# Patient Record
Sex: FEMALE | NOT HISPANIC OR LATINO | Employment: OTHER | URBAN - METROPOLITAN AREA
[De-identification: names, ages, dates, MRNs, and addresses within clinical notes are randomized per-mention and may not be internally consistent; named-entity substitution may affect disease eponyms.]

---

## 2018-10-01 ENCOUNTER — APPOINTMENT (OUTPATIENT)
Dept: CT IMAGING | Facility: HOSPITAL | Age: 73
End: 2018-10-01

## 2018-10-01 ENCOUNTER — HOSPITAL ENCOUNTER (INPATIENT)
Facility: HOSPITAL | Age: 73
LOS: 18 days | Discharge: NURSING, REHAB OR LONG-TERM CARE | End: 2018-10-19
Attending: EMERGENCY MEDICINE | Admitting: INTERNAL MEDICINE

## 2018-10-01 ENCOUNTER — APPOINTMENT (OUTPATIENT)
Dept: GENERAL RADIOLOGY | Facility: HOSPITAL | Age: 73
End: 2018-10-01

## 2018-10-01 DIAGNOSIS — Z74.09 IMPAIRED FUNCTIONAL MOBILITY, BALANCE, GAIT, AND ENDURANCE: ICD-10-CM

## 2018-10-01 DIAGNOSIS — R13.12 OROPHARYNGEAL DYSPHAGIA: ICD-10-CM

## 2018-10-01 DIAGNOSIS — Z74.09 IMPAIRED MOBILITY AND ADLS: ICD-10-CM

## 2018-10-01 DIAGNOSIS — J96.01 ACUTE RESPIRATORY FAILURE WITH HYPOXEMIA (HCC): Primary | ICD-10-CM

## 2018-10-01 DIAGNOSIS — Z78.9 IMPAIRED MOBILITY AND ADLS: ICD-10-CM

## 2018-10-01 PROBLEM — R63.4 WEIGHT LOSS: Status: ACTIVE | Noted: 2018-10-01

## 2018-10-01 PROBLEM — R41.82 ALTERED MENTAL STATE: Status: ACTIVE | Noted: 2018-10-01

## 2018-10-01 PROBLEM — E87.1 HYPONATREMIA: Status: ACTIVE | Noted: 2018-10-01

## 2018-10-01 PROBLEM — J96.00 ACUTE RESPIRATORY FAILURE (HCC): Status: ACTIVE | Noted: 2018-10-01

## 2018-10-01 PROBLEM — R10.84 GENERALIZED ABDOMINAL PAIN: Status: ACTIVE | Noted: 2018-10-01

## 2018-10-01 PROBLEM — R40.2430 GLASGOW COMA SCALE TOTAL SCORE 3-8 (HCC): Status: ACTIVE | Noted: 2018-10-01

## 2018-10-01 PROBLEM — E16.2 HYPOGLYCEMIA: Status: ACTIVE | Noted: 2018-10-01

## 2018-10-01 PROBLEM — I95.9 HYPOTENSION: Status: ACTIVE | Noted: 2018-10-01

## 2018-10-01 LAB
ALBUMIN SERPL-MCNC: 3.08 G/DL (ref 3.2–4.8)
ALT SERPL W P-5'-P-CCNC: 25 U/L (ref 7–40)
AMPHET+METHAMPHET UR QL: NEGATIVE
AMPHETAMINES UR QL: NEGATIVE
AMYLASE SERPL-CCNC: 99 U/L (ref 30–118)
ANION GAP SERPL CALCULATED.3IONS-SCNC: 10 MMOL/L (ref 3–11)
APTT PPP: 26.6 SECONDS (ref 24–31)
ARTERIAL PATENCY WRIST A: ABNORMAL
AST SERPL-CCNC: 39 U/L (ref 0–33)
ATMOSPHERIC PRESS: ABNORMAL MMHG
BACTERIA UR QL AUTO: ABNORMAL /HPF
BARBITURATES UR QL SCN: NEGATIVE
BASE EXCESS BLDA CALC-SCNC: 0.6 MMOL/L (ref 0–2)
BASOPHILS # BLD AUTO: 0.01 10*3/MM3 (ref 0–0.2)
BASOPHILS NFR BLD AUTO: 0.1 % (ref 0–1)
BDY SITE: ABNORMAL
BENZODIAZ UR QL SCN: NEGATIVE
BILIRUB UR QL STRIP: NEGATIVE
BUN BLD-MCNC: 34 MG/DL (ref 9–23)
BUN/CREAT SERPL: 32.7 (ref 7–25)
BUPRENORPHINE SERPL-MCNC: NEGATIVE NG/ML
CALCIUM SPEC-SCNC: 8.6 MG/DL (ref 8.7–10.4)
CANNABINOIDS SERPL QL: NEGATIVE
CHLORIDE SERPL-SCNC: 86 MMOL/L (ref 99–109)
CK SERPL-CCNC: 212 U/L (ref 26–174)
CLARITY UR: CLEAR
CO2 BLDA-SCNC: 25.2 MMOL/L (ref 22–33)
CO2 SERPL-SCNC: 23 MMOL/L (ref 20–31)
COCAINE UR QL: NEGATIVE
COHGB MFR BLD: 0.5 % (ref 0–2)
COLOR UR: YELLOW
CORTIS SERPL-MCNC: 39.5 MCG/DL
CREAT BLD-MCNC: 1.04 MG/DL (ref 0.6–1.3)
D-LACTATE SERPL-SCNC: 1.5 MMOL/L (ref 0.5–2)
DEPRECATED RDW RBC AUTO: 43.8 FL (ref 37–54)
EOSINOPHIL # BLD AUTO: 0 10*3/MM3 (ref 0–0.3)
EOSINOPHIL NFR BLD AUTO: 0 % (ref 0–3)
ERYTHROCYTE [DISTWIDTH] IN BLOOD BY AUTOMATED COUNT: 14.1 % (ref 11.3–14.5)
ETHANOL BLD-MCNC: <10 MG/DL (ref 0–10)
GFR SERPL CREATININE-BSD FRML MDRD: 52 ML/MIN/1.73
GLUCOSE BLD-MCNC: 178 MG/DL (ref 70–100)
GLUCOSE BLDC GLUCOMTR-MCNC: 198 MG/DL (ref 70–130)
GLUCOSE BLDC GLUCOMTR-MCNC: 81 MG/DL (ref 70–130)
GLUCOSE UR STRIP-MCNC: ABNORMAL MG/DL
HCO3 BLDA-SCNC: 24.2 MMOL/L (ref 20–26)
HCT VFR BLD AUTO: 36.9 % (ref 34.5–44)
HCT VFR BLD CALC: 37.5 %
HGB BLD-MCNC: 12.6 G/DL (ref 11.5–15.5)
HGB BLDA-MCNC: 12.2 G/DL (ref 14–18)
HGB UR QL STRIP.AUTO: NEGATIVE
HOLD SPECIMEN: NORMAL
HOLD SPECIMEN: NORMAL
HOROWITZ INDEX BLD+IHG-RTO: 60 %
HYALINE CASTS UR QL AUTO: ABNORMAL /LPF
IMM GRANULOCYTES # BLD: 0.03 10*3/MM3 (ref 0–0.03)
IMM GRANULOCYTES NFR BLD: 0.3 % (ref 0–0.6)
KETONES UR QL STRIP: NEGATIVE
LEUKOCYTE ESTERASE UR QL STRIP.AUTO: ABNORMAL
LIPASE SERPL-CCNC: 47 U/L (ref 6–51)
LYMPHOCYTES # BLD AUTO: 0.74 10*3/MM3 (ref 0.6–4.8)
LYMPHOCYTES NFR BLD AUTO: 7.1 % (ref 24–44)
MAGNESIUM SERPL-MCNC: 1.6 MG/DL (ref 1.3–2.7)
MCH RBC QN AUTO: 29 PG (ref 27–31)
MCHC RBC AUTO-ENTMCNC: 34.1 G/DL (ref 32–36)
MCV RBC AUTO: 84.8 FL (ref 80–99)
METHADONE UR QL SCN: NEGATIVE
METHGB BLD QL: 1.3 % (ref 0–1.5)
MODALITY: ABNORMAL
MONOCYTES # BLD AUTO: 0.52 10*3/MM3 (ref 0–1)
MONOCYTES NFR BLD AUTO: 5 % (ref 0–12)
NEUTROPHILS # BLD AUTO: 9.2 10*3/MM3 (ref 1.5–8.3)
NEUTROPHILS NFR BLD AUTO: 87.8 % (ref 41–71)
NITRITE UR QL STRIP: NEGATIVE
OPIATES UR QL: NEGATIVE
OXYCODONE UR QL SCN: NEGATIVE
OXYHGB MFR BLDV: 97.7 % (ref 94–99)
PCO2 BLDA: 34.5 MM HG (ref 35–45)
PCP UR QL SCN: NEGATIVE
PH BLDA: 7.45 PH UNITS (ref 7.35–7.45)
PH UR STRIP.AUTO: 7 [PH] (ref 5–8)
PLATELET # BLD AUTO: 317 10*3/MM3 (ref 150–450)
PMV BLD AUTO: 8.9 FL (ref 6–12)
PO2 BLDA: 179 MM HG (ref 83–108)
POTASSIUM BLD-SCNC: 3.5 MMOL/L (ref 3.5–5.5)
PROCALCITONIN SERPL-MCNC: <0.05 NG/ML
PROPOXYPH UR QL: NEGATIVE
PROT UR QL STRIP: ABNORMAL
RBC # BLD AUTO: 4.35 10*6/MM3 (ref 3.89–5.14)
RBC # UR: ABNORMAL /HPF
REF LAB TEST METHOD: ABNORMAL
SODIUM BLD-SCNC: 119 MMOL/L (ref 132–146)
SODIUM BLD-SCNC: 125 MMOL/L (ref 132–146)
SP GR UR STRIP: 1.02 (ref 1–1.03)
SQUAMOUS #/AREA URNS HPF: ABNORMAL /HPF
T4 FREE SERPL-MCNC: 1.36 NG/DL (ref 0.89–1.76)
TRICYCLICS UR QL SCN: NEGATIVE
TROPONIN I SERPL-MCNC: 0.02 NG/ML (ref 0–0.07)
TSH SERPL DL<=0.05 MIU/L-ACNC: 3.11 MIU/ML (ref 0.35–5.35)
UROBILINOGEN UR QL STRIP: ABNORMAL
WBC NRBC COR # BLD: 10.47 10*3/MM3 (ref 3.5–10.8)
WBC UR QL AUTO: ABNORMAL /HPF
WHOLE BLOOD HOLD SPECIMEN: NORMAL
WHOLE BLOOD HOLD SPECIMEN: NORMAL

## 2018-10-01 PROCEDURE — 25010000002 FENTANYL CITRATE (PF) 2500 MCG/50ML SOLUTION: Performed by: NURSE PRACTITIONER

## 2018-10-01 PROCEDURE — 25010000002 PIPERACILLIN SOD-TAZOBACTAM PER 1 G: Performed by: EMERGENCY MEDICINE

## 2018-10-01 PROCEDURE — 84460 ALANINE AMINO (ALT) (SGPT): CPT | Performed by: EMERGENCY MEDICINE

## 2018-10-01 PROCEDURE — 82805 BLOOD GASES W/O2 SATURATION: CPT | Performed by: EMERGENCY MEDICINE

## 2018-10-01 PROCEDURE — 83735 ASSAY OF MAGNESIUM: CPT | Performed by: EMERGENCY MEDICINE

## 2018-10-01 PROCEDURE — 70498 CT ANGIOGRAPHY NECK: CPT

## 2018-10-01 PROCEDURE — 85730 THROMBOPLASTIN TIME PARTIAL: CPT | Performed by: EMERGENCY MEDICINE

## 2018-10-01 PROCEDURE — B543ZZA ULTRASONOGRAPHY OF RIGHT JUGULAR VEINS, GUIDANCE: ICD-10-PCS | Performed by: NURSE PRACTITIONER

## 2018-10-01 PROCEDURE — 82533 TOTAL CORTISOL: CPT | Performed by: INTERNAL MEDICINE

## 2018-10-01 PROCEDURE — 94799 UNLISTED PULMONARY SVC/PX: CPT

## 2018-10-01 PROCEDURE — 85025 COMPLETE CBC W/AUTO DIFF WBC: CPT | Performed by: EMERGENCY MEDICINE

## 2018-10-01 PROCEDURE — 5A1945Z RESPIRATORY VENTILATION, 24-96 CONSECUTIVE HOURS: ICD-10-PCS | Performed by: INTERNAL MEDICINE

## 2018-10-01 PROCEDURE — 25010000002 ALBUMIN HUMAN 5% PER 50 ML: Performed by: NURSE PRACTITIONER

## 2018-10-01 PROCEDURE — 85014 HEMATOCRIT: CPT

## 2018-10-01 PROCEDURE — 36600 WITHDRAWAL OF ARTERIAL BLOOD: CPT | Performed by: EMERGENCY MEDICINE

## 2018-10-01 PROCEDURE — 80048 BASIC METABOLIC PNL TOTAL CA: CPT | Performed by: INTERNAL MEDICINE

## 2018-10-01 PROCEDURE — 82962 GLUCOSE BLOOD TEST: CPT

## 2018-10-01 PROCEDURE — 87040 BLOOD CULTURE FOR BACTERIA: CPT | Performed by: EMERGENCY MEDICINE

## 2018-10-01 PROCEDURE — 80306 DRUG TEST PRSMV INSTRMNT: CPT | Performed by: EMERGENCY MEDICINE

## 2018-10-01 PROCEDURE — 71045 X-RAY EXAM CHEST 1 VIEW: CPT

## 2018-10-01 PROCEDURE — 99291 CRITICAL CARE FIRST HOUR: CPT

## 2018-10-01 PROCEDURE — 80047 BASIC METABLC PNL IONIZED CA: CPT

## 2018-10-01 PROCEDURE — 51702 INSERT TEMP BLADDER CATH: CPT

## 2018-10-01 PROCEDURE — 80307 DRUG TEST PRSMV CHEM ANLYZR: CPT | Performed by: EMERGENCY MEDICINE

## 2018-10-01 PROCEDURE — 82550 ASSAY OF CK (CPK): CPT | Performed by: EMERGENCY MEDICINE

## 2018-10-01 PROCEDURE — 82150 ASSAY OF AMYLASE: CPT | Performed by: INTERNAL MEDICINE

## 2018-10-01 PROCEDURE — 74176 CT ABD & PELVIS W/O CONTRAST: CPT

## 2018-10-01 PROCEDURE — 83605 ASSAY OF LACTIC ACID: CPT | Performed by: EMERGENCY MEDICINE

## 2018-10-01 PROCEDURE — 05HM33Z INSERTION OF INFUSION DEVICE INTO RIGHT INTERNAL JUGULAR VEIN, PERCUTANEOUS APPROACH: ICD-10-PCS | Performed by: NURSE PRACTITIONER

## 2018-10-01 PROCEDURE — 84295 ASSAY OF SERUM SODIUM: CPT | Performed by: INTERNAL MEDICINE

## 2018-10-01 PROCEDURE — 25010000002 VANCOMYCIN PER 500 MG: Performed by: EMERGENCY MEDICINE

## 2018-10-01 PROCEDURE — 84484 ASSAY OF TROPONIN QUANT: CPT

## 2018-10-01 PROCEDURE — 71250 CT THORAX DX C-: CPT

## 2018-10-01 PROCEDURE — 99291 CRITICAL CARE FIRST HOUR: CPT | Performed by: INTERNAL MEDICINE

## 2018-10-01 PROCEDURE — 84450 TRANSFERASE (AST) (SGOT): CPT | Performed by: EMERGENCY MEDICINE

## 2018-10-01 PROCEDURE — P9041 ALBUMIN (HUMAN),5%, 50ML: HCPCS | Performed by: NURSE PRACTITIONER

## 2018-10-01 PROCEDURE — 93005 ELECTROCARDIOGRAM TRACING: CPT | Performed by: EMERGENCY MEDICINE

## 2018-10-01 PROCEDURE — 83690 ASSAY OF LIPASE: CPT | Performed by: INTERNAL MEDICINE

## 2018-10-01 PROCEDURE — 84145 PROCALCITONIN (PCT): CPT | Performed by: EMERGENCY MEDICINE

## 2018-10-01 PROCEDURE — 82040 ASSAY OF SERUM ALBUMIN: CPT | Performed by: NURSE PRACTITIONER

## 2018-10-01 PROCEDURE — 81001 URINALYSIS AUTO W/SCOPE: CPT | Performed by: EMERGENCY MEDICINE

## 2018-10-01 PROCEDURE — 94002 VENT MGMT INPAT INIT DAY: CPT

## 2018-10-01 PROCEDURE — 84443 ASSAY THYROID STIM HORMONE: CPT | Performed by: EMERGENCY MEDICINE

## 2018-10-01 PROCEDURE — 84439 ASSAY OF FREE THYROXINE: CPT | Performed by: NURSE PRACTITIONER

## 2018-10-01 PROCEDURE — 85610 PROTHROMBIN TIME: CPT

## 2018-10-01 PROCEDURE — 0042T HC CT CEREBRAL PERFUSION W/WO CONTRAST: CPT

## 2018-10-01 PROCEDURE — 70450 CT HEAD/BRAIN W/O DYE: CPT

## 2018-10-01 PROCEDURE — 0 IOPAMIDOL PER 1 ML: Performed by: EMERGENCY MEDICINE

## 2018-10-01 PROCEDURE — 36556 INSERT NON-TUNNEL CV CATH: CPT | Performed by: NURSE PRACTITIONER

## 2018-10-01 PROCEDURE — 70496 CT ANGIOGRAPHY HEAD: CPT

## 2018-10-01 RX ORDER — LISINOPRIL 10 MG/1
10 TABLET ORAL 2 TIMES DAILY
Status: ON HOLD | COMMUNITY
End: 2018-10-02

## 2018-10-01 RX ORDER — NOREPINEPHRINE BIT/0.9 % NACL 8 MG/250ML
.02-.3 INFUSION BOTTLE (ML) INTRAVENOUS
Status: DISCONTINUED | OUTPATIENT
Start: 2018-10-01 | End: 2018-10-04

## 2018-10-01 RX ORDER — SODIUM CHLORIDE 0.9 % (FLUSH) 0.9 %
1-10 SYRINGE (ML) INJECTION AS NEEDED
Status: DISCONTINUED | OUTPATIENT
Start: 2018-10-01 | End: 2018-10-19 | Stop reason: HOSPADM

## 2018-10-01 RX ORDER — DOXYCYCLINE HYCLATE 100 MG/1
100 CAPSULE ORAL 2 TIMES DAILY
Status: ON HOLD | COMMUNITY
Start: 2018-09-26 | End: 2018-10-02

## 2018-10-01 RX ORDER — CLOPIDOGREL BISULFATE 75 MG/1
75 TABLET ORAL DAILY
COMMUNITY

## 2018-10-01 RX ORDER — PRAMIPEXOLE DIHYDROCHLORIDE 1 MG/1
1 TABLET ORAL 3 TIMES DAILY
COMMUNITY

## 2018-10-01 RX ORDER — SPIRONOLACTONE AND HYDROCHLOROTHIAZIDE 25; 25 MG/1; MG/1
1 TABLET ORAL DAILY
COMMUNITY
End: 2018-10-19 | Stop reason: HOSPADM

## 2018-10-01 RX ORDER — ALBUMIN, HUMAN INJ 5% 5 %
500 SOLUTION INTRAVENOUS ONCE
Status: COMPLETED | OUTPATIENT
Start: 2018-10-01 | End: 2018-10-01

## 2018-10-01 RX ORDER — SODIUM CHLORIDE 450 MG/100ML
100 INJECTION, SOLUTION INTRAVENOUS CONTINUOUS
Status: DISCONTINUED | OUTPATIENT
Start: 2018-10-01 | End: 2018-10-02

## 2018-10-01 RX ORDER — SODIUM CHLORIDE, SODIUM LACTATE, POTASSIUM CHLORIDE, CALCIUM CHLORIDE 600; 310; 30; 20 MG/100ML; MG/100ML; MG/100ML; MG/100ML
150 INJECTION, SOLUTION INTRAVENOUS CONTINUOUS
Status: DISCONTINUED | OUTPATIENT
Start: 2018-10-01 | End: 2018-10-01

## 2018-10-01 RX ORDER — ESOMEPRAZOLE MAGNESIUM 40 MG/1
40 CAPSULE, DELAYED RELEASE ORAL DAILY
COMMUNITY
End: 2018-10-19 | Stop reason: HOSPADM

## 2018-10-01 RX ORDER — KETOROLAC TROMETHAMINE 5 MG/ML
1 SOLUTION OPHTHALMIC 4 TIMES DAILY PRN
COMMUNITY
End: 2018-10-19 | Stop reason: HOSPADM

## 2018-10-01 RX ORDER — LOSARTAN POTASSIUM 100 MG/1
100 TABLET ORAL DAILY
COMMUNITY
End: 2018-10-19 | Stop reason: HOSPADM

## 2018-10-01 RX ORDER — VANCOMYCIN HYDROCHLORIDE 1 G/200ML
1000 INJECTION, SOLUTION INTRAVENOUS ONCE
Status: COMPLETED | OUTPATIENT
Start: 2018-10-01 | End: 2018-10-01

## 2018-10-01 RX ORDER — SODIUM CHLORIDE 0.9 % (FLUSH) 0.9 %
10 SYRINGE (ML) INJECTION AS NEEDED
Status: DISCONTINUED | OUTPATIENT
Start: 2018-10-01 | End: 2018-10-19 | Stop reason: HOSPADM

## 2018-10-01 RX ADMIN — TAZOBACTAM SODIUM AND PIPERACILLIN SODIUM 3.38 G: 375; 3 INJECTION, SOLUTION INTRAVENOUS at 23:12

## 2018-10-01 RX ADMIN — SODIUM CHLORIDE 1000 ML: 9 INJECTION, SOLUTION INTRAVENOUS at 16:10

## 2018-10-01 RX ADMIN — NOREPINEPHRINE BITARTRATE 0.2 MCG/KG/MIN: 8 SOLUTION at 23:11

## 2018-10-01 RX ADMIN — ALBUMIN HUMAN 500 ML: 0.05 INJECTION, SOLUTION INTRAVENOUS at 20:55

## 2018-10-01 RX ADMIN — IOPAMIDOL 115 ML: 755 INJECTION, SOLUTION INTRAVENOUS at 16:28

## 2018-10-01 RX ADMIN — TAZOBACTAM SODIUM AND PIPERACILLIN SODIUM 3.38 G: 375; 3 INJECTION, SOLUTION INTRAVENOUS at 17:45

## 2018-10-01 RX ADMIN — SODIUM CHLORIDE, POTASSIUM CHLORIDE, SODIUM LACTATE AND CALCIUM CHLORIDE 150 ML/HR: 600; 310; 30; 20 INJECTION, SOLUTION INTRAVENOUS at 20:56

## 2018-10-01 RX ADMIN — VANCOMYCIN HYDROCHLORIDE 1000 MG: 1 INJECTION, SOLUTION INTRAVENOUS at 18:14

## 2018-10-01 RX ADMIN — SODIUM CHLORIDE 100 ML/HR: 4.5 INJECTION, SOLUTION INTRAVENOUS at 23:12

## 2018-10-01 RX ADMIN — FENTANYL CITRATE 50 MCG/HR: 50 INJECTION, SOLUTION INTRAMUSCULAR; INTRAVENOUS at 23:39

## 2018-10-01 RX ADMIN — SODIUM CHLORIDE 1000 ML: 9 INJECTION, SOLUTION INTRAVENOUS at 20:30

## 2018-10-01 RX ADMIN — SODIUM CHLORIDE 1000 ML: 9 INJECTION, SOLUTION INTRAVENOUS at 17:49

## 2018-10-01 NOTE — H&P
"Pulmonary/Critical Care History and Physical Exam     LOS: 0 days   Patient Care Team:  Provider, No Known as PCP - General    Chief Complaint:    Chief Complaint   Patient presents with   • Loss of Consciousness     Source of history: Chart, attempted to call available phone number from chart and message says that number is unavailable.    Subjective     HPI:     73-year-old female who arrived by [facility] to the emergency department here after being found down and unresponsive by family in her home.  She reportedly was found by her son unresponsive and \"not breathing\".  The patient's blood sugar apparently was 105 initially, but 39 mg/dL en route.  Initially, the patient had an oxygen saturation of 47% and was intubated in the field.  The ER was able to contact the patient's son however he was noted to be a poor historian.  He does report that she has had increased weakness over the last few weeks and states that she was evaluated for a seizure 2 weeks previously and had hyponatremia at that point.  He denied that she has diabetes.  She reportedly has a history of CVA in the past.    CT head done emergency department showed no acute abnormality.  CT perfusion study was negative according to the ER attending physician although I do not see an official report is taking this.  CT angiogram is pending.  The patient was started on empiric antibiotics for sepsis.  She was significantly hypotensive in the ER although responds to fluids before drifting back down to 60s to 70s systolic.    On my evaluation, the patient is intubated and drowsy but is arousable.  She follows commands in all 4 extremities.  She does report abdominal tenderness.    I attempted to call the patient's son however I was unable to contact them as the patient's son's phone number message states that the numbers unavailable.    History taken from: patient    Past Medical History:   Diagnosis Date   • Stroke (CMS/[facility])        History reviewed. No " pertinent surgical history.    History reviewed. No pertinent family history.    Social History     Social History   • Marital status: Unknown     Spouse name: N/A   • Number of children: N/A   • Years of education: N/A     Occupational History   • Not on file.     Social History Main Topics   • Smoking status: Unknown If Ever Smoked   • Smokeless tobacco: Not on file   • Alcohol use Defer   • Drug use: Unknown   • Sexual activity: Defer     Other Topics Concern   • Not on file     Social History Narrative   • No narrative on file       Not on File    PMH/FH/SocH were reviewed by me and updates were made.     Review of Systems:    Review of 14 systems was completed with positives and pertinent negatives noted in the subjective section.  All other systems reviewed and are negative.   Exceptions are noted below:    Unable to obtain secondary to altered mental status, intubation.    Objective     Vital Signs  Temp:  [94 °F (34.4 °C)-96.6 °F (35.9 °C)] 96.6 °F (35.9 °C)  Heart Rate:  [67-86] 71  Resp:  [14-16] 16  BP: ()/(36-80) 115/64  FiO2 (%):  [60 %] 60 %  Body mass index is 14.63 kg/m².  FiO2 (%):  [60 %] 60 %  S RR:  [12] 12  PEEP/CPAP (cm H2O):  [5 cm H20] 5 cm H20    Physical Exam:     General Appearance:    Drowsy, on ventilator, cachectic, in no acute distress   Head:    Normocephalic, without obvious abnormality, atraumatic   Eyes:            Lids and lashes normal, conjunctivae and sclerae normal, no   icterus, no pallor, corneas clear, PERRL   ENMT:   Ears appear intact with no abnormalities noted      No oral lesions, edentulous, no thrush, oral mucosa moist, oral endotracheal tube in place.     Neck:   No adenopathy, supple, trachea midline, no thyromegaly, no   carotid bruit, no JVD       Lungs/resp:     On ventilator, symmetric chest rise, no crepitus, clear to      auscultation bilaterally, no chest wall tenderness                  Heart/CV:    Regular rhythm and normal rate, normal S1 and S2, no             murmur   Abdomen/GI:     Normal bowel sounds, no masses, no organomegaly, soft, moderately distended, mild diffuse tenderness to palpation.         G/U:     Deferred   Extremities/MSK:   No clubbing, cyanosis or edema.  Normal tone.  No deformities.    Pulses:   Pulses palpable and equal bilaterally   Skin:   No bleeding, multiple bruises.  Some erythema of bilateral lower extremities.  Wrinkling of skin lower extremities suggesting recent improvement of chronic edema.     Hem/Lymph:   No cervical or supraclavicular adenopathy.    Neurologic:   Moves all extremities with no obvious focal motor deficit.  Cranial nerves 2 - 12 grossly intact            Psychiatric:  Drowsy, nonagitated.     Results Review:     I reviewed the patient's new clinical results.     Results from last 7 days  Lab Units 10/01/18  1640   SODIUM mmol/L 119*   POTASSIUM mmol/L 3.5   CHLORIDE mmol/L 86*   CO2 mmol/L 23.0   BUN mg/dL 34*   CREATININE mg/dL 1.04   CALCIUM mg/dL 8.6*   ALT (SGPT) U/L 25   AST (SGOT) U/L 39*   GLUCOSE mg/dL 178*       Results from last 7 days  Lab Units 10/01/18  1640   WBC 10*3/mm3 10.47   HEMOGLOBIN g/dL 12.6   HEMATOCRIT % 36.9   PLATELETS 10*3/mm3 317       Results from last 7 days  Lab Units 10/01/18  1700   PH, ARTERIAL pH units 7.454*   PO2 ART mm Hg 179.0*   PCO2, ARTERIAL mm Hg 34.5*   HCO3 ART mmol/L 24.2       I reviewed the patient's new imaging including images and reports.    Medication Review:     sodium chloride 1,000 mL Intravenous Once          Assessment/Plan     Hospital Problem List     * (Principal)Acute respiratory failure (CMS/HCC)    Altered mental state    Acute respiratory failure with hypoxemia (CMS/HCC)    Hyponatremia    Hypoglycemia    Generalized abdominal pain    Weight loss    Maria D coma scale total score 3-8 (CMS/HCC) - on arrival    Hypotension        73-year-old female with a history of hypertension, reported history of prior stroke, recent weight loss and episode of  hyponatremia/seizure was brought to the emergency department after being found down by family at home.  She was intubated in the field for hypoxemic respiratory failure.  Blood sugar en route was 39 mg/dL and was replaced.  She has been persistently hypotensive since arrival.  Pro-calcitonin is low.    Chest x-ray was read as no significant abnormality however I am concerned about a possible AP window density/left upper lobe density (although this may be due to rotation).  Additionally, she seems to have some abdominal tenderness and distention on exam.  Her son reports that she was recently treated with doxycycline for a urinary tract infection.  She has poor food intake and has had persistent weight loss over the past year and a half.    I think there is a chance that the patient had a seizure/post ictal state, possibly triggered by hypoglycemia, hyponatremia or a combination.  I suspect she has an underlying issue causing her poor oral intake and weight loss.  Chest x-ray seems abnormal to me and I think she needs a CT scan of the chest, abdomen, and pelvis to rule out possible malignancy and evaluate for the possibility of mesenteric ischemia or intra-abdominal abscess.    Plan:  1.  Admit to the intensive care unit.  2.  Fluid resuscitation.  3.  May require central line, pressors.  4.  Continue antibiotics for now, repeat pro-calcitonin in a.m.  May be able to de-escalate antibiotics soon.  5.  Follow-up cultures.  6.  Check serum cortisol.  7.  Check CT scan of the chest as well as abdomen/pelvis to evaluate abnormal chest x-ray (rule out left upper lobe/AP window pathology) and further evaluate patient's abdominal pain.  8.  Monitor sodium to avoid overly rapid correction.  9.  Hopefully can be weaned from mechanical ventilation tomorrow.      Dhruv Crandall MD  10/01/18  8:01 PM    60 minutes of critical care provided. This time excludes other billable procedures. Time does include preparation of  documents, medical consultations, review of old records, and direct bedside care. Patient was at high risk for life-threatening deterioration due to acute respiratory failure, hypotension.       Addendum: The patient's son, Bryn Huddleston, called back and I was able to get some additional history which I added to the history above.  He is currently only available at the patient's home phone number, as his cell phone has been turned off.  He states that he is going to try to come to the hospital tomorrow, but needs to get his phone turned back on so he can use the map function.    Patient's PCP is Evelyn Francisco [phone].

## 2018-10-01 NOTE — ED PROVIDER NOTES
"Subjective   Ms. Devora Huddleston is a 73-year-old female presenting to the emergency department via helicopter EMS for evaluation s/p being found unconscious by her family in her home. Per EMS personnel, the patient was found by her son unresponsive and barely breathing at her home in [place], [place]. Upon EMS arrival, the patient reportedly was unresponsive with PERRL. When the patient boarded the helicopter, her blood sugar level and was recorded to be 105 mg/dL when checked by EMS, then 39 mg/dL en route taken by helicopter crew, and 200 mg/dL upon arrival at the ED. Oxygen saturation was initially 47%, at which time the patient was intubated and oxygen saturation level went to 100%. The patient's son was called after initial evaluation and provided a slightly more extensive history, but he is a poor historian. He states that the last known well could have been as early as 12:00 today. The patient has a hx of CVA, and the son says \"I could imagine\" when asked if she has any deficits from the previous experiences. He reports that she has been increasingly weak for the past few weeks. He also states that she had a seizure two weeks ago and had low sodium at that point, but does not believe she has DM or currently takes any medication for controlling blood sugar.         History provided by:  EMS personnel and relative  Neurologic Problem   The patient's primary symptoms include an altered mental status, syncope and weakness. This is a recurrent problem. The current episode started today. The neurological problem developed suddenly. The last time the patient was known to be well was 10/1/2018 12:00 PM.  The problem has been gradually improving since onset. Treatments tried: Intubation, ventilation. The treatment provided mild relief. Her past medical history is significant for a CVA and seizures.       Review of Systems   Unable to perform ROS: Acuity of condition   Neurological: Positive for syncope and " weakness.       Past Medical History:   Diagnosis Date   • Stroke (CMS/HCC)        Not on File    History reviewed. No pertinent surgical history.    History reviewed. No pertinent family history.    Social History     Social History   • Marital status: Unknown     Social History Main Topics   • Smoking status: Unknown If Ever Smoked   • Alcohol use Defer   • Drug use: Unknown   • Sexual activity: Defer     Other Topics Concern   • Not on file         Objective   Physical Exam   Constitutional: She appears distressed.   HENT:   Head: Normocephalic and atraumatic.   Nose: Nose normal.   Eyes: Pupils are equal, round, and reactive to light. Conjunctivae are normal. No scleral icterus.   PERRL at 4 mm in diameter.   Neck: Normal range of motion. Neck supple.   Cardiovascular: Normal rate, regular rhythm, normal heart sounds and intact distal pulses.    No murmur heard.  Pulmonary/Chest: She is in respiratory distress.   Patient is intubated and ventilated.   Abdominal: Soft. There is no tenderness.   Musculoskeletal: Normal range of motion.   Neurological:   Patient is lethargic. Moves all 4 extremities but not on command. Spont eye opening occasionally.   Skin: Skin is warm and dry.   Nursing note and vitals reviewed.      Critical Care  Performed by: VALARIE THOMAS  Authorized by: VALARIE THOMAS     Critical care provider statement:     Critical care time (minutes):  45    Critical care time was exclusive of:  Separately billable procedures and treating other patients    Critical care was necessary to treat or prevent imminent or life-threatening deterioration of the following conditions:  Respiratory failure, circulatory failure, metabolic crisis and CNS failure or compromise    Critical care was time spent personally by me on the following activities:  Blood draw for specimens, development of treatment plan with patient or surrogate, discussions with consultants, evaluation of patient's response to treatment,  examination of patient, interpretation of cardiac output measurements, obtaining history from patient or surrogate, ordering and performing treatments and interventions, ordering and review of laboratory studies, ordering and review of radiographic studies, pulse oximetry, re-evaluation of patient's condition, review of old charts and ventilator management                 ED Course  ED Course as of Oct 01 1808   Mon Oct 01, 2018   1617 Met flight crew in CT scan room. Patient is awake, lethargic, moving all 4 extremities weakly and not on command. Pupils are reactive at 4mm. Patient is intubated and ventilated. She is responsive with meaningful movement of extremities to painful stimuli.  [CB]   1621 Obtained note from EMS personnel with the following information:  Patient's last name: Flaquito  : 1945  Cell phone used to make 911 call: [phone]  EMS states that the cell phone number apparently belongs to the patient, but the caller was the patient's son.  [CB]   1626 I have attempted to call the provided number twice with no answer.  [CB]   1640 The patient's son called back and stated that the number provided by EMS is a landline phone number for the patient and her son. The cell number for the son is [phone].  [CB]   1641 [facility] phone number is [phone].  [CB]   1727 I was called back to the patient's room emergently due to the systolic blood pressure dropping to the 60s. I directed further resuscitation and large pore IVs.   [CB]   1755 Paged the intensivist.  [CB]   1759 Chest x-ray by my interpretation and confirmed by the radiologist shows no active disease.  CT perfusion negative.  [MS]   1807 Spoke with Dr. Crandall, the intensivist on call, who will admit the patient.  [CB]      ED Course User Index  [CB] Thony Bains  [MS] Stu Prescott MD     Recent Results (from the past 24 hour(s))   aPTT    Collection Time: 10/01/18  4:40 PM   Result Value Ref Range     PTT 26.6 24.0 - 31.0 seconds   AST    Collection Time: 10/01/18  4:40 PM   Result Value Ref Range    AST (SGOT) 39 (H) 0 - 33 U/L   ALT    Collection Time: 10/01/18  4:40 PM   Result Value Ref Range    ALT (SGPT) 25 7 - 40 U/L   Light Blue Top    Collection Time: 10/01/18  4:40 PM   Result Value Ref Range    Extra Tube hold for add-on    Green Top (Gel)    Collection Time: 10/01/18  4:40 PM   Result Value Ref Range    Extra Tube Hold for add-ons.    Lavender Top    Collection Time: 10/01/18  4:40 PM   Result Value Ref Range    Extra Tube hold for add-on    Gold Top - SST    Collection Time: 10/01/18  4:40 PM   Result Value Ref Range    Extra Tube Hold for add-ons.    CBC Auto Differential    Collection Time: 10/01/18  4:40 PM   Result Value Ref Range    WBC 10.47 3.50 - 10.80 10*3/mm3    RBC 4.35 3.89 - 5.14 10*6/mm3    Hemoglobin 12.6 11.5 - 15.5 g/dL    Hematocrit 36.9 34.5 - 44.0 %    MCV 84.8 80.0 - 99.0 fL    MCH 29.0 27.0 - 31.0 pg    MCHC 34.1 32.0 - 36.0 g/dL    RDW 14.1 11.3 - 14.5 %    RDW-SD 43.8 37.0 - 54.0 fl    MPV 8.9 6.0 - 12.0 fL    Platelets 317 150 - 450 10*3/mm3    Neutrophil % 87.8 (H) 41.0 - 71.0 %    Lymphocyte % 7.1 (L) 24.0 - 44.0 %    Monocyte % 5.0 0.0 - 12.0 %    Eosinophil % 0.0 0.0 - 3.0 %    Basophil % 0.1 0.0 - 1.0 %    Immature Grans % 0.3 0.0 - 0.6 %    Neutrophils, Absolute 9.20 (H) 1.50 - 8.30 10*3/mm3    Lymphocytes, Absolute 0.74 0.60 - 4.80 10*3/mm3    Monocytes, Absolute 0.52 0.00 - 1.00 10*3/mm3    Eosinophils, Absolute 0.00 0.00 - 0.30 10*3/mm3    Basophils, Absolute 0.01 0.00 - 0.20 10*3/mm3    Immature Grans, Absolute 0.03 0.00 - 0.03 10*3/mm3   Ethanol    Collection Time: 10/01/18  4:40 PM   Result Value Ref Range    Ethanol <10 0 - 10 mg/dL   TSH    Collection Time: 10/01/18  4:40 PM   Result Value Ref Range    TSH 3.112 0.350 - 5.350 mIU/mL   Magnesium    Collection Time: 10/01/18  4:40 PM   Result Value Ref Range    Magnesium 1.6 1.3 - 2.7 mg/dL   CK    Collection  Time: 10/01/18  4:40 PM   Result Value Ref Range    Creatine Kinase 212 (H) 26 - 174 U/L   Procalcitonin    Collection Time: 10/01/18  4:45 PM   Result Value Ref Range    Procalcitonin <0.05 <=0.25 ng/mL   POC Troponin, Rapid    Collection Time: 10/01/18  4:48 PM   Result Value Ref Range    Troponin I 0.02 0.00 - 0.07 ng/mL   Blood Gas, Arterial    Collection Time: 10/01/18  5:00 PM   Result Value Ref Range    Site Arterial: right brachial     Stephon's Test N/A     pH, Arterial 7.454 (H) 7.350 - 7.450 pH units    pCO2, Arterial 34.5 (L) 35.0 - 45.0 mm Hg    pO2, Arterial 179.0 (H) 83.0 - 108.0 mm Hg    HCO3, Arterial 24.2 20.0 - 26.0 mmol/L    Base Excess, Arterial 0.6 0.0 - 2.0 mmol/L    Hemoglobin, Blood Gas 12.2 (L) 14 - 18 g/dL    Hematocrit, Blood Gas 37.5 %    Oxyhemoglobin 97.7 94 - 99 %    Methemoglobin 1.30 0.00 - 1.50 %    Carboxyhemoglobin 0.5 0 - 2 %    CO2 Content 25.2 22 - 33 mmol/L    Barometric Pressure for Blood Gas  mmHg    Modality Ventilator     FIO2 60 %   Urinalysis With Microscopic If Indicated (No Culture) - Urine, Catheter    Collection Time: 10/01/18  5:51 PM   Result Value Ref Range    Color, UA Yellow Yellow, Straw    Appearance, UA Clear Clear    pH, UA 7.0 5.0 - 8.0    Specific Gravity, UA 1.016 1.001 - 1.030    Glucose,  mg/dL (1+) (A) Negative    Ketones, UA Negative Negative    Bilirubin, UA Negative Negative    Blood, UA Negative Negative    Protein, UA 30 mg/dL (1+) (A) Negative    Leuk Esterase, UA Trace (A) Negative    Nitrite, UA Negative Negative    Urobilinogen, UA 1.0 E.U./dL 0.2 - 1.0 E.U./dL   Urinalysis, Microscopic Only - Urine, Clean Catch    Collection Time: 10/01/18  5:51 PM   Result Value Ref Range    RBC, UA 3-6 (A) None Seen, 0-2 /HPF    WBC, UA 3-5 (A) None Seen, 0-2 /HPF    Bacteria, UA None Seen None Seen, Trace /HPF    Squamous Epithelial Cells, UA 0-2 None Seen, 0-2 /HPF    Hyaline Casts, UA 0-6 0 - 6 /LPF    Methodology Automated Microscopy      Note: In  "addition to lab results from this visit, the labs listed above may include labs taken at another facility or during a different encounter within the last 24 hours. Please correlate lab times with ED admission and discharge times for further clarification of the services performed during this visit.    CT Head Without Contrast Stroke Protocol   Preliminary Result   No acute intracranial abnormality.       Scan performed on 10/1/2018 at 1607 hours. Scan report given to ER   physician and team in person at scanner by Dr. Marks on 10/1/2018 at 1615   hours.              XR Chest 1 View    (Results Pending)   CT Cerebral Perfusion With & Without Contrast    (Results Pending)   CT Angiogram Head With & Without Contrast    (Results Pending)   CT Angiogram Neck With & Without Contrast    (Results Pending)     Vitals:    10/01/18 1610 10/01/18 1700 10/01/18 1755 10/01/18 1759   BP: 96/64  118/71    BP Location: Left arm      Patient Position: Lying      Pulse: 76      Resp: 14      Temp:  94 °F (34.4 °C)  96.1 °F (35.6 °C)   TempSrc:  Core  Core   SpO2: 99%  100%    Weight:  36.3 kg (80 lb)     Height:  157.5 cm (62\")       Medications   sodium chloride 0.9 % flush 10 mL (not administered)   piperacillin-tazobactam (ZOSYN) 3.375 g in iso-osmotic dextrose 50 ml (premix) (not administered)   vancomycin (VANCOCIN) in iso-osmotic dextrose IVPB 1 g (premix) 200 mL (not administered)   iopamidol (ISOVUE-370) 76 % injection 150 mL (115 mL Intravenous Given 10/1/18 1628)   sodium chloride 0.9 % bolus 1,000 mL (0 mL Intravenous Stopped 10/1/18 1730)   sodium chloride 0.9 % bolus 1,000 mL (1,000 mL Intravenous New Bag 10/1/18 1749)     ECG/EMG Results (last 24 hours)     ** No results found for the last 24 hours. **        ECG 12 Lead   Final Result   Test Reason : Acute Stroke Protocol (onset < 12 hrs)   Blood Pressure : **/** mmHG   Vent. Rate : 072 BPM     Atrial Rate : 072 BPM      P-R Int : 208 ms          QRS Dur : 080 ms       QT " Int : 404 ms       P-R-T Axes : 081 042 241 degrees      QTc Int : 442 ms      Normal sinus rhythm   Low voltage QRS   ST & T wave abnormality, consider inferolateral ischemia   Abnormal ECG   No previous ECGs available   Confirmed by VALARIE PRESCOTT MD ([id number]) on 10/2/2018 2:13:25 AM      Referred By:  MARTHA           Confirmed By:VALARIE PRESCOTT MD                        [facility]    Final diagnoses:   Acute respiratory failure with hypoxemia (CMS/HCC)       Documentation assistance provided by kelli Bains.  Information recorded by the scribe was done at my direction and has been verified and validated by me.     Thony Bains  10/01/18 1730       Thony Bains  10/01/18 1800       Thony Bains  10/01/18 1800       Valarie Prescott MD  10/02/18 1969

## 2018-10-02 ENCOUNTER — APPOINTMENT (OUTPATIENT)
Dept: CARDIOLOGY | Facility: HOSPITAL | Age: 73
End: 2018-10-02

## 2018-10-02 ENCOUNTER — APPOINTMENT (OUTPATIENT)
Dept: GENERAL RADIOLOGY | Facility: HOSPITAL | Age: 73
End: 2018-10-02

## 2018-10-02 PROBLEM — S22.49XA MULTIPLE RIB FRACTURES: Status: ACTIVE | Noted: 2018-10-02

## 2018-10-02 PROBLEM — D64.9 ANEMIA: Status: ACTIVE | Noted: 2018-10-02

## 2018-10-02 LAB
ALBUMIN SERPL-MCNC: 3.45 G/DL (ref 3.2–4.8)
ALBUMIN/GLOB SERPL: 2.2 G/DL (ref 1.5–2.5)
ALP SERPL-CCNC: 81 U/L (ref 25–100)
ALT SERPL W P-5'-P-CCNC: 21 U/L (ref 7–40)
ANION GAP SERPL CALCULATED.3IONS-SCNC: 10 MMOL/L (ref 3–11)
ARTERIAL PATENCY WRIST A: ABNORMAL
AST SERPL-CCNC: 36 U/L (ref 0–33)
ATMOSPHERIC PRESS: ABNORMAL MMHG
BACTERIA UR QL AUTO: ABNORMAL /HPF
BASE EXCESS BLDA CALC-SCNC: 0.1 MMOL/L (ref 0–2)
BASOPHILS # BLD AUTO: 0.01 10*3/MM3 (ref 0–0.2)
BASOPHILS NFR BLD AUTO: 0 % (ref 0–1)
BDY SITE: ABNORMAL
BH CV ECHO MEAS - AO ROOT AREA (BSA CORRECTED): 2.1
BH CV ECHO MEAS - AO ROOT AREA: 5.9 CM^2
BH CV ECHO MEAS - AO ROOT DIAM: 2.7 CM
BH CV ECHO MEAS - BSA(HAYCOCK): 1.2 M^2
BH CV ECHO MEAS - BSA: 1.3 M^2
BH CV ECHO MEAS - BZI_BMI: 14.6 KILOGRAMS/M^2
BH CV ECHO MEAS - BZI_METRIC_HEIGHT: 157.5 CM
BH CV ECHO MEAS - BZI_METRIC_WEIGHT: 36.3 KG
BH CV ECHO MEAS - EDV(CUBED): 57.1 ML
BH CV ECHO MEAS - EDV(MOD-SP2): 39 ML
BH CV ECHO MEAS - EDV(MOD-SP4): 41 ML
BH CV ECHO MEAS - EDV(TEICH): 64 ML
BH CV ECHO MEAS - EF(CUBED): 80.1 %
BH CV ECHO MEAS - EF(MOD-BP): 75 %
BH CV ECHO MEAS - EF(MOD-SP2): 74.4 %
BH CV ECHO MEAS - EF(MOD-SP4): 73.2 %
BH CV ECHO MEAS - EF(TEICH): 73.3 %
BH CV ECHO MEAS - ESV(CUBED): 11.4 ML
BH CV ECHO MEAS - ESV(MOD-SP2): 10 ML
BH CV ECHO MEAS - ESV(MOD-SP4): 11 ML
BH CV ECHO MEAS - ESV(TEICH): 17.1 ML
BH CV ECHO MEAS - FS: 41.6 %
BH CV ECHO MEAS - IVS/LVPW: 1
BH CV ECHO MEAS - IVSD: 0.7 CM
BH CV ECHO MEAS - LAD MAJOR: 4.1 CM
BH CV ECHO MEAS - LAT PEAK E' VEL: 6.6 CM/SEC
BH CV ECHO MEAS - LATERAL E/E' RATIO: 10.6
BH CV ECHO MEAS - LV DIASTOLIC VOL/BSA (35-75): 31.7 ML/M^2
BH CV ECHO MEAS - LV MASS(C)D: 73.1 GRAMS
BH CV ECHO MEAS - LV MASS(C)DI: 56.4 GRAMS/M^2
BH CV ECHO MEAS - LV SYSTOLIC VOL/BSA (12-30): 8.5 ML/M^2
BH CV ECHO MEAS - LVIDD: 3.9 CM
BH CV ECHO MEAS - LVIDS: 2.2 CM
BH CV ECHO MEAS - LVLD AP2: 5.9 CM
BH CV ECHO MEAS - LVLD AP4: 5.9 CM
BH CV ECHO MEAS - LVLS AP2: 4.8 CM
BH CV ECHO MEAS - LVLS AP4: 4.9 CM
BH CV ECHO MEAS - LVPWD: 0.69 CM
BH CV ECHO MEAS - MED PEAK E' VEL: 6.9 CM/SEC
BH CV ECHO MEAS - MEDIAL E/E' RATIO: 10.1
BH CV ECHO MEAS - MV A MAX VEL: 58.2 CM/SEC
BH CV ECHO MEAS - MV E MAX VEL: 71.1 CM/SEC
BH CV ECHO MEAS - MV E/A: 1.2
BH CV ECHO MEAS - RAP SYSTOLE: 15 MMHG
BH CV ECHO MEAS - RVSP: 38 MMHG
BH CV ECHO MEAS - SI(CUBED): 35.4 ML/M^2
BH CV ECHO MEAS - SI(MOD-SP2): 22.4 ML/M^2
BH CV ECHO MEAS - SI(MOD-SP4): 23.2 ML/M^2
BH CV ECHO MEAS - SI(TEICH): 36.2 ML/M^2
BH CV ECHO MEAS - SV(CUBED): 45.8 ML
BH CV ECHO MEAS - SV(MOD-SP2): 29 ML
BH CV ECHO MEAS - SV(MOD-SP4): 30 ML
BH CV ECHO MEAS - SV(TEICH): 46.9 ML
BH CV ECHO MEAS - TAPSE (>1.6): 1.2 CM2
BH CV ECHO MEAS - TR MAX PG: 23 MMHG
BH CV ECHO MEAS - TR MAX VEL: 238.2 CM/SEC
BH CV ECHO MEASUREMENTS AVERAGE E/E' RATIO: 10.53
BH CV VAS BP RIGHT ARM: NORMAL MMHG
BH CV XLRA - RV BASE: 4.2 CM
BH CV XLRA - RV LENGTH: 6 CM
BH CV XLRA - RV MID: 3.2 CM
BH CV XLRA - TDI S': 12.7 CM/SEC
BILIRUB SERPL-MCNC: 0.8 MG/DL (ref 0.3–1.2)
BILIRUB UR QL STRIP: NEGATIVE
BUN BLD-MCNC: 27 MG/DL (ref 9–23)
BUN BLDA-MCNC: 34 MG/DL (ref 8–26)
BUN/CREAT SERPL: 37 (ref 7–25)
CA-I BLDA-SCNC: 1.19 MMOL/L (ref 1.2–1.32)
CA-I SERPL ISE-MCNC: 1.22 MMOL/L (ref 1.12–1.32)
CALCIUM SPEC-SCNC: 8.2 MG/DL (ref 8.7–10.4)
CHLORIDE BLDA-SCNC: 84 MMOL/L (ref 98–109)
CHLORIDE SERPL-SCNC: 93 MMOL/L (ref 99–109)
CLARITY UR: CLEAR
CO2 BLDA-SCNC: 24.8 MMOL/L (ref 22–33)
CO2 BLDA-SCNC: 26 MMOL/L (ref 24–29)
CO2 SERPL-SCNC: 23 MMOL/L (ref 20–31)
COHGB MFR BLD: 1.2 % (ref 0–2)
COLOR UR: YELLOW
CREAT BLD-MCNC: 0.73 MG/DL (ref 0.6–1.3)
CREAT BLDA-MCNC: 1.1 MG/DL (ref 0.6–1.3)
DEPRECATED RDW RBC AUTO: 44.4 FL (ref 37–54)
EOSINOPHIL # BLD AUTO: 0 10*3/MM3 (ref 0–0.3)
EOSINOPHIL NFR BLD AUTO: 0 % (ref 0–3)
ERYTHROCYTE [DISTWIDTH] IN BLOOD BY AUTOMATED COUNT: 14.2 % (ref 11.3–14.5)
GFR SERPL CREATININE-BSD FRML MDRD: 79 ML/MIN/1.73
GLOBULIN UR ELPH-MCNC: 1.6 GM/DL
GLUCOSE BLD-MCNC: 104 MG/DL (ref 70–100)
GLUCOSE BLDC GLUCOMTR-MCNC: 100 MG/DL (ref 70–130)
GLUCOSE BLDC GLUCOMTR-MCNC: 121 MG/DL (ref 70–130)
GLUCOSE BLDC GLUCOMTR-MCNC: 129 MG/DL (ref 70–130)
GLUCOSE BLDC GLUCOMTR-MCNC: 141 MG/DL (ref 70–130)
GLUCOSE BLDC GLUCOMTR-MCNC: 197 MG/DL (ref 70–130)
GLUCOSE BLDC GLUCOMTR-MCNC: 204 MG/DL (ref 70–130)
GLUCOSE BLDC GLUCOMTR-MCNC: 249 MG/DL (ref 70–130)
GLUCOSE BLDC GLUCOMTR-MCNC: 66 MG/DL (ref 70–130)
GLUCOSE BLDC GLUCOMTR-MCNC: 78 MG/DL (ref 70–130)
GLUCOSE BLDC GLUCOMTR-MCNC: 78 MG/DL (ref 70–130)
GLUCOSE UR STRIP-MCNC: ABNORMAL MG/DL
HCO3 BLDA-SCNC: 23.8 MMOL/L (ref 20–26)
HCT VFR BLD AUTO: 29.5 % (ref 34.5–44)
HCT VFR BLD AUTO: 33.1 % (ref 34.5–44)
HCT VFR BLD CALC: 32.4 %
HCT VFR BLDA CALC: 41 % (ref 38–51)
HGB BLD-MCNC: 10.2 G/DL (ref 11.5–15.5)
HGB BLD-MCNC: 11.2 G/DL (ref 11.5–15.5)
HGB BLDA-MCNC: 10.6 G/DL (ref 14–18)
HGB BLDA-MCNC: 13.9 G/DL (ref 12–17)
HGB UR QL STRIP.AUTO: ABNORMAL
HOROWITZ INDEX BLD+IHG-RTO: 40 %
HYALINE CASTS UR QL AUTO: ABNORMAL /LPF
IMM GRANULOCYTES # BLD: 0.08 10*3/MM3 (ref 0–0.03)
IMM GRANULOCYTES NFR BLD: 0.4 % (ref 0–0.6)
INR PPP: 1 (ref 0.8–1.2)
KETONES UR QL STRIP: NEGATIVE
LEFT ATRIUM VOLUME INDEX: 27.8 ML/M^2
LEFT ATRIUM VOLUME: 36 CM3
LEUKOCYTE ESTERASE UR QL STRIP.AUTO: NEGATIVE
LYMPHOCYTES # BLD AUTO: 0.79 10*3/MM3 (ref 0.6–4.8)
LYMPHOCYTES NFR BLD AUTO: 3.6 % (ref 24–44)
MAGNESIUM SERPL-MCNC: 1.4 MG/DL (ref 1.3–2.7)
MCH RBC QN AUTO: 29.4 PG (ref 27–31)
MCHC RBC AUTO-ENTMCNC: 34.6 G/DL (ref 32–36)
MCV RBC AUTO: 85 FL (ref 80–99)
METHGB BLD QL: 1 % (ref 0–1.5)
MODALITY: ABNORMAL
MONOCYTES # BLD AUTO: 0.4 10*3/MM3 (ref 0–1)
MONOCYTES NFR BLD AUTO: 1.8 % (ref 0–12)
NEUTROPHILS # BLD AUTO: 20.78 10*3/MM3 (ref 1.5–8.3)
NEUTROPHILS NFR BLD AUTO: 94.6 % (ref 41–71)
NITRITE UR QL STRIP: NEGATIVE
OSMOLALITY SERPL: 267 MOSM/KG (ref 275–295)
OSMOLALITY UR: 454 MOSM/KG (ref 300–1100)
OXYHGB MFR BLDV: 96.9 % (ref 94–99)
PCO2 BLDA: 34.2 MM HG (ref 35–45)
PH BLDA: 7.45 PH UNITS (ref 7.35–7.45)
PH UR STRIP.AUTO: 6 [PH] (ref 5–8)
PHOSPHATE SERPL-MCNC: 2.4 MG/DL (ref 2.4–5.1)
PLATELET # BLD AUTO: 293 10*3/MM3 (ref 150–450)
PMV BLD AUTO: 9 FL (ref 6–12)
PO2 BLDA: 102 MM HG (ref 83–108)
POTASSIUM BLD-SCNC: 2.3 MMOL/L (ref 3.5–5.5)
POTASSIUM BLD-SCNC: 3 MMOL/L (ref 3.5–5.5)
POTASSIUM BLDA-SCNC: 3.3 MMOL/L (ref 3.5–4.9)
PROT SERPL-MCNC: 5 G/DL (ref 5.7–8.2)
PROT UR QL STRIP: ABNORMAL
PROTHROMBIN TIME: 11.7 SECONDS (ref 12.8–15.2)
RBC # BLD AUTO: 3.47 10*6/MM3 (ref 3.89–5.14)
RBC # UR: ABNORMAL /HPF
REF LAB TEST METHOD: ABNORMAL
SODIUM BLD-SCNC: 123 MMOL/L (ref 132–146)
SODIUM BLD-SCNC: 124 MMOL/L (ref 132–146)
SODIUM BLD-SCNC: 124 MMOL/L (ref 132–146)
SODIUM BLD-SCNC: 125 MMOL/L (ref 132–146)
SODIUM BLD-SCNC: 126 MMOL/L (ref 132–146)
SODIUM BLDA-SCNC: 122 MMOL/L (ref 138–146)
SP GR UR STRIP: >=1.03 (ref 1–1.03)
SQUAMOUS #/AREA URNS HPF: ABNORMAL /HPF
UROBILINOGEN UR QL STRIP: ABNORMAL
WBC NRBC COR # BLD: 21.98 10*3/MM3 (ref 3.5–10.8)
WBC UR QL AUTO: ABNORMAL /HPF

## 2018-10-02 PROCEDURE — 84165 PROTEIN E-PHORESIS SERUM: CPT | Performed by: INTERNAL MEDICINE

## 2018-10-02 PROCEDURE — 82962 GLUCOSE BLOOD TEST: CPT

## 2018-10-02 PROCEDURE — 81001 URINALYSIS AUTO W/SCOPE: CPT | Performed by: INTERNAL MEDICINE

## 2018-10-02 PROCEDURE — 25010000003 POTASSIUM CHLORIDE PER 2 MEQ: Performed by: NURSE PRACTITIONER

## 2018-10-02 PROCEDURE — 99291 CRITICAL CARE FIRST HOUR: CPT | Performed by: INTERNAL MEDICINE

## 2018-10-02 PROCEDURE — 25010000002 VANCOMYCIN: Performed by: INTERNAL MEDICINE

## 2018-10-02 PROCEDURE — 93306 TTE W/DOPPLER COMPLETE: CPT | Performed by: INTERNAL MEDICINE

## 2018-10-02 PROCEDURE — 25010000002 PIPERACILLIN SOD-TAZOBACTAM PER 1 G: Performed by: INTERNAL MEDICINE

## 2018-10-02 PROCEDURE — 94003 VENT MGMT INPAT SUBQ DAY: CPT

## 2018-10-02 PROCEDURE — 36600 WITHDRAWAL OF ARTERIAL BLOOD: CPT | Performed by: INTERNAL MEDICINE

## 2018-10-02 PROCEDURE — 82330 ASSAY OF CALCIUM: CPT | Performed by: NURSE PRACTITIONER

## 2018-10-02 PROCEDURE — 84132 ASSAY OF SERUM POTASSIUM: CPT | Performed by: NURSE PRACTITIONER

## 2018-10-02 PROCEDURE — 25010000002 HEPARIN (PORCINE) PER 1000 UNITS: Performed by: NURSE PRACTITIONER

## 2018-10-02 PROCEDURE — 83930 ASSAY OF BLOOD OSMOLALITY: CPT | Performed by: INTERNAL MEDICINE

## 2018-10-02 PROCEDURE — 94799 UNLISTED PULMONARY SVC/PX: CPT

## 2018-10-02 PROCEDURE — 82805 BLOOD GASES W/O2 SATURATION: CPT | Performed by: INTERNAL MEDICINE

## 2018-10-02 PROCEDURE — 84295 ASSAY OF SERUM SODIUM: CPT | Performed by: INTERNAL MEDICINE

## 2018-10-02 PROCEDURE — 74018 RADEX ABDOMEN 1 VIEW: CPT

## 2018-10-02 PROCEDURE — 83935 ASSAY OF URINE OSMOLALITY: CPT | Performed by: INTERNAL MEDICINE

## 2018-10-02 PROCEDURE — 85018 HEMOGLOBIN: CPT | Performed by: INTERNAL MEDICINE

## 2018-10-02 PROCEDURE — 85014 HEMATOCRIT: CPT | Performed by: INTERNAL MEDICINE

## 2018-10-02 PROCEDURE — 25010000002 MAGNESIUM SULFATE 2 GM/50ML SOLUTION: Performed by: NURSE PRACTITIONER

## 2018-10-02 PROCEDURE — 80053 COMPREHEN METABOLIC PANEL: CPT | Performed by: NURSE PRACTITIONER

## 2018-10-02 PROCEDURE — 83735 ASSAY OF MAGNESIUM: CPT | Performed by: NURSE PRACTITIONER

## 2018-10-02 PROCEDURE — 84100 ASSAY OF PHOSPHORUS: CPT | Performed by: NURSE PRACTITIONER

## 2018-10-02 PROCEDURE — 93306 TTE W/DOPPLER COMPLETE: CPT

## 2018-10-02 PROCEDURE — 85025 COMPLETE CBC W/AUTO DIFF WBC: CPT | Performed by: NURSE PRACTITIONER

## 2018-10-02 RX ORDER — DOXYCYCLINE HYCLATE 100 MG/1
100 CAPSULE ORAL 2 TIMES DAILY
COMMUNITY
Start: 2018-09-26 | End: 2018-10-19 | Stop reason: HOSPADM

## 2018-10-02 RX ORDER — CLONAZEPAM 0.5 MG/1
0.5 TABLET ORAL 3 TIMES DAILY PRN
Status: ON HOLD | COMMUNITY
End: 2018-10-19

## 2018-10-02 RX ORDER — DEXTROSE AND SODIUM CHLORIDE 5; .45 G/100ML; G/100ML
75 INJECTION, SOLUTION INTRAVENOUS CONTINUOUS
Status: DISCONTINUED | OUTPATIENT
Start: 2018-10-02 | End: 2018-10-02

## 2018-10-02 RX ORDER — CHLORHEXIDINE GLUCONATE 0.12 MG/ML
15 RINSE ORAL EVERY 12 HOURS SCHEDULED
Status: DISCONTINUED | OUTPATIENT
Start: 2018-10-02 | End: 2018-10-04

## 2018-10-02 RX ORDER — MAGNESIUM SULFATE HEPTAHYDRATE 40 MG/ML
2 INJECTION, SOLUTION INTRAVENOUS AS NEEDED
Status: DISCONTINUED | OUTPATIENT
Start: 2018-10-02 | End: 2018-10-19 | Stop reason: HOSPADM

## 2018-10-02 RX ORDER — FAMOTIDINE 10 MG/ML
20 INJECTION, SOLUTION INTRAVENOUS EVERY 12 HOURS SCHEDULED
Status: DISCONTINUED | OUTPATIENT
Start: 2018-10-02 | End: 2018-10-03

## 2018-10-02 RX ORDER — FAMOTIDINE 10 MG/ML
20 INJECTION, SOLUTION INTRAVENOUS
Status: DISCONTINUED | OUTPATIENT
Start: 2018-10-02 | End: 2018-10-02

## 2018-10-02 RX ORDER — POTASSIUM CHLORIDE 29.8 MG/ML
20 INJECTION INTRAVENOUS
Status: DISCONTINUED | OUTPATIENT
Start: 2018-10-02 | End: 2018-10-19 | Stop reason: HOSPADM

## 2018-10-02 RX ORDER — DEXTROSE AND SODIUM CHLORIDE 5; .45 G/100ML; G/100ML
100 INJECTION, SOLUTION INTRAVENOUS CONTINUOUS
Status: ACTIVE | OUTPATIENT
Start: 2018-10-02 | End: 2018-10-02

## 2018-10-02 RX ORDER — MAGNESIUM SULFATE HEPTAHYDRATE 40 MG/ML
4 INJECTION, SOLUTION INTRAVENOUS AS NEEDED
Status: DISCONTINUED | OUTPATIENT
Start: 2018-10-02 | End: 2018-10-19 | Stop reason: HOSPADM

## 2018-10-02 RX ORDER — DEXTROSE AND SODIUM CHLORIDE 5; .9 G/100ML; G/100ML
75 INJECTION, SOLUTION INTRAVENOUS CONTINUOUS
Status: DISCONTINUED | OUTPATIENT
Start: 2018-10-02 | End: 2018-10-05

## 2018-10-02 RX ORDER — HEPARIN SODIUM 5000 [USP'U]/ML
2500 INJECTION, SOLUTION INTRAVENOUS; SUBCUTANEOUS EVERY 8 HOURS SCHEDULED
Status: DISCONTINUED | OUTPATIENT
Start: 2018-10-02 | End: 2018-10-03

## 2018-10-02 RX ADMIN — POTASSIUM CHLORIDE 20 MEQ: 29.8 INJECTION, SOLUTION INTRAVENOUS at 22:36

## 2018-10-02 RX ADMIN — TAZOBACTAM SODIUM AND PIPERACILLIN SODIUM 3.38 G: 375; 3 INJECTION, SOLUTION INTRAVENOUS at 16:21

## 2018-10-02 RX ADMIN — CHLORHEXIDINE GLUCONATE 15 ML: 1.2 RINSE ORAL at 08:57

## 2018-10-02 RX ADMIN — VANCOMYCIN HYDROCHLORIDE 500 MG: 500 INJECTION, POWDER, LYOPHILIZED, FOR SOLUTION INTRAVENOUS at 16:21

## 2018-10-02 RX ADMIN — MAGNESIUM SULFATE HEPTAHYDRATE 2 G: 40 INJECTION, SOLUTION INTRAVENOUS at 06:09

## 2018-10-02 RX ADMIN — FAMOTIDINE 20 MG: 10 INJECTION, SOLUTION INTRAVENOUS at 08:52

## 2018-10-02 RX ADMIN — POTASSIUM CHLORIDE 20 MEQ: 29.8 INJECTION, SOLUTION INTRAVENOUS at 07:30

## 2018-10-02 RX ADMIN — HEPARIN SODIUM 2500 UNITS: 5000 INJECTION, SOLUTION INTRAVENOUS; SUBCUTANEOUS at 22:36

## 2018-10-02 RX ADMIN — HEPARIN SODIUM 2500 UNITS: 5000 INJECTION, SOLUTION INTRAVENOUS; SUBCUTANEOUS at 06:08

## 2018-10-02 RX ADMIN — NOREPINEPHRINE BITARTRATE 0.3 MCG/KG/MIN: 8 SOLUTION at 12:28

## 2018-10-02 RX ADMIN — POTASSIUM CHLORIDE 20 MEQ: 29.8 INJECTION, SOLUTION INTRAVENOUS at 08:51

## 2018-10-02 RX ADMIN — FAMOTIDINE 20 MG: 10 INJECTION, SOLUTION INTRAVENOUS at 22:36

## 2018-10-02 RX ADMIN — MAGNESIUM SULFATE HEPTAHYDRATE 2 G: 40 INJECTION, SOLUTION INTRAVENOUS at 07:30

## 2018-10-02 RX ADMIN — DEXTROSE AND SODIUM CHLORIDE 100 ML/HR: 5; 450 INJECTION, SOLUTION INTRAVENOUS at 00:42

## 2018-10-02 RX ADMIN — HEPARIN SODIUM 2500 UNITS: 5000 INJECTION, SOLUTION INTRAVENOUS; SUBCUTANEOUS at 14:00

## 2018-10-02 RX ADMIN — CHLORHEXIDINE GLUCONATE 15 ML: 1.2 RINSE ORAL at 04:01

## 2018-10-02 RX ADMIN — MAGNESIUM SULFATE HEPTAHYDRATE 2 G: 40 INJECTION, SOLUTION INTRAVENOUS at 08:51

## 2018-10-02 RX ADMIN — TAZOBACTAM SODIUM AND PIPERACILLIN SODIUM 3.38 G: 375; 3 INJECTION, SOLUTION INTRAVENOUS at 08:52

## 2018-10-02 RX ADMIN — POTASSIUM CHLORIDE 20 MEQ: 29.8 INJECTION, SOLUTION INTRAVENOUS at 18:50

## 2018-10-02 RX ADMIN — POTASSIUM CHLORIDE 20 MEQ: 29.8 INJECTION, SOLUTION INTRAVENOUS at 06:09

## 2018-10-02 RX ADMIN — CHLORHEXIDINE GLUCONATE 15 ML: 1.2 RINSE ORAL at 20:30

## 2018-10-02 NOTE — PROGRESS NOTES
"Discharge Planning Assessment  [facility]     Patient Name: Devora Huddleston  MRN: [id number]  Today's Date: 10/2/2018    Admit Date: 10/1/2018          Discharge Needs Assessment     Row Name 10/02/18 1522       Living Environment    Lives With child(nitish), adult    Current Living Arrangements home/apartment/condo   Lives in a ranch style home c basement in [place], [place] c her adult son.  She can stay on main floor.    Primary Care Provided by child(nitish)    Provides Primary Care For no one, unable/limited ability to care for self    Family Caregiver if Needed child(nitish), adult    Quality of Family Relationships unable to assess    Able to Return to Prior Arrangements yes       Resource/Environmental Concerns    Transportation Concerns rides, unreliable from others       Transition Planning    Patient/Family Anticipates Transition to home with help/services    Transportation Anticipated family or friend will provide       Discharge Needs Assessment    Readmission Within the Last 30 Days no previous admission in last 30 days    Concerns to be Addressed adjustment to diagnosis/illness;basic needs;discharge planning    Equipment Currently Used at Home walker, rolling            Discharge Plan     Row Name 10/02/18 1524       Plan    Plan TBD    Patient/Family in Agreement with Plan yes    Plan Comments Ms. Huddleston's son came to visit her today.  She lives c her son in [place], [place] in ranch style home c basement.  She stays on main floor.  She is dependent c ADL's.  She uses a RW.  Her son assists her c bath, cooking meals. She asked for board and wrote me a message \"I need help\".  I asked him if she needs to go to facility for care, and he states she is not going to a nursing home.  She is a current pt. c [facility].  Son's name is Bryn.  His new cell # [phone]. Her PCP is Dr. Evelyn Francisco.  She uses [facility] for her Rx.  Son's goal is for her to return to their home c " [facility].  I will talk with Ms. Huddleston again when she is extubated to decide d/c plan.      Row Name 10/02/18 1420       Plan    Plan Comments Sedated and intubated on [facility] vent.  Her son is on his way to hospital.  Waiting to discuss d/c plans with her son.  He does not answer his phone.          Destination     No service coordination in this encounter.      Durable Medical Equipment     No service coordination in this encounter.      Dialysis/Infusion     No service coordination in this encounter.      Home Medical Care     No service coordination in this encounter.      Social Care     No service coordination in this encounter.        Expected Discharge Date and Time     Expected Discharge Date Expected Discharge Time    Oct 6, 2018               Demographic Summary    No documentation.           Functional Status     Row Name 10/02/18 1520       Functional Status    Usual Activity Tolerance fair    Current Activity Tolerance poor       Functional Status, IADL    Medications assistive person    Meal Preparation assistive person    Housekeeping assistive person    Laundry assistive person    Shopping assistive person       Mental Status Summary    Recent Changes in Mental Status/Cognitive Functioning unable to assess       Employment/    Employment Status retired    Row Name 10/02/18 1420       Functional Status    Current Activity Tolerance fair       Employment/    Employment Status retired            Psychosocial    No documentation.           Abuse/Neglect    No documentation.           Legal    No documentation.           Substance Abuse    No documentation.           Patient Forms    No documentation.         Yary Zapata RN

## 2018-10-02 NOTE — PROGRESS NOTES
Discharge Planning Assessment  [facility]     Patient Name: Devora Huddleston  MRN: [id number]  Today's Date: 10/2/2018    Admit Date: 10/1/2018          Discharge Needs Assessment    No documentation.             Discharge Plan     Row Name 10/02/18 1420       Plan    Plan [facility]  Her son is on his way to hospital.  Waiting to discuss d/c plans with her son.  He does not answer his phone.          Destination     No service coordination in this encounter.      Durable Medical Equipment     No service coordination in this encounter.      Dialysis/Infusion     No service coordination in this encounter.      Home Medical Care     No service coordination in this encounter.      Social Care     No service coordination in this encounter.        Expected Discharge Date and Time     Expected Discharge Date Expected Discharge Time    Oct 6, 2018               Demographic Summary    No documentation.           Functional Status     Row Name 10/02/18 1420       Functional Status    Current Activity Tolerance fair       Employment/    Employment Status retired            Psychosocial    No documentation.           Abuse/Neglect    No documentation.           Legal    No documentation.           Substance Abuse    No documentation.           Patient Forms    No documentation.         Yary Zapata RN

## 2018-10-02 NOTE — PLAN OF CARE
Problem: Skin Injury Risk (Adult)  Goal: Identify Related Risk Factors and Signs and Symptoms  Outcome: Ongoing (interventions implemented as appropriate)    Goal: Skin Health and Integrity  Outcome: Ongoing (interventions implemented as appropriate)      Problem: Ventilation, Mechanical Invasive (Adult)  Goal: Signs and Symptoms of Listed Potential Problems Will be Absent, Minimized or Managed (Ventilation, Mechanical Invasive)  Outcome: Ongoing (interventions implemented as appropriate)      Problem: Airway, Artificial (Adult)  Goal: Signs and Symptoms of Listed Potential Problems Will be Absent, Minimized or Managed (Airway, Artificial)  Outcome: Ongoing (interventions implemented as appropriate)      Problem: Patient Care Overview  Goal: Plan of Care Review  Outcome: Ongoing (interventions implemented as appropriate)    Goal: Individualization and Mutuality  Outcome: Ongoing (interventions implemented as appropriate)    Goal: Discharge Needs Assessment  Outcome: Ongoing (interventions implemented as appropriate)    Goal: Interprofessional Rounds/Family Conf  Outcome: Ongoing (interventions implemented as appropriate)      Problem: Infection, Risk/Actual (Adult)  Goal: Identify Related Risk Factors and Signs and Symptoms  Outcome: Ongoing (interventions implemented as appropriate)    Goal: Infection Prevention/Resolution  Outcome: Ongoing (interventions implemented as appropriate)

## 2018-10-02 NOTE — PAYOR COMM NOTE
"Francy Pete RN    Phone [phone]  Fax [phone]    Devora Huddleston  (73 y.o. Female)     Date of Birth Social Security Number Address Home Phone MRN    1945  [place]  [facility] [address] [phone] [id number]    [employer] Marital Status          None Unknown       Admission Date Admission Type Admitting Provider Attending Provider Department, Room/Bed    10/1/18 Emergency Dhruv Crandall MD Mueller, Joseph C, MD [facility] 2B ICU, N235/1    Discharge Date Discharge Disposition Discharge Destination                       Attending Provider:  Dhruv Crandall MD    Allergies:  Not on File    Isolation:  None   Infection:  None   Code Status:  CPR    Ht:  157.5 cm (62\")   Wt:  36.3 kg (80 lb)    Admission Cmt:  None   Principal Problem:  Acute respiratory failure (CMS/[facility]) [J96.00]                 Active Insurance as of 10/1/2018     Primary Coverage     Payor Plan Insurance Group Employer/Plan Group    MEDICARE MEDICARE A & B      Payor Plan Address Payor Plan Phone Number Effective From Effective To    [address] [phone] 4/1/1986     [facility] [id number]       Subscriber Name Subscriber Birth Date Member ID       DEVORA HUDDLESTON 1945 [id number]           Secondary Coverage     Payor Plan Insurance Group Employer/Plan Group    [employer] [place]ICAID      Payor Plan Address Payor Plan Phone Number Effective From Effective To    [address] [address] [phone] 10/1/2018     [facility] [id number]       Subscriber Name Subscriber Birth Date Member ID       DEVORA HUDDLESTON 1945 [id number]                 Emergency Contacts      (Rel.) Home Phone Work Phone Mobile Phone    Flaquito Julien (Wily) [phone] -- --               History & Physical      Dhruv Crandall MD at 10/1/2018  7:13 PM          Pulmonary/Critical Care History and Physical Exam     LOS: 0 days   Patient Care Team:  Provider, No Known as PCP - " "General    Chief Complaint:    Chief Complaint   Patient presents with   • Loss of Consciousness     Source of history: Chart, attempted to call available phone number from chart and message says that number is unavailable.    Subjective     HPI:     73-year-old female who arrived by [facility] to the emergency department here after being found down and unresponsive by family in her home.  She reportedly was found by her son unresponsive and \"not breathing\".  The patient's blood sugar apparently was 105 initially, but 39 mg/dL en route.  Initially, the patient had an oxygen saturation of 47% and was intubated in the field.  The ER was able to contact the patient's son however he was noted to be a poor historian.  He does report that she has had increased weakness over the last few weeks and states that she was evaluated for a seizure 2 weeks previously and had hyponatremia at that point.  He denied that she has diabetes.  She reportedly has a history of CVA in the past.    CT head done emergency department showed no acute abnormality.  CT perfusion study was negative according to the ER attending physician although I do not see an official report is taking this.  CT angiogram is pending.  The patient was started on empiric antibiotics for sepsis.  She was significantly hypotensive in the ER although responds to fluids before drifting back down to 60s to 70s systolic.    On my evaluation, the patient is intubated and drowsy but is arousable.  She follows commands in all 4 extremities.  She does report abdominal tenderness.    I attempted to call the patient's son however I was unable to contact them as the patient's son's phone number message states that the numbers unavailable.    History taken from: patient    Past Medical History:   Diagnosis Date   • Stroke (CMS/HCC)        History reviewed. No pertinent surgical history.    History reviewed. No pertinent family history.    Social History     Social History   • " Marital status: Unknown     Spouse name: N/A   • Number of children: N/A   • Years of education: N/A     Occupational History   • Not on file.     Social History Main Topics   • Smoking status: Unknown If Ever Smoked   • Smokeless tobacco: Not on file   • Alcohol use Defer   • Drug use: Unknown   • Sexual activity: Defer     Other Topics Concern   • Not on file     Social History Narrative   • No narrative on file       Not on File    PMH/FH/SocH were reviewed by me and updates were made.     Review of Systems:    Review of 14 systems was completed with positives and pertinent negatives noted in the subjective section.  All other systems reviewed and are negative.   Exceptions are noted below:    Unable to obtain secondary to altered mental status, intubation.    Objective     Vital Signs  Temp:  [94 °F (34.4 °C)-96.6 °F (35.9 °C)] 96.6 °F (35.9 °C)  Heart Rate:  [67-86] 71  Resp:  [14-16] 16  BP: ()/(36-80) 115/64  FiO2 (%):  [60 %] 60 %  Body mass index is 14.63 kg/m².  FiO2 (%):  [60 %] 60 %  S RR:  [12] 12  PEEP/CPAP (cm H2O):  [5 cm H20] 5 cm H20    Physical Exam:     General Appearance:    Drowsy, on ventilator, cachectic, in no acute distress   Head:    Normocephalic, without obvious abnormality, atraumatic   Eyes:            Lids and lashes normal, conjunctivae and sclerae normal, no   icterus, no pallor, corneas clear, PERRL   ENMT:   Ears appear intact with no abnormalities noted      No oral lesions, edentulous, no thrush, oral mucosa moist, oral endotracheal tube in place.     Neck:   No adenopathy, supple, trachea midline, no thyromegaly, no   carotid bruit, no JVD       Lungs/resp:     On ventilator, symmetric chest rise, no crepitus, clear to      auscultation bilaterally, no chest wall tenderness                  Heart/CV:    Regular rhythm and normal rate, normal S1 and S2, no            murmur   Abdomen/GI:     Normal bowel sounds, no masses, no organomegaly, soft, moderately distended, mild  diffuse tenderness to palpation.         G/U:     Deferred   Extremities/MSK:   No clubbing, cyanosis or edema.  Normal tone.  No deformities.    Pulses:   Pulses palpable and equal bilaterally   Skin:   No bleeding, multiple bruises.  Some erythema of bilateral lower extremities.  Wrinkling of skin lower extremities suggesting recent improvement of chronic edema.     Hem/Lymph:   No cervical or supraclavicular adenopathy.    Neurologic:   Moves all extremities with no obvious focal motor deficit.  Cranial nerves 2 - 12 grossly intact            Psychiatric:  Drowsy, nonagitated.     Results Review:     I reviewed the patient's new clinical results.     Results from last 7 days  Lab Units 10/01/18  1640   SODIUM mmol/L 119*   POTASSIUM mmol/L 3.5   CHLORIDE mmol/L 86*   CO2 mmol/L 23.0   BUN mg/dL 34*   CREATININE mg/dL 1.04   CALCIUM mg/dL 8.6*   ALT (SGPT) U/L 25   AST (SGOT) U/L 39*   GLUCOSE mg/dL 178*       Results from last 7 days  Lab Units 10/01/18  1640   WBC 10*3/mm3 10.47   HEMOGLOBIN g/dL 12.6   HEMATOCRIT % 36.9   PLATELETS 10*3/mm3 317       Results from last 7 days  Lab Units 10/01/18  1700   PH, ARTERIAL pH units 7.454*   PO2 ART mm Hg 179.0*   PCO2, ARTERIAL mm Hg 34.5*   HCO3 ART mmol/L 24.2       I reviewed the patient's new imaging including images and reports.    Medication Review:     sodium chloride 1,000 mL Intravenous Once          Assessment/Plan     Hospital Problem List     * (Principal)Acute respiratory failure (CMS/HCC)    Altered mental state    Acute respiratory failure with hypoxemia (CMS/HCC)    Hyponatremia    Hypoglycemia    Generalized abdominal pain    Weight loss    Maria D coma scale total score 3-8 (CMS/HCC) - on arrival    Hypotension        73-year-old female with a history of hypertension, reported history of prior stroke, recent weight loss and episode of hyponatremia/seizure was brought to the emergency department after being found down by family at home.  She was  intubated in the field for hypoxemic respiratory failure.  Blood sugar en route was 39 mg/dL and was replaced.  She has been persistently hypotensive since arrival.  Pro-calcitonin is low.    Chest x-ray was read as no significant abnormality however I am concerned about a possible AP window density/left upper lobe density (although this may be due to rotation).  Additionally, she seems to have some abdominal tenderness and distention on exam.  Her son reports that she was recently treated with doxycycline for a urinary tract infection.  She has poor food intake and has had persistent weight loss over the past year and a half.    I think there is a chance that the patient had a seizure/post ictal state, possibly triggered by hypoglycemia, hyponatremia or a combination.  I suspect she has an underlying issue causing her poor oral intake and weight loss.  Chest x-ray seems abnormal to me and I think she needs a CT scan of the chest, abdomen, and pelvis to rule out possible malignancy and evaluate for the possibility of mesenteric ischemia or intra-abdominal abscess.    Plan:  1.  Admit to the intensive care unit.  2.  Fluid resuscitation.  3.  May require central line, pressors.  4.  Continue antibiotics for now, repeat pro-calcitonin in a.m.  May be able to de-escalate antibiotics soon.  5.  Follow-up cultures.  6.  Check serum cortisol.  7.  Check CT scan of the chest as well as abdomen/pelvis to evaluate abnormal chest x-ray (rule out left upper lobe/AP window pathology) and further evaluate patient's abdominal pain.  8.  Monitor sodium to avoid overly rapid correction.  9.  Hopefully can be weaned from mechanical ventilation tomorrow.      Dhruv Crandall MD  10/01/18  8:01 PM    60 minutes of critical care provided. This time excludes other billable procedures. Time does include preparation of documents, medical consultations, review of old records, and direct bedside care. Patient was at high risk for  life-threatening deterioration due to acute respiratory failure, hypotension.       Addendum: The patient's son, Bryn Huddleston, called back and I was able to get some additional history which I added to the history above.  He is currently only available at the patient's home phone number, as his cell phone has been turned off.  He states that he is going to try to come to the hospital tomorrow, but needs to get his phone turned back on so he can use the map function.    Patient's PCP is Evelyn Francisco [phone].       Electronically signed by Dhruv Crandall MD at 10/1/2018  8:23 PM             ICU Vital Signs     Row Name 10/02/18 0826 10/02/18 0630 10/02/18 0615 10/02/18 0600 10/02/18 0545       Vitals    Pulse  -- 64 61 66 65    Heart Rate Source  --  --  -- Monitor  --    Resp  --  --  -- 14  --    Resp Rate Source  --  --  -- Ventilator  --    Resp Rate (Observed) Vent 13 11 11 14 13    BP  -- 97/55 125/66 99/60 94/56    Noninvasive MAP (mmHg)  -- 70 87 77 71    BP Location  --  --  -- Left arm  --    BP Method  --  --  -- Automatic  --    Patient Position  --  --  -- Lying  --       Oxygen Therapy    SpO2  -- 95 % 97 % 95 % 95 %    Pulse Oximetry Type  --  --  -- Continuous  --    Device (Oxygen Therapy)  --  --  -- ventilator  --    Oxygen Concentration (%)  --  --  -- 40  --       Invasive Hemodynamic Monitoring    CVP (mmHg)  -- 6 mmHg 9 mmHg 6 mmHg 5 mmHg    Row Name 10/02/18 0530 10/02/18 0515 10/02/18 0500 10/02/18 0445 10/02/18 0430       Vitals    Pulse 68 65 65 66 67    Resp Rate (Observed) Vent 11 14 14 15 14    BP 95/57 94/56 98/55 96/53 97/57    Noninvasive MAP (mmHg) 71 70 71 71 73       Oxygen Therapy    SpO2 95 % 96 % 96 % 96 % 95 %       Invasive Hemodynamic Monitoring    CVP (mmHg) 6 mmHg 6 mmHg 6 mmHg 6 mmHg 6 mmHg    Row Name 10/02/18 0420 10/02/18 0415 10/02/18 0400 10/02/18 0345 10/02/18 0330       Vitals    Pulse  -- 70 83 66 66    Heart Rate Source  --  -- Monitor  --  --    Resp   --  -- 14  --  --    Resp Rate Source  --  -- Ventilator  --  --    Resp Rate (Observed) Vent 13 12 30 15 15    BP  -- (!)  88/54 103/62 93/52 93/53    Noninvasive MAP (mmHg)  -- 66 80 67 69    BP Location  --  -- Left arm  --  --    BP Method  --  -- Automatic  --  --    Patient Position  --  -- Lying  --  --       Oxygen Therapy    SpO2  -- 94 % 97 % 96 % 96 %    Pulse Oximetry Type  --  -- Continuous  --  --    Device (Oxygen Therapy)  --  -- ventilator  --  --    Oxygen Concentration (%)  --  -- 40  --  --       Invasive Hemodynamic Monitoring    CVP (mmHg)  -- 7 mmHg 5 mmHg 6 mmHg 6 mmHg    Row Name 10/02/18 0315 10/02/18 0300 10/02/18 0245 10/02/18 0230 10/02/18 0215       Vitals    Pulse 67 68 67 66 67    Resp Rate (Observed) Vent 13 12 14 12 14    BP 98/56 100/60 95/58 93/54 95/59    Noninvasive MAP (mmHg) 72 76 72 69 72       Oxygen Therapy    SpO2 96 % 96 % 96 % 96 % 97 %       Invasive Hemodynamic Monitoring    CVP (mmHg) 7 mmHg 7 mmHg 7 mmHg 6 mmHg 6 mmHg    Row Name 10/02/18 0200 10/02/18 0145 10/02/18 0130 10/02/18 0120 10/02/18 0115       Vitals    Pulse 70 68 67  -- 66    Heart Rate Source Monitor  --  --  --  --    Resp 14  --  --  --  --    Resp Rate Source Ventilator  --  --  --  --    Resp Rate (Observed) Vent 14 14 14 14 16    /59 99/60 100/58  -- 98/61    Noninvasive MAP (mmHg) 75 79 75  -- 76    BP Location Left arm  --  --  --  --    BP Method Automatic  --  --  --  --    Patient Position Lying  --  --  --  --       Oxygen Therapy    SpO2 97 % 97 % 96 %  -- 96 %    Pulse Oximetry Type Continuous  --  --  --  --    Device (Oxygen Therapy) ventilator  --  --  --  --    Oxygen Concentration (%) 40  --  --  --  --       Invasive Hemodynamic Monitoring    CVP (mmHg) 7 mmHg 8 mmHg 7 mmHg  -- 7 mmHg    Row Name 10/02/18 0100 10/02/18 0045 10/02/18 0030 10/02/18 0015 10/02/18 0000       Vitals    Temp  --  --  --  -- 98.2 °F (36.8 °C)    Temp src  --  --  --  -- Core    Pulse 67 68 70 75 81     Heart Rate Source  --  --  --  -- Monitor    Resp  --  --  --  -- 15    Resp Rate Source  --  --  --  -- Ventilator    Resp Rate (Observed) Vent 16 23 14 15 13    BP (!)  84/55 95/63 91/60 92/61 98/60    Noninvasive MAP (mmHg) 65 74 69 72 72    BP Location  --  --  --  -- Left arm    BP Method  --  --  --  -- Automatic    Patient Position  --  --  --  -- Lying       Oxygen Therapy    SpO2 96 % 96 % 96 % 95 % 96 %    Pulse Oximetry Type  --  --  --  -- Continuous    Device (Oxygen Therapy)  --  --  --  -- ventilator    Oxygen Concentration (%)  --  --  --  -- 40       Invasive Hemodynamic Monitoring    CVP (mmHg) 6 mmHg 6 mmHg 7 mmHg 6 mmHg 5 mmHg    Row Name 10/01/18 2345 10/01/18 2340 10/01/18 2330 10/01/18 2315 10/01/18 2300       Vitals    Pulse 75  -- 78 64 67    Resp Rate (Observed) Vent 23 21 22 18 14    /80  -- 113/79 94/56 (!)  80/47    Noninvasive MAP (mmHg) 97  -- 89 69 60       Oxygen Therapy    SpO2 100 %  -- 97 % 97 % 96 %       Invasive Hemodynamic Monitoring    CVP (mmHg) 11 mmHg  -- 10 mmHg 9 mmHg 8 mmHg    Row Name 10/01/18 2245 10/01/18 2230 10/01/18 2215 10/01/18 2200 10/01/18 2145       Vitals    Pulse 67 66 70 71 73    Heart Rate Source  --  --  -- Monitor  --    Resp  --  --  -- 13  --    Resp Rate Source  --  --  -- Ventilator  --    Resp Rate (Observed) Vent 15 14 12 13 13    BP (!)  72/46 (!)  75/44 (!)  77/48 (!)  85/55 92/52    Noninvasive MAP (mmHg) 54 55 60 65 66    BP Location  --  --  -- Left arm  --    BP Method  --  --  -- Automatic  --    Patient Position  --  --  -- Lying  --       Oxygen Therapy    SpO2 94 % 93 % 98 % 98 % 99 %    Pulse Oximetry Type  --  --  -- Continuous  --    Device (Oxygen Therapy)  --  --  -- ventilator  --    Oxygen Concentration (%)  --  --  -- 40  --    Row Name 10/01/18 2130 10/01/18 2115 10/01/18 2110 10/01/18 2100 10/01/18 2045       Vitals    Pulse 71 71  -- 64 66    Resp Rate (Observed) Vent 30 18 15 14 13    /64 100/64  -- (!)  68/46 (!)   73/46    Noninvasive MAP (mmHg) 82 76  -- 55 56       Oxygen Therapy    SpO2 100 % 99 %  -- 98 % 96 %    Row Name 10/01/18 2030 10/01/18 2000 10/01/18 1955 10/01/18 1945 10/01/18 1930       Vitals    Temp  -- 97.3 °F (36.3 °C)  --  --  --    Temp src  -- Core  --  --  --    Pulse 68 71  -- 71 73    Heart Rate Source  -- Monitor  --  --  --    Resp  -- 13  --  --  --    Resp Rate Source  -- Ventilator  --  --  --    Resp Rate (Observed) Vent 14  -- 14  --  --    BP (!)  70/47 107/64  -- 115/64 99/63    Noninvasive MAP (mmHg) 57 77  -- 83 79    BP Location  -- Left arm  --  --  --    BP Method  -- Automatic  --  --  --    Patient Position  -- Lying  --  --  --       Oxygen Therapy    SpO2 95 % 100 %  -- 99 % 100 %    Pulse Oximetry Type  -- Continuous  --  --  --    Device (Oxygen Therapy)  -- ventilator  --  --  --    Oxygen Concentration (%)  -- 40  --  --  --    Row Name 10/01/18 1915 10/01/18 1900 10/01/18 1845 10/01/18 1805 10/01/18 1800       Vitals    Temp  --  -- 96.6 °F (35.9 °C)  --  --    Temp src  --  -- Core  --  --    Pulse 67 68 86  --  --    Heart Rate Source  --  -- Monitor  --  --    Resp  --  -- 16  --  --    Resp Rate Source  --  -- Ventilator  --  --    BP (!)  77/52 (!)  64/43 120/69 133/80 128/77    Noninvasive MAP (mmHg) 61 50 89 99 100    BP Location  --  -- Left arm  --  --    BP Method  --  -- Automatic  --  --    Patient Position  --  -- Lying  --  --       Oxygen Therapy    SpO2 97 % 99 % 100 % 100 % 100 %    Pulse Oximetry Type  --  -- Continuous  --  --    Device (Oxygen Therapy)  --  -- ventilator  --  --    Row Name 10/01/18 1759 10/01/18 1755 10/01/18 1750 10/01/18 1745 10/01/18 1740       Vitals    Temp 96.1 °F (35.6 °C)  --  --  --  --    Temp src Core   temp sensing catheter  --  --  --  --    Pulse  --  -- 67  -- 68    BP  -- 118/71 93/56 (!)  77/49 (!)  89/53    Noninvasive MAP (mmHg)  -- 91 68 58 68       Oxygen Therapy    SpO2  -- 100 % 100 % 100 % 100 %    Row Name 10/01/18  "1735 10/01/18 1730 10/01/18 1720 10/01/18 1715 10/01/18 1712       Vitals    Pulse 72 75 75 70 73    BP 95/60 95/59 94/76 (!)  61/45 (!)  56/36    Noninvasive MAP (mmHg) 69 70 81 53 45       Oxygen Therapy    SpO2 100 % 100 % 100 %  --  --    Row Name 10/01/18 1710 10/01/18 1705 10/01/18 1700 10/01/18 1655 10/01/18 1610       Height and Weight    Height  --  -- 157.5 cm (62\")  --  --    Height Method  --  -- Estimated  --  --    Weight  --  -- 36.3 kg (80 lb)  --  --    Weight Method  --  -- Estimated  --  --    Ideal Body Weight (IBW) (kg)  --  -- 50.43  --  --    BSA (Calculated - sq m)  --  -- 1.29 sq meters  --  --    BMI (Calculated)  --  -- 14.6  --  --    Weight in (lb) to have BMI = 25  --  -- 136.4  --  --       Vitals    Temp  --  -- 94 °F (34.4 °C)  --  --    Temp src  --  -- Core  --  --    Pulse 75 81  --  -- 76    Heart Rate Source  --  --  --  -- Monitor    Resp  --  --  --  -- 14    Resp Rate Source  --  --  --  -- Ventilator    Resp Rate (Observed) Vent  --  -- 20  --  --    BP  -- (!)  80/64 119/66 108/67 96/64    Noninvasive MAP (mmHg) 21 73 103 87  --    BP Location  --  --  --  -- Left arm    BP Method  --  --  --  -- Automatic    Patient Position  --  --  --  -- Lying       Oxygen Therapy    SpO2  --  --  --  -- 99 %    Pulse Oximetry Type  --  --  --  -- Continuous    Device (Oxygen Therapy)  --  --  --  -- ventilator        Hospital Medications (all)       Dose Frequency Start End    ! vancomycin trough 10/3 @1530. Hold 1600 dose until pharmacist reviews.  Continuous PRN 10/3/2018     Sig - Route: Continuous As Needed for Consult. - Does not apply    albumin human 5 % bottle 500 mL 500 mL Once 10/1/2018 10/1/2018    Sig - Route: Infuse 500 mL into a venous catheter 1 (One) Time. - Intravenous    chlorhexidine (PERIDEX) 0.12 % solution 15 mL 15 mL Every 12 Hours Scheduled 10/2/2018     Sig - Route: Apply 15 mL to the mouth or throat Every 12 (Twelve) Hours. - Mouth/Throat    dextrose 5 % and " "sodium chloride 0.45 % infusion 100 mL/hr Continuous 10/2/2018 10/2/2018    Sig - Route: Infuse 100 mL/hr into a venous catheter Continuous. - Intravenous    famotidine (PEPCID) injection 20 mg 20 mg Every 24 Hours Scheduled 10/2/2018     Sig - Route: Infuse 2 mL into a venous catheter Daily. - Intravenous    fentaNYL 2500 mcg/250 mL NS infusion  mcg/hr Titrated 10/1/2018 10/11/2018    Sig - Route: Infuse  mcg/hr into a venous catheter Dose Adjusted By Provider As Needed. - Intravenous    heparin (porcine) 5000 UNIT/ML injection 2,500 Units 2,500 Units Every 8 Hours Scheduled 10/2/2018     Sig - Route: Inject 0.5 mL under the skin into the appropriate area as directed Every 8 (Eight) Hours. - Subcutaneous    iopamidol (ISOVUE-370) 76 % injection 150 mL 150 mL Once in Imaging 10/1/2018 10/1/2018    Sig - Route: Infuse 150 mL into a venous catheter Once. - Intravenous    Magnesium Sulfate 2 gram / 50mL Infusion (GIVE X 3 BAGS TO EQUAL 6GM TOTAL DOSE) - Mg 1.1 - 1.5 mg/dl 2 g As Needed 10/2/2018     Sig - Route: Infuse 50 mL into a venous catheter As Needed (See Administration Instructions). - Intravenous    Linked Group 1:  \"Or\" Linked Group Details        Magnesium Sulfate 2 gram Bolus, followed by 8 gram infusion (total Mg dose 10 grams)- Mg less than or equal to 1mg/dL 2 g As Needed 10/2/2018     Sig - Route: Infuse 50 mL into a venous catheter As Needed (See Administration Instructions). - Intravenous    Linked Group 1:  \"Or\" Linked Group Details        Magnesium Sulfate 4 gram infusion- Mg 1.6-1.9 mg/dL 4 g As Needed 10/2/2018     Sig - Route: Infuse 100 mL into a venous catheter As Needed (See Administration Instructions). - Intravenous    Linked Group 1:  \"Or\" Linked Group Details        norepinephrine (LEVOPHED) 8 mg/250 mL (32 mcg/mL) in sodium chloride 0.9% infusion (premix) 0.02-0.3 mcg/kg/min × 36.3 kg Titrated 10/1/2018     Sig - Route: Infuse 0.726-10.89 mcg/min into a venous catheter Dose " Adjusted By Provider As Needed. - Intravenous    Pharmacy to dose vancomycin  Continuous PRN 10/1/2018 10/8/2018    Sig - Route: Continuous As Needed for Consult. - Does not apply    piperacillin-tazobactam (ZOSYN) 3.375 g in iso-osmotic dextrose 50 ml (premix) 3.375 g Once 10/1/2018 10/1/2018    Sig - Route: Infuse 50 mL into a venous catheter 1 (One) Time. - Intravenous    piperacillin-tazobactam (ZOSYN) 3.375 g in iso-osmotic dextrose 50 ml (premix) 3.375 g Every 8 Hours 10/2/2018 10/11/2018    Sig - Route: Infuse 50 mL into a venous catheter Every 8 (Eight) Hours. - Intravenous    potassium chloride 20 mEq in 50 mL IVPB 20 mEq Every 1 Hour PRN 10/2/2018     Sig - Route: Infuse 50 mL into a venous catheter Every 1 (One) Hour As Needed (See admin Instructions.). - Intravenous    sodium chloride 0.9 % bolus 1,000 mL 1,000 mL Once 10/1/2018 10/1/2018    Sig - Route: Infuse 1,000 mL into a venous catheter 1 (One) Time. - Intravenous    sodium chloride 0.9 % bolus 1,000 mL 1,000 mL Once 10/1/2018 10/1/2018    Sig - Route: Infuse 1,000 mL into a venous catheter 1 (One) Time. - Intravenous    sodium chloride 0.9 % bolus 1,000 mL 1,000 mL Once 10/1/2018 10/1/2018    Sig - Route: Infuse 1,000 mL into a venous catheter 1 (One) Time. - Intravenous    sodium chloride 0.9 % flush 1-10 mL 1-10 mL As Needed 10/1/2018     Sig - Route: Infuse 1-10 mL into a venous catheter As Needed for Line Care. - Intravenous    sodium chloride 0.9 % flush 10 mL 10 mL As Needed 10/1/2018     Sig - Route: Infuse 10 mL into a venous catheter As Needed for Line Care. - Intravenous    Cosign for Ordering: Accepted by Stu Prescott MD on 10/2/2018  3:34 AM    vancomycin (VANCOCIN) in iso-osmotic dextrose IVPB 1 g (premix) 200 mL 1,000 mg Once 10/1/2018 10/1/2018    Sig - Route: Infuse 200 mL into a venous catheter 1 (One) Time. - Intravenous    vancomycin 500 mg/100 mL 0.9% NS IVPB (mbp) 500 mg Every 24 Hours 10/2/2018 10/12/2018    Sig - Route:  Infuse 100 mL into a venous catheter Daily. - Intravenous    enoxaparin (LOVENOX) syringe 30 mg (Discontinued) 30 mg Every 24 Hours 10/2/2018 10/2/2018    Sig - Route: Inject 0.3 mL under the skin into the appropriate area as directed Daily. - Subcutaneous    Reason for Discontinue: Alternate therapy    lactated ringers infusion (Discontinued) 150 mL/hr Continuous 10/1/2018 10/1/2018    Sig - Route: Infuse 150 mL/hr into a venous catheter Continuous. - Intravenous    Pharmacy Meds to Bed Consult (Discontinued)  Daily 10/2/2018 10/2/2018    Sig - Route: Daily. - Does not apply    Cosign for Ordering: Accepted by Dhruv Crandall MD on 10/1/2018 10:26 PM    piperacillin-tazobactam (ZOSYN) 3.375 g in iso-osmotic dextrose 50 ml (premix) (Discontinued) 3.375 g Once 10/1/2018 10/1/2018    Sig - Route: Infuse 50 mL into a venous catheter 1 (One) Time. - Intravenous    Reason for Discontinue: Duplicate order    piperacillin-tazobactam (ZOSYN) 3.375 g in iso-osmotic dextrose 50 ml (premix) (Discontinued) 3.375 g Every 8 Hours 10/2/2018 10/1/2018    Sig - Route: Infuse 50 mL into a venous catheter Every 8 (Eight) Hours. - Intravenous    Reason for Discontinue: Duplicate order    piperacillin-tazobactam (ZOSYN) 3.375 g in iso-osmotic dextrose 50 ml (premix) (Discontinued) 3.375 g Once 10/2/2018 10/1/2018    Sig - Route: Infuse 50 mL into a venous catheter 1 (One) Time. - Intravenous    Reason for Discontinue: Reorder    sodium chloride 0.45 % infusion (Discontinued) 100 mL/hr Continuous 10/1/2018 10/2/2018    Sig - Route: Infuse 100 mL/hr into a venous catheter Continuous. - Intravenous            Lab Results (last 24 hours)     Procedure Component Value Units Date/Time    Sodium [[id number]]  (Abnormal) Collected:  10/02/18 0834    Specimen:  Blood Updated:  10/02/18 0935     Sodium 124 (L) mmol/L     Protein Elec + Interp, Serum [[id number]] Collected:  10/02/18 0834    Specimen:  Blood Updated:  10/02/18 0916    Osmolality,  Serum [[id number]] Collected:  10/02/18 0834    Specimen:  Blood Updated:  10/02/18 0916    POC CHEM 8 [[id number]]  (Abnormal) Collected:  10/01/18 1619    Specimen:  Blood Updated:  10/02/18 0830     Glucose 197 (H) mg/dL      BUN 34 (H) mg/dL      Creatinine 1.10 mg/dL      Sodium 122 (L) mmol/L      Potassium 3.3 (L) mmol/L      Chloride 84 (L) mmol/L      Total CO2 26 mmol/L      Hemoglobin 13.9 g/dL      Comment: Serial Number: [id number]:  [id number]        Hematocrit 41 %      Ionized Calcium 1.19 (L) mmol/L     POC Protime / INR [[id number]]  (Abnormal) Collected:  10/01/18 1618    Specimen:  Blood Updated:  10/02/18 0830     Protime 11.7 (L) seconds      INR 1.0     Comment: Serial Number: [id number]:  [id number]       POC Glucose Once [[id number]]  (Normal) Collected:  10/02/18 0814    Specimen:  Blood Updated:  10/02/18 0815     Glucose 129 mg/dL     Narrative:       Meter: [id number] : [id number] David Gallardo    POC Glucose Once [[id number]]  (Abnormal) Collected:  10/01/18 1617    Specimen:  Blood Updated:  10/02/18 0811     Glucose 204 (H) mg/dL     Narrative:       Meter: [id number] : [id number] Amari Rojas    Blood Culture - Blood, [[id number]]  (Normal) Collected:  10/01/18 1745    Specimen:  Blood from Wrist, Right Updated:  10/02/18 0631     Blood Culture No growth at less than 24 hours    Blood Culture - Blood, [[id number]]  (Normal) Collected:  10/01/18 1715    Specimen:  Blood from Arm, Right Updated:  10/02/18 0631     Blood Culture No growth at less than 24 hours    Blood Gas, Arterial [[id number]]  (Abnormal) Collected:  10/02/18 0545    Specimen:  Arterial Blood Updated:  10/02/18 0550     Site Arterial: right radial     Stephon's Test N/A     pH, Arterial 7.450 pH units      pCO2, Arterial 34.2 (L) mm Hg      pO2, Arterial 102.0 mm Hg      HCO3, Arterial 23.8 mmol/L      Base Excess, Arterial 0.1 mmol/L      Hemoglobin, Blood Gas 10.6 (L) g/dL      Hematocrit, Blood Gas 32.4 %       Oxyhemoglobin 96.9 %      Methemoglobin 1.00 %      Carboxyhemoglobin 1.2 %      CO2 Content 24.8 mmol/L      Barometric Pressure for Blood Gas -- mmHg      Comment: N/A        Modality Ventilator     FIO2 40 %     Narrative:       While On Ventilator    Comprehensive Metabolic Panel [[id number]]  (Abnormal) Collected:  10/02/18 0357    Specimen:  Blood Updated:  10/02/18 0519     Glucose 104 (H) mg/dL      BUN 27 (H) mg/dL      Creatinine 0.73 mg/dL      Sodium 126 (L) mmol/L      Potassium 2.3 (C) mmol/L      Chloride 93 (L) mmol/L      CO2 23.0 mmol/L      Calcium 8.2 (L) mg/dL      Total Protein 5.0 (L) g/dL      Albumin 3.45 g/dL      ALT (SGPT) 21 U/L      AST (SGOT) 36 (H) U/L      Alkaline Phosphatase 81 U/L      Total Bilirubin 0.8 mg/dL      eGFR Non African Amer 79 mL/min/1.73      Globulin 1.6 gm/dL      A/G Ratio 2.2 g/dL      BUN/Creatinine Ratio 37.0 (H)     Anion Gap 10.0 mmol/L     Narrative:       [employer] Guidelines    Stage     Description        GFR  1         Normal or High     90+  2         Mild decrease      60-89  3         Moderate decrease  30-59  4         Severe decrease    15-29  5         Kidney failure     <15    The MDRD GFR formula is only valid for adults with stable renal function between ages 18 and 70.    Calcium, Ionized [[id number]]  (Normal) Collected:  10/02/18 0357    Specimen:  Blood Updated:  10/02/18 0514     Ionized Calcium 1.22 mmol/L     Magnesium [[id number]]  (Normal) Collected:  10/02/18 0357    Specimen:  Blood Updated:  10/02/18 0511     Magnesium 1.4 mg/dL     Phosphorus [[id number]]  (Normal) Collected:  10/02/18 0357    Specimen:  Blood Updated:  10/02/18 0511     Phosphorus 2.4 mg/dL     POC Glucose Once [[id number]]  (Normal) Collected:  10/02/18 0505    Specimen:  Blood Updated:  10/02/18 0509     Glucose 121 mg/dL     Narrative:       Meter: [id number] : [id number] Nettro Lindsay    CBC Auto Differential [[id number]]  (Abnormal) Collected:   10/02/18 0357    Specimen:  Blood Updated:  10/02/18 0437     WBC 21.98 (H) 10*3/mm3      RBC 3.47 (L) 10*6/mm3      Hemoglobin 10.2 (L) g/dL      Hematocrit 29.5 (L) %      MCV 85.0 fL      MCH 29.4 pg      MCHC 34.6 g/dL      RDW 14.2 %      RDW-SD 44.4 fl      MPV 9.0 fL      Platelets 293 10*3/mm3      Neutrophil % 94.6 (H) %      Lymphocyte % 3.6 (L) %      Monocyte % 1.8 %      Eosinophil % 0.0 %      Basophil % 0.0 %      Immature Grans % 0.4 %      Neutrophils, Absolute 20.78 (H) 10*3/mm3      Lymphocytes, Absolute 0.79 10*3/mm3      Monocytes, Absolute 0.40 10*3/mm3      Eosinophils, Absolute 0.00 10*3/mm3      Basophils, Absolute 0.01 10*3/mm3      Immature Grans, Absolute 0.08 (H) 10*3/mm3     Sodium [[id number]]  (Abnormal) Collected:  10/02/18 0042    Specimen:  Blood Updated:  10/02/18 0152     Sodium 125 (L) mmol/L     POC Glucose Once [[id number]]  (Abnormal) Collected:  10/02/18 0034    Specimen:  Blood Updated:  10/02/18 0040     Glucose 66 (L) mg/dL     Narrative:       Meter: [id number] : [id number] Nettro Lindsay    POC Glucose Once [[id number]]  (Normal) Collected:  10/02/18 0026    Specimen:  Blood Updated:  10/02/18 0040     Glucose 78 mg/dL     Narrative:       Meter: [id number] : [id number] Nettro Lindsay    POC Glucose Once [[id number]]  (Abnormal) Collected:  10/02/18 0025    Specimen:  Blood Updated:  10/02/18 0040     Glucose 249 (H) mg/dL     Narrative:       Meter: [id number] : [id number] Nettro Lindsay    Albumin [[id number]]  (Abnormal) Collected:  10/01/18 2048    Specimen:  Blood Updated:  10/01/18 2350     Albumin 3.08 (L) g/dL     POC Glucose Once [[id number]]  (Normal) Collected:  10/01/18 2308    Specimen:  Blood Updated:  10/01/18 2311     Glucose 81 mg/dL     Narrative:       Meter: [id number] : [id number] Nettro Lindsay    Sodium [[id number]]  (Abnormal) Collected:  10/01/18 2048    Specimen:  Blood Updated:  10/01/18 2121     Sodium 125 (L) mmol/L     Lipase  [[id number]]  (Normal) Collected:  10/01/18 1640    Specimen:  Blood Updated:  10/01/18 2003     Lipase 47 U/L     Amylase [[id number]]  (Normal) Collected:  10/01/18 1640    Specimen:  Blood Updated:  10/01/18 2003     Amylase 99 U/L     POC Glucose Once [[id number]]  (Abnormal) Collected:  10/01/18 1938    Specimen:  Blood Updated:  10/01/18 1953     Glucose 198 (H) mg/dL     Narrative:       Meter: [id number] : [id number] Jude Montoya    Cortisol [[id number]] Collected:  10/01/18 1640    Specimen:  Blood Updated:  10/01/18 1952     Cortisol 39.50 mcg/dL     Basic Metabolic Panel [[id number]]  (Abnormal) Collected:  10/01/18 1640    Specimen:  Blood Updated:  10/01/18 1900     Glucose 178 (H) mg/dL      BUN 34 (H) mg/dL      Creatinine 1.04 mg/dL      Sodium 119 (C) mmol/L      Potassium 3.5 mmol/L      Chloride 86 (L) mmol/L      CO2 23.0 mmol/L      Calcium 8.6 (L) mg/dL      eGFR Non African Amer 52 (L) mL/min/1.73      BUN/Creatinine Ratio 32.7 (H)     Anion Gap 10.0 mmol/L     Narrative:       National Kid[employer] Guidelines    Stage     Description        GFR  1         Normal or High     90+  2         Mild decrease      60-89  3         Moderate decrease  30-59  4         Severe decrease    15-29  5         Kidney failure     <15    The MDRD GFR formula is only valid for adults with stable renal function between ages 18 and 70.    T4, Free [[id number]]  (Normal) Collected:  10/01/18 1640    Specimen:  Blood Updated:  10/01/18 1856     Free T4 1.36 ng/dL     Lactic Acid, Plasma [[id number]]  (Normal) Collected:  10/01/18 1747    Specimen:  Blood Updated:  10/01/18 1813     Lactate 1.5 mmol/L      Comment: Falsely depressed results may occur on samples drawn from patients receiving N-Acetylcysteine (NAC) or Metamizole.       Urine Drug Screen - Urine, Clean Catch [[id number]]  (Normal) Collected:  10/01/18 1752    Specimen:  Urine from Urine, Clean Catch Updated:  10/01/18 1809     THC, Screen, Urine  Negative     Phencyclidine (PCP), Urine Negative     Cocaine Screen, Urine Negative     Methamphetamine, Urine Negative     Opiate Screen Negative     Amphetamine Screen, Urine Negative     Benzodiazepine Screen, Urine Negative     Tricyclic Antidepressants Screen Negative     Methadone Screen, Urine Negative     Barbiturates Screen, Urine Negative     Oxycodone Screen, Urine Negative     Propoxyphene Screen Negative     Buprenorphine, Screen, Urine Negative    Narrative:       Cutoff For Drugs Screened:    Amphetamines               500 ng/ml  Barbiturates               200 ng/ml  Benzodiazepines            150 ng/ml  Cocaine                    150 ng/ml  Methadone                  200 ng/ml  Opiates                    100 ng/ml  Phencyclidine               25 ng/ml  THC                            50 ng/ml  Methamphetamine            500 ng/ml  Tricyclic Antidepressants  300 ng/ml  Oxycodone                  100 ng/ml  Propoxyphene               300 ng/ml  Buprenorphine               10 ng/ml    The normal value for all drugs tested is negative. This report includes unconfirmed screening results, with the cutoff values listed, to be used for medical treatment purposes only.  Unconfirmed results must not be used for non-medical purposes such as employment or legal testing.  Clinical consideration should be applied to any drug of abuse test, particularly when unconfirmed results are used.      Urinalysis With Microscopic If Indicated (No Culture) - Urine, Catheter [[id number]]  (Abnormal) Collected:  10/01/18 1751    Specimen:  Urine from Urine, Catheter Updated:  10/01/18 1803     Color, UA Yellow     Appearance, UA Clear     pH, UA 7.0     Specific Gravity, UA 1.016     Glucose,  mg/dL (1+) (A)     Ketones, UA Negative     Bilirubin, UA Negative     Blood, UA Negative     Protein, UA 30 mg/dL (1+) (A)     Leuk Esterase, UA Trace (A)     Nitrite, UA Negative     Urobilinogen, UA 1.0 E.U./dL    Urinalysis,  Microscopic Only - Urine, Clean Catch [[id number]]  (Abnormal) Collected:  10/01/18 1751    Specimen:  Urine from Urine, Catheter Updated:  10/01/18 1803     RBC, UA 3-6 (A) /HPF      WBC, UA 3-5 (A) /HPF      Bacteria, UA None Seen /HPF      Squamous Epithelial Cells, UA 0-2 /HPF      Hyaline Casts, UA 0-6 /LPF      Methodology Automated Microscopy    [place] Draw [[id number]] Collected:  10/01/18 1640    Specimen:  Blood Updated:  10/01/18 1752    Narrative:       The following orders were created for panel order [place] Draw.  Procedure                               Abnormality         Status                     ---------                               -----------         ------                     Light Blue Top[[id number]]                                   Final result               Green Top (Gel)[[id number]]                                  Final result               Lavender Top[[id number]]                                     Final result               Gold Top - SST[[id number]]                                   Final result               Green Top (No Gel)[[id number]]                                                            Please view results for these tests on the individual orders.    Light Blue Top [[id number]] Collected:  10/01/18 1640    Specimen:  Blood Updated:  10/01/18 1746     Extra Tube hold for add-on     Comment: Auto resulted       Green Top (Gel) [[id number]] Collected:  10/01/18 1640    Specimen:  Blood Updated:  10/01/18 1746     Extra Tube Hold for add-ons.     Comment: Auto resulted.       Lavender Top [[id number]] Collected:  10/01/18 1640    Specimen:  Blood Updated:  10/01/18 1746     Extra Tube hold for add-on     Comment: Auto resulted       Gold Top - SST [[id number]] Collected:  10/01/18 1640    Specimen:  Blood Updated:  10/01/18 1746     Extra Tube Hold for add-ons.     Comment: Auto resulted.       Procalcitonin [[id number]]  (Normal) Collected:  10/01/18 1645    Specimen:  Blood Updated:   10/01/18 1734     Procalcitonin <0.05 ng/mL     Narrative:       As a Marker for Sepsis (Non-Neonates):   1. <0.5 ng/mL represents a low risk of severe sepsis and/or septic shock.  2. >2 ng/mL represents a high risk of severe sepsis and/or septic shock.    As a Marker for Lower Respiratory Tract Infections that require antibiotic therapy:    PCT on Admission     Antibiotic Therapy       6-12 Hrs later  > 0.5                Strongly Recommended             >0.25 - <0.5         Recommended  0.1 - 0.25           Discouraged              Remeasure/reassess PCT  <0.1                 Strongly Discouraged     Remeasure/reassess PCT                     PCT values of < 0.5 ng/mL do not exclude an infection, because localized infections (without systemic signs) may be associated with such low concentrations, or a systemic infection in its initial stages (< 6 hours). Furthermore, increased PCT can occur without infection. PCT concentrations between 0.5 and 2.0 ng/mL should be interpreted taking into account the patient's history. It is recommended to retest PCT within 6-24 hours if any concentrations < 2 ng/mL are obtained.    CK [[id number]]  (Abnormal) Collected:  10/01/18 1640    Specimen:  Blood Updated:  10/01/18 1727     Creatine Kinase 212 (H) U/L     AST [[id number]]  (Abnormal) Collected:  10/01/18 1640    Specimen:  Blood Updated:  10/01/18 1727     AST (SGOT) 39 (H) U/L     ALT [[id number]]  (Normal) Collected:  10/01/18 1640    Specimen:  Blood Updated:  10/01/18 1727     ALT (SGPT) 25 U/L     TSH [[id number]]  (Normal) Collected:  10/01/18 1640    Specimen:  Blood Updated:  10/01/18 1727     TSH 3.112 mIU/mL     Magnesium [[id number]]  (Normal) Collected:  10/01/18 1640    Specimen:  Blood Updated:  10/01/18 1727     Magnesium 1.6 mg/dL     Ethanol [[id number]]  (Normal) Collected:  10/01/18 1640    Specimen:  Blood Updated:  10/01/18 1725     Ethanol <10 mg/dL     aPTT [[id number]]  (Normal) Collected:  10/01/18 1640     Specimen:  Blood Updated:  10/01/18 1722     PTT 26.6 seconds     Narrative:       PTT = The equivalent PTT values for the therapeutic range of heparin levels at 0.3 to 0.5 U/ml are 55 to 70 seconds.    Blood Gas, Arterial [[id number]]  (Abnormal) Collected:  10/01/18 1700    Specimen:  Arterial Blood Updated:  10/01/18 1719     Site Arterial: right brachial     Stephon's Test N/A     pH, Arterial 7.454 (H) pH units      pCO2, Arterial 34.5 (L) mm Hg      pO2, Arterial 179.0 (H) mm Hg      HCO3, Arterial 24.2 mmol/L      Base Excess, Arterial 0.6 mmol/L      Hemoglobin, Blood Gas 12.2 (L) g/dL      Hematocrit, Blood Gas 37.5 %      Oxyhemoglobin 97.7 %      Methemoglobin 1.30 %      Carboxyhemoglobin 0.5 %      CO2 Content 25.2 mmol/L      Barometric Pressure for Blood Gas -- mmHg      Comment: N/A        Modality Ventilator     FIO2 60 %     POC Troponin, Rapid [[id number]]  (Normal) Collected:  10/01/18 1648    Specimen:  Blood Updated:  10/01/18 1713     Troponin I 0.02 ng/mL      Comment: Serial Number: [id number]:  [id number]       CBC & Differential [[id number]] Collected:  10/01/18 1640    Specimen:  Blood Updated:  10/01/18 1707    Narrative:       The following orders were created for panel order CBC & Differential.  Procedure                               Abnormality         Status                     ---------                               -----------         ------                     CBC Auto Differential[[id number]]        Abnormal            Final result                 Please view results for these tests on the individual orders.    CBC Auto Differential [[id number]]  (Abnormal) Collected:  10/01/18 1640    Specimen:  Blood Updated:  10/01/18 1707     WBC 10.47 10*3/mm3      RBC 4.35 10*6/mm3      Hemoglobin 12.6 g/dL      Hematocrit 36.9 %      MCV 84.8 fL      MCH 29.0 pg      MCHC 34.1 g/dL      RDW 14.1 %      RDW-SD 43.8 fl      MPV 8.9 fL      Platelets 317 10*3/mm3      Neutrophil % 87.8 (H) %       Lymphocyte % 7.1 (L) %      Monocyte % 5.0 %      Eosinophil % 0.0 %      Basophil % 0.1 %      Immature Grans % 0.3 %      Neutrophils, Absolute 9.20 (H) 10*3/mm3      Lymphocytes, Absolute 0.74 10*3/mm3      Monocytes, Absolute 0.52 10*3/mm3      Eosinophils, Absolute 0.00 10*3/mm3      Basophils, Absolute 0.01 10*3/mm3      Immature Grans, Absolute 0.03 10*3/mm3         Imaging Results (last 24 hours)     Procedure Component Value Units Date/Time    XR Abdomen KUB [[id number]] Updated:  10/02/18 0922    XR Chest 1 View [[id number]] Collected:  10/02/18 0912     Updated:  10/02/18 0912    Narrative:       EXAMINATION: XR CHEST 1 VW- 10/01/2018     INDICATION: Acute Stroke Protocol (onset < 12 hrs)      COMPARISON: NONE     FINDINGS: Portable chest reveals endotracheal tube in satisfactory  position above the daily. Lung fields are clear. No focal parenchymal  opacification present.  No pleural effusion or pneumothorax.  Emphysematous changes seen throughout the lung fields.           Impression:       Chronic and emphysematous changes seen throughout the lung  fields. No acute parenchymal disease.     D:  10/01/2018  E:  10/02/2018          CT Angiogram Head With & Without Contrast [[id number]] Collected:  10/02/18 0908     Updated:  10/02/18 0908    Narrative:       EXAMINATION: CT ANGIOGRAM HEAD W WO CONTRAST-, CT ANGIOGRAM NECK W WO  CONTRAST- 10/01/2018     INDICATION: Unresponsive; prior history of stroke     TECHNIQUE: Multiple axial CT imaging was obtained of the head and neck  following the administration of intravenous contrast according to the CT  angio protocol. Three-D reformatted images were submitted to further  facilitate diagnostic accuracy and treatment planning.     Stenosis measurement was performed by the NASCET or similar method.     The radiation dose reduction device was turned on for each scan per the  ALARA (As Low as Reasonably Achievable) protocol.     COMPARISON: None.     FINDINGS:   Severe emphysematous changes identified at the lung apices  with endotracheal tube above the level of the daily. The thyroid is  homogeneous. There is distention identified of the esophagus filled with  air. Thoracic aortic arch reveals atherosclerotic disease. There is  atherosclerotic disease seen at the origin of the left subclavian  artery. Both common carotid arteries are patent bilaterally with  extensive atherosclerotic disease seen in both carotid bifurcations.  There is no significant stenosis seen of the right internal carotid  artery. The left internal carotid artery reveals moderate stenosis of  the proximal portion. There is symmetry seen of the vertebral arteries  bilaterally. No significant stenosis or tortuosity present of the  vertebral arteries. Degenerative changes seen within the spine.     The internal carotid arteries are revealing extensive atherosclerotic  disease seen in the cavernous portions and supraclinoid portions. There  is no significant stenosis seen of the middle cerebral arteries or the  anterior cerebral arteries. No aneurysmal dilatation is identified. The  posterior circulation reveals small caliber basilar artery. There is  fetal origin identified of the right posterior cerebral artery. Left  posterior cerebral artery is unremarkable in appearance. Posterior  circulation is intact.       Impression:       Atherosclerotic disease of both carotid bifurcations with  no significant stenosis seen of the common carotid arteries and only  moderate stenosis of the left proximal internal carotid artery. No  significant stenosis of the right internal carotid artery. The CTA of  the head is unremarkable with atherosclerotic disease of the  intracranial portions of the internal carotid arteries.     D:  10/01/2018  E:  10/02/2018       CT Angiogram Neck With & Without Contrast [[id number]] Collected:  10/02/18 0908     Updated:  10/02/18 0908    Narrative:       EXAMINATION: CT ANGIOGRAM  HEAD W WO CONTRAST-, CT ANGIOGRAM NECK W WO  CONTRAST- 10/01/2018     INDICATION: Unresponsive; prior history of stroke     TECHNIQUE: Multiple axial CT imaging was obtained of the head and neck  following the administration of intravenous contrast according to the CT  angio protocol. Three-D reformatted images were submitted to further  facilitate diagnostic accuracy and treatment planning.     Stenosis measurement was performed by the NASCET or similar method.     The radiation dose reduction device was turned on for each scan per the  ALARA (As Low as Reasonably Achievable) protocol.     COMPARISON: None.     FINDINGS:  Severe emphysematous changes identified at the lung apices  with endotracheal tube above the level of the daily. The thyroid is  homogeneous. There is distention identified of the esophagus filled with  air. Thoracic aortic arch reveals atherosclerotic disease. There is  atherosclerotic disease seen at the origin of the left subclavian  artery. Both common carotid arteries are patent bilaterally with  extensive atherosclerotic disease seen in both carotid bifurcations.  There is no significant stenosis seen of the right internal carotid  artery. The left internal carotid artery reveals moderate stenosis of  the proximal portion. There is symmetry seen of the vertebral arteries  bilaterally. No significant stenosis or tortuosity present of the  vertebral arteries. Degenerative changes seen within the spine.     The internal carotid arteries are revealing extensive atherosclerotic  disease seen in the cavernous portions and supraclinoid portions. There  is no significant stenosis seen of the middle cerebral arteries or the  anterior cerebral arteries. No aneurysmal dilatation is identified. The  posterior circulation reveals small caliber basilar artery. There is  fetal origin identified of the right posterior cerebral artery. Left  posterior cerebral artery is unremarkable in appearance.  Posterior  circulation is intact.       Impression:       Atherosclerotic disease of both carotid bifurcations with  no significant stenosis seen of the common carotid arteries and only  moderate stenosis of the left proximal internal carotid artery. No  significant stenosis of the right internal carotid artery. The CTA of  the head is unremarkable with atherosclerotic disease of the  intracranial portions of the internal carotid arteries.     D:  10/01/2018  E:  10/02/2018       CT Cerebral Perfusion With & Without Contrast [[id number]] Collected:  10/02/18 0902     Updated:  10/02/18 0902    Narrative:       EXAMINATION: CT CEREBRAL PERFUSION W WO CONTRAST- 10/01/2018     INDICATION: Unresponsive; prior history of stroke     TECHNIQUE: Cerebral perfusion analysis was performed using computed  tomography with contrast administration, including post processing of  parametric maps with determination of cerebral blood flow, cerebral  blood volume, and mean transit time.     The radiation dose reduction device was turned on for each scan per the  ALARA (As Low as Reasonably Achievable) protocol.     COMPARISON: NONE     FINDINGS: There is extensive low-density seen throughout the  periventricular and subcortical white matter. There is no evidence of  perfusion abnormality to suggest evidence of reversible ischemia. There  is no evidence of increased mean transit time to suggest evidence of an  occlusion or stenosis.       Impression:       No evidence of reversible ischemia.     D:  10/01/2018  E:  10/02/2018                   CT Head Without Contrast Stroke Protocol [[id number]] Collected:  10/01/18 1643     Updated:  10/02/18 0902    Narrative:       EXAMINATION: CT HEAD WO CONTRAST STROKE PROTOCOL-      INDICATION: Unresponsive.      TECHNIQUE: Axial CT of the head without intravenous contrast  administration.     The radiation dose reduction device was turned on for each scan per the  ALARA (As Low as Reasonably  Achievable) protocol.     COMPARISON: None.     FINDINGS: Structures are symmetric without evidence of mass, mass effect  or midline shift. Ventricles and sulci within normal limits. No  intraaxial hemorrhage or extraaxial fluid collection. Severe attenuation  changes in the periventricular and deep white matter indicating chronic  small vessel ischemic disease with likely prior insult left  frontotemporal region as there is mild encephalomalacia present. Globes  and orbits unremarkable. Visualized paranasal sinuses and mastoid air  cells are grossly clear and well pneumatized.       Impression:       No acute intracranial abnormality.     NOTE: Scan performed on 10/01/2018 at 1607 hours. Scan report given to  ER physician and team in person at scanner by Dr. Marks on 10/01/2018 at  1615 hours.     D:  10/01/2018  E:  10/02/2018       XR Chest 1 View [[id number]] Collected:  10/01/18 2152     Updated:  10/01/18 2227    Narrative:       EXAM:    XR Chest, 1 View    CLINICAL HISTORY:    73 years old, female; Device placement; Other: Cvc placement    TECHNIQUE:    Frontal view of the chest.    COMPARISON:    CR XR CHEST 1 VW 10/1/2018 5:22 PM    FINDINGS:    Lungs:  COPD.  Mild residual right basilar atelectasis/infiltrate, some of   which is superimposed upon dome of diaphragm.  Mild biapical scarring.  Mild   accentuated interstitial markings at right base.  No acute infiltrates of left   lung.    Pleural space:  No significant pleural effusion visualized.  No obvious   pneumothorax.    Heart:  Normal heart size.    Mediastinum:  No significant pneumomediastinum visualized, but please refer   to recent CT exam.    Bones/joints:  Healing fracture of lateral right ninth rib.  Patient has   known acute right lower rib fractures, but these are better visualized on the   recent CT scan.    Vasculature:  Atherosclerotic placques scattered along some vessels.    Tubes, lines and devices:  Right jugular central venous catheter  tip at SVC   level.  ETT tip is approximately 3.4 cm above daily.    Upper abdomen:  Some gaseous distention of stomach.      Impression:       1.  Right jugular central venous catheter tip at SVC level.  2.  COPD.  3.  ETT tip is approximately 3.4 cm above daily.  4.  Mild residual right basilar atelectasis/infiltrate.    THIS DOCUMENT HAS BEEN ELECTRONICALLY SIGNED BY JOSIE PEREZ MD    CT Abdomen Pelvis Without Contrast [[id number]] Collected:  10/01/18 1827     Updated:  10/01/18 2219    Narrative:       EXAM:    CT Abdomen and Pelvis Without Intravenous Contrast    CLINICAL HISTORY:    73 years old, female; Signs and symptoms; Abdominal tenderness; Additional   info: Abdominal pain, unspecified    TECHNIQUE:    Axial computed tomography images of the abdomen and pelvis without   intravenous contrast.  All CT scans at this facility use At least one of these   dose optimization techniques: automated exposure control; mA and/or kV   adjustment per patient size (includes targeted exams where dose is matched to   clinical indication); or iterative reconstruction.    Coronal and sagittal reformatted images were created and reviewed.    COMPARISON:    No relevant prior studies available.    FINDINGS:    Artifacts:  Artifacts from patient's arm(s) held down diminish resolution on   some scan slices.    Lung bases:  Old granulomatous disease of lung(s).  COPD, scattered   subsegmental atelectasis, and pulmonary scarring.  Mild RLL   infiltrate/atelectasis.    Pleural space:  Anterior left basilar subpleural blebs versus trace   pneumothorax.  Erect inspiratory/expiratory CXR could discern this, if desired.    The finding has been previously reported to physician on CT Chest scan.    Possible minimal right pleural effusion.    Heart:  Normal heart size.  Cannot exclude CAD or mitral annulus   calcifications.     ABDOMEN:    Liver:  Unremarkable as visualized.  Limited.    Gallbladder and bile ducts:  Distended  gallbladder.  No calcified stones.  No   ductal dilation.    Pancreas:  Unremarkable as visualized, but limited resolution    Spleen:  Unremarkable as visualized.  Limited.    Adrenals:  Unremarkable as visualized.  Limited.    Kidneys and ureters:  Scattered subcentimeter or bilateral renal nodules,   which US can characterize.  Limited parenchymal resolution and evaluation at   posterior cortex left kidney related to beam hardening artifacts from adjacent   left arm held down by patient's side.  Bilateral renal function.  No   hydronephrosis.    Stomach and bowel:  Gaseously distended stomach.  Nonspecific bowel gas   pattern.  The contracted segments of bowel restrict wall evaluation to rule out   any abnormal thickening.  No gaseously dilated small bowel.     PELVIS:    Appendix:  Appendix obscured, incompletely evaluated.    Bladder:  The urinary bladder relatively is decompressed with a Matthews   catheter in place.      Reproductive:  Unremarkable as visualized.     ABDOMEN and PELVIS:    Intraperitoneal space:  Possible mild ascites, which US can clarify.  No   definite pneumoperitoneum.    Bones/joints:  Acute mildly displaced fractures of right posterior ninth,   10th, and 11th ribs.  Healing or healed fracture of lateral right ninth rib.    No dislocation.    Soft tissues:  Calcified injection granulomas are noted in the subcutaneous   tissues of right buttocks. Skin wrinkles or skin irregularity at right buttock.    Clinically correlate.     Vasculature:  Mild unruptured proximal AAA 3.2 cm.  Pelvic phleboliths.    Atherosclerotic placques scattered along some vessels.  No abdominal aortic   aneurysm.    Lymph nodes:  Unremarkable.  No enlarged lymph nodes.    Other findings:  Cachexia.      Impression:       1.  Small bilateral renal nodules.  2.  Mild unruptured proximal AAA 3.2 cm.  3.  Gaseously distended stomach.  4.  Nonspecific bowel gas pattern.  5.  Possible mild ascites, which US can clarify.  6.   Acute mildly displaced fractures of right posterior ninth, 10th, and 11th   ribs.  7.  Abnormal chest findings, as above.    Note: Findings were discussed with Dr. Delvalle, associate of Dhruv Cortez at 10/1/2018 10:05 PM EDT, in conjunction with reportedly CT Chest   report. The report was understood and acknowledged by the healthcare giver, who   stated that gastric gaseous distention may be secondary to recent emergency   assisted ventilation.    THIS DOCUMENT HAS BEEN ELECTRONICALLY SIGNED BY JOSIE PEREZ MD    CT Chest Without Contrast [[id number]] Collected:  10/01/18 1825     Updated:  10/01/18 2211    Addenda:        Note: Critical Findings were discussed with Dr. Delvalle , associate of Dhruv Cortez at 10/1/2018 10:05 PM EDT. The report was understood and   acknowledged by the healthcare giver, who stated patient had hypoxic   episode   and was ventilated prior to arrival.    THIS DOCUMENT HAS BEEN ELECTRONICALLY SIGNED BY JOSIE PEREZ MD  Signed:  10/01/18 2210 by Josie Perez MD    Narrative:       EXAM:    CT Chest Without Intravenous Contrast    CLINICAL HISTORY:    73 years old, female; Abnormal findings; Abnormal radiologic exam of lung or   chest; Additional info: Abn findings on diagnostic imaging of lung. Cxr report   not dictated at this time    TECHNIQUE:    Axial computed tomography images of the chest without intravenous contrast.    All CT scans at this facility use at least one of these dose optimization   techniques: automated exposure control; mA and/or kV adjustment per patient   size (includes targeted exams where dose is matched to clinical indication); or   iterative reconstruction.    Coronal and sagittal reformatted images were created and reviewed.    COMPARISON:    CR XR CHEST 1 VW 10/1/2018 5:22 PM    FINDINGS:    Artifacts:  Artifacts from patient's arm(s) held down diminish resolution on   some scan slices.    Lungs:  COPD.  Mild scattered  subsegmental atelectasis.  Mild RLL   atelectasis/infiltrate.  Old granulomatous disease of lung(s).  Mild pulmonary   scarring, predominantly at lung apices.  Some retained secretions in central   right bronchial tree.  Suspect small streak of scarring or subsegmental   atelectasis laterally at MIRANDA mid lung field bordering the major fissure, rather   than a true tiny nodule.    Pleural space:  Trace anterior left basilar pneumothorax versus subpleural   blebs.  Followup erect inspiratory/expiratory CXR could discern this.  Possible   trace right pleural effusion.  No right pneumothorax.    Heart:  Normal heart size.  Some coronary artery calcification cannot be   excluded.  No significant pericardial effusion.    Mediastinum:  Mild pneumomediastinum bordering left tracheobronchial tree,   perhaps,  related to barotrauma.    Bones/joints:  Acute mildly displaced fractures of posterior right ninth,   10th, 11th ribs.  Mild thoracolumbar scoliosis.  Focal sclerosis in left side   of the L1 vertebral body.  Degenerative changes of the spine.  Healing or   healed fracture of lateral right ninth rib.  No dislocation.    Soft tissues: Cachexia.    Vasculature:  Atherosclerotic placques scattered along some vessels.  No   aortic aneurysm.    Lymph nodes:  No obvious lymphadenopathy, but limited resolution related to   cachexia and noncontrast exam.    Kidneys and ureters:  A few subcentimeter hypodense nodules of left kidney.    US can characterize, if desired.    Stomach and bowel:  Gaseous distention of stomach.    Intraperitoneal space: Limited resolution, cannot exclude mild ascites,   restricted resolution.  US could clarify, if desired.    Tubes, lines and devices:  ETT tip in satisfactory position, 2 cm above   daily.      Impression:       1.  Mild pneumomediastinum bordering left tracheobronchial tree.  2.  Trace anterior left basilar pneumothorax versus subpleural blebs.  Followup   erect inspiratory/expiratory  CXR could discern this.  3.  Acute mildly displaced fractures of posterior right ninth, 10th, 11th ribs.  4.  Possible trace right pleural effusion.  5.  Satisfactory ETT position.  6.  COPD.  7.  Mild scattered subsegmental atelectasis and pulmonary scarring.  8.  Mild RLL atelectasis/infiltrate.  9.  Small left renal nodules.  10.  Additional findings, as above.    THIS DOCUMENT HAS BEEN ELECTRONICALLY SIGNED BY JOSIE PEREZ MD

## 2018-10-02 NOTE — PROGRESS NOTES
"Pharmacy Consult-Vancomycin Dosing  Devora Huddleston is a  73 y.o. female receiving vancomycin therapy.     Indication: sepsis  Consulting Provider:  ID Consult:     Goal Trough:15-20    Current Antimicrobial Therapy  Anti-Infectives       Ordered     Dose/Rate Route Frequency Start Stop    10/01/18 2025  piperacillin-tazobactam (ZOSYN) 3.375 g in iso-osmotic dextrose 50 ml (premix)     Ordering Provider:  Dhruv Crandall MD    3.375 g  over 4 Hours Intravenous Every 8 Hours 10/02/18 0300 10/09/18 0259    10/01/18 2025  piperacillin-tazobactam (ZOSYN) 3.375 g in iso-osmotic dextrose 50 ml (premix)     Ordering Provider:  Dhruv Crandall MD    3.375 g  over 30 Minutes Intravenous Once 10/01/18 2115      10/01/18 2025  Pharmacy to dose vancomycin     Ordering Provider:  Dhruv Crandall MD     Does not apply Continuous PRN 10/01/18 2025 10/08/18 2024    10/01/18 1703  piperacillin-tazobactam (ZOSYN) 3.375 g in iso-osmotic dextrose 50 ml (premix)     Ordering Provider:  Stu Prescott MD    3.375 g  over 30 Minutes Intravenous Once 10/01/18 1705 10/01/18 1815    10/01/18 1703  vancomycin (VANCOCIN) in iso-osmotic dextrose IVPB 1 g (premix) 200 mL     Ordering Provider:  Stu Prescott MD    1,000 mg  over 1 Hours Intravenous Once 10/01/18 1705 10/01/18 1914            Allergies  Allergies as of 10/01/2018   • (Not on File)       Labs      Results from last 7 days     Lab Units 10/01/18  1640   BUN mg/dL 34*   CREATININE mg/dL 1.04         Results from last 7 days     Lab Units 10/01/18  1640   WBC 10*3/mm3 10.47       Evaluation of Dosing     Last Dose Received in the ED/Outside Facility:     Ht - 157.5 cm (62\")  Wt - 36.3 kg (80 lb)    Estimated Creatinine Clearance: 28.4 mL/min (by C-G formula based on SCr of 1.04 mg/dL).    Intake & Output (last 3 days)         09/29 0701 - 09/30 0700 09/30 0701 - 10/01 0700 10/01 0701 - 10/02 0700    IV Piggyback   1050    Total Intake(mL/kg)   1050 (28.9)    Urine " (mL/kg/hr)   500    Total Output     500    Net     +550                   Microbiology and Radiology  Microbiology Results (last 10 days)       ** No results found for the last 240 hours. **            Evaluation of Level        Assessment/Plan: Loading dose 27.5 mg/kg ( on estimated weight). Per current guideline 15 mg/kg q24h to follow.

## 2018-10-02 NOTE — PROGRESS NOTES
Intensivist Note     10/2/2018  Hospital Day: 1  * No surgery found *      Ms. Devora Huddleston, 73 y.o. female is followed for:  Principal Problem:    Acute respiratory failure (CMS/HCC)  Active Problems:    Altered mental state    Acute respiratory failure with hypoxemia (CMS/HCC)    Hyponatremia    Hypoglycemia    Generalized abdominal pain    Weight loss    Gill coma scale total score 3-8 (CMS/HCC) - on arrival    Hypotension       SUBJECTIVE     73-year-old white female found down and unresponsive by her son 10/1/18. Was intubated in the field and transported. Initial blood sugar was apparently 105 but dropped to 39 and in route. Patient had apparently been complaining of increasing weakness for several weeks, had been losing weight, and 2 weeks PTA was evaluated for hyponatremia and seizures. No data was available and no family was available upon arrival here. X-rays and CT scans revealed that she had 3 right posterior rib fractures so presumably fell at home. CTs of the head however revealed no evidence of injury or stroke. The only pertinent history from the transferring records indicated that she had apparently had a CVA in the past but we had no specifics. Patient was hypotensive in the ER but did respond initially to fluids. When her blood pressure drifted down again she was placed on norepinephrine and remains on that at this time. Although requiring ventilatory support, gas exchange has not been a problem, and she is well oxygenated on only 40%.    The patient is alert and cooperative at this time and does not appear in any distress. She has had no fever overnight, but her white count is increased from 10,000-22,000. She is at present on empiric vancomycin and Zosyn for possible sepsis. Urine however does not reveal any evidence of infection, and chest x-ray on admission yesterday was normal without any evidence of pulmonary infiltrates suggesting pneumonia.    The patient's relevant past medical,  "surgical and social history were reviewed and updated in Epic as appropriate.    OBJECTIVE     /63   Pulse (!) 46   Temp 97.5 °F (36.4 °C) (Core)   Resp 13   Ht 157.5 cm (62.01\")   Wt 36.3 kg (80 lb)   SpO2 100%   BMI 14.63 kg/m²   Oxygen Concentration (%): 40       Flowsheet Rows      First Filed Value   Admission Height  157.5 cm (62\") Documented at 10/01/2018 1700   Admission Weight  36.3 kg (80 lb) Documented at 10/01/2018 1700        Intake & Output (last day)       10/01 0701 - 10/02 0700 10/02 0701 - 10/03 0700    I.V. (mL/kg) 1393.7 (38.4) 836.2 (23)    IV Piggyback 1118.5 196.8    Total Intake(mL/kg) 2512.2 (69.2) 1033 (28.5)    Urine (mL/kg/hr) 1980 850 (2)    Total Output 1980 850    Net +532.2 +183                Exam:  General Exam:  Chronically ill cachectic-looking white female intubated and on ventilatory support  HEENT: Pupils equal and reactive. ETT and NG tube in place.  Neck:                          Supple, no JVD, thyromegaly, or adenopathy. Right IJ line in place.  Lungs: Clear to auscultation and percussion anteriorly and posteriorly.  Cardiovascular: RRR without murmurs or gallops. HR 56 and sinus on monitor  Abdomen: No hepatosplenomegaly. Somewhat protuberant and tender diffusely but primarily over the right lateral abdomen where she has rib fractures.   and rectal: Matthews catheter in place  Extremities: No cyanosis clubbing edema.  Neurologic:                 Symmetric strength but diffusely weak. No focal deficits.    Chest X-Ray 10/1/18: Clear lung fields    INFUSIONS  fentanyl 10 mcg/mL  mcg/hr Last Rate: 75 mcg/hr (10/02/18 0628)   norepinephrine 0.02-0.3 mcg/kg/min Last Rate: 0.3 mcg/kg/min (10/02/18 1228)         Results from last 7 days  Lab Units 10/02/18  0357 10/01/18  1640 10/01/18  1619   WBC 10*3/mm3 21.98* 10.47  --    HEMOGLOBIN g/dL 10.2* 12.6  --    HEMOGLOBIN, POC g/dL  --   --  13.9   HEMATOCRIT % 29.5* 36.9  --    HEMATOCRIT POC %  --   --  41 "   PLATELETS 10*3/mm3 293 317  --        Results from last 7 days  Lab Units 10/02/18  1535 10/02/18  1210  10/02/18  0357  10/01/18  1640   SODIUM mmol/L 123* 124*  < > 126*  < > 119*   POTASSIUM mmol/L 3.0*  --   --  2.3*  --  3.5   CHLORIDE mmol/L  --   --   --  93*  --  86*   CO2 mmol/L  --   --   --  23.0  --  23.0   BUN mg/dL  --   --   --  27*  --  34*   CREATININE mg/dL  --   --   --  0.73  --  1.04   GLUCOSE mg/dL  --   --   --  104*  --  178*   CALCIUM mg/dL  --   --   --  8.2*  --  8.6*   < > = values in this interval not displayed.    Results from last 7 days  Lab Units 10/02/18  0357 10/01/18  1640   MAGNESIUM mg/dL 1.4 1.6   PHOSPHORUS mg/dL 2.4  --        Results from last 7 days  Lab Units 10/02/18  0357 10/01/18  1640   ALK PHOS U/L 81  --    BILIRUBIN mg/dL 0.8  --    ALT (SGPT) U/L 21 25   AST (SGOT) U/L 36* 39*       No results found for: SEDRATE  No results found for: BNP  Lab Results   Component Value Date    CKTOTAL 212 (H) 10/01/2018     Lab Results   Component Value Date    TSH 3.112 10/01/2018     Lab Results   Component Value Date    LACTATE 1.5 10/01/2018     Lab Results   Component Value Date    CORTISOL 39.50 10/01/2018         Results from last 7 days  Lab Units 10/02/18  0545 10/01/18  1700   PH, ARTERIAL pH units 7.450 7.454*   PCO2, ARTERIAL mm Hg 34.2* 34.5*   PO2 ART mm Hg 102.0 179.0*   HCO3 ART mmol/L 23.8 24.2   FIO2 % 40 60       Vent Settings:  Assist control  Vt (Set, L): 0.35 L  Resp Rate (Set): 10  FiO2 (%): 40 %  PEEP/CPAP (cm H2O): 5 cm H20    Minute Ventilation (L/min) (Obs): 6.37 L/min  Resp Rate (Observed) Vent: 10  I:E Ratio (Obs): 1:4.70  PIP Observed (cm H2O): 18 cm H2O      I reviewed the patient's results, images and medication.    Assessment/Plan   ASSESSMENT      Principal Problem:    Acute respiratory failure (CMS/HCC)  Active Problems:    Altered mental state    Acute respiratory failure with hypoxemia (CMS/HCC)    Hyponatremia    Hypoglycemia    Generalized  abdominal pain    Weight loss    Maria D coma scale total score 3-8 (CMS/HCC) - on arrival    Hypotension      DISCUSSION: Not sure if I can explain the entire presentation. She appears profoundly debilitated, has been losing weight, and is hyponatremic suggesting an underlying malignancy but I do not see nothing obvious on CT of the chest or abdomen. She then had an event at home in which she fell and fractured her ribs. She was not initially hypoglycemic but did become so shortly after being found. There has been no evidence of seizure activity and CT of the head was apparently unremarkable. For now I would like to keep her on ventilatory support, maintain her with fluids, follow-up her hematocrit to see if she is losing blood anywhere, and reassess in the morning. She is on broad-spectrum antimicrobial coverage with vancomycin and Zosyn although I see no focus for infection and we did this just because she had an elevated white count (for all I know this was due to the stress of her fall, rib fractures, or even an occult seizure related to hypoglycemia).    PLAN     1. Maintain on ventilatory support  2. If hemodynamically stable in a.m. and off pressors (and has adequate mechanics) will wean and extubate  3. Check urine and serum osmolality to rule out SIADH   4. Continue antimicrobial therapy for now  5. Repeat hemoglobin and hematocrit tonight and in a.m.  6. Change fluids to D5 normal saline and follow-up serum sodium      Plan of care and goals reviewed with mulitdisciplinary team at daily rounds.    I discussed the patient's findings and my recommendations with patient and nursing staff    Time spent Critical care 40 min (It does not include procedure time).    Joe Christine MD  Intensive Care Medicine  10/02/18 6:58 PM

## 2018-10-03 ENCOUNTER — APPOINTMENT (OUTPATIENT)
Dept: GENERAL RADIOLOGY | Facility: HOSPITAL | Age: 73
End: 2018-10-03

## 2018-10-03 PROBLEM — J44.9 COPD (CHRONIC OBSTRUCTIVE PULMONARY DISEASE) (HCC): Status: ACTIVE | Noted: 2018-10-03

## 2018-10-03 LAB
ALBUMIN SERPL-MCNC: 3.3 G/DL (ref 2.9–4.4)
ALBUMIN SERPL-MCNC: 3.34 G/DL (ref 3.2–4.8)
ALBUMIN/GLOB SERPL: 1.7 {RATIO} (ref 0.7–1.7)
ALBUMIN/GLOB SERPL: 1.8 G/DL (ref 1.5–2.5)
ALP SERPL-CCNC: 85 U/L (ref 25–100)
ALPHA1 GLOB FLD ELPH-MCNC: 0.3 G/DL (ref 0–0.4)
ALPHA2 GLOB SERPL ELPH-MCNC: 0.5 G/DL (ref 0.4–1)
ALT SERPL W P-5'-P-CCNC: 23 U/L (ref 7–40)
ANION GAP SERPL CALCULATED.3IONS-SCNC: 6 MMOL/L (ref 3–11)
APTT PPP: 30.2 SECONDS (ref 55–70)
ARTERIAL PATENCY WRIST A: ABNORMAL
ARTERIAL PATENCY WRIST A: ABNORMAL
AST SERPL-CCNC: 46 U/L (ref 0–33)
ATMOSPHERIC PRESS: ABNORMAL MMHG
ATMOSPHERIC PRESS: ABNORMAL MMHG
B-GLOBULIN SERPL ELPH-MCNC: 0.6 G/DL (ref 0.7–1.3)
BASE EXCESS BLDA CALC-SCNC: -0.7 MMOL/L (ref 0–2)
BASE EXCESS BLDA CALC-SCNC: -1.1 MMOL/L (ref 0–2)
BASOPHILS # BLD AUTO: 0.02 10*3/MM3 (ref 0–0.2)
BASOPHILS NFR BLD AUTO: 0.2 % (ref 0–1)
BDY SITE: ABNORMAL
BDY SITE: ABNORMAL
BILIRUB SERPL-MCNC: 0.6 MG/DL (ref 0.3–1.2)
BUN BLD-MCNC: 14 MG/DL (ref 9–23)
BUN/CREAT SERPL: 24.1 (ref 7–25)
CALCIUM SPEC-SCNC: 8.2 MG/DL (ref 8.7–10.4)
CHLORIDE SERPL-SCNC: 98 MMOL/L (ref 99–109)
CO2 BLDA-SCNC: 23.6 MMOL/L (ref 22–33)
CO2 BLDA-SCNC: 24.6 MMOL/L (ref 22–33)
CO2 SERPL-SCNC: 23 MMOL/L (ref 20–31)
COHGB MFR BLD: 1 % (ref 0–2)
COHGB MFR BLD: 1.1 % (ref 0–2)
CREAT BLD-MCNC: 0.58 MG/DL (ref 0.6–1.3)
DEPRECATED RDW RBC AUTO: 46.8 FL (ref 37–54)
EOSINOPHIL # BLD AUTO: 0.01 10*3/MM3 (ref 0–0.3)
EOSINOPHIL NFR BLD AUTO: 0.1 % (ref 0–3)
ERYTHROCYTE [DISTWIDTH] IN BLOOD BY AUTOMATED COUNT: 14.7 % (ref 11.3–14.5)
ERYTHROCYTE [SEDIMENTATION RATE] IN BLOOD: 7 MM/HR (ref 0–30)
GAMMA GLOB SERPL ELPH-MCNC: 0.5 G/DL (ref 0.4–1.8)
GFR SERPL CREATININE-BSD FRML MDRD: 102 ML/MIN/1.73
GLOBULIN SER CALC-MCNC: 2 G/DL (ref 2.2–3.9)
GLOBULIN UR ELPH-MCNC: 1.9 GM/DL
GLUCOSE BLD-MCNC: 112 MG/DL (ref 70–100)
GLUCOSE BLDC GLUCOMTR-MCNC: 101 MG/DL (ref 70–130)
GLUCOSE BLDC GLUCOMTR-MCNC: 123 MG/DL (ref 70–130)
GLUCOSE BLDC GLUCOMTR-MCNC: 68 MG/DL (ref 70–130)
GLUCOSE BLDC GLUCOMTR-MCNC: 89 MG/DL (ref 70–130)
HCO3 BLDA-SCNC: 22.6 MMOL/L (ref 20–26)
HCO3 BLDA-SCNC: 23.4 MMOL/L (ref 20–26)
HCT VFR BLD AUTO: 31.8 % (ref 34.5–44)
HCT VFR BLD CALC: 31.3 %
HCT VFR BLD CALC: 33.2 %
HGB BLD-MCNC: 10.6 G/DL (ref 11.5–15.5)
HGB BLDA-MCNC: 10.2 G/DL (ref 14–18)
HGB BLDA-MCNC: 10.8 G/DL (ref 14–18)
HOROWITZ INDEX BLD+IHG-RTO: 35 %
HOROWITZ INDEX BLD+IHG-RTO: 40 %
IMM GRANULOCYTES # BLD: 0.02 10*3/MM3 (ref 0–0.03)
IMM GRANULOCYTES NFR BLD: 0.2 % (ref 0–0.6)
INR PPP: 1.04 (ref 0.91–1.09)
LYMPHOCYTES # BLD AUTO: 0.98 10*3/MM3 (ref 0.6–4.8)
LYMPHOCYTES NFR BLD AUTO: 9.9 % (ref 24–44)
Lab: ABNORMAL
M-SPIKE: ABNORMAL G/DL
MAGNESIUM SERPL-MCNC: 2 MG/DL (ref 1.3–2.7)
MCH RBC QN AUTO: 29.2 PG (ref 27–31)
MCHC RBC AUTO-ENTMCNC: 33.3 G/DL (ref 32–36)
MCV RBC AUTO: 87.6 FL (ref 80–99)
METHGB BLD QL: 0.8 % (ref 0–1.5)
METHGB BLD QL: 0.9 % (ref 0–1.5)
MODALITY: ABNORMAL
MODALITY: ABNORMAL
MONOCYTES # BLD AUTO: 0.42 10*3/MM3 (ref 0–1)
MONOCYTES NFR BLD AUTO: 4.2 % (ref 0–12)
NEUTROPHILS # BLD AUTO: 8.46 10*3/MM3 (ref 1.5–8.3)
NEUTROPHILS NFR BLD AUTO: 85.6 % (ref 41–71)
OXYHGB MFR BLDV: 97.8 % (ref 94–99)
OXYHGB MFR BLDV: 98 % (ref 94–99)
PCO2 BLDA: 31.9 MM HG (ref 35–45)
PCO2 BLDA: 37.5 MM HG (ref 35–45)
PH BLDA: 7.4 PH UNITS (ref 7.35–7.45)
PH BLDA: 7.46 PH UNITS (ref 7.35–7.45)
PHOSPHATE SERPL-MCNC: 1.6 MG/DL (ref 2.4–5.1)
PLATELET # BLD AUTO: 257 10*3/MM3 (ref 150–450)
PMV BLD AUTO: 9.3 FL (ref 6–12)
PO2 BLDA: 136 MM HG (ref 83–108)
PO2 BLDA: 148 MM HG (ref 83–108)
POTASSIUM BLD-SCNC: 3.5 MMOL/L (ref 3.5–5.5)
PROT PATTERN SERPL ELPH-IMP: ABNORMAL
PROT SERPL-MCNC: 5.2 G/DL (ref 5.7–8.2)
PROT SERPL-MCNC: 5.3 G/DL (ref 6–8.5)
PROTHROMBIN TIME: 10.9 SECONDS (ref 9.6–11.5)
RBC # BLD AUTO: 3.63 10*6/MM3 (ref 3.89–5.14)
SAO2 % BLDCOA: 98 %
SODIUM BLD-SCNC: 127 MMOL/L (ref 132–146)
VANCOMYCIN TROUGH SERPL-MCNC: 5.1 MCG/ML (ref 10–20)
WBC NRBC COR # BLD: 9.89 10*3/MM3 (ref 3.5–10.8)

## 2018-10-03 PROCEDURE — 94799 UNLISTED PULMONARY SVC/PX: CPT

## 2018-10-03 PROCEDURE — 82962 GLUCOSE BLOOD TEST: CPT

## 2018-10-03 PROCEDURE — 80053 COMPREHEN METABOLIC PANEL: CPT | Performed by: INTERNAL MEDICINE

## 2018-10-03 PROCEDURE — 71045 X-RAY EXAM CHEST 1 VIEW: CPT

## 2018-10-03 PROCEDURE — 83735 ASSAY OF MAGNESIUM: CPT | Performed by: NURSE PRACTITIONER

## 2018-10-03 PROCEDURE — 82805 BLOOD GASES W/O2 SATURATION: CPT | Performed by: INTERNAL MEDICINE

## 2018-10-03 PROCEDURE — 85025 COMPLETE CBC W/AUTO DIFF WBC: CPT | Performed by: INTERNAL MEDICINE

## 2018-10-03 PROCEDURE — 80202 ASSAY OF VANCOMYCIN: CPT | Performed by: INTERNAL MEDICINE

## 2018-10-03 PROCEDURE — 25010000003 POTASSIUM CHLORIDE PER 2 MEQ: Performed by: NURSE PRACTITIONER

## 2018-10-03 PROCEDURE — 25010000002 PIPERACILLIN SOD-TAZOBACTAM PER 1 G: Performed by: INTERNAL MEDICINE

## 2018-10-03 PROCEDURE — 25010000002 VANCOMYCIN PER 500 MG

## 2018-10-03 PROCEDURE — 36600 WITHDRAWAL OF ARTERIAL BLOOD: CPT | Performed by: INTERNAL MEDICINE

## 2018-10-03 PROCEDURE — 85610 PROTHROMBIN TIME: CPT | Performed by: INTERNAL MEDICINE

## 2018-10-03 PROCEDURE — 25010000002 FENTANYL CITRATE (PF) 2500 MCG/50ML SOLUTION: Performed by: NURSE PRACTITIONER

## 2018-10-03 PROCEDURE — 85652 RBC SED RATE AUTOMATED: CPT | Performed by: INTERNAL MEDICINE

## 2018-10-03 PROCEDURE — 99291 CRITICAL CARE FIRST HOUR: CPT | Performed by: INTERNAL MEDICINE

## 2018-10-03 PROCEDURE — 25010000002 ENOXAPARIN PER 10 MG: Performed by: INTERNAL MEDICINE

## 2018-10-03 PROCEDURE — 84100 ASSAY OF PHOSPHORUS: CPT | Performed by: INTERNAL MEDICINE

## 2018-10-03 PROCEDURE — 85730 THROMBOPLASTIN TIME PARTIAL: CPT | Performed by: INTERNAL MEDICINE

## 2018-10-03 PROCEDURE — 25010000002 HEPARIN (PORCINE) PER 1000 UNITS: Performed by: NURSE PRACTITIONER

## 2018-10-03 PROCEDURE — 94003 VENT MGMT INPAT SUBQ DAY: CPT

## 2018-10-03 PROCEDURE — 92610 EVALUATE SWALLOWING FUNCTION: CPT

## 2018-10-03 RX ORDER — MIDODRINE HYDROCHLORIDE 5 MG/1
5 TABLET ORAL
Status: DISCONTINUED | OUTPATIENT
Start: 2018-10-03 | End: 2018-10-05

## 2018-10-03 RX ORDER — DIPHENOXYLATE HYDROCHLORIDE AND ATROPINE SULFATE 2.5; .025 MG/1; MG/1
1 TABLET ORAL DAILY
Status: DISCONTINUED | OUTPATIENT
Start: 2018-10-04 | End: 2018-10-19 | Stop reason: HOSPADM

## 2018-10-03 RX ORDER — DOCUSATE SODIUM 100 MG/1
100 CAPSULE, LIQUID FILLED ORAL 2 TIMES DAILY
Status: DISCONTINUED | OUTPATIENT
Start: 2018-10-03 | End: 2018-10-06

## 2018-10-03 RX ORDER — CLOPIDOGREL BISULFATE 75 MG/1
75 TABLET ORAL DAILY
Status: DISCONTINUED | OUTPATIENT
Start: 2018-10-03 | End: 2018-10-19 | Stop reason: HOSPADM

## 2018-10-03 RX ORDER — VANCOMYCIN HYDROCHLORIDE 1 G/200ML
1000 INJECTION, SOLUTION INTRAVENOUS EVERY 24 HOURS
Status: DISCONTINUED | OUTPATIENT
Start: 2018-10-03 | End: 2018-10-03

## 2018-10-03 RX ORDER — SENNA AND DOCUSATE SODIUM 50; 8.6 MG/1; MG/1
2 TABLET, FILM COATED ORAL NIGHTLY
Status: DISCONTINUED | OUTPATIENT
Start: 2018-10-03 | End: 2018-10-19 | Stop reason: HOSPADM

## 2018-10-03 RX ORDER — FAMOTIDINE 20 MG/1
20 TABLET, FILM COATED ORAL 2 TIMES DAILY
Status: DISCONTINUED | OUTPATIENT
Start: 2018-10-03 | End: 2018-10-19 | Stop reason: HOSPADM

## 2018-10-03 RX ADMIN — FAMOTIDINE 20 MG: 10 INJECTION, SOLUTION INTRAVENOUS at 08:26

## 2018-10-03 RX ADMIN — MIDODRINE HYDROCHLORIDE 5 MG: 5 TABLET ORAL at 17:17

## 2018-10-03 RX ADMIN — TAZOBACTAM SODIUM AND PIPERACILLIN SODIUM 3.38 G: 375; 3 INJECTION, SOLUTION INTRAVENOUS at 17:08

## 2018-10-03 RX ADMIN — TAZOBACTAM SODIUM AND PIPERACILLIN SODIUM 3.38 G: 375; 3 INJECTION, SOLUTION INTRAVENOUS at 08:26

## 2018-10-03 RX ADMIN — NOREPINEPHRINE BITARTRATE 0.2 MCG/KG/MIN: 8 SOLUTION at 03:17

## 2018-10-03 RX ADMIN — FAMOTIDINE 20 MG: 20 TABLET ORAL at 20:04

## 2018-10-03 RX ADMIN — ENOXAPARIN SODIUM 30 MG: 30 INJECTION SUBCUTANEOUS at 20:04

## 2018-10-03 RX ADMIN — HEPARIN SODIUM 2500 UNITS: 5000 INJECTION, SOLUTION INTRAVENOUS; SUBCUTANEOUS at 06:48

## 2018-10-03 RX ADMIN — POTASSIUM CHLORIDE 20 MEQ: 29.8 INJECTION, SOLUTION INTRAVENOUS at 08:30

## 2018-10-03 RX ADMIN — TAZOBACTAM SODIUM AND PIPERACILLIN SODIUM 3.38 G: 375; 3 INJECTION, SOLUTION INTRAVENOUS at 01:11

## 2018-10-03 RX ADMIN — CLOPIDOGREL BISULFATE 75 MG: 75 TABLET ORAL at 20:04

## 2018-10-03 RX ADMIN — POTASSIUM PHOSPHATE, MONOBASIC AND POTASSIUM PHOSPHATE, DIBASIC 30 MMOL: 224; 236 INJECTION, SOLUTION INTRAVENOUS at 10:49

## 2018-10-03 RX ADMIN — CHLORHEXIDINE GLUCONATE 15 ML: 1.2 RINSE ORAL at 08:36

## 2018-10-03 RX ADMIN — DOCUSATE SODIUM 100 MG: 100 CAPSULE, LIQUID FILLED ORAL at 21:04

## 2018-10-03 RX ADMIN — VANCOMYCIN HYDROCHLORIDE 1000 MG: 1 INJECTION, SOLUTION INTRAVENOUS at 17:09

## 2018-10-03 RX ADMIN — CHLORHEXIDINE GLUCONATE 15 ML: 1.2 RINSE ORAL at 20:05

## 2018-10-03 RX ADMIN — SENNOSIDES AND DOCUSATE SODIUM 2 TABLET: 8.6; 5 TABLET ORAL at 21:04

## 2018-10-03 RX ADMIN — HEPARIN SODIUM 2500 UNITS: 5000 INJECTION, SOLUTION INTRAVENOUS; SUBCUTANEOUS at 17:08

## 2018-10-03 RX ADMIN — FENTANYL CITRATE 75 MCG/HR: 50 INJECTION, SOLUTION INTRAMUSCULAR; INTRAVENOUS at 07:23

## 2018-10-03 NOTE — PROGRESS NOTES
Adult Nutrition  Assessment/PES    Patient Name:  Devora Huddleston  YOB: 1945  MRN: [id number]  Admit Date:  10/1/2018    Assessment Date:  10/3/2018    Comments:  Malnutrition severity assessment in progress; actual weight needed to complete. Pt reports she eats a small meal each day not much food in the house; pt son reports she drinks better than eats. Pt drinks an Ensure sometimes. Pt w/ possible  physical signs of vit B2,3,6 and/ or Fe deficiency. Consider multivitamin and mineral supplement. RD will complete assessment and monitor intake closely.          Reason for Assessment     Row Name 10/03/18 1733          Reason for Assessment    Reason For Assessment per organizational policy;follow-up protocol   MDR; p/o- dysphagia f/u; malnutrition assessment initiated 30 mins     Diagnosis --   AMS unresponsive in ED ARF (extubated );rib fractures; Abd pain, ? seizure, present: Hypoglycemia, Hyponatremia, Hypotension Hx HTN, CVA, recent UTI     Identified At Risk by Screening Criteria MST SCORE 2+;BMI;difficulty chewing/swallowing             Nutrition/Diet History     Row Name 10/03/18 1739          Nutrition/Diet History    Typical Food/Fluid Intake I small meal per day     Food Preferences prefers vanilla, pleased w/ mechanical soft texture; edentulous, dentures are at home son will bring them on Friday     Supplemental Drinks/Foods/Additives drinks vanilla ensure at home     Vitamin/Mineral/Herbal Supplements cannot afford vitamin     Functional Status not able to prepare meals;not able to purchase food   limited financial resources; son and great grandson live w/ pt     Factors Affecting Nutritional Intake socio-economic   RN reports pt eating only a small amount               Labs/Tests/Procedures/Meds     Row Name 10/03/18 3959          Labs/Procedures/Meds    Lab Results Reviewed reviewed, pertinent     Lab Results Comments low K, Phos, Mg++ consistent w/ malnutrition        Diagnostic  Tests/Procedures    Diagnostic Test/Procedure Reviewed reviewed, pertinent     Diagnostic Test/Procedures Comments SLP swallow eval noted        Medications    Pertinent Medications Reviewed reviewed, pertinent             Physical Findings     Row Name 10/03/18 173          Physical Findings    Overall Physical Appearance generalized wasting;loss of muscle mass;loss of subcutaneous fat     Oral/Mouth Cavity other (see comments)   r/o dermatits, angular stomatitis, cheilosis     Skin poor skin integrity/turgor               Nutrition Prescription Ordered     Row Name 10/03/18 1743          Nutrition Prescription PO    Current PO Diet Soft Texture     Texture Ground     Fluid Consistency Thin     Common Modifiers Cardiac               Problem/Interventions:        Problem 1     Row Name 10/03/18 1742          Nutrition Diagnoses Problem 1    Problem 1 Inadequate Intake/Infusion     Macronutrient Kcal;Protein;Fat     Micronutrient Vitamin;Mineral     Etiology (related to) Factors Affecting Nutrition     Functional Diagnosis Chewing deficit;Other (comment)   edentulous , dentures at home     Food Habit/Preferences Socioeconomic Issues     Signs/Symptoms (evidenced by) Report of Mnimal PO Intake;Unintended Weight Change     Unintended Weight Change Loss     Number of Pounds Lost unknown                      Intervention Goal     Row Name 10/03/18 1747          Intervention Goal    General Nutrition support treatment;Meet nutritional needs for age/condition     PO Initiate feeding;Increase intake;Modify texture/consistency             Nutrition Intervention     Row Name 10/03/18 7978          Nutrition Intervention    RD/Tech Action Follow Tx progress;Care plan reviewd;Interview for preference;Encourage intake;Recommend/ordered     Recommended/Ordered Supplement             Nutrition Prescription     Row Name 10/03/18 9110          Nutrition Prescription PO    PO Prescription Begin/change supplement     Supplement Boost  Plus     Supplement Frequency 3 times a day     New PO Prescription Ordered? Yes             Education/Evaluation     Row Name 10/03/18 1747          Monitor/Evaluation    Monitor Per protocol;PO intake;Supplement intake;Pertinent labs;Weight;Skin status;Symptoms         Electronically signed by:  Darline Guerra MS,RD,LD  10/03/18 5:47 PM

## 2018-10-03 NOTE — PROGRESS NOTES
Continued Stay Note  [facility]     Patient Name: Devora Huddleston  MRN: [id number]  Today's Date: 10/3/2018    Admit Date: 10/1/2018          Discharge Plan     Row Name 10/03/18 0920       Plan    Plan Comments Will consult with MSW to give her and son resources for food at home.  Son states he does not have enough money for food for family.  She does smoke/drink per son.                Discharge Codes    No documentation.       Expected Discharge Date and Time     Expected Discharge Date Expected Discharge Time    Oct 6, 2018             Yary Zapata RN

## 2018-10-03 NOTE — THERAPY TREATMENT NOTE
Acute Care - Speech Language Pathology   Swallow Initial Evaluation [facility]     Patient Name: Devora Huddleston  : 1945  MRN: [id number]  Today's Date: 10/3/2018               Admit Date: 10/1/2018    Visit Dx:     ICD-10-CM ICD-9-CM   1. Acute respiratory failure with hypoxemia (CMS/HCC) J96.01 518.81     Patient Active Problem List   Diagnosis   • Acute respiratory failure (CMS/HCC)   • Altered mental state associated with hypoglycemia. Resolved   • Acute respiratory failure with hypoxemia (CMS/HCC)   • Hyponatremia   • Hypoglycemia   • Generalized abdominal pain   • Weight loss   • Maria D coma scale total score 3-8 (CMS/HCC) - on arrival   • Hypotension   • Multiple right rib fractures (ninth, 10th, 11th)   • Anemia with hematocrit dropping from 36 to 29 over 16 hours     Past Medical History:   Diagnosis Date   • Hypertension    • Stroke (CMS/HCC)      History reviewed. No pertinent surgical history.       SWALLOW EVALUATION (last 72 hours)      SLP Adult Swallow Evaluation     Row Name 10/03/18 1300                   Rehab Evaluation    Document Type evaluation  -AV (r) MF (t) AV (c)        Subjective Information no complaints  -AV (r) MF (t) AV (c)        Patient Observations alert;cooperative  -AV (r) MF (t) AV (c)        Patient/Family Observations no family present  -AV (r) MF (t) AV (c)        Patient Effort good  -AV (r) MF (t) AV (c)           General Information    Patient Profile Reviewed yes  -AV (r) MF (t) AV (c)        Pertinent History Of Current Problem resp. failure  -AV (r) MF (t) AV (c)        Current Method of Nutrition NPO  -AV (r) MF (t) AV (c)        Precautions/Limitations, Vision WFL  -AV (r) MF (t) AV (c)        Precautions/Limitations, Hearing WFL  -AV (r) MF (t) AV (c)        Prior Level of Function-Communication WFL  -AV (r) MF (t) AV (c)        Prior Level of Function-Swallowing no diet consistency restrictions  -AV (r) MF (t) AV (c)        Patient's Goals for Discharge  patient did not state  -AV (r) MF (t) AV (c)           Pain Assessment    Additional Documentation Pain Scale: FACES Pre/Post-Treatment (Group)  -AV (r) MF (t) AV (c)           Pain Scale: FACES Pre/Post-Treatment    Pain: FACES Scale, Pretreatment 0-->no hurt  -AV (r) MF (t) AV (c)        Pain: FACES Scale, Post-Treatment 0-->no hurt  -AV (r) MF (t) AV (c)           Oral Motor and Function    Dentition Assessment edentulous, does not have dentures  -AV (r) MF (t) AV (c)        Secretion Management WNL/WFL  -AV (r) MF (t) AV (c)        Mucosal Quality moist, healthy  -AV (r) MF (t) AV (c)           Oral Musculature and Cranial Nerve Assessment    Oral Motor General Assessment WFL  -AV (r) MF (t) AV (c)           General Eating/Swallowing Observations    Respiratory Support Currently in Use nasal cannula  -AV (r) MF (t) AV (c)        Eating/Swallowing Skills fed by SLP  -AV (r) MF (t) AV (c)        Positioning During Eating upright in bed  -AV (r) MF (t) AV (c)        Utensils Used spoon;cup;straw  -AV (r) MF (t) AV (c)        Consistencies Trialed regular textures;pureed;thin liquids  -AV (r) MF (t) AV (c)           Respiratory    Respiratory Status WFL  -AV (r) MF (t) AV (c)           Clinical Swallow Eval    Oral Prep Phase (P)  impaired   prolonged mastication. pt edentulous  -MF        Oral Transit WFL  -AV (r) MF (t) AV (c)        Oral Residue WFL  -AV (r) MF (t) AV (c)        Pharyngeal Phase no overt signs/symptoms of pharyngeal impairment  -AV (r) MF (t) AV (c)        Esophageal Phase unremarkable  -AV (r) MF (t) AV (c)        Clinical Swallow Evaluation Summary CSE completed this date. Pt did not show s/s of aspiration at the bedside on trials of thin liquids, puree, or solids. Observed prolonged mastication 2' edentulousness. Rec: regular diet and thin liquids.   -AV (r) MF (t) AV (c)           Oral Prep Concerns    Oral Prep Concerns (P)  prolonged mastication  -MF        Prolonged Mastication (P)   regular consistencies  -           Clinical Impression    SLP Swallowing Diagnosis (P)  functional pharyngeal phase;mild;oral dysfunction  -        Functional Impact (P)  risk of aspiration/pneumonia  -        Rehab Potential/Prognosis, Swallowing good, to achieve stated therapy goals  -AV (r) MF (t) AV (c)        Swallow Criteria for Skilled Therapeutic Interventions Met demonstrates skilled criteria  -AV (r) MF (t) AV (c)           Recommendations    Therapy Frequency (Swallow) (P)  5 days per week  -        Predicted Duration Therapy Intervention (Days) (P)  until discharge  -        SLP Diet Recommendation (P)  soft textures;thin liquids  -        SLP Rec. for Method of Medication Administration meds whole;with thin liquids  -AV (r) MF (t) AV (c)        Monitor for Signs of Aspiration notify SLP if any concerns  -AV (r) MF (t) AV (c)        Anticipated Dischage Disposition (P)  unknown  -          User Key  (r) = Recorded By, (t) = Taken By, (c) = Cosigned By    Initials Name Effective Dates     Alejandra Xiong, MS CCC-SLP 04/03/18 -     Emani Flynn, Speech Therapy Student 08/06/18 -         EDUCATION  The patient has been educated in the following areas:   Dysphagia (Swallowing Impairment).    SLP Recommendation and Plan  SLP Swallowing Diagnosis: (P) functional pharyngeal phase, mild, oral dysfunction  SLP Diet Recommendation: (P) soft textures, thin liquids        Monitor for Signs of Aspiration: notify SLP if any concerns     Swallow Criteria for Skilled Therapeutic Interventions Met: demonstrates skilled criteria  Anticipated Dischage Disposition: (P) unknown  Rehab Potential/Prognosis, Swallowing: good, to achieve stated therapy goals  Therapy Frequency (Swallow): (P) 5 days per week  Predicted Duration Therapy Intervention (Days): (P) until discharge       Plan of Care Reviewed With: (P) patient  Plan of Care Review  Plan of Care Reviewed With: (P) patient  Progress: (P)   (Evaluation)          SLP GOALS     Row Name 10/03/18 1300             Oral Nutrition/Hydration Goal 1 (SLP)    Oral Nutrition/Hydration Goal 1, SLP (P)  LTG: Pt will tolerate mechanical soft diet and thin liquids with no s/s of aspiration in the hospital settig with 100% accuracy.  -      Time Frame (Oral Nutrition/Hydration Goal 1, SLP) (P)  by discharge  -         Oral Nutrition/Hydration Goal 2 (SLP)    Oral Nutrition/Hydration Goal 2, SLP (P)  Pt will tolerate trials of mechanical soft textures and thin liquids with no s/s of aspiration with 100% accuracy.  -      Time Frame (Oral Nutrition/Hydration Goal 2, SLP) (P)  short term goal (STG);by discharge  -        User Key  (r) = Recorded By, (t) = Taken By, (c) = Cosigned By    Initials Name Provider Type    Emani Flynn, Speech Therapy Student Speech Therapy Student               Time Calculation:         Time Calculation- SLP     Row Name 10/03/18 1513             Time Calculation- SLP    SLP Start Time (P)  1300  -      SLP Received On (P)  10/03/18  -        User Key  (r) = Recorded By, (t) = Taken By, (c) = Cosigned By    Initials Name Provider Type    Emani Flynn, Speech Therapy Student Speech Therapy Student          Therapy Charges for Today     Code Description Service Date Service Provider Modifiers Qty    [id number] HC ST EVAL ORAL PHARYNG SWALLOW 4 10/3/2018 Emain Gamble, Speech Therapy Student GN 1               Emani Gamble Speech Therapy Student  10/3/2018

## 2018-10-03 NOTE — PROGRESS NOTES
Intensivist Note     10/3/2018  Hospital Day: 2  * No surgery found *      Ms. Devora Huddleston, 73 y.o. female is followed for:  Principal Problem:    Acute hypoxemic respiratory failure requiring intubation and ventilatory support (CMS/[facility])  Active Problems:    Hypoglycemia etiology unclear. Resolved    COPD and active cigarette abuse (CMS/[facility])    Altered mental state associated with hypoglycemia. Resolved    Maria D coma scale on arrival 3-8 (CMS/[facility])     Multiple right rib fractures (ninth, 10th, 11th)    Hypotension improving    Anemia with hematocrit dropping from 36 to 29 over 16 hours    Hyponatremia. Suspect SIADH    Generalized abdominal pain    Weight loss       SUBJECTIVE     73-year-old white female found down and unresponsive by her son 10/1/18. Was intubated in the field and transported. Initial blood sugar was apparently 105 but dropped to 39 in route. Patient had apparently been complaining of increasing weakness for several weeks, had been losing weight, and 2 weeks PTA was evaluated for hyponatremia and seizures. No data was available, patient was intubated, and no family available. X-rays and CT scans revealed that she had 3 right posterior rib fractures so presumably fell at home. CTs of the head however revealed no evidence of injury or stroke. The only pertinent history from the transferring records indicated that she had apparently had a CVA in the past but we had no specifics. Patient was hypotensive in the ER but did respond initially to fluids. When her blood pressure drifted down again she was placed on norepinephrine and remains on that at this time. Although requiring ventilatory support, gas exchange has not been a problem, and she is well oxygenated on only 40%.     Today the patient is awake, alert and cooperative and although intubated is not in distress. She had developed a significant leukocytosis on 10/2/18, but that has now returned to normal and she has had no fever. She  "remains on empiric vancomycin and Zosyn for possible sepsis, but there were no pulmonary infiltrates, urinalysis was negative without evidence of infection, and blood cultures remain normal.    The patient's relevant past medical, surgical and social history were reviewed and updated in Epic as appropriate.    OBJECTIVE     /85   Pulse 61   Temp 97.5 °F (36.4 °C) (Core)   Resp 12   Ht 157.5 cm (62.01\")   Wt 36.3 kg (80 lb)   SpO2 94%   BMI 14.63 kg/m²       Flowsheet Rows      First Filed Value   Admission Height  157.5 cm (62\") Documented at 10/01/2018 1700   Admission Weight  36.3 kg (80 lb) Documented at 10/01/2018 1700        Intake & Output (last day)       10/03 0701 - 10/04 0700    I.V. (mL/kg) 276.1 (7.6)    Total Intake(mL/kg) 276.1 (7.6)    Urine (mL/kg/hr) 300 (0.7)    Total Output 300    Net -23.9               Exam:  General Exam:  Elderly cachectic appearing white female intubated and on ventilatory support  HEENT: Pupils equal and reactive. ETT in place. OG tube was in place but she pulled it out  Neck:                          Supple, no JVD, thyromegaly, or adenopathy. Right IJ line in place.  Lungs: Clear to auscultation and percussion anteriorly and posteriorly.  Cardiovascular: RRR without murmurs or gallops. HR 50 (sinus)  Abdomen: Soft nontender without organomegaly or masses.   and rectal: Matthews catheter placed  Extremities: No cyanosis clubbing edema.  Neurologic:                 Symmetric strength. No focal deficits. Follows all commands.    Chest X-Ray 10/3/18: Normal heart size, lung fields clear, ET tube in position, right IJ line in position    INFUSIONS    dextrose 5 % and sodium chloride 0.9 % 75 mL/hr Last Rate: 75 mL/hr (10/02/18 2030)   fentanyl 10 mcg/mL  mcg/hr Last Rate: 75 mcg/hr (10/03/18 0723)   norepinephrine 0.02-0.3 mcg/kg/min Last Rate: 0.02 mcg/kg/min (10/03/18 1712)         Results from last 7 days  Lab Units 10/03/18  0629 10/02/18  2008 " 10/02/18  0357 10/01/18  1640   WBC 10*3/mm3 9.89  --  21.98* 10.47   HEMOGLOBIN g/dL 10.6* 11.2* 10.2* 12.6   HEMATOCRIT % 31.8* 33.1* 29.5* 36.9   PLATELETS 10*3/mm3 257  --  293 317       Results from last 7 days  Lab Units 10/03/18  0629 10/02/18  1535  10/02/18  0357   SODIUM mmol/L 127* 123*  < > 126*   POTASSIUM mmol/L 3.5 3.0*  --  2.3*   CHLORIDE mmol/L 98*  --   --  93*   CO2 mmol/L 23.0  --   --  23.0   BUN mg/dL 14  --   --  27*   CREATININE mg/dL 0.58*  --   --  0.73   GLUCOSE mg/dL 112*  --   --  104*   CALCIUM mg/dL 8.2*  --   --  8.2*   < > = values in this interval not displayed.    Results from last 7 days  Lab Units 10/03/18  0629 10/02/18  0357 10/01/18  1640   MAGNESIUM mg/dL 2.0 1.4 1.6   PHOSPHORUS mg/dL 1.6* 2.4  --        Results from last 7 days  Lab Units 10/03/18  0629 10/02/18  0357 10/01/18  1640   ALK PHOS U/L 85 81  --    BILIRUBIN mg/dL 0.6 0.8  --    ALT (SGPT) U/L 23 21 25   AST (SGOT) U/L 46* 36* 39*       Lab Results   Component Value Date    SEDRATE 7 10/03/2018     No results found for: BNP  Lab Results   Component Value Date    CKTOTAL 212 (H) 10/01/2018     Lab Results   Component Value Date    TSH 3.112 10/01/2018     Lab Results   Component Value Date    LACTATE 1.5 10/01/2018     Lab Results   Component Value Date    CORTISOL 39.50 10/01/2018         Results from last 7 days  Lab Units 10/03/18  0945 10/03/18  0415 10/02/18  0545 10/01/18  1700   PH, ARTERIAL pH units 7.459* 7.403 7.450 7.454*   PCO2, ARTERIAL mm Hg 31.9* 37.5 34.2* 34.5*   PO2 ART mm Hg 136.0* 148.0* 102.0 179.0*   HCO3 ART mmol/L 22.6 23.4 23.8 24.2   FIO2 % 35 40 40 60       Vent Settings:  IMV and pressure support  Vt (Set, L): 0.35 L  Resp Rate (Set): 10  Pressure Support (cm H2O): 10 cm H20  FiO2 (%): 35 %  PEEP/CPAP (cm H2O): 6 cm H20    Minute Ventilation (L/min) (Obs): 4.64 L/min  Resp Rate (Observed) Vent: 22  I:E Ratio (Obs): 1:5.40  PIP Observed (cm H2O): 16 cm H2O      I reviewed the patient's  "results, images and medication.    Assessment/Plan   ASSESSMENT      Principal Problem:    Acute hypoxemic respiratory failure requiring intubation and ventilatory support (CMS/[facility])  Active Problems:    Hypoglycemia etiology unclear. Resolved    COPD and active cigarette abuse (CMS/[facility])    Altered mental state associated with hypoglycemia. Resolved    [place] coma scale on arrival 3-8 (CMS/[facility])     Multiple right rib fractures (ninth, 10th, 11th)    Hypotension improving    Anemia with hematocrit dropping from 36 to 29 over 16 hours    Hyponatremia. Suspect SIADH    Generalized abdominal pain    Weight loss      DISCUSSION: ABGs excellent and respiratory mechanics good. She is awake and alert and should be able to handle her secretions. We will proceed with weaning and extubation today. Serum sodium remains low at 127 and a note that serum osmolality is 267, while urine osmolality is 454. She seems to be inappropriately concentrating her urine suggesting SIADH. I normally would suspect an underlying malignancy (especially with her weight loss) but I see nothing to suggest a malignancy on CT scans of the chest and abdomen. It remains unclear to me why she is hypotensive as she is normally on antihypertensives at home. There is no evidence of sepsis, and her cortisol was normal.     Apparently case management has discussed the patient's situation with her son. She is living with her son and her great-grandchild and apparently \"they do not have enough money for food\".    PLAN     1. Wean and extubate  2. Mobilize  3. Start midodrine if unable to take off of norepinephrine  4. After extubation will test swallowing and if she fails will start tube feedings  5. Have asked case management/social work to look into her home situation  6. Discontinue vancomycin but continue Zosyn  7. Change IV heparin to subcutaneous Lovenox  8. Continue normal saline fluids, until we see if she will be able to tolerate oral intake  9. " Follow-up sodium closely  10. Resume patient's home Plavix  11. Spirometry in a.m.    Plan of care and goals reviewed with mulitdisciplinary team at daily rounds.    I discussed the patient's findings and my recommendations with patient and nursing staff    Time spent Critical care 35 min (It does not include procedure time).    Joe Christine MD  Intensive Care Medicine  10/03/18 7:19 PM

## 2018-10-03 NOTE — PROGRESS NOTES
"Pharmacy Consult-Vancomycin Dosing  Devora Huddleston is a  73 y.o. female receiving vancomycin therapy.     Indication: Sepsis  Consulting Provider: Intensivist  ID Consult: No    Goal Trough:15-20    Current Antimicrobial Therapy  Vancomycin - Day 3  Zosyn - Day 3      Allergies  Allergies as of 10/01/2018   • (Not on File)       Labs    Results from last 7 days   Lab Units 10/03/18  0629 10/02/18  0357 10/01/18  1640   BUN mg/dL 14 27* 34*   CREATININE mg/dL 0.58* 0.73 1.04       Results from last 7 days   Lab Units 10/03/18  0629 10/02/18  0357 10/01/18  1640   WBC 10*3/mm3 9.89 21.98* 10.47       Evaluation of Dosing    Ht - 157.5 cm (62.01\")  Wt - 36.3 kg (80 lb)    Estimated Creatinine Clearance: 37 mL/min (A) (by C-G formula based on SCr of 0.58 mg/dL (L)).    Intake & Output (last 3 days)         09/30 0701 - 10/01 0700 10/01 0701 - 10/02 0700 10/02 0701 - 10/03 0700 10/03 0701 - 10/04 0700    I.V. (mL/kg)  1393.7 (38.4) 1825 (50.3) 276.1 (7.6)    IV Piggyback  1118.5 232.5     Total Intake(mL/kg)  2512.2 (69.2) 2057.5 (56.7) 276.1 (7.6)    Urine (mL/kg/hr)  1980 1470 (1.7) 200 (0.6)    Total Output   1980 1470 200    Net   +532.2 +587.5 +76.1                    Microbiology and Radiology  Microbiology Results (last 10 days)       Procedure Component Value - Date/Time    Blood Culture - Blood, [[id number]]  (Normal) Collected:  10/01/18 1745    Lab Status:  Preliminary result Specimen:  Blood from Wrist, Right Updated:  10/02/18 1830     Blood Culture No growth at 24 hours    Blood Culture - Blood, [[id number]]  (Normal) Collected:  10/01/18 1715    Lab Status:  Preliminary result Specimen:  Blood from Arm, Right Updated:  10/02/18 1830     Blood Culture No growth at 24 hours            Evaluation of Level    Lab Results   Component Value Date    VANCSTEVIE 5.10 (L) 10/03/2018   ---Drawn appropriately prior to third total dose of Vancomycin 500mg IV q24h    Assessment/Plan:   PTD Vancomycin for potential " sepsis, goal trough 15-20mcg/mL. Pt was initiated on a Vancomycin 1000mg (27mg/kg) load followed by Vancomycin 500mg IV q24h (14mg/kg). Trough was drawn appropriately prior to the 3rd total dose and is SUBtherapeutic at 5.1mcg/mL. Will increase to Vancomycin 1000mg IV q24h given patient's improvement in Scr and given trough level. Pharmacy will follow.    Ben Correa PharmD  10/3/2018  4:11 PM

## 2018-10-03 NOTE — PROGRESS NOTES
"Adult Nutrition  Assessment/PES    Patient Name:  Devora Huddleston  YOB: 1945  MRN: [id number]  Admit Date:  10/1/2018    Assessment Date:  10/2/2018    Comments: Determine svc needs pending eval of wt/intake hx and course of tx           Reason for Assessment     Row Name 10/02/18 2028          Reason for Assessment    Reason For Assessment per organizational policy   MDR 25 min     Diagnosis --   AMS unresponsive in ED Acute Resp F now mechanical vent; Abd pain, ? seizure, present: Hypoglycemia, Hyponatremia, Hypotension Hx HTN, CVA, recent UTI     Identified At Risk by Screening Criteria MST SCORE 2+               Anthropometrics     Row Name 10/02/18 2034 10/02/18 1808       Anthropometrics    Height  -- 157.5 cm (62.01\")    Weight --   80 lb estimated; Ht 62.01 in 36.3 kg (80 lb)       Ideal Body Weight (IBW)    Ideal Body Weight (IBW) (kg)  -- 50.45    % Ideal Body Weight  -- 71.93       Body Mass Index (BMI)    BMI (kg/m2)  -- 14.66            Labs/Tests/Procedures/Meds     Row Name 10/02/18 2034          Labs/Procedures/Meds    Lab Results Reviewed reviewed            Nutrition Prescription Ordered     Row Name 10/02/18 2034          Nutrition Prescription PO    Current PO Diet NPO         Problem/Interventions:        Problem 1     Row Name 10/02/18 2034          Nutrition Diagnoses Problem 1    Problem 1 Inadequate Intake/Infusion     Etiology (related to) --   vent support     Signs/Symptoms (evidenced by) NPO                     Intervention Goal     Row Name 10/02/18 2035          Intervention Goal    General Nutrition support treatment             Nutrition Intervention     Row Name 10/02/18 2035          Nutrition Intervention    RD/Tech Action Follow Tx progress;Care plan reviewd               Education/Evaluation     Row Name 10/02/18 2035          Monitor/Evaluation    Monitor Per protocol;Pertinent labs;Symptoms   Determine svc needs pending eval of wt/intake hx and course of tx  "        Electronically signed by:  Malgorzata Dukes RD  10/02/18 8:36 PM

## 2018-10-03 NOTE — PLAN OF CARE
Problem: Skin Injury Risk (Adult)  Intervention: Promote/Optimize Nutrition   10/03/18 1624   Nutrition Interventions   Oral Nutrition Promotion medicated;safe use of adaptive equipment encouraged     Intervention: Prevent/Manage Excess Moisture   10/03/18 0800 10/03/18 1200 10/03/18 1624   Skin Interventions   Skin Protection --  --  adhesive use limited;incontinence pads utilized;transparent dressing maintained;skin sealant/moisture barrier applied;skin-to-device areas padded   Hygiene Care   Perineal Care --  absorbent pad changed --    Bathing/Skin Care incontinence care --  --      Intervention: Maintain Head of Bed Elevation Less Than 30 Degrees as Tolerated   10/03/18 1600   Positioning   Head of Bed (HOB) HOB at 60 degrees     Intervention: Prevent/Minimize Shear/Friction Injuries   10/03/18 1600   Skin Interventions   Pressure Reduction Devices elbow protectors utilized;positioning supports utilized;heel offloading device utilized   Positioning   Positioning/Transfer Devices pillows;in use     Intervention: Prevent or Minimize Pressure   10/03/18 1600   Skin Interventions   Pressure Reduction Techniques frequent weight shift encouraged;pressure points protected   Positioning   Body Position neutral body alignment;neutral head position;upper extremity elevated, right;upper extremity elevated, left;weight shift assistance provided       Goal: Identify Related Risk Factors and Signs and Symptoms  Outcome: Ongoing (interventions implemented as appropriate)   10/03/18 1624   Skin Injury Risk (Adult)   Related Risk Factors (Skin Injury Risk) advanced age;critical care admission;edema;hospitalization prolonged;medication;mobility impaired;nutritional deficiencies     Goal: Skin Health and Integrity  Outcome: Ongoing (interventions implemented as appropriate)      Problem: Ventilation, Mechanical Invasive (Adult)  Intervention: Prevent Airway Displacement/Mechanical Dysfunction   10/03/18 0800   Prevent Airway  Displacement/Mechanical Dysfunction   Airway Safety Measures manual resuscitator/mask/valve in room;suction at bedside     Intervention: Prevent Airway-Related Skin/Tissue Breakdown   10/03/18 1600   Skin Interventions   Device Skin Pressure Protection absorbent pad utilized/changed;pressure points protected;positioning supports utilized;skin-to-device areas padded       Goal: Signs and Symptoms of Listed Potential Problems Will be Absent, Minimized or Managed (Ventilation, Mechanical Invasive)  Outcome: Outcome(s) achieved Date Met: 10/03/18      Problem: Patient Care Overview  Goal: Interprofessional Rounds/Family Conf  Outcome: Ongoing (interventions implemented as appropriate)   10/03/18 1624   Interdisciplinary Rounds/Family Conf   Summary Pt is appears to be awake and sedation turned off this A.M. to test mechanics. ABG drawn and extubation was done. pt failed bedside dysphagia-SLP was consulted and pt. passed swallow evaluation then. pt is now sitting up and is alert and appears awake and eating.    Interdisciplinary Rounds/Family Conf   Participants nursing;physician;respiratory therapy;social work       Problem: Infection, Risk/Actual (Adult)  Intervention: Manage Suspected/Actual Infection   10/03/18 1624   Promote Perineal Hygiene/Elimination Safety   Isolation Precautions protective environment maintained   Safety Interventions   Infection Management aseptic technique maintained     Intervention: Prevent Infection/Maximize Resistance   10/03/18 0800 10/03/18 1000 10/03/18 1200   Nutrition Interventions   Glycemic Management --  --  blood glucose monitoring   Oral Nutrition Promotion --  --  --    Respiratory Interventions   Airway/Ventilation Management --  airway patency maintained;calming measures promoted;humidification applied;pulmonary hygiene promoted --    Pain/Comfort/Sleep Interventions   Sleep/Rest Enhancement --  --  --    Hygiene Care   Bathing/Skin Care incontinence care --  --     10/03/18  1600 10/03/18 162   Nutrition Interventions   Glycemic Management --  --    Oral Nutrition Promotion --  medicated;safe use of adaptive equipment encouraged   Respiratory Interventions   Airway/Ventilation Management --  --    Pain/Comfort/Sleep Interventions   Sleep/Rest Enhancement consistent schedule promoted;noise level reduced;relaxation techniques promoted --    Hygiene Care   Bathing/Skin Care --  --        Goal: Identify Related Risk Factors and Signs and Symptoms  Outcome: Ongoing (interventions implemented as appropriate)   10/03/18 1624   Infection, Risk/Actual (Adult)   Related Risk Factors (Infection, Risk/Actual) age extremes;chronic illness/condition;malnutrition;skin integrity impairment   Signs and Symptoms (Infection, Risk/Actual) blood glucose changes;edema;feeding/eating intolerance;weakness     Goal: Infection Prevention/Resolution  Outcome: Ongoing (interventions implemented as appropriate)      Problem: Fall Risk (Adult)  Intervention: Monitor/Assist with Self Care   10/03/18 1600 10/03/18 1624   Daily Care Interventions   Self-Care Promotion --  independence encouraged;safe use of adaptive equipment encouraged   Activity   Activity Assistance Provided assistance, 2 people --      Intervention: Reduce Risk/Promote Restraint Free Environment   10/03/18 1600   Safety Management   Environmental Safety Modification assistive device/personal items within reach;clutter free environment maintained;room organization consistent;room near unit station   Safety Promotion/Fall Prevention safety round/check completed;fall prevention program maintained;activity supervised   Prevent  Drop/Fall   Safety/Security Measures bed alarm set     Intervention: Review Medications/Identify Contributors to Fall Risk   10/03/18 1600   Safety Management   Medication Review/Management medications reviewed;high risk medications identified       Goal: Identify Related Risk Factors and Signs and Symptoms  Outcome:  Ongoing (interventions implemented as appropriate)   10/03/18 1624   Fall Risk (Adult)   Related Risk Factors (Fall Risk) age-related changes;history of falls;gait/mobility problems;fear of falling;environment unfamiliar   Signs and Symptoms (Fall Risk) presence of risk factors     Goal: Absence of Fall  Outcome: Ongoing (interventions implemented as appropriate)   10/03/18 1624   Fall Risk (Adult)   Absence of Fall making progress toward outcome

## 2018-10-03 NOTE — PLAN OF CARE
Problem: Patient Care Overview  Goal: Plan of Care Review  Outcome: Ongoing (interventions implemented as appropriate)   10/03/18 0761   Coping/Psychosocial   Plan of Care Reviewed With patient   Plan of Care Review   Progress (Evaluation)   SLP evaluation completed. Will continue to address dysphagia. Pt did not show s/s of aspiration at the bedside, recommend mechanical soft diet and thin liquids. SLP will f/u for diet tolerance. Please see note for further details and recommendations.

## 2018-10-04 ENCOUNTER — APPOINTMENT (OUTPATIENT)
Dept: PULMONOLOGY | Facility: HOSPITAL | Age: 73
End: 2018-10-04
Attending: INTERNAL MEDICINE

## 2018-10-04 ENCOUNTER — APPOINTMENT (OUTPATIENT)
Dept: GENERAL RADIOLOGY | Facility: HOSPITAL | Age: 73
End: 2018-10-04

## 2018-10-04 ENCOUNTER — ANCILLARY PROCEDURE (OUTPATIENT)
Dept: SPEECH THERAPY | Facility: HOSPITAL | Age: 73
End: 2018-10-04
Attending: INTERNAL MEDICINE

## 2018-10-04 LAB
ALBUMIN SERPL-MCNC: 2.89 G/DL (ref 3.2–4.8)
ANION GAP SERPL CALCULATED.3IONS-SCNC: 6 MMOL/L (ref 3–11)
BASOPHILS # BLD AUTO: 0.01 10*3/MM3 (ref 0–0.2)
BASOPHILS NFR BLD AUTO: 0.1 % (ref 0–1)
BUN BLD-MCNC: 14 MG/DL (ref 9–23)
BUN/CREAT SERPL: 28.6 (ref 7–25)
CALCIUM SPEC-SCNC: 7.8 MG/DL (ref 8.7–10.4)
CHLORIDE SERPL-SCNC: 95 MMOL/L (ref 99–109)
CO2 SERPL-SCNC: 25 MMOL/L (ref 20–31)
CREAT BLD-MCNC: 0.49 MG/DL (ref 0.6–1.3)
DEPRECATED RDW RBC AUTO: 46.4 FL (ref 37–54)
EOSINOPHIL # BLD AUTO: 0.03 10*3/MM3 (ref 0–0.3)
EOSINOPHIL NFR BLD AUTO: 0.3 % (ref 0–3)
ERYTHROCYTE [DISTWIDTH] IN BLOOD BY AUTOMATED COUNT: 14.8 % (ref 11.3–14.5)
GFR SERPL CREATININE-BSD FRML MDRD: 124 ML/MIN/1.73
GLUCOSE BLD-MCNC: 80 MG/DL (ref 70–100)
GLUCOSE BLDC GLUCOMTR-MCNC: 115 MG/DL (ref 70–130)
GLUCOSE BLDC GLUCOMTR-MCNC: 159 MG/DL (ref 70–130)
GLUCOSE BLDC GLUCOMTR-MCNC: 194 MG/DL (ref 70–130)
GLUCOSE BLDC GLUCOMTR-MCNC: 95 MG/DL (ref 70–130)
HCT VFR BLD AUTO: 26.7 % (ref 34.5–44)
HGB BLD-MCNC: 9.1 G/DL (ref 11.5–15.5)
IMM GRANULOCYTES # BLD: 0.02 10*3/MM3 (ref 0–0.03)
IMM GRANULOCYTES NFR BLD: 0.2 % (ref 0–0.6)
LYMPHOCYTES # BLD AUTO: 0.74 10*3/MM3 (ref 0.6–4.8)
LYMPHOCYTES NFR BLD AUTO: 8 % (ref 24–44)
MAGNESIUM SERPL-MCNC: 1.5 MG/DL (ref 1.3–2.7)
MCH RBC QN AUTO: 29.6 PG (ref 27–31)
MCHC RBC AUTO-ENTMCNC: 34.1 G/DL (ref 32–36)
MCV RBC AUTO: 87 FL (ref 80–99)
MONOCYTES # BLD AUTO: 0.3 10*3/MM3 (ref 0–1)
MONOCYTES NFR BLD AUTO: 3.2 % (ref 0–12)
NEUTROPHILS # BLD AUTO: 8.17 10*3/MM3 (ref 1.5–8.3)
NEUTROPHILS NFR BLD AUTO: 88.4 % (ref 41–71)
PHOSPHATE SERPL-MCNC: 1.9 MG/DL (ref 2.4–5.1)
PLATELET # BLD AUTO: 225 10*3/MM3 (ref 150–450)
PMV BLD AUTO: 9.3 FL (ref 6–12)
POTASSIUM BLD-SCNC: 3.7 MMOL/L (ref 3.5–5.5)
RBC # BLD AUTO: 3.07 10*6/MM3 (ref 3.89–5.14)
SODIUM BLD-SCNC: 126 MMOL/L (ref 132–146)
WBC NRBC COR # BLD: 9.25 10*3/MM3 (ref 3.5–10.8)

## 2018-10-04 PROCEDURE — 97163 PT EVAL HIGH COMPLEX 45 MIN: CPT

## 2018-10-04 PROCEDURE — 92526 ORAL FUNCTION THERAPY: CPT

## 2018-10-04 PROCEDURE — 74018 RADEX ABDOMEN 1 VIEW: CPT

## 2018-10-04 PROCEDURE — 82962 GLUCOSE BLOOD TEST: CPT

## 2018-10-04 PROCEDURE — 25010000002 MAGNESIUM SULFATE 2 GM/50ML SOLUTION: Performed by: NURSE PRACTITIONER

## 2018-10-04 PROCEDURE — 92612 ENDOSCOPY SWALLOW (FEES) VID: CPT

## 2018-10-04 PROCEDURE — 25010000002 PIPERACILLIN SOD-TAZOBACTAM PER 1 G: Performed by: INTERNAL MEDICINE

## 2018-10-04 PROCEDURE — 94010 BREATHING CAPACITY TEST: CPT | Performed by: INTERNAL MEDICINE

## 2018-10-04 PROCEDURE — 94010 BREATHING CAPACITY TEST: CPT

## 2018-10-04 PROCEDURE — 80069 RENAL FUNCTION PANEL: CPT | Performed by: INTERNAL MEDICINE

## 2018-10-04 PROCEDURE — 85025 COMPLETE CBC W/AUTO DIFF WBC: CPT | Performed by: INTERNAL MEDICINE

## 2018-10-04 PROCEDURE — 83735 ASSAY OF MAGNESIUM: CPT | Performed by: INTERNAL MEDICINE

## 2018-10-04 PROCEDURE — 25810000003 DEXTROSE-NACL PER 500 ML: Performed by: INTERNAL MEDICINE

## 2018-10-04 PROCEDURE — 99291 CRITICAL CARE FIRST HOUR: CPT | Performed by: INTERNAL MEDICINE

## 2018-10-04 PROCEDURE — 25010000002 ENOXAPARIN PER 10 MG: Performed by: INTERNAL MEDICINE

## 2018-10-04 PROCEDURE — 71045 X-RAY EXAM CHEST 1 VIEW: CPT

## 2018-10-04 RX ORDER — SODIUM PHOSPHATE, DIBASIC AND SODIUM PHOSPHATE, MONOBASIC 7; 19 G/133ML; G/133ML
1 ENEMA RECTAL ONCE AS NEEDED
Status: DISCONTINUED | OUTPATIENT
Start: 2018-10-04 | End: 2018-10-06

## 2018-10-04 RX ORDER — BISACODYL 5 MG/1
10 TABLET, DELAYED RELEASE ORAL ONCE
Status: COMPLETED | OUTPATIENT
Start: 2018-10-04 | End: 2018-10-04

## 2018-10-04 RX ORDER — BISACODYL 10 MG
10 SUPPOSITORY, RECTAL RECTAL ONCE AS NEEDED
Status: DISCONTINUED | OUTPATIENT
Start: 2018-10-04 | End: 2018-10-19 | Stop reason: HOSPADM

## 2018-10-04 RX ADMIN — TAZOBACTAM SODIUM AND PIPERACILLIN SODIUM 3.38 G: 375; 3 INJECTION, SOLUTION INTRAVENOUS at 16:00

## 2018-10-04 RX ADMIN — MAGNESIUM SULFATE HEPTAHYDRATE 2 G: 40 INJECTION, SOLUTION INTRAVENOUS at 08:20

## 2018-10-04 RX ADMIN — DOCUSATE SODIUM 100 MG: 100 CAPSULE, LIQUID FILLED ORAL at 22:39

## 2018-10-04 RX ADMIN — Medication 1 TABLET: at 08:40

## 2018-10-04 RX ADMIN — FAMOTIDINE 20 MG: 20 TABLET ORAL at 22:39

## 2018-10-04 RX ADMIN — TAZOBACTAM SODIUM AND PIPERACILLIN SODIUM 3.38 G: 375; 3 INJECTION, SOLUTION INTRAVENOUS at 00:24

## 2018-10-04 RX ADMIN — ENOXAPARIN SODIUM 30 MG: 30 INJECTION SUBCUTANEOUS at 20:55

## 2018-10-04 RX ADMIN — MAGNESIUM SULFATE HEPTAHYDRATE 2 G: 40 INJECTION, SOLUTION INTRAVENOUS at 13:00

## 2018-10-04 RX ADMIN — DOCUSATE SODIUM 100 MG: 100 CAPSULE, LIQUID FILLED ORAL at 08:20

## 2018-10-04 RX ADMIN — CLOPIDOGREL BISULFATE 75 MG: 75 TABLET ORAL at 08:20

## 2018-10-04 RX ADMIN — MIDODRINE HYDROCHLORIDE 5 MG: 5 TABLET ORAL at 13:00

## 2018-10-04 RX ADMIN — TAZOBACTAM SODIUM AND PIPERACILLIN SODIUM 3.38 G: 375; 3 INJECTION, SOLUTION INTRAVENOUS at 23:28

## 2018-10-04 RX ADMIN — MIDODRINE HYDROCHLORIDE 5 MG: 5 TABLET ORAL at 08:20

## 2018-10-04 RX ADMIN — SENNOSIDES AND DOCUSATE SODIUM 2 TABLET: 8.6; 5 TABLET ORAL at 22:39

## 2018-10-04 RX ADMIN — POTASSIUM & SODIUM PHOSPHATES POWDER PACK 280-160-250 MG 2 PACKET: 280-160-250 PACK at 08:40

## 2018-10-04 RX ADMIN — MAGNESIUM SULFATE HEPTAHYDRATE 2 G: 40 INJECTION, SOLUTION INTRAVENOUS at 09:50

## 2018-10-04 RX ADMIN — TAZOBACTAM SODIUM AND PIPERACILLIN SODIUM 3.38 G: 375; 3 INJECTION, SOLUTION INTRAVENOUS at 08:21

## 2018-10-04 RX ADMIN — POLYETHYLENE GLYCOL 3350 17 G: 17 POWDER, FOR SOLUTION ORAL at 08:20

## 2018-10-04 RX ADMIN — FAMOTIDINE 20 MG: 20 TABLET ORAL at 08:21

## 2018-10-04 RX ADMIN — BISACODYL 10 MG: 5 TABLET, COATED ORAL at 10:18

## 2018-10-04 RX ADMIN — DEXTROSE AND SODIUM CHLORIDE 75 ML/HR: 5; 900 INJECTION, SOLUTION INTRAVENOUS at 20:55

## 2018-10-04 RX ADMIN — DEXTROSE AND SODIUM CHLORIDE 75 ML/HR: 5; 900 INJECTION, SOLUTION INTRAVENOUS at 02:35

## 2018-10-04 NOTE — NURSING NOTE
Attempted to call son to update on plan of care changes (restraints and feeding tube placement). Unable to reach at this time.

## 2018-10-04 NOTE — PLAN OF CARE
Problem: Patient Care Overview  Goal: Plan of Care Review   10/04/18 1827   Coping/Psychosocial   Plan of Care Reviewed With patient   Coping/Psychosocial   Patient Agreement with Plan of Care unable to participate   OTHER   Outcome Summary Pt failed speech evaulation today and is now NPO; keofeed placed and awaiting xray confirmation; worked with PT today; bowel regimen started, attempted to disempact some; pt restrained r/t attempting to pull out feeding tube; vital signs stable; will continue to monitor.

## 2018-10-04 NOTE — CONSULTS
Continued Stay Note  [facility]     Patient Name: Devora Huddleston  MRN: [id number]  Today's Date: 10/4/2018    Admit Date: 10/1/2018          Discharge Plan     Row Name 10/04/18 1049       Plan    Plan MSW available    Plan Comments Spoke with pt's son Molina Hendrickson and provided local resources for food and other needs- [facility] [phone] and [facility] [phone]    Row Name 10/04/18 4617       Plan    Plan Awaiting return call.     Plan Comments Called and left message for sonMolina [phone] re: food resources.               Discharge Codes    No documentation.       Expected Discharge Date and Time     Expected Discharge Date Expected Discharge Time    Oct 6, 2018             ANNE Gunn

## 2018-10-04 NOTE — CONSULTS
Adult Nutrition  Assessment/PES    Patient Name:  Devora Huddleston  YOB: 1945  MRN: [id number]  Admit Date:  10/1/2018    Assessment Date:  10/4/2018    Comments:  Pt initiated on EN support; malnutrition severity assessment and EN assessment completed. Goal rate of Peptamen af rec at 55 m l/he w/ daily goal of volume of 1100 ml. Pt at high risk of refeeding syndrome; need to  Closely monitor - K+, phos, Mg++ levels. RD will monitor adequacy of delivery and tolerance to EN support. Please see assessment details in note.          Reason for Assessment     Row Name 10/04/18 175          Reason for Assessment    Reason For Assessment per organizational policy;follow-up protocol;TF/PN;physician consult   MDR; malnutrition severity assessment; nutrition assessment for EN support - 60 mins     Diagnosis neurologic conditions;pulmonary disease   AMS unresponsive in ED ARF (extubated );rib fractures; Abd pain, ? seizure, COPD : Hypoglycemia, Hyponatremia, Hypotension Hx HTN, CVA, recent UTI, active cigarette abuse     Identified At Risk by Screening Criteria MST SCORE 2+;BMI;difficulty chewing/swallowing             Nutrition/Diet History     Row Name 10/04/18 9333          Nutrition/Diet History    Factors Affecting Nutritional Intake difficulty/impaired swallowing;chewing difficulties/inability to chew food;socio-economic;abdominal pain;abdominal distension               Labs/Tests/Procedures/Meds     Row Name 10/04/18 8703          Labs/Procedures/Meds    Lab Results Reviewed reviewed, pertinent     Lab Results Comments low, phos, Mg - replaced per RN report        Diagnostic Tests/Procedures    Diagnostic Test/Procedure Reviewed reviewed, pertinent     Diagnostic Test/Procedures Comments SLP re-evval completed; all consistencies w/ aspiration - NPO recs noted; EN support ordered        Medications    Pertinent Medications Reviewed reviewed, pertinent     Pertinent Medications Comments multivitamin and  bowel regimen ordered             Physical Findings     Row Name 10/04/18 1758          Physical Findings    Overall Physical Appearance generalized wasting;loss of subcutaneous fat;loss of muscle mass     Gastrointestinal constipation     Oral/Mouth Cavity --   edentulous , dentures at home     Skin --   bruising , dermatitis , angular stomatitis              Estimated/Assessed Needs     Row Name 10/04/18 1800          Calculation Measurements    Weight Used For Calculations 42.6 kg (93 lb 14.7 oz)        Estimated/Assessed Needs    Additional Documentation Calorie Requirements (Group);[place] (Group);Fluid Requirements (Group);Protein Requirements (Group)        Calorie Requirements    Weight Used For Calorie Calculations 42.6 kg (93 lb 14.7 oz)     Estimated Calorie Requirement (kcal/day) 1300     Estimated Calorie Need Method [place];kcal/kg     Estimated Calorie Requirement Comment 1065- 1523 kcal/d range for gentle to aggressive repletion per predictive equations        [place]    Activity Factors Other (comment)     Repletion Factors + 250     Estimated Kcal ([place])  x 1.25 = 1273 plus 250 =1523 kcal        KCAL/KG    25 Kcal/Kg (kcal) 1065     30 Kcal/Kg (kcal) 1278     35 Kcal/Kg (kcal) 1491        [place]-St. Jeor Equation    RMR ([place]-St. Jeor Equation) 894.38        Protein Requirements    Weight Used For Protein Calculations 42.6 kg (93 lb 14.7 oz)     Est Protein Requirement Amount (gms/kg) 1.5 gm protein     Estimated Protein Requirements (gms/day) 63.9        Fluid Requirements    Estimated Fluid Requirements (mL/day) 1300     Estimated Fluid Requirement Method RDA Method     RDA Method (mL) 1300                   Nutrition Prescription Ordered     Row Name 10/04/18 1804          Nutrition Prescription PO    Current PO Diet Soft Texture     Texture Ground     Fluid Consistency Thin     Supplement Boost Plus (Ensure Enlive, Ensure Plus)     Supplement Frequency 3  times a day     Common Modifiers Cardiac             Evaluation of Received Nutrient/Fluid Intake     Row Name 10/04/18 1804 10/04/18 1800       Calculation Measurements    Weight Used For Calculations  -- 42.6 kg (93 lb 14.7 oz)       PO Evaluation    Number of Days PO Intake Evaluated 2 days  --    Number of Meals 3  --    % PO Intake 25%; 2 meals undocumented observed by RD approx 5 % - few bites and sips of Boost Plus  --            Evaluation of Prescribed Nutrient/Fluid Intake     Row Name 10/04/18 1806          Weight Used For Calculations  --          Nutrition Prescribed Calorie Evaluation;Protein Evaluation;Fluid Evaluation;RDI;Fiber Evaluation          Enteral Calories (kcal) 1728    Total Calories (kcal) 1728          Enteral Protein (gm) 109    Total Protein (gm) 109          Enteral  Fluid (mL) 1166    Total Fluid Intake (mL) 1166          Fiber 8 gm          RDI Met            Malnutrition Severity Assessment     Row Name 10/04/18 1810          Malnutrition Severity Assessment    Malnutrition Type Social/Environmental Circumstance Malnutrition        Physical Signs of Malnutrition (Social/Environmental)    Muscle Wasting Severe   per NFPE severe orbital and temporal wasting, clavicle and acromion regions -severe, hand- dorsal severe, patella/ thigh- severe, calf severe     Fat Loss Severe   orbital,temporal, buccal - severe,  thoraic/ribs severe, triceps severe wasting     Fluid Accumulation None     Secondary Physical Signs Present (comment)   angular stomatitis; dermatits        Weight Status (Social/Environmental)    %UBW Mod (75-85%)     Weight Loss --    lbs pt reports she is thin at this; period of wt loss unknown        Energy Intake Status (Social/Environmental)    Energy Intake Severe (< or equal to 50% / > or equal to 1 mo)        Criteria Met (Must meet criteria for severity in at least 2 of these categories: M Wasting, Fat Loss, Fluid, Secondary Signs, Wt. Status, Intake)    Patient  meets criteria for  Severe malnutrition         Problem/Interventions:        Problem 1     Row Name 10/04/18 1824          Nutrition Diagnoses Problem 1    Problem 1 Inadequate Intake/Infusion     Macronutrient Kcal;Protein;Fat     Micronutrient Vitamin;Mineral     Etiology (related to) Factors Affecting Nutrition     Functional Diagnosis Chewing deficit;Other (comment)   edentulous , dentures at home     Food Habit/Preferences Socioeconomic Issues     Signs/Symptoms (evidenced by) Report of Mnimal PO Intake;Unintended Weight Change;PO Intake     Percent (%) intake recorded 20 %     Over number of meals 5     Unintended Weight Change Loss     Number of Pounds Lost unknown              Problem 2     Row Name 10/04/18 1825          Nutrition Diagnoses Problem 2    Problem 2 Malnutrition     Etiology (related to) Factors Affecting Nutrition;Functional Diagnosis     Functional Diagnosis Dysphagia     Oral Chewing Difficulty;Swallowing Difficulty;Without Dentures     Other food insecurity r/t limited financial resources     Signs/Symptoms (evidenced by) Unintended Weight Change;BMI;Report of Minimal PO Intake     BMI 17 - 17.9     Unintended Weight Change Loss     Number of Pounds Lost 17     Weight loss time period uncertain per pt report                   Intervention Goal     Row Name 10/04/18 1828          Intervention Goal    General Nutrition support treatment;Meet nutritional needs for age/condition     TF/PN Inititiate TF/PN;Establish TF tolerance;Adjust TF/PN             Nutrition Intervention     Row Name 10/04/18 1828          Nutrition Intervention    RD/Tech Action Follow Tx progress;Care plan reviewd;Recommend/ordered     Recommended/Ordered EN             Nutrition Prescription     Row Name 10/04/18 1828          Nutrition Prescription PO    PO Prescription Begin/change diet     Begin/Change Diet to NPO        Nutrition Prescription EN    Enteral Prescription Enteral begin/change;Enteral to supply      Enteral Route ND     Product Peptamen AF     TF Delivery Method Continuous     Continuous TF Goal Rate (mL/hr) 55 mL/hr     Continuous TF Goal Volume (mL) 1100 mL     Water flush (mL)  20 mL     Water Flush Frequency Per hour     New EN Prescription Ordered? No, recommended        EN to Supply    Kcal/Day 1320 Kcal/Day     Protein (gm/day) 83 gm/day     TF Free H2O (mL) 891 mL     Total Free H2O (mL/day) 1371 mL/day     Fiber Per Day (gm/day) 7 gm/day        Other Orders    Supplement Vitamin mineral supplement             Education/Evaluation     Row Name 10/04/18 1830          Monitor/Evaluation    Monitor Per protocol;I&O;Pertinent labs;TF delivery/tolerance;Weight;Symptoms;Skin status         Electronically signed by:  Darline Guerra MS,RD,LD  10/04/18 6:31 PM

## 2018-10-04 NOTE — DISCHARGE INSTR - DIET
MBS/VFSS/FEES    Reason for Referral  Patient was referred for a FEES to assess the efficiency of his/her swallow function, rule out aspiration and make recommendations regarding safe dietary consistencies, effective compensatory strategies, and safe eating environment.        Referring Physician: MD Emmett          Recommendations/Treatment  Oral Prep Phase: impaired oral phase of swallowing  Oral Transit Phase: impaired  Oral Residue: WFL              SLP Swallowing Diagnosis: mild, oral dysfunction, functional pharyngeal phase  Swallow Criteria for Skilled Therapeutic Interventions Met: no problems identified which require skilled intervention    Therapy Frequency (Swallow): evaluation only  SLP Diet Recommendation: soft textures, chopped, thin liquids, other (see comments) (nursing can advance/adjust as needed r/t cog status)  Recommended Precautions and Strategies: upright posture during/after eating  SLP Rec. for Method of Medication Administration: meds whole, with pudding or applesauce (advance to via thins as desires as cog status improves)      Oral Preparation/ Oral Phase  Oral Prep Phase: impaired oral phase of swallowing  Prolonged Manipulation: pudding/puree, regular textures, secondary to impaired cognitive status         Pharyngeal Phase  Pharyngeal Phase: functional pharyngeal phase of swallowing             Cervical Esophageal Phase   N/A        Prognosis

## 2018-10-04 NOTE — THERAPY TREATMENT NOTE
Acute Care - Speech Language Pathology   Swallow Treatment Note [facility]     Patient Name: Devora Huddleston  : 1945  MRN: [id number]  Today's Date: 10/4/2018               Admit Date: 10/1/2018    Visit Dx:      ICD-10-CM ICD-9-CM   1. Acute respiratory failure with hypoxemia (CMS/[facility]) J96.01 518.81     Patient Active Problem List   Diagnosis   • Acute respiratory failure (CMS/[facility])   • Altered mental state associated with hypoglycemia. Resolved   • Acute hypoxemic respiratory failure requiring intubation and ventilatory support (CMS/[facility])   • Hyponatremia. Suspect SIADH   • Hypoglycemia etiology unclear. Resolved   • Generalized abdominal pain   • Weight loss   • Maria D coma scale on arrival 3-8 (CMS/[facility])    • Hypotension improving   • Multiple right rib fractures (ninth, 10th, 11th)   • Anemia with hematocrit dropping from 36 to 29 over 16 hours   • COPD and active cigarette abuse (CMS/[facility])       Therapy Treatment        Rehabilitation Treatment Summary     Row Name 10/04/18 1315             Treatment Time/Intention    Discipline speech language pathologist  -MP      Document Type therapy note (daily note)  -MP      Subjective Information no complaints  -MP      Care Plan Review care plan/treatment goals reviewed;patient/other agree to care plan  -MP      Therapy Frequency (Swallow) 5 days per week  -MP      Patient Effort good  -MP      Recorded by [MP] Terrence Padgett MS, JUDAH-SLP 10/04/18 1421      Row Name 10/04/18 1315             Pain Assessment    Additional Documentation Pain Scale: FACES Pre/Post-Treatment (Group)  -MP      Recorded by [MP] Terrence Padgett MS, JUDAH-SLP 10/04/18 1421      Row Name 10/04/18 1315             Pain Scale: FACES Pre/Post-Treatment    Pain: FACES Scale, Pretreatment 2-->hurts little bit  -MP      Pain: FACES Scale, Post-Treatment 2-->hurts little bit  -MP      Recorded by [MP] Terrence Padgett MS, JUDAH-SLP 10/04/18 1421      Row Name                Wound 10/01/18  1845 Bilateral medial coccyx    Wound - Properties Group Date first assessed: 10/01/18 [SW] Time first assessed: 1845 [SW] Present On Admission : yes [SW] Side: Bilateral [SW] Orientation: medial [SW] Location: coccyx [SW] Recorded by:  [SW] Fatmata Villalobos RN 10/01/18 1905    Row Name 10/04/18 1315             Outcome Summary/Treatment Plan (SLP)    Daily Summary of Progress (SLP) progress towards functional goals is fair  -MP      Barriers to Overall Progress (SLP) ? cognitive status  -MP      Plan for Continued Treatment (SLP) SLP will f/u for FEES this PM  -MP      Anticipated Dischage Disposition unknown  -MP      Recorded by [MP] Terrence Padgett MS, CFY-SLP 10/04/18 1421        User Key  (r) = Recorded By, (t) = Taken By, (c) = Cosigned By    Initials Name Effective Dates Discipline     Fatmata Villalobos RN 06/16/16 -  Nurse    MP Terrence Padgett MS, CFY-SLP 08/15/18 -  SLP          Outcome Summary  Outcome Summary/Treatment Plan (SLP)  Daily Summary of Progress (SLP): progress towards functional goals is fair (10/04/18 1315 : Terrence Padgett MS, CFY-SLP)  Barriers to Overall Progress (SLP): ? cognitive status (10/04/18 1315 : Terrence Padgett MS, CFY-SLP)  Plan for Continued Treatment (SLP): SLP will f/u for FEES this PM (10/04/18 1315 : Terrence Padgett MS, CFY-SLP)  Anticipated Dischage Disposition: unknown (10/04/18 1315 : Terrence Padgett MS, CFY-SLP)            SLP GOALS     Row Name 10/04/18 1315 10/03/18 1300          Oral Nutrition/Hydration Goal 1 (SLP)    Oral Nutrition/Hydration Goal 1, SLP LTG: Pt will tolerate mechanical soft diet and thin liquids with no s/s of aspiration in the hospital settig with 100% accuracy.  -MP LTG: Pt will tolerate mechanical soft diet and thin liquids with no s/s of aspiration in the hospital settig with 100% accuracy.  -AV (r) MF (t) AV (c)     Time Frame (Oral Nutrition/Hydration Goal 1, SLP) by discharge  -MP by discharge  -AV (r) MF (t) AV (c)      Progress/Outcomes (Oral Nutrition/Hydration Goal 1, SLP) continuing progress toward goal  -MP  --        Oral Nutrition/Hydration Goal 2 (SLP)    Oral Nutrition/Hydration Goal 2, SLP Pt will tolerate trials of mechanical soft textures and thin liquids with no s/s of aspiration with 100% accuracy.  -MP Pt will tolerate trials of mechanical soft textures and thin liquids with no s/s of aspiration with 100% accuracy.  -AV (r) MF (t) AV (c)     Time Frame (Oral Nutrition/Hydration Goal 2, SLP) short term goal (STG);by discharge  -MP short term goal (STG);by discharge  -AV (r) MF (t) AV (c)     Barriers (Oral Nutrition/Hydration Goal 2, SLP) Pt w/ cough requiring suctioning following 2 bites of mashed potatoes and small sip of boost.  -MP  --     Progress/Outcomes (Oral Nutrition/Hydration Goal 2, SLP) continuing progress toward goal  -MP  --       User Key  (r) = Recorded By, (t) = Taken By, (c) = Cosigned By    Initials Name Provider Type    Alejandra Solorzano, MS CCC-SLP Speech and Language Pathologist    Emani Flynn, Speech Therapy Student Speech Therapy Student    Terrence Saavedra, MS, CFY-SLP Speech and Language Pathologist          EDUCATION  The patient has been educated in the following areas:   Dysphagia (Swallowing Impairment) Modified Diet Instruction.    SLP Recommendation and Plan                       Anticipated Dischage Disposition: unknown     Therapy Frequency (Swallow): 5 days per week          Plan of Care Reviewed With: patient  Plan of Care Review  Plan of Care Reviewed With: patient  Daily Summary of Progress (SLP): progress towards functional goals is fair  Plan for Continued Treatment (SLP): SLP will f/u for FEES this PM           Time Calculation:         Time Calculation- SLP     Row Name 10/04/18 1425             Time Calculation- SLP    SLP Start Time 1315  -MP      SLP Received On 10/04/18  -MP        User Key  (r) = Recorded By, (t) = Taken By, (c) = Cosigned By    Initials  Name Provider Type    Terrence Saavedra MS, CFY-SLP Speech and Language Pathologist          Therapy Charges for Today     Code Description Service Date Service Provider Modifiers Qty    [id number] HC ST TREATMENT SWALLOW 3 10/4/2018 Terrence Padgett MS, CFY-SLP GN 1                 Terrence Padgett MS, JUDAH-SLP  10/4/2018

## 2018-10-04 NOTE — PLAN OF CARE
Problem: Patient Care Overview  Goal: Plan of Care Review   10/04/18 1430   Coping/Psychosocial   Plan of Care Reviewed With patient   SLP FEES evaluation completed as pt coughing w/ lunch tray. Dietician reports concern for severe malnutrition. Will address Moderate oral and severe pharyngeal dysphagia. Pt aspirated thins, nectar, and pudding consistencies. Mostly silent aspiration, but inconsistently sensed w/ coughing. Able to orally suction some of aspirated material, but could not clear all of subglottic residue. RECS: Safest NPO, meds alt route, Oral care BID/PRN, initiate dys tx. Please see note for further details and recommendations.

## 2018-10-04 NOTE — PROGRESS NOTES
Malnutrition Severity Assessment    Patient Name:  Devora Huddleston  YOB: 1945  MRN: [id number]  Admit Date:  10/1/2018    Patient meets criteria for : Severe malnutrition    Comments:Multiple criteria met for severe malnutrition r/t social/ environmental circumstances ae evidenced by po intake of < 50% for> 1 month r/t food insecurity, wt loss of 15 % and as pt allowed for nutrition focus physical exam w/ evidence of severe  muscle and fat wasting.    Malnutrition Type: Social/Environmental Circumstance Malnutrition     Malnutrition Type (last 8 hours)      Malnutrition Severity Assessment     Row Name 10/04/18 1810       Malnutrition Severity Assessment    Malnutrition Type Social/Environmental Circumstance Malnutrition    Row Name 10/04/18 1810       Physical Signs of Malnutrition (Social/Environmental)    Muscle Wasting Severe   per NFPE severe orbital and temporal wasting, clavicle and acromion regions -severe, hand- dorsal severe, patella/ thigh- severe, calf severe    Fat Loss Severe   orbital,temporal, buccal - severe,  thoraic/ribs severe, triceps severe wasting    Fluid Accumulation None    Secondary Physical Signs Present (comment)   angular stomatitis; dermatits    Row Name 10/04/18 1810       Weight Status (Social/Environmental)    %UBW Mod (75-85%)    Weight Loss --    lbs pt reports she is thin at this; period of wt loss unknown    Row Name 10/04/18 1810       Energy Intake Status (Social/Environmental)    Energy Intake Severe (< or equal to 50% / > or equal to 1 mo)    Row Name 10/04/18 1810       Criteria Met (Must meet criteria for severity in at least 2 of these categories: M Wasting, Fat Loss, Fluid, Secondary Signs, Wt. Status, Intake)    Patient meets criteria for  Severe malnutrition          Electronically signed by:  Darline Guerra, MS,RD,LD  10/04/18 6:36 PM

## 2018-10-04 NOTE — PROGRESS NOTES
Continued Stay Note  [facility]     Patient Name: Devora Huddleston  MRN: [id number]  Today's Date: 10/4/2018    Admit Date: 10/1/2018          Discharge Plan     Row Name 10/04/18 0852       Plan    Plan Awaiting return call.     Plan Comments Called and left message for sonMolina [phone] re: food resources.               Discharge Codes    No documentation.       Expected Discharge Date and Time     Expected Discharge Date Expected Discharge Time    Oct 6, 2018             ANNE Gunn

## 2018-10-04 NOTE — PLAN OF CARE
Problem: Skin Injury Risk (Adult)  Goal: Identify Related Risk Factors and Signs and Symptoms  Outcome: Outcome(s) achieved Date Met: 10/04/18    Goal: Skin Health and Integrity  Outcome: Ongoing (interventions implemented as appropriate)      Problem: Infection, Risk/Actual (Adult)  Goal: Identify Related Risk Factors and Signs and Symptoms  Outcome: Outcome(s) achieved Date Met: 10/04/18      Problem: Fall Risk (Adult)  Goal: Identify Related Risk Factors and Signs and Symptoms  Outcome: Outcome(s) achieved Date Met: 10/04/18    Goal: Absence of Fall  Outcome: Ongoing (interventions implemented as appropriate)      Problem: Patient Care Overview  Goal: Plan of Care Review  Outcome: Ongoing (interventions implemented as appropriate)   10/04/18 0553   Coping/Psychosocial   Plan of Care Reviewed With patient   Plan of Care Review   Progress improving   OTHER   Outcome Summary Patient walked to the bathroom last night with walker and assistance x2. Had BM - c/o of constipation so bowel regimen started. Weaned down on oxygen from 5L to RA, sats >95%. Levophed off, hemodynamically stable. Will continue to monitor.

## 2018-10-04 NOTE — PLAN OF CARE
Problem: Patient Care Overview  Goal: Plan of Care Review  Outcome: Ongoing (interventions implemented as appropriate)   10/04/18 3815   Coping/Psychosocial   Plan of Care Reviewed With patient   SLP treatment completed. Pt seen when eating lunch. Pt w/ prolonged oral manipulation of soft solid and multiple swallows per bite. Cough following PO requiring suctioning. SLP will f/u for FEES this PM. Please see note for further details and recommendations.

## 2018-10-04 NOTE — THERAPY EVALUATION
Acute Care - Physical Therapy Initial Evaluation  [facility]     Patient Name: Devora Huddleston  : 1945  MRN: [id number]  Today's Date: 10/4/2018   Onset of Illness/Injury or Date of Surgery: 10/01/18  Date of Referral to PT: 10/04/18  Referring Physician: MD Crandall      Admit Date: 10/1/2018    Visit Dx:     ICD-10-CM ICD-9-CM   1. Acute respiratory failure with hypoxemia (CMS/HCC) J96.01 518.81   2. Impaired functional mobility, balance, gait, and endurance Z74.09 V49.89     Patient Active Problem List   Diagnosis   • Acute respiratory failure (CMS/HCC)   • Altered mental state associated with hypoglycemia. 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 Resolved   • Generalized abdominal pain   • Weight loss   • [place] coma scale on arrival 3-8 (CMS/HCC)    • Hypotension improving   • Multiple right rib fractures (ninth, 10th, 11th)   • Anemia with hematocrit dropping from 36 to 29 over 16 hours   • COPD and active cigarette abuse (CMS/HCC)     Past Medical History:   Diagnosis Date   • Hypertension    • Stroke (CMS/HCC)      History reviewed. No pertinent surgical history.     PT ASSESSMENT (last 12 hours)      Physical Therapy Evaluation     Row Name 10/04/18 1500          PT Evaluation Time/Intention    Subjective Information complains of;weakness  -SIXTO     Document Type evaluation  -SIXTO     Mode of Treatment physical therapy  -SIXTO     Patient Effort good  -SIXTO     Symptoms Noted During/After Treatment fatigue  -SIXTO     Row Name 10/04/18 1500          General Information    Patient Profile Reviewed? yes  -SIXTO     Onset of Illness/Injury or Date of Surgery 10/01/18  -SIXTO     Referring Physician MD Crandall  -SIXTO     Patient Observations alert;cooperative;agree to therapy  -SIXTO     Patient/Family Observations no family  -SIXTO     Prior Level of Function independent:;gait;transfer;bed  mobility;ADL's  -SIXTO     Equipment Currently Used at Home none  -SIXTO     Pertinent History of Current Functional Problem patient was found down at home not breathing by family she was intubated in the field and admitted to [facility] with acute hypoxia  -SIXTO     Existing Precautions/Restrictions fall  -SIXTO     Limitations/Impairments safety/cognitive  -SIXTO     Risks Reviewed patient:;LOB;increased discomfort  -SIXTO     Benefits Reviewed patient:;increase strength;increase balance;decrease risk of DVT  -SIXTO     Barriers to Rehab medically complex;previous functional deficit;family issues  -SIXTO     Row Name 10/04/18 1500          Relationship/Environment    Lives With child(nitish), adult  -SIXTO     Row Name 10/04/18 1500          Resource/Environmental Concerns    Current Living Arrangements home/apartment/condo  -SIXTO     Resource/Environmental Concerns none  -SIXTO     Row Name 10/04/18 1500          Cognitive Assessment/Intervention- PT/OT    Orientation Status (Cognition) oriented to;person  -SIXTO     Follows Commands (Cognition) follows one step commands;75-90% accuracy  -SIXTO     Safety Deficit (Cognitive) judgment;safety precautions awareness;safety precautions follow-through/compliance;awareness of need for assistance  -SIXTO     Row Name 10/04/18 1500          Safety Issues, Functional Mobility    Safety Issues Affecting Function (Mobility) insight into deficits/self awareness;judgment;safety precautions follow-through/compliance;safety precaution awareness  -SIXTO     Impairments Affecting Function (Mobility) balance;cognition;endurance/activity tolerance;shortness of breath;strength  -SIXTO     Row Name 10/04/18 1500          Bed Mobility Assessment/Treatment    Bed Mobility Assessment/Treatment rolling left;rolling right;scooting/bridging;supine-sit  -SIXTO     Rolling Left [place] (Bed Mobility) minimum assist (75% patient effort)  -SIXTO     Rolling Right [place] (Bed Mobility) minimum assist (75% patient effort)  -SIXTO      Scooting/Bridging [place] (Bed Mobility) moderate assist (50% patient effort)  -SIXTO     Supine-Sit [place] (Bed Mobility) moderate assist (50% patient effort);2 person assist  -SIXTO     Bed Mobility, Safety Issues decreased use of arms for pushing/pulling;decreased use of legs for bridging/pushing  -SIXTO     Row Name 10/04/18 1500          Transfer Assessment/Treatment    Transfer Assessment/Treatment sit-stand transfer;stand-sit transfer;toilet transfer;bed-chair transfer  -SIXTO     Bed-Chair [place] (Transfers) minimum assist (75% patient effort);2 person assist  -SIXTO     Assistive Device (Bed-Chair Transfers) walker, front-wheeled  -SIXTO     Sit-Stand [place] (Transfers) minimum assist (75% patient effort);2 person assist  -SIXTO     Stand-Sit [place] (Transfers) minimum assist (75% patient effort);2 person assist  -SIXTO     [place] Level (Toilet Transfer) minimum assist (75% patient effort);2 person assist  -SIXTO     Assistive Device (Toilet Transfer) walker, front-wheeled  -SIXTO     Row Name 10/04/18 1500          Sit-Stand Transfer    Assistive Device (Sit-Stand Transfers) walker, front-wheeled  -SIXTO     Row Name 10/04/18 1500          Stand-Sit Transfer    Assistive Device (Stand-Sit Transfers) walker, front-wheeled  -SIXOT     Row Name 10/04/18 1500          Toilet Transfer    Type (Toilet Transfer) sit-stand;stand-sit  -SIXTO     Row Name 10/04/18 1500          Gait/Stairs Assessment/Training    Gait/Stairs Assessment/Training gait/ambulation independence;gait/ambulation assistive device  -SIXTO     [place] Level (Gait) moderate assist (50% patient effort);2 person assist  -SIXTO     Assistive Device (Gait) walker, front-wheeled  -SIXTO     Distance in Feet (Gait) 25  -SIXTO     Pattern (Gait) step-to  -SIXTO     Deviations/Abnormal Patterns (Gait) ataxic;base of support, narrow;scissoring  -SIXTO     Bilateral Gait Deviations forward flexed posture  -SIXTO     Comment (Gait/Stairs) patient fatigue easily and has  increased forward flexed posture with fatigue.   -     Row Name 10/04/18 1500          General ROM    GENERAL ROM COMMENTS limited ROM all extremities   -     Row Name 10/04/18 1500          MMT (Manual Muscle Testing)    General MMT Comments generalized weakness 4-/5  -     Row Name 10/04/18 1500          Motor Assessment/Intervention    Additional Documentation Balance (Group);Therapeutic Exercise (Group);Therapeutic Exercise Interventions (Group)  -     Row Name 10/04/18 1500          Therapeutic Exercise    Therapeutic Exercise seated, lower extremities  -     Additional Documentation Therapeutic Exercise (Row)  -     Row Name 10/04/18 1500          Lower Extremity Seated Therapeutic Exercise    Performed, Seated Lower Extremity (Therapeutic Exercise) hip flexion/extension;knee flexion/extension;ankle dorsiflexion/plantarflexion  -     Exercise Type, Seated Lower Extremity (Therapeutic Exercise) AAROM (active assistive range of motion)  -     Sets/Reps Detail, Seated Lower Extremity (Therapeutic Exercise) 1/10  -     Row Name 10/04/18 1500          Balance    Balance static sitting balance;static standing balance  -[facility] Name 10/04/18 1500          Static Sitting Balance    Level of [place] (Unsupported Sitting, Static Balance) contact guard assist  -SIXTO     Sitting Position (Unsupported Sitting, Static Balance) sitting on edge of bed  -SIXTO     Time Able to Maintain Position (Unsupported Sitting, Static Balance) more than 5 minutes  -     Row Name 10/04/18 1500          Static Standing Balance    Level of [place] (Supported Standing, Static Balance) moderate assist, 50 to 74% patient effort  -SIXTO     Time Able to Maintain Position (Supported Standing, Static Balance) 1 to 2 minutes  -     Assistive Device Utilized (Supported Standing, Static Balance) rolling walker  -     Row Name 10/04/18 1500          Pain Scale: FACES Pre/Post-Treatment    Pain: FACES Scale, Pretreatment  2-->hurts little bit  -SIXTO     Pain: FACES Scale, Post-Treatment 2-->hurts little bit  -SIXTO     Row Name             Wound 10/01/18 1845 Bilateral medial coccyx    Wound - Properties Group Date first assessed: 10/01/18  -SW Time first assessed: 1845  -SW Present On Admission : yes  -SW Side: Bilateral  -SW Orientation: medial  -SW Location: coccyx  -SW    Row Name 10/04/18 1500          Coping    Observed Emotional State calm;cooperative  -SIXTO     Verbalized Emotional State acceptance  -SIXTO     Row Name 10/04/18 1500          Plan of Care Review    Plan of Care Reviewed With patient  -SIXTO     Row Name 10/04/18 1500          Physical Therapy Clinical Impression    Date of Referral to PT 10/04/18  -SIXTO     PT Diagnosis (PT Clinical Impression) impaired bed mobility transfer and gait decreased strength and balance  -SIXTO     Patient/Family Goals Statement (PT Clinical Impression) unable to assess  -SIXTO     Criteria for Skilled Interventions Met (PT Clinical Impression) yes;treatment indicated  -SIXTO     Rehab Potential (PT Clinical Summary) fair, will monitor progress closely  -SIXTO     Care Plan Review (PT) care plan/treatment goals reviewed;risks/benefits reviewed;patient/other agree to care plan;evaluation/treatment results reviewed  -SIXTO     Row Name 10/04/18 1500          Vital Signs    Pre Systolic BP Rehab 112  -SIXTO     Pre Treatment Diastolic BP 60  -SIXTO     Post Systolic BP Rehab 118  -SIXTO     Post Treatment Diastolic BP 64  -SIXTO     Pretreatment Heart Rate (beats/min) 60  -SIXTO     Intratreatment Heart Rate (beats/min) 70  -SIXTO     Pre SpO2 (%) 97  -SIXTO     O2 Delivery Pre Treatment nasal cannula  -SIXTO     Post SpO2 (%) 96  -SIXTO     O2 Delivery Post Treatment supplemental O2  -SIXTO     Pre Patient Position Supine  -SIXTO     Intra Patient Position Standing  -SIXTO     Post Patient Position Sitting  -SIXTO     Row Name 10/04/18 1500          Physical Therapy Goals    Bed Mobility Goal Selection (PT) bed mobility, PT goal 1  -SIXTO     Transfer  Goal Selection (PT) transfer, PT goal 1  -SIXTO     Gait Training Goal Selection (PT) gait training, PT goal 1  -SIXTO     Row Name 10/04/18 1500          Bed Mobility Goal 1 (PT)    Activity/Assistive Device (Bed Mobility Goal 1, PT) sit to supine/supine to sit  -SIXTO     [place] Level/Cues Needed (Bed Mobility Goal 1, PT) independent  -SIXTO     Time Frame (Bed Mobility Goal 1, PT) long term goal (LTG);10 days  -SIXTO     Progress/Outcomes (Bed Mobility Goal 1, PT) goal ongoing  -SIXTO     Row Name 10/04/18 1500          Transfer Goal 1 (PT)    Activity/Assistive Device (Transfer Goal 1, PT) sit-to-stand/stand-to-sit  -SIXTO     [place] Level/Cues Needed (Transfer Goal 1, PT) independent  -SIXTO     Time Frame (Transfer Goal 1, PT) long term goal (LTG);10 days  -SIXTO     Progress/Outcome (Transfer Goal 1, PT) goal ongoing  -SIXTO     Row Name 10/04/18 1500          Gait Training Goal 1 (PT)    Activity/Assistive Device (Gait Training Goal 1, PT) gait (walking locomotion);assistive device use;walker, rolling  -SIXTO     [place] Level (Gait Training Goal 1, PT) minimum assist (75% or more patient effort)  -SIXTO     Distance (Gait Goal 1, PT) 100  -SIXTO     Time Frame (Gait Training Goal 1, PT) long term goal (LTG);10 days  -SIXTO     Progress/Outcome (Gait Training Goal 1, PT) goal ongoing  -SIXTO     Row Name 10/04/18 1500          Patient Education Goal (PT)    Activity (Patient Education Goal, PT) HEP  -SIXTO     [place]/Cues/Accuracy (Memory Goal 2, PT) verbalizes understanding  -SIXTO     Time Frame (Patient Education Goal, PT) long term goal (LTG);10 days  -SIXTO     Progress/Outcome (Patient Education Goal, PT) goal ongoing  -SIXTO     Row Name 10/04/18 1500          Positioning and Restraints    Pre-Treatment Position in bed  -SIXTO     Post Treatment Position chair  -SIXTO     In Chair notified nsg;reclined;sitting;call light within reach;exit alarm on;with SLP;waffle cushion  -SIXTO       User Key  (r) = Recorded By, (t) = Taken By, (c) = Cosigned  By    Initials Name Provider Type    Althea Baum, PT Physical Therapist    Fatmata Chaves RN Registered Nurse          Physical Therapy Education     Title: PT OT SLP Therapies (Active)     Topic: Physical Therapy (Active)     Point: Mobility training (Active)    Learning Progress Summary     Learner Status Readiness Method Response Comment Documented by    Patient Active Acceptance E NR   10/04/18 1500          Point: Home exercise program (Active)    Learning Progress Summary     Learner Status Readiness Method Response Comment Documented by    Patient Active Acceptance E NR   10/04/18 1500          Point: Body mechanics (Active)    Learning Progress Summary     Learner Status Readiness Method Response Comment Documented by    Patient Active Acceptance E NR   10/04/18 1500          Point: Precautions (Active)    Learning Progress Summary     Learner Status Readiness Method Response Comment Documented by    Patient Active Acceptance E NR   10/04/18 1500                      User Key     Initials Effective Dates Name Provider Type Discipline    SIXTO 06/19/15 -  Althea Ruth PT Physical Therapist PT                PT Recommendation and Plan  Anticipated Discharge Disposition (PT): skilled nursing facility  Planned Therapy Interventions (PT Eval): balance training, bed mobility training, gait training, home exercise program, strengthening, transfer training  Therapy Frequency (PT Clinical Impression): daily  Outcome Summary/Treatment Plan (PT)  Anticipated Discharge Disposition (PT): skilled nursing facility  Plan of Care Reviewed With: patient  Outcome Summary: PT eval completed patient is able to ambulate 25 ft with mod assist using rolling walker . anticipate patient will need SNF placement at D/C          Outcome Measures     Row Name 10/04/18 1500             How much help from another person do you currently need...    Turning from your back to your side while in flat bed without  using bedrails? 3  -SIXTO      Moving from lying on back to sitting on the side of a flat bed without bedrails? 2  -SIXTO      Moving to and from a bed to a chair (including a wheelchair)? 2  -SIXTO      Standing up from a chair using your arms (e.g., wheelchair, bedside chair)? 2  -SIXTO      Climbing 3-5 steps with a railing? 1  -SIXTO      To walk in hospital room? 2  -SIXTO      AM-PAC 6 Clicks Score 12  -SIXTO         Functional Assessment    Outcome Measure Options AM-PAC 6 Clicks Basic Mobility (PT)  -SIXTO        User Key  (r) = Recorded By, (t) = Taken By, (c) = Cosigned By    Initials Name Provider Type    Althea Baum PT Physical Therapist           Time Calculation:         PT Charges     Row Name 10/04/18 1500             Time Calculation    Start Time 1500  -SIXTO      PT Received On 10/04/18  -SIXTO      PT Goal Re-Cert Due Date 10/14/18  -SIXTO        User Key  (r) = Recorded By, (t) = Taken By, (c) = Cosigned By    Initials Name Provider Type    Althea Baum PT Physical Therapist        Therapy Suggested Charges     Code   Minutes Charges    None           Therapy Charges for Today     Code Description Service Date Service Provider Modifiers Qty    [id number] HC PT EVAL HIGH COMPLEXITY 4 10/4/2018 Althea Ruth, PT GP 1    [id number]  PT THER SUPP EA 15 MIN 10/4/2018 Althea Ruth PT GP 2          PT G-Codes  Outcome Measure Options: AM-PAC 6 Clicks Basic Mobility (PT)  AM-PAC 6 Clicks Score: 12      Althea Ruth PT  10/4/2018

## 2018-10-04 NOTE — MBS/VFSS/FEES
Acute Care - Speech Language Pathology   Swallow Initial Evaluation Psychiatric   Fiberoptic Endoscopic Evaluation of Swallowing (FEES)     Patient Name: Devora Huddleston  : 1945  MRN: [id number]  Today's Date: 10/4/2018  Onset of Illness/Injury or Date of Surgery: 10/01/18     Referring Physician: MD Crandall      Admit Date: 10/1/2018    Visit Dx:     ICD-10-CM ICD-9-CM   1. Acute respiratory failure with hypoxemia (CMS/HCC) J96.01 518.81   2. Impaired functional mobility, balance, gait, and endurance Z74.09 V49.89   3. Oropharyngeal dysphagia R13.12 787.22     Patient Active Problem List   Diagnosis   • Acute respiratory failure (CMS/HCC)   • Altered mental state associated with hypoglycemia. Resolved   • Acute hypoxemic respiratory failure requiring intubation and ventilatory support (CMS/[facility])   • Hyponatremia. Suspect SIADH   • Hypoglycemia etiology unclear. Resolved   • Generalized abdominal pain   • Weight loss   • Maria D coma scale on arrival 3-8 (CMS/[facility])    • Hypotension improving   • Multiple right rib fractures (ninth, 10th, 11th)   • Anemia with hematocrit dropping from 36 to 29 over 16 hours   • COPD and active cigarette abuse (CMS/HCC)     Past Medical History:   Diagnosis Date   • Hypertension    • Stroke (CMS/[facility])      History reviewed. No pertinent surgical history.       SWALLOW EVALUATION (last 72 hours)      [facility] Adult Swallow Evaluation     Row Name 10/04/18 1430       Rehab Evaluation    Document Type  eval only    Subjective Information  no complaints    Patient Observations  lethargic, cooperative, agrees to therapy    Patient/Family Observations  No family present    Patient Effort  adequate       General Information    Patient Profile Reviewed  yes    Pertinent History Of Current Problem  Acute resp failure. Intubated 10/1-10/3/18. Severe malnutrition per dietician. AMS, unintentional weight loss, AMS. Coughing w/ lunch tray today.    Current Method of Nutrition [facility] soft-chopped,  thins  -RD    Precautions/Limitations, Vision      Precautions/Limitations, Hearing  WFL for purposes of eval    Prior Level of Function-Communication other (see comments)   baseline unknown  -RD    Prior Level of Function-Swallowing no diet consistency restrictions  -RD    Patient's Goals for Discharge patient did not state  -RD       Pain Assessment    Additional Documentation Pain Scale: FACES Pre/Post-Treatment (Group)  -RD       Pain Scale: FACES Pre/Post-Treatment    Pain: FACES Scale, Pretreatment 0-->no hurt  -RD    Pain: FACES Scale, Post-Treatment 0-->no hurt  -RD       Fiberoptic Endoscopic Evaluation of Swallowing (FEES)    Risks/Benefits Reviewed risks/benefits explained;patient;agreed to eval  -RD    Nasal Entry right:  -RD    Special Considerations scope #: [id number]  -RD       Anatomy and Physiology    Anatomic Considerations vocal folds;false vocal folds;anatomic deviation observed (see comments);other (see comments)  -RD    Comment Whitish-red lesions on the posterior portion of the true vocal folds bilaterally, consistent w/ intubation. Pt noted w/ deep pockets in the false vocal folds bilaterally, appears to be an anatomical difference not impairment.   -RD    Velopharyngeal WFL  -RD    Base of Tongue symmetrical;range reduced  -RD    Epiglottis WFL  -RD    Laryngeal Function Breathing symmetrical  -RD    Laryngeal Function Phonation symmetrical  -RD    Laryngeal Function to Breath Hold TVT/FVF/Arytenoid;can't sustain closure  -RD    Secretion Rating Scale (Dl et al. 1996) 0- normal, no visible secretions  -RD    Secretion Description thin;clear  -RD    Ice Chips DNA  -RD    Spontaneous Swallow frequency reduced  -RD    Sensory sensed scope  -RD    Utensils Used Spoon;Cup;Straw  -RD    Consistencies Trialed thin liquids;nectar-thick liquids;pudding/puree  -RD       FEES Interpretation    Oral Phase prolonged manipulation;increased A-P transit time;prespill of liquids into pharynx  -RD    Oral  Phase, Comment Moderate oral dysphagia. Prolonged manipulation and oral transit time w/ pudding. DNT solid. Inconsistent pre-spill in small amounts w/ thins and nectar.   -RD       Initiation of Pharyngeal Swallow    Initiation of Pharyngeal Swallow bolus in pyriform sinuses  -RD    Pharyngeal Phase impaired pharyngeal phase of swallowing  -RD    Penetration During the Swallow thin liquids;nectar-thick liquids;pudding/puree;secondary to delayed swallow initiation or mistiming;secondary to reduced laryngeal elevation;secondary to reduced vestibular closure  -RD    Aspiration During the Swallow thin liquids;nectar-thick liquids;pudding/puree;secondary to delayed swallow initiation or mistiming;secondary to reduced laryngeal elevation;secondary to reduced vestibular closure  -RD    Aspiration After the Swallow thin liquids;nectar-thick liquids;pudding/puree;secondary to residue;in laryngeal vestibule  -RD    Response to Aspiration no response, silent aspiration;inconsistent response;weak cough/throat clear;could not clear subglottic material;cleared subglottic material  -RD    Rosenbek's Scale thin:;nectar:;pudding/puree:;8-->Level 8  -RD    Residue thin liquids;nectar-thick liquids;pudding/puree;laryngeal vestibule;secondary to reduced posterior pharyngeal wall stripping;secondary to reduced laryngeal elevation  -RD    Response to Residue unable to clear residue;with spontaneous subsequent swallow;with cued swallow;other (see comments)   w/ cued cough  -RD    Attempted Compensatory Maneuvers bolus size;bolus presentation style;throat clear after swallow  -RD    Response to Attempted Compensatory Maneuvers did not prevent aspiration  -RD    Pharyngeal Phase, Comment Severe pharyngeal dysphagia. Pt lethargic during eval. Inconsistent delay to the pyriforms. Penetration/aspiration occurred during the swallow w/ thins, nectar, and pudding 2' decr'd elevation, reduced pharyngeal squeeze, and reduced vestibular closure. Pt  unable to consistently clear vestibular residue and continued to aspirate during consecutive swallows w/ all consistencies tested. Pt fatigued t/o eval and aspirated all consistencies in larger amounts. Most of aspiration was silent, however pt inconsistently able to cough and suction some of aspirated material. Vestibular residue present w/ all consistencies, otherwise no significant pharyngeal residue observed.   -RD       Clinical Impression    SLP Swallowing Diagnosis severe;pharyngeal dysfunction;moderate;oral dysfunction  -RD    Functional Impact risk of aspiration/pneumonia;risk of malnutrition;risk of dehydration  -RD    Rehab Potential/Prognosis, Swallowing good, to achieve stated therapy goals  -RD    Swallow Criteria for Skilled Therapeutic Interventions Met  demonstrates skilled criteria       Recommendations    Therapy Frequency (Swallow) 5 days per week  -RD    Predicted Duration Therapy Intervention (Days) until discharge  -RD    SLP Diet Recommendation NPO;temporary alternate methods of nutrition/hydration  -RD    Recommended Precautions and Strategies other (see comments)   Oral care BID/PRN  -RD    SLP Rec. for Method of Medication Administration meds via alternate route  -RD    Monitor for Signs of Aspiration  --    Anticipated Dischage Disposition unknown;anticipate therapy at next level of care  -RD      User Key  (r) = Recorded By, (t) = Taken By, (c) = Cosigned By    Initials Name Effective Dates    Alejandra Solorzano, MS CCC-SLP 04/03/18 -     Crystal Donald, MS CCC-SLP 04/03/18 -     Emani Flynn, Speech Therapy Student 08/06/18 -         EDUCATION  The patient has been educated in the following areas:   Dysphagia (Swallowing Impairment) Oral Care/Hydration NPO rationale.    SLP Recommendation and Plan  SLP Swallowing Diagnosis: severe, pharyngeal dysfunction, moderate, oral dysfunction  SLP Diet Recommendation: NPO, temporary alternate methods of  nutrition/hydration  Recommended Precautions and Strategies: other (see comments) (Oral care BID/PRN)           Swallow Criteria for Skilled Therapeutic Interventions Met: demonstrates skilled criteria  Anticipated Dischage Disposition: unknown, anticipate therapy at next level of care  Rehab Potential/Prognosis, Swallowing: good, to achieve stated therapy goals  Therapy Frequency (Swallow): 5 days per week  Predicted Duration Therapy Intervention (Days): until discharge       Plan of Care Reviewed With: patient  Plan of Care Review  Plan of Care Reviewed With: patient          SLP GOALS     Row Name 10/04/18 1430 10/04/18 1315 10/03/18 1300       Oral Nutrition/Hydration Goal 1 (SLP)    Oral Nutrition/Hydration Goal 1, SLP LTG: Pt will improve swallowing to begin to take some PO safely w/ 100% accuracy w/o cues.   -RD LTG: Pt will tolerate mechanical soft diet and thin liquids with no s/s of aspiration in the hospital settig with 100% accuracy.  -MP LTG: Pt will tolerate mechanical soft diet and thin liquids with no s/s of aspiration in the hospital settig with 100% accuracy.  -AV (r) MF (t) AV (c)    Time Frame (Oral Nutrition/Hydration Goal 1, SLP) by discharge  -RD by discharge  -MP by discharge  -AV (r) MF (t) AV (c)    Progress/Outcomes (Oral Nutrition/Hydration Goal 1, SLP) goal revised this date  -RD continuing progress toward goal  -MP  --       Oral Nutrition/Hydration Goal 2 (SLP)    Oral Nutrition/Hydration Goal 2, SLP Pt will tolerate H20 trials post oral care w/ no overt s/s of aspiration w/ 70% accuracy w/o cues.   -RD Pt will tolerate trials of mechanical soft textures and thin liquids with no s/s of aspiration with 100% accuracy.  -MP Pt will tolerate trials of mechanical soft textures and thin liquids with no s/s of aspiration with 100% accuracy.  -AV (r) MF (t) AV (c)    Time Frame (Oral Nutrition/Hydration Goal 2, SLP) short term goal (STG);by discharge  -RD short term goal (STG);by discharge   -MP short term goal (STG);by discharge  -AV (r) MF (t) AV (c)    Barriers (Oral Nutrition/Hydration Goal 2, SLP)  -- Pt w/ cough requiring suctioning following 2 bites of mashed potatoes and small sip of boost.  -MP  --    Progress/Outcomes (Oral Nutrition/Hydration Goal 2, SLP) goal revised this date  -RD continuing progress toward goal  -MP  --       Pharyngeal Strengthening Exercise Goal 1 (SLP)    Activity (Pharyngeal Strengthening Goal 1, SLP) increase timing;increase superior movement of the hyolaryngeal complex;increase closure at entrance to airway/closure of airway at glottis;increase squeeze/positive pressure generation  -RD  --  --    Increase Timing prepping - 3 second prep or suck swallow or 3-step swallow  -RD  --  --    Increase Superior Movement of the Hyolaryngeal Complex supraglottic swallow  -RD  --  --    Increase Closure at Entrance to Airway/Closure of Airway at Glottis super-supraglottic swallow  -RD  --  --    Increase Squeeze/Positive Pressure Generation effortful pitch glide (falsetto + pharyngeal squeeze);hard effortful swallow  -RD  --  --    [place]/Accuracy (Pharyngeal Strengthening Goal 1, SLP) with minimal cues (75-90% accuracy)  -RD  --  --    Time Frame (Pharyngeal Strengthening Goal 1, SLP) short term goal (STG);by discharge  -RD  --  --      User Key  (r) = Recorded By, (t) = Taken By, (c) = Cosigned By    Initials Name Provider Type    AV Alejandra Xiong, MS CCC-SLP Speech and Language Pathologist    Crystal Donald, MS CCC-SLP Speech and Language Pathologist    MF Emani Gamble, Speech Therapy Student Speech Therapy Student    MP Terrence Padegtt, MS, CFY-SLP Speech and Language Pathologist               Time Calculation:         Time Calculation- SLP     Row Name 10/04/18 1706 10/04/18 1426          Time Calculation- SLP    SLP Start Time 1430  -RD 1315  -MP     SLP Received On 10/04/18  -RD 10/04/18  -MP       User Key  (r) = Recorded By, (t) = Taken By, (c)  = Cosigned By    Initials Name Provider Type    RD Crystal Gilmore, MS CCC-SLP Speech and Language Pathologist    Terrence Saavedra MS, CFY-SLP Speech and Language Pathologist          Therapy Charges for Today     Code Description Service Date Service Provider Modifiers Qty    [id number] HC ST FIBEROPTIC ENDO EVAL SWALL 7 10/4/2018 Crystal Gilmore MS CCC-SLP GN 1               Crystal Gilmore MS CCC-SLP  10/4/2018

## 2018-10-04 NOTE — PLAN OF CARE
Problem: Patient Care Overview  Goal: Plan of Care Review  Outcome: Ongoing (interventions implemented as appropriate)   10/04/18 1500   Coping/Psychosocial   Plan of Care Reviewed With patient   OTHER   Outcome Summary PT eval completed patient is able to ambulate 25 ft with mod assist using rolling walker . anticipate patient will need SNF placement at D/C

## 2018-10-05 ENCOUNTER — APPOINTMENT (OUTPATIENT)
Dept: GENERAL RADIOLOGY | Facility: HOSPITAL | Age: 73
End: 2018-10-05

## 2018-10-05 LAB
ANION GAP SERPL CALCULATED.3IONS-SCNC: 6 MMOL/L (ref 3–11)
BASOPHILS # BLD AUTO: 0.01 10*3/MM3 (ref 0–0.2)
BASOPHILS NFR BLD AUTO: 0.1 % (ref 0–1)
BUN BLD-MCNC: 9 MG/DL (ref 9–23)
BUN/CREAT SERPL: 23.7 (ref 7–25)
CALCIUM SPEC-SCNC: 7.5 MG/DL (ref 8.7–10.4)
CHLORIDE SERPL-SCNC: 98 MMOL/L (ref 99–109)
CO2 SERPL-SCNC: 25 MMOL/L (ref 20–31)
CREAT BLD-MCNC: 0.38 MG/DL (ref 0.6–1.3)
DEPRECATED RDW RBC AUTO: 46.4 FL (ref 37–54)
EOSINOPHIL # BLD AUTO: 0.02 10*3/MM3 (ref 0–0.3)
EOSINOPHIL NFR BLD AUTO: 0.2 % (ref 0–3)
ERYTHROCYTE [DISTWIDTH] IN BLOOD BY AUTOMATED COUNT: 14.8 % (ref 11.3–14.5)
GFR SERPL CREATININE-BSD FRML MDRD: >150 ML/MIN/1.73
GLUCOSE BLD-MCNC: 92 MG/DL (ref 70–100)
GLUCOSE BLDC GLUCOMTR-MCNC: 114 MG/DL (ref 70–130)
GLUCOSE BLDC GLUCOMTR-MCNC: 74 MG/DL (ref 70–130)
GLUCOSE BLDC GLUCOMTR-MCNC: 95 MG/DL (ref 70–130)
HCT VFR BLD AUTO: 29.5 % (ref 34.5–44)
HGB BLD-MCNC: 10 G/DL (ref 11.5–15.5)
IMM GRANULOCYTES # BLD: 0.02 10*3/MM3 (ref 0–0.03)
IMM GRANULOCYTES NFR BLD: 0.2 % (ref 0–0.6)
LYMPHOCYTES # BLD AUTO: 0.92 10*3/MM3 (ref 0.6–4.8)
LYMPHOCYTES NFR BLD AUTO: 9.6 % (ref 24–44)
MAGNESIUM SERPL-MCNC: 1.8 MG/DL (ref 1.3–2.7)
MCH RBC QN AUTO: 29.2 PG (ref 27–31)
MCHC RBC AUTO-ENTMCNC: 33.9 G/DL (ref 32–36)
MCV RBC AUTO: 86 FL (ref 80–99)
MONOCYTES # BLD AUTO: 0.3 10*3/MM3 (ref 0–1)
MONOCYTES NFR BLD AUTO: 3.1 % (ref 0–12)
NEUTROPHILS # BLD AUTO: 8.31 10*3/MM3 (ref 1.5–8.3)
NEUTROPHILS NFR BLD AUTO: 87 % (ref 41–71)
PHOSPHATE SERPL-MCNC: 2 MG/DL (ref 2.4–5.1)
PLATELET # BLD AUTO: 256 10*3/MM3 (ref 150–450)
PMV BLD AUTO: 9.3 FL (ref 6–12)
POTASSIUM BLD-SCNC: 2.9 MMOL/L (ref 3.5–5.5)
POTASSIUM BLD-SCNC: 3.7 MMOL/L (ref 3.5–5.5)
RBC # BLD AUTO: 3.43 10*6/MM3 (ref 3.89–5.14)
SODIUM BLD-SCNC: 129 MMOL/L (ref 132–146)
WBC NRBC COR # BLD: 9.56 10*3/MM3 (ref 3.5–10.8)

## 2018-10-05 PROCEDURE — 25010000003 POTASSIUM CHLORIDE PER 2 MEQ: Performed by: NURSE PRACTITIONER

## 2018-10-05 PROCEDURE — 99291 CRITICAL CARE FIRST HOUR: CPT | Performed by: INTERNAL MEDICINE

## 2018-10-05 PROCEDURE — 74018 RADEX ABDOMEN 1 VIEW: CPT

## 2018-10-05 PROCEDURE — 25010000002 ENOXAPARIN PER 10 MG: Performed by: INTERNAL MEDICINE

## 2018-10-05 PROCEDURE — 80048 BASIC METABOLIC PNL TOTAL CA: CPT | Performed by: INTERNAL MEDICINE

## 2018-10-05 PROCEDURE — 84132 ASSAY OF SERUM POTASSIUM: CPT | Performed by: INTERNAL MEDICINE

## 2018-10-05 PROCEDURE — 97165 OT EVAL LOW COMPLEX 30 MIN: CPT

## 2018-10-05 PROCEDURE — 82962 GLUCOSE BLOOD TEST: CPT

## 2018-10-05 PROCEDURE — 85025 COMPLETE CBC W/AUTO DIFF WBC: CPT | Performed by: INTERNAL MEDICINE

## 2018-10-05 PROCEDURE — 83735 ASSAY OF MAGNESIUM: CPT | Performed by: NURSE PRACTITIONER

## 2018-10-05 PROCEDURE — 25010000002 PIPERACILLIN SOD-TAZOBACTAM PER 1 G: Performed by: INTERNAL MEDICINE

## 2018-10-05 PROCEDURE — 84100 ASSAY OF PHOSPHORUS: CPT | Performed by: NURSE PRACTITIONER

## 2018-10-05 RX ORDER — DEXTROSE, SODIUM CHLORIDE, AND POTASSIUM CHLORIDE 5; .9; .15 G/100ML; G/100ML; G/100ML
75 INJECTION INTRAVENOUS CONTINUOUS
Status: DISCONTINUED | OUTPATIENT
Start: 2018-10-05 | End: 2018-10-05

## 2018-10-05 RX ORDER — SODIUM CHLORIDE AND POTASSIUM CHLORIDE 300; 900 MG/100ML; MG/100ML
50 INJECTION, SOLUTION INTRAVENOUS CONTINUOUS
Status: DISCONTINUED | OUTPATIENT
Start: 2018-10-06 | End: 2018-10-08

## 2018-10-05 RX ADMIN — DEXTROSE MONOHYDRATE, SODIUM CHLORIDE, AND POTASSIUM CHLORIDE 75 ML/HR: 50; 9; 1.49 INJECTION, SOLUTION INTRAVENOUS at 11:04

## 2018-10-05 RX ADMIN — MIDODRINE HYDROCHLORIDE 5 MG: 5 TABLET ORAL at 09:00

## 2018-10-05 RX ADMIN — POTASSIUM & SODIUM PHOSPHATES POWDER PACK 280-160-250 MG 2 PACKET: 280-160-250 PACK at 09:00

## 2018-10-05 RX ADMIN — Medication 1 TABLET: at 09:00

## 2018-10-05 RX ADMIN — SENNOSIDES AND DOCUSATE SODIUM 2 TABLET: 8.6; 5 TABLET ORAL at 20:29

## 2018-10-05 RX ADMIN — POLYETHYLENE GLYCOL 3350 17 G: 17 POWDER, FOR SOLUTION ORAL at 09:00

## 2018-10-05 RX ADMIN — POTASSIUM CHLORIDE 20 MEQ: 29.8 INJECTION, SOLUTION INTRAVENOUS at 09:00

## 2018-10-05 RX ADMIN — TAZOBACTAM SODIUM AND PIPERACILLIN SODIUM 3.38 G: 375; 3 INJECTION, SOLUTION INTRAVENOUS at 08:22

## 2018-10-05 RX ADMIN — TAZOBACTAM SODIUM AND PIPERACILLIN SODIUM 3.38 G: 375; 3 INJECTION, SOLUTION INTRAVENOUS at 17:35

## 2018-10-05 RX ADMIN — DOCUSATE SODIUM 100 MG: 100 CAPSULE, LIQUID FILLED ORAL at 20:29

## 2018-10-05 RX ADMIN — MAGNESIUM SULFATE HEPTAHYDRATE 4 G: 40 INJECTION, SOLUTION INTRAVENOUS at 09:00

## 2018-10-05 RX ADMIN — POTASSIUM CHLORIDE 20 MEQ: 29.8 INJECTION, SOLUTION INTRAVENOUS at 11:02

## 2018-10-05 RX ADMIN — ENOXAPARIN SODIUM 30 MG: 30 INJECTION SUBCUTANEOUS at 20:28

## 2018-10-05 RX ADMIN — POTASSIUM CHLORIDE 20 MEQ: 29.8 INJECTION, SOLUTION INTRAVENOUS at 13:38

## 2018-10-05 RX ADMIN — FAMOTIDINE 20 MG: 20 TABLET ORAL at 09:00

## 2018-10-05 RX ADMIN — CLOPIDOGREL BISULFATE 75 MG: 75 TABLET ORAL at 09:00

## 2018-10-05 RX ADMIN — FAMOTIDINE 20 MG: 20 TABLET ORAL at 20:29

## 2018-10-05 NOTE — PLAN OF CARE
Problem: Skin Injury Risk (Adult)  Goal: Skin Health and Integrity  Outcome: Ongoing (interventions implemented as appropriate)   10/05/18 1457   Skin Injury Risk (Adult)   Skin Health and Integrity making progress toward outcome       Problem: Patient Care Overview  Goal: Plan of Care Review  Outcome: Ongoing (interventions implemented as appropriate)   10/05/18 1457   Coping/Psychosocial   Plan of Care Reviewed With patient;son   Coping/Psychosocial   Patient Agreement with Plan of Care agrees   Plan of Care Review   Progress improving   OTHER   Outcome Summary Pt worked with OT again today. Remains on room air. Pt disempacted again today and had a soap suds enema administered. Pt has had some successful bowel movements following. Matthews placed due to patient's urinary retention. Restraints D/C'd. Vitals remain stable. Ordered to telemetry. Will continue to monitor.        Problem: Infection, Risk/Actual (Adult)  Goal: Infection Prevention/Resolution  Outcome: Ongoing (interventions implemented as appropriate)   10/05/18 1457   Infection, Risk/Actual (Adult)   Infection Prevention/Resolution making progress toward outcome       Problem: Fall Risk (Adult)  Goal: Absence of Fall  Outcome: Outcome(s) achieved Date Met: 10/05/18   10/05/18 1457   Fall Risk (Adult)   Absence of Fall achieves outcome

## 2018-10-05 NOTE — PLAN OF CARE
Problem: Patient Care Overview  Goal: Plan of Care Review  Outcome: Ongoing (interventions implemented as appropriate)   10/05/18 1010   Coping/Psychosocial   Plan of Care Reviewed With patient   OTHER   Outcome Summary OT completed a brief chart review relating to presenting physical deficits. Pt Mod Ax2 for t/fs, Dep for ADL performance. Pt limited d/t significant generalized weakness and confusion. Recommend cont skilled IPOT POC. Recommend pt DC to SNF.

## 2018-10-05 NOTE — PROGRESS NOTES
Clinical Nutrition Note      Patient Name: Devora Huddleston  MRN: [id number]  Admission date: 10/1/2018      Reason for Assessment:  Multidisciplinary Rounds    Additional information obtained during MDR: RN reports difficulty w/ ND placement once placed pt removed; replaced w/ corpak but NG. Pt remains significantly constipated, plan for enema today and disimpaction. Pt requiring replacement of K+ , Phos and Mg++ over the past few days and today as well.    Current diet: NPO Diet    Diet, Tube Feeding Tube Feeding Formula: Peptamen AF (Peptide Based, Critical Care, ALI)    Pertinent medical data reviewed  76 ml Peptamen AF delivered in past 24 hrs per nsg shift period    Intervention:  Plan of care and goals reviewed    Monitor:  RD to follow per protocol      Darline Guerra MS,RD,LD  10/05/18 4:40 PM  Time: 20min

## 2018-10-05 NOTE — THERAPY EVALUATION
Acute Care - Occupational Therapy Initial Evaluation  [facility]     Patient Name: Devora Huddleston  : 1945  MRN: [id number]  Today's Date: 10/5/2018  Onset of Illness/Injury or Date of Surgery: 10/01/18  Date of Referral to OT: 10/04/18  Referring Physician: MD Emmett    Admit Date: 10/1/2018       ICD-10-CM ICD-9-CM   1. Acute respiratory failure with hypoxemia (CMS/HCC) J96.01 518.81   2. Impaired functional mobility, balance, gait, and endurance Z74.09 V49.89   3. Oropharyngeal dysphagia R13.12 787.22   4. Impaired mobility and ADLs Z74.09 799.89     Patient Active Problem List   Diagnosis   • Acute respiratory failure (CMS/HCC)   • Altered mental state associated with hypoglycemia. Resolved   • Acute hypoxemic respiratory failure requiring intubation and ventilatory support (CMS/[facility])   • Hyponatremia. Suspect SIADH   • Hypoglycemia etiology unclear. Resolved   • Generalized abdominal pain   • Weight loss   • Maria D coma scale on arrival 3-8 (CMS/[facility])    • Hypotension improving   • Multiple right rib fractures (ninth, 10th, 11th)   • Anemia with hematocrit dropping from 36 to 29 over 16 hours   • COPD and active cigarette abuse (CMS/HCC)     Past Medical History:   Diagnosis Date   • Hypertension    • Stroke (CMS/[facility])      History reviewed. No pertinent surgical history.       OT ASSESSMENT FLOWSHEET (last 72 hours)      Occupational Therapy Evaluation     Row Name 10/05/18 0840                   OT Evaluation Time/Intention    Subjective Information complains of;fatigue;pain  -CL        Document Type evaluation  -CL        Mode of Treatment occupational therapy  -CL        Patient Effort good  -CL        Symptoms Noted During/After Treatment fatigue  -CL           General Information    Patient Profile Reviewed? yes  -CL        Onset of Illness/Injury or Date of Surgery 10/01/18  -CL        Referring Physician MD Emmett  -CL        Prior Level of Function independent:;all household  mobility;transfer;ADL's;dressing;bathing   Pt questionnable historian, no family present  -CL        Equipment Currently Used at Home none   Pt questionnable historian, no family present  -CL        Pertinent History of Current Functional Problem Pt presented to the ED ater being found unresponsive. Pt required intubation 2/2 to hypoxic resp failure. CT (+) 3 R posterior rib fx d/t fall. Pt extubated on 10/03. PMH: CVA  -CL        Existing Precautions/Restrictions fall;other (see comments)   R sided posterior rib fxs  -CL        Limitations/Impairments safety/cognitive  -CL        Risks Reviewed patient:;LOB;nausea/vomiting;change in vital signs;increased discomfort;dizziness;lines disloged  -CL        Benefits Reviewed patient:;improve function;increase independence;increase balance;increase strength;decrease pain;increase knowledge  -CL        Barriers to Rehab medically complex;cognitive status  -CL           Relationship/Environment    Lives With child(nitish), adult;child(nitish), dependent   Son and 2 y.o. great grandchild  -CL           Resource/Environmental Concerns    Current Living Arrangements home/apartment/condo   Pt questionnable historian, no family present  -CL           Cognitive Assessment/Intervention- PT/OT    Affect/Mental Status (Cognitive) confused  -CL        Orientation Status (Cognition) oriented to;person;disoriented to;place  -CL        Follows Commands (Cognition) follows one step commands;75-90% accuracy;repetition of directions required;verbal cues/prompting required;increased processing time needed  -CL        Cognitive Function (Cognitive) safety deficit  -CL        Safety Deficit (Cognitive) moderate deficit;awareness of need for assistance;insight into deficits/self awareness;safety precautions awareness  -CL        Personal Safety Interventions fall prevention program maintained;gait belt;nonskid shoes/slippers when out of bed  -CL           Bed Mobility Assessment/Treatment    Bed  Mobility Assessment/Treatment supine-sit;sit-supine  -CL        Supine-Sit Morgan (Bed Mobility) moderate assist (50% patient effort);2 person assist;verbal cues  -CL        Sit-Supine [place] (Bed Mobility) moderate assist (50% patient effort);2 person assist;verbal cues  -CL        Assistive Device (Bed Mobility) draw sheet;head of bed elevated  -CL           Transfer Assessment/Treatment    Transfer Assessment/Treatment sit-stand transfer;stand-sit transfer;toilet transfer  -CL        Comment (Transfers) Pt stood from the EOB and performed SPT to/from the BSC.   -CL           Sit-Stand Transfer    Sit-Stand Morgan (Transfers) moderate assist (50% patient effort);2 person assist;verbal cues  -CL           Stand-Sit Transfer    Stand-Sit Morgan (Transfers) moderate assist (50% patient effort);2 person assist;verbal cues  -CL           Toilet Transfer    Type (Toilet Transfer) stand pivot/stand step  -CL        Morgan Level (Toilet Transfer) moderate assist (50% patient effort);2 person assist;verbal cues  -CL        Assistive Device (Toilet Transfer) commode, bedside without drop arms  -CL           ADL Assessment/Intervention    BADL Assessment/Intervention toileting  -CL           Toileting Assessment/Training    [place] Level (Toileting) change pad/brief;perform perineal hygiene;adjust/manage clothing;dependent (less than 25% patient effort);two person assist required;verbal cues  -CL        Assistive Devices (Toileting) commode, bedside without drop arms  -CL        Toileting Position supported sitting;supported standing  -CL           General ROM    GENERAL ROM COMMENTS Formal assessment limited d/t cognitive status.   -CL           MMT (Manual Muscle Testing)    General MMT Comments Formal assessment limited d/t cognitive status. Observed grossly 3/5.   -CL           Motor Assessment/Interventions    Additional Documentation Balance (Group)  -CL           Static Sitting  Balance    Level of [place] (Unsupported Sitting, Static Balance) contact guard assist  -CL        Sitting Position (Unsupported Sitting, Static Balance) sitting on edge of bed  -CL           Static Standing Balance    Level of [place] (Supported Standing, Static Balance) moderate assist, 50 to 74% patient effort  -CL        Assistive Device Utilized (Supported Standing, Static Balance) --   BUE support  -CL           Sensory Assessment/Intervention    Sensory General Assessment no sensation deficits identified  -CL           Positioning and Restraints    Pre-Treatment Position in bed  -CL        Post Treatment Position bed  -CL        In Bed notified nsg;fowlers;side lying right;call light within reach;encouraged to call for assist;exit alarm on;side rails up x3;RUE elevated;LUE elevated;legs elevated;heels elevated;pillow between legs  -CL           Pain Assessment    Additional Documentation Pain Scale 2: FACES Pre/Post-Treatment (Group)  -CL           Pain Scale 2: FACES Pre/Post-Treatment    Pain 2: FACES Scale, Pretreatment 2-->hurts little bit  -CL        Pain 2: FACES Scale, Post-Treatment 2-->hurts little bit  -CL        Pain Location 2 - Orientation generalized  -CL        Pre/Post Treatment Pain 2 Comment Tolerated.   -CL        Pain Intervention(s) 2 Repositioned;Ambulation/increased activity  -CL           Wound 10/01/18 1845 Bilateral medial coccyx    Wound - Properties Group Date first assessed: 10/01/18  -SW Time first assessed: 1845  -SW Present On Admission : yes  -SW Side: Bilateral  -SW Orientation: medial  -SW Location: coccyx  -SW       Clinical Impression (OT)    Date of Referral to OT 10/04/18  -CL        OT Diagnosis Decreased independence in ADLs.   -CL        Patient/Family Goals Statement (OT Eval) Return to PLOF.   -CL        Criteria for Skilled Therapeutic Interventions Met (OT Eval) yes;treatment indicated  -CL        Rehab Potential (OT Eval) good, to achieve stated therapy  goals  -CL        Therapy Frequency (OT Eval) daily  -CL        Anticipated Equipment Needs at Discharge (OT) --   TBA further  -CL        Anticipated Discharge Disposition (OT) skilled nursing facility  -CL           Vital Signs    Pre Systolic BP Rehab 132  -CL        Pre Treatment Diastolic BP 69  -CL        Post Systolic BP Rehab 132  -CL        Post Treatment Diastolic BP 75  -CL        Pretreatment Heart Rate (beats/min) 57  -CL        Posttreatment Heart Rate (beats/min) 57  -CL        Pre SpO2 (%) 95  -CL        O2 Delivery Pre Treatment room air  -CL        Post SpO2 (%) 94  -CL        O2 Delivery Post Treatment room air  -CL        Pre Patient Position Supine  -CL        Intra Patient Position Standing  -CL        Post Patient Position Supine  -CL           OT Goals    Transfer Goal Selection (OT) transfer, OT goal 1  -CL        Dressing Goal Selection (OT) dressing, OT goal 1  -CL        Toileting Goal Selection (OT) toileting, OT goal 1  -CL        Strength Goal Selection (OT) strength, OT goal 1  -CL        Additional Documentation Strength Goal Selection (OT) (Row)  -CL           Transfer Goal 1 (OT)    Activity/Assistive Device (Transfer Goal 1, OT) sit-to-stand/stand-to-sit;toilet  -CL        [place] Level/Cues Needed (Transfer Goal 1, OT) minimum assist (75% or more patient effort);verbal cues required  -CL        Time Frame (Transfer Goal 1, OT) by discharge;long term goal (LTG)  -CL        Progress/Outcome (Transfer Goal 1, OT) goal ongoing  -CL           Dressing Goal 1 (OT)    Activity/Assistive Device (Dressing Goal 1, OT) lower body dressing   Don/doff socks  -CL        [place]/Cues Needed (Dressing Goal 1, OT) verbal cues required;minimum assist (75% or more patient effort)  -CL        Time Frame (Dressing Goal 1, OT) by discharge;long term goal (LTG)  -CL        Progress/Outcome (Dressing Goal 1, OT) goal ongoing  -CL           Toileting Goal 1 (OT)    Activity/Device (Toileting  Goal 1, OT) adjust/manage clothing;perform perineal hygiene  -CL        [place] Level/Cues Needed (Toileting Goal 1, OT) minimum assist (75% or more patient effort);verbal cues required  -CL        Time Frame (Toileting Goal 1, OT) by discharge;long term goal (LTG)  -CL        Progress/Outcome (Toileting Goal 1, OT) goal ongoing  -CL           Strength Goal 1 (OT)    Strength Goal 1 (OT) Pt will tolerate BUE AROM HEP x10 reps to promote ADL performance.   -CL        Time Frame (Strength Goal 1, OT) by discharge;long term goal (LTG)  -CL        Progress/Outcome (Strength Goal 1, OT) goal ongoing  -CL          User Key  (r) = Recorded By, (t) = Taken By, (c) = Cosigned By    Initials Name Effective Dates    Fatmata Chaves RN 06/16/16 -     CL Helena Thomas OT 04/03/18 -            Occupational Therapy Education     Title: PT OT SLP Therapies (Active)     Topic: Occupational Therapy (Active)     Point: ADL training (Active)     Description: Instruct learner(s) on proper safety adaptation and remediation techniques during self care or transfers.   Instruct in proper use of assistive devices.   Learning Progress Summary     Learner Status Readiness Method Response Comment Documented by    Patient Active Acceptance E NR Pt educated on appropriate safety precautions, t/f techniques, role of OT, positioning, and benefits of therapy. CL 10/05/18 1009          Point: Precautions (Active)     Description: Instruct learner(s) on prescribed precautions during self-care and functional transfers.   Learning Progress Summary     Learner Status Readiness Method Response Comment Documented by    Patient Active Acceptance E NR Pt educated on appropriate safety precautions, t/f techniques, role of OT, positioning, and benefits of therapy. CL 10/05/18 1009          Point: Body mechanics (Active)     Description: Instruct learner(s) on proper positioning and spine alignment during self-care, functional mobility activities  and/or exercises.   Learning Progress Summary     Learner Status Readiness Method Response Comment Documented by    Patient Active Acceptance E NR Pt educated on appropriate safety precautions, t/f techniques, role of OT, positioning, and benefits of therapy.  10/05/18 1009                      User Key     Initials Effective Dates Name Provider Type Discipline    CL 04/03/18 -  Helena Thomas, AYANA Occupational Therapist OT                  OT Recommendation and Plan  Outcome Summary/Treatment Plan (OT)  Anticipated Equipment Needs at Discharge (OT):  (TBA further)  Anticipated Discharge Disposition (OT): skilled nursing facility  Therapy Frequency (OT Eval): daily  Plan of Care Review  Plan of Care Reviewed With: patient  Plan of Care Reviewed With: patient  Outcome Summary: OT completed a brief chart review relating to presenting physical deficits. Pt Mod Ax2 for t/fs, Dep for ADL performance. Pt limited d/t significant generalized weakness and confusion. Recommend cont skilled IPOT POC. Recommend pt DC to SNF.           Outcome Measures     Row Name 10/05/18 0840 10/04/18 1500          How much help from another person do you currently need...    Turning from your back to your side while in flat bed without using bedrails?  -- 3  -SIXTO     Moving from lying on back to sitting on the side of a flat bed without bedrails?  -- 2  -SIXTO     Moving to and from a bed to a chair (including a wheelchair)?  -- 2  -SIXTO     Standing up from a chair using your arms (e.g., wheelchair, bedside chair)?  -- 2  -SIXTO     Climbing 3-5 steps with a railing?  -- 1  -SIXTO     To walk in hospital room?  -- 2  -SIXTO     AM-PAC 6 Clicks Score  -- 12  -SIXTO        How much help from another is currently needed...    Putting on and taking off regular lower body clothing? 1  -CL  --     Bathing (including washing, rinsing, and drying) 1  -CL  --     Toileting (which includes using toilet bed pan or urinal) 1  -CL  --     Putting on and taking off regular  upper body clothing 2  -CL  --     Taking care of personal grooming (such as brushing teeth) 3  -CL  --     Eating meals 1  -CL  --     Score 9  -CL  --        Functional Assessment    Outcome Measure Options AM-PAC 6 Clicks Daily Activity (OT)  -CL AM-PAC 6 Clicks Basic Mobility (PT)  -SIXTO       User Key  (r) = Recorded By, (t) = Taken By, (c) = Cosigned By    Initials Name Provider Type    Althea Baum, PT Physical Therapist    CL Helena Thomas OT Occupational Therapist          Time Calculation:         Time Calculation- OT     Row Name 10/05/18 0840             Time Calculation- OT    OT Start Time 0840  -CL      OT Received On 10/05/18  -CL      OT Goal Re-Cert Due Date 10/15/18  -CL        User Key  (r) = Recorded By, (t) = Taken By, (c) = Cosigned By    Initials Name Provider Type    CL Helena Thomas OT Occupational Therapist        Therapy Suggested Charges     Code   Minutes Charges    None           Therapy Charges for Today     Code Description Service Date Service Provider Modifiers Qty    [id number]  OT EVAL LOW COMPLEXITY 4 10/5/2018 Helena Thomas OT GO 1    [id number]  OT THER SUPP EA 15 MIN 10/5/2018 Helena Thomas OT GO 2               Helena Thomas OT  10/5/2018

## 2018-10-05 NOTE — PROGRESS NOTES
Continued Stay Note  [facility]     Patient Name: Devora Huddleston  MRN: [id number]  Today's Date: 10/5/2018    Admit Date: 10/1/2018          Discharge Plan     Row Name 10/05/18 1413       Plan    Plan SNF    Patient/Family in Agreement with Plan yes    Plan Comments Talked to Ms. Huddleston's son, Bryn.  We discussed placement and he is hesitant, but agrees she needs more nursing care than he can give her.  He would like me to try and get her into SNF in [place] as 1st choice and in [place] or [place] as 2nd choices.  Will fax clinical packet to these facilities.  He has full custody of his 2 year old grandson and his 7 yr. old son.  Bryn's ph # is [phone].    Final Discharge Disposition Code 03 - skilled nursing facility (SNF)              Discharge Codes    No documentation.       Expected Discharge Date and Time     Expected Discharge Date Expected Discharge Time    Oct 6, 2018             Yary Zapata RN

## 2018-10-05 NOTE — PROGRESS NOTES
Continued Stay Note  [facility]     Patient Name: Devora Huddleston  MRN: [id number]  Today's Date: 10/5/2018    Admit Date: 10/1/2018          Discharge Plan     Row Name 10/05/18 0909       Plan    Plan Rehab    Plan Comments Will likely need SNF to help her.  Son works and has a 2 year old grandchild he cares for.  Will discuss with son when he comes in today.               Discharge Codes    No documentation.       Expected Discharge Date and Time     Expected Discharge Date Expected Discharge Time    Oct 6, 2018             Yary Zapata RN

## 2018-10-05 NOTE — PLAN OF CARE
Problem: Skin Injury Risk (Adult)  Goal: Skin Health and Integrity  Outcome: Ongoing (interventions implemented as appropriate)      Problem: Infection, Risk/Actual (Adult)  Goal: Infection Prevention/Resolution  Outcome: Ongoing (interventions implemented as appropriate)      Problem: Fall Risk (Adult)  Goal: Absence of Fall  Outcome: Ongoing (interventions implemented as appropriate)      Problem: Patient Care Overview  Goal: Plan of Care Review  Outcome: Ongoing (interventions implemented as appropriate)   10/05/18 0540   Coping/Psychosocial   Plan of Care Reviewed With patient   Plan of Care Review   Progress no change   OTHER   Outcome Summary Patient had NG placed to begin feeding - did well up until 5 AM when patient pulled tube out. JEFF Madsen had conversation about goals of care with patient at bedside. Patient agreed to have tube placed back in and to be placed in mitt restraints. VSS. Will continue to monitor.       Problem: Restraint, Nonbehavioral (Nonviolent)  Goal: Rationale and Justification  Outcome: Ongoing (interventions implemented as appropriate)    Goal: Nonbehavioral (Nonviolent) Restraint: Absence of Injury/Harm  Outcome: Ongoing (interventions implemented as appropriate)    Goal: Nonbehavioral (Nonviolent) Restraint: Achievement of Discontinuation Criteria  Outcome: Ongoing (interventions implemented as appropriate)    Goal: Nonbehavioral (Nonviolent) Restraint: Preservation of Dignity and Wellbeing  Outcome: Ongoing (interventions implemented as appropriate)

## 2018-10-05 NOTE — DISCHARGE PLACEMENT REQUEST
"Devora Huddleston  (73 y.o. Female)     Chiquita Zapata, RAE/Tanesha Paulson RN  Case Management  [facility]  [phone]        Date of Birth Social Security Number Address Home Phone MRN    1945  [address] [place] [facility] [address] [phone] [id number]    Tao[place] Marital Status          None Unknown       Admission Date Admission Type Admitting Provider Attending Provider Department, Room/Bed    10/1/18 Emergency Joe Christine MD Harrison, John M, MD [facility] 2B ICU, N235/1    Discharge Date Discharge Disposition Discharge Destination                       Attending Provider:  Joe Christine MD    Allergies:  Not on File    Isolation:  None   Infection:  None   Code Status:  CPR    Ht:  157.5 cm (62.01\")   Wt:  42.6 kg (93 lb 14.7 oz)    Admission Cmt:  None   Principal Problem:  Acute hypoxemic respiratory failure requiring intubation and ventilatory support (CMS/[facility]) [J96.01]                 Active Insurance as of 10/1/2018     Primary Coverage     Payor Plan Insurance Group Employer/Plan Group    MEDICARE MEDICARE A & B      Payor Plan Address Payor Plan Phone Number Effective From Effective To    [address] [phone] 4/1/1986     [facility] [id number]       Subscriber Name Subscriber Birth Date Member ID       DEVORA HUDDLESTON 1945 [id number]           Secondary Coverage     Payor Plan Insurance Group Employer/Plan Group    [employer] [place] WELL[employer]ARE MEDICAID      Payor Plan Address Payor Plan Phone Number Effective From Effective To    [address] [address] [phone] 10/1/2018     [facility] [id number]       Subscriber Name Subscriber Birth Date Member ID       DEVORA HUDDLESTON 1945 [id number]                 Emergency Contacts      (Rel.) Home Phone Work Phone Mobile Phone    Molina Huddleston (Son) [phone] -- --               History & Physical      Dhruv Crandall MD at 10/1/2018  7:13 PM          Pulmonary/Critical Care History " "and Physical Exam     LOS: 0 days   Patient Care Team:  Provider, No Known as PCP - General    Chief Complaint:    Chief Complaint   Patient presents with   • Loss of Consciousness     Source of history: Chart, attempted to call available phone number from chart and message says that number is unavailable.    Subjective     HPI:     73-year-old female who arrived by [facility] to the emergency department here after being found down and unresponsive by family in her home.  She reportedly was found by her son unresponsive and \"not breathing\".  The patient's blood sugar apparently was 105 initially, but 39 mg/dL en route.  Initially, the patient had an oxygen saturation of 47% and was intubated in the field.  The ER was able to contact the patient's son however he was noted to be a poor historian.  He does report that she has had increased weakness over the last few weeks and states that she was evaluated for a seizure 2 weeks previously and had hyponatremia at that point.  He denied that she has diabetes.  She reportedly has a history of CVA in the past.    CT head done emergency department showed no acute abnormality.  CT perfusion study was negative according to the ER attending physician although I do not see an official report is taking this.  CT angiogram is pending.  The patient was started on empiric antibiotics for sepsis.  She was significantly hypotensive in the ER although responds to fluids before drifting back down to 60s to 70s systolic.    On my evaluation, the patient is intubated and drowsy but is arousable.  She follows commands in all 4 extremities.  She does report abdominal tenderness.    I attempted to call the patient's son however I was unable to contact them as the patient's son's phone number message states that the numbers unavailable.    History taken from: patient    Past Medical History:   Diagnosis Date   • Stroke (CMS/[facility])        History reviewed. No pertinent surgical " history.    History reviewed. No pertinent family history.    Social History     Social History   • Marital status: Unknown     Spouse name: N/A   • Number of children: N/A   • Years of education: N/A     Occupational History   • Not on file.     Social History Main Topics   • Smoking status: Unknown If Ever Smoked   • Smokeless tobacco: Not on file   • Alcohol use Defer   • Drug use: Unknown   • Sexual activity: Defer     Other Topics Concern   • Not on file     Social History Narrative   • No narrative on file       Not on File    PMH/FH/SocH were reviewed by me and updates were made.     Review of Systems:    Review of 14 systems was completed with positives and pertinent negatives noted in the subjective section.  All other systems reviewed and are negative.   Exceptions are noted below:    Unable to obtain secondary to altered mental status, intubation.    Objective     Vital Signs  Temp:  [94 °F (34.4 °C)-96.6 °F (35.9 °C)] 96.6 °F (35.9 °C)  Heart Rate:  [67-86] 71  Resp:  [14-16] 16  BP: ()/(36-80) 115/64  FiO2 (%):  [60 %] 60 %  Body mass index is 14.63 kg/m².  FiO2 (%):  [60 %] 60 %  S RR:  [12] 12  PEEP/CPAP (cm H2O):  [5 cm H20] 5 cm H20    Physical Exam:     General Appearance:    Drowsy, on ventilator, cachectic, in no acute distress   Head:    Normocephalic, without obvious abnormality, atraumatic   Eyes:            Lids and lashes normal, conjunctivae and sclerae normal, no   icterus, no pallor, corneas clear, PERRL   ENMT:   Ears appear intact with no abnormalities noted      No oral lesions, edentulous, no thrush, oral mucosa moist, oral endotracheal tube in place.     Neck:   No adenopathy, supple, trachea midline, no thyromegaly, no   carotid bruit, no JVD       Lungs/resp:     On ventilator, symmetric chest rise, no crepitus, clear to      auscultation bilaterally, no chest wall tenderness                  Heart/CV:    Regular rhythm and normal rate, normal S1 and S2, no            murmur    Abdomen/GI:     Normal bowel sounds, no masses, no organomegaly, soft, moderately distended, mild diffuse tenderness to palpation.         G/U:     Deferred   Extremities/MSK:   No clubbing, cyanosis or edema.  Normal tone.  No deformities.    Pulses:   Pulses palpable and equal bilaterally   Skin:   No bleeding, multiple bruises.  Some erythema of bilateral lower extremities.  Wrinkling of skin lower extremities suggesting recent improvement of chronic edema.     Hem/Lymph:   No cervical or supraclavicular adenopathy.    Neurologic:   Moves all extremities with no obvious focal motor deficit.  Cranial nerves 2 - 12 grossly intact            Psychiatric:  Drowsy, nonagitated.     Results Review:     I reviewed the patient's new clinical results.     Results from last 7 days  Lab Units 10/01/18  1640   SODIUM mmol/L 119*   POTASSIUM mmol/L 3.5   CHLORIDE mmol/L 86*   CO2 mmol/L 23.0   BUN mg/dL 34*   CREATININE mg/dL 1.04   CALCIUM mg/dL 8.6*   ALT (SGPT) U/L 25   AST (SGOT) U/L 39*   GLUCOSE mg/dL 178*       Results from last 7 days  Lab Units 10/01/18  1640   WBC 10*3/mm3 10.47   HEMOGLOBIN g/dL 12.6   HEMATOCRIT % 36.9   PLATELETS 10*3/mm3 317       Results from last 7 days  Lab Units 10/01/18  1700   PH, ARTERIAL pH units 7.454*   PO2 ART mm Hg 179.0*   PCO2, ARTERIAL mm Hg 34.5*   HCO3 ART mmol/L 24.2       I reviewed the patient's new imaging including images and reports.    Medication Review:     sodium chloride 1,000 mL Intravenous Once          Assessment/Plan     Hospital Problem List     * (Principal)Acute respiratory failure (CMS/HCC)    Altered mental state    Acute respiratory failure with hypoxemia (CMS/HCC)    Hyponatremia    Hypoglycemia    Generalized abdominal pain    Weight loss    [place] coma scale total score 3-8 (CMS/HCC) - on arrival    Hypotension        73-year-old female with a history of hypertension, reported history of prior stroke, recent weight loss and episode of  hyponatremia/seizure was brought to the emergency department after being found down by family at home.  She was intubated in the field for hypoxemic respiratory failure.  Blood sugar en route was 39 mg/dL and was replaced.  She has been persistently hypotensive since arrival.  Pro-calcitonin is low.    Chest x-ray was read as no significant abnormality however I am concerned about a possible AP window density/left upper lobe density (although this may be due to rotation).  Additionally, she seems to have some abdominal tenderness and distention on exam.  Her son reports that she was recently treated with doxycycline for a urinary tract infection.  She has poor food intake and has had persistent weight loss over the past year and a half.    I think there is a chance that the patient had a seizure/post ictal state, possibly triggered by hypoglycemia, hyponatremia or a combination.  I suspect she has an underlying issue causing her poor oral intake and weight loss.  Chest x-ray seems abnormal to me and I think she needs a CT scan of the chest, abdomen, and pelvis to rule out possible malignancy and evaluate for the possibility of mesenteric ischemia or intra-abdominal abscess.    Plan:  1.  Admit to the intensive care unit.  2.  Fluid resuscitation.  3.  May require central line, pressors.  4.  Continue antibiotics for now, repeat pro-calcitonin in a.m.  May be able to de-escalate antibiotics soon.  5.  Follow-up cultures.  6.  Check serum cortisol.  7.  Check CT scan of the chest as well as abdomen/pelvis to evaluate abnormal chest x-ray (rule out left upper lobe/AP window pathology) and further evaluate patient's abdominal pain.  8.  Monitor sodium to avoid overly rapid correction.  9.  Hopefully can be weaned from mechanical ventilation tomorrow.      Dhruv Crandall MD  10/01/18  8:01 PM    60 minutes of critical care provided. This time excludes other billable procedures. Time does include preparation of  documents, medical consultations, review of old records, and direct bedside care. Patient was at high risk for life-threatening deterioration due to acute respiratory failure, hypotension.       Addendum: The patient's son, Bryn Huddleston, called back and I was able to get some additional history which I added to the history above.  He is currently only available at the patient's home phone number, as his cell phone has been turned off.  He states that he is going to try to come to the hospital tomorrow, but needs to get his phone turned back on so he can use the map function.    Patient's PCP is Evelyn Francisco [phone].       Electronically signed by Dhruv Crandall MD at 10/1/2018  8:23 PM       Hospital Medications (active)       Dose Frequency Start End    bisacodyl (DULCOLAX) suppository 10 mg 10 mg Once As Needed 10/4/2018     Sig - Route: Insert 1 suppository into the rectum 1 (One) Time As Needed for Constipation (if oral bisacodyl is ineffective). - Rectal    clopidogrel (PLAVIX) tablet 75 mg 75 mg Daily 10/3/2018     Sig - Route: Take 1 tablet by mouth Daily. - Oral    dextrose 5 % and sodium chloride 0.9 % with KCl 20 mEq/L infusion 75 mL/hr Continuous 10/5/2018     Sig - Route: Infuse 75 mL/hr into a venous catheter Continuous. - Intravenous    docusate sodium (COLACE) capsule 100 mg 100 mg 2 Times Daily 10/3/2018     Sig - Route: Take 1 capsule by mouth 2 (Two) Times a Day. - Oral    enoxaparin (LOVENOX) syringe 30 mg 30 mg Nightly 10/3/2018     Sig - Route: Inject 0.3 mL under the skin into the appropriate area as directed Every Night. - Subcutaneous    famotidine (PEPCID) tablet 20 mg 20 mg 2 Times Daily 10/3/2018     Sig - Route: Take 1 tablet by mouth 2 (Two) Times a Day. - Oral    fleet enema 1 enema 1 enema Once As Needed 10/4/2018     Sig - Route: Insert 1 enema into the rectum 1 (One) Time As Needed (constipation). - Rectal    Magnesium Sulfate 2 gram / 50mL Infusion (GIVE X 3 BAGS TO  "EQUAL 6GM TOTAL DOSE) - Mg 1.1 - 1.5 mg/dl 2 g As Needed 10/2/2018     Sig - Route: Infuse 50 mL into a venous catheter As Needed (See Administration Instructions). - Intravenous    Linked Group 1:  \"Or\" Linked Group Details        Magnesium Sulfate 2 gram Bolus, followed by 8 gram infusion (total Mg dose 10 grams)- Mg less than or equal to 1mg/dL 2 g As Needed 10/2/2018     Sig - Route: Infuse 50 mL into a venous catheter As Needed (See Administration Instructions). - Intravenous    Linked Group 1:  \"Or\" Linked Group Details        Magnesium Sulfate 4 gram infusion- Mg 1.6-1.9 mg/dL 4 g As Needed 10/2/2018     Sig - Route: Infuse 100 mL into a venous catheter As Needed (See Administration Instructions). - Intravenous    Linked Group 1:  \"Or\" Linked Group Details        multivitamin (THERAGRAN) tablet 1 tablet 1 tablet Daily 10/4/2018     Sig - Route: Take 1 tablet by mouth Daily. - Oral    piperacillin-tazobactam (ZOSYN) 3.375 g in iso-osmotic dextrose 50 ml (premix) 3.375 g Every 8 Hours 10/2/2018 10/11/2018    Sig - Route: Infuse 50 mL into a venous catheter Every 8 (Eight) Hours. - Intravenous    polyethylene glycol 3350 powder (packet) 17 g Daily 10/4/2018     Sig - Route: Take 17 g by mouth Daily. - Oral    potassium & sodium phosphates (PHOS-NAK) 280-160-250 MG packet - for Phosphorus 1.25 - 2.5 mg/dL 2 packet Once As Needed 10/4/2018     Sig - Route: Take 2 packets by mouth 1 (One) Time As Needed (Phosphorus 1.25 - 2.5 mg/dL). - Oral    Linked Group 2:  \"Or\" Linked Group Details        potassium & sodium phosphates (PHOS-NAK) 280-160-250 MG packet - for Phosphorus less than 1.25 mg/dL 2 packet Every 6 Hours PRN 10/4/2018     Sig - Route: Take 2 packets by mouth Every 6 (Six) Hours As Needed (Phosphorus less than 1.25 mg/dL). - Oral    Linked Group 2:  \"Or\" Linked Group Details        potassium chloride 20 mEq in 50 mL IVPB 20 mEq Every 1 Hour PRN 10/2/2018     Sig - Route: Infuse 50 mL into a venous catheter " "Every 1 (One) Hour As Needed (See admin Instructions.). - Intravenous    potassium phosphate 15 mmol in sodium chloride 0.9 % 100 mL infusion 15 mmol As Needed 10/3/2018     Sig - Route: Infuse 15 mmol into a venous catheter As Needed (Central Line - Phosphorus 1.8 - 2.5 mg/dL.). - Intravenous    Linked Group 3:  \"Or\" Linked Group Details        potassium phosphate 30 mmol in sodium chloride 0.9 % 100 mL infusion 30 mmol As Needed 10/3/2018     Sig - Route: Infuse 30 mmol into a venous catheter As Needed (Central Line - Phosphorus 1.3 - 1.7 mg/dL.). - Intravenous    Linked Group 3:  \"Or\" Linked Group Details        potassium phosphate 45 mmol in sodium chloride 0.9 % 250 mL infusion 45 mmol As Needed 10/3/2018     Sig - Route: Infuse 45 mmol into a venous catheter As Needed (Central Line - Phosphorus Less Than 1.3 mg/dL.). - Intravenous    Linked Group 3:  \"Or\" Linked Group Details        sennosides-docusate sodium (SENOKOT-S) 8.6-50 MG tablet 2 tablet 2 tablet Nightly 10/3/2018     Sig - Route: Take 2 tablets by mouth Every Night. - Oral    sodium chloride 0.9 % flush 1-10 mL 1-10 mL As Needed 10/1/2018     Sig - Route: Infuse 1-10 mL into a venous catheter As Needed for Line Care. - Intravenous    sodium chloride 0.9 % flush 10 mL 10 mL As Needed 10/1/2018     Sig - Route: Infuse 10 mL into a venous catheter As Needed for Line Care. - Intravenous    Cosign for Ordering: Accepted by Stu Prescott MD on 10/2/2018  3:34 AM    dextrose 5 % and sodium chloride 0.9 % infusion (Discontinued) 75 mL/hr Continuous 10/2/2018 10/5/2018    Sig - Route: Infuse 75 mL/hr into a venous catheter Continuous. - Intravenous    midodrine (PROAMATINE) tablet 5 mg (Discontinued) 5 mg 3 Times Daily Before Meals 10/3/2018 10/5/2018    Sig - Route: Take 1 tablet by mouth 3 (Three) Times a Day Before Meals. - Oral    norepinephrine (LEVOPHED) 8 mg/250 mL (32 mcg/mL) in sodium chloride 0.9% infusion (premix) (Discontinued) 0.02-0.3 " mcg/kg/min × 36.3 kg Titrated 10/1/2018 10/4/2018    Sig - Route: Infuse 0.726-10.89 mcg/min into a venous catheter Dose Adjusted By Provider As Needed. - Intravenous             Physician Progress Notes (last 24 hours) (Notes from 10/4/2018  2:20 PM through 10/5/2018  2:20 PM)      Joe Christine MD at 10/4/2018  8:45 PM          Intensivist Note     10/4/2018  Hospital Day: 3  * No surgery found *      Ms. Devora Huddleston, 73 y.o. female is followed for:  Principal Problem:    Acute hypoxemic respiratory failure requiring intubation and ventilatory support (CMS/HCC)  Active Problems:    Hypoglycemia etiology unclear. Resolved    COPD and active cigarette abuse (CMS/HCC)    Altered mental state associated with hypoglycemia. Resolved    [place] coma scale on arrival 3-8 (CMS/HCC)     Multiple right rib fractures (ninth, 10th, 11th)    Hypotension improving    Anemia with hematocrit dropping from 36 to 29 over 16 hours    Hyponatremia. Suspect SIADH    Generalized abdominal pain    Weight loss       SUBJECTIVE     73-year-old white female found down and unresponsive by her son 10/1/18. Was intubated in the field and transported. Initial blood sugar was apparently 105 but dropped to 39 in route. Patient had apparently been complaining of increasing weakness for several weeks, had been losing weight, and 2 weeks PTA was evaluated for hyponatremia and seizures. No data was available, patient was intubated, and no family available. X-rays and CT scans revealed that she had 3 right posterior rib fractures so presumably fell at home. CTs of the head however revealed no evidence of injury or stroke. The only pertinent history from the transferring records indicated that she had apparently had a CVA in the past but we had no specifics. Patient was hypotensive in the ER but did respond initially to fluids. When her blood pressure drifted down again she was placed on norepinephrine but that has since been weaned off. In  "addition on 10/3/18 she continue to improve, was alert, and was able to be extubated without difficulty.     Today the patient is awake, alert and cooperative. She has been complaining bitterly of constipation and in fact continually tries to disimpact herself digitally. She remains afebrile and has a normal white count. Patient initially had a significant leukocytosis on admission but has returned to normal and she remains afebrile. She was initially treated with vancomycin and Zosyn but the vancomycin was discontinued 10/3/18 and all cultures remain no growth. She remains thin, profoundly debilitated, and malnourished. I see no way that she should return to her home environment    The patient's relevant past medical, surgical and social history were reviewed and updated in Epic as appropriate.    OBJECTIVE     /82 (BP Location: Right arm, Patient Position: Lying)   Pulse 52   Temp 98.1 °F (36.7 °C) (Oral)   Resp 12   Ht 157.5 cm (62.01\")   Wt 42.6 kg (93 lb 14.7 oz)   SpO2 96%   BMI 17.17 kg/m²        Flowsheet Rows      First Filed Value   Admission Height  157.5 cm (62\") Documented at 10/01/2018 1700   Admission Weight  36.3 kg (80 lb) Documented at 10/01/2018 1700        Intake & Output (last day)       10/04 0701 - 10/05 0700    P.O. 175    I.V. (mL/kg) 848 (19.9)    IV Piggyback 50    Total Intake(mL/kg) 1073 (25.2)    Net +1073         Unmeasured Urine Occurrence 2 x    Unmeasured Stool Occurrence 1 x          Exam:  General Exam:  Elderly cachectic appearing white female awake and alert  HEENT: Pupils equal and reactive. Nose and throat clear  Neck:                          Supple, no JVD, thyromegaly, or adenopathy. Right IJ line in place.  Lungs: Clear but diminished breath sounds  Cardiovascular: RRR without murmurs or gallops. HR 60 (sinus)  Abdomen: Soft nontender without organomegaly or masses. Scaphoid   and rectal: Matthews catheter to be removed today  Extremities: No cyanosis clubbing " edema.  Neurologic:                 Symmetric strength but diffusely weak. No focal deficits. Follows all commands.    Chest X-Ray 10/4/18: Normal heart size, hyperinflated lungs, no pulmonary parenchymal infiltrates or masses.        Results from last 7 days  Lab Units 10/04/18  0452 10/03/18  0629 10/02/18  2008 10/02/18  0357   WBC 10*3/mm3 9.25 9.89  --  21.98*   HEMOGLOBIN g/dL 9.1* 10.6* 11.2* 10.2*   HEMATOCRIT % 26.7* 31.8* 33.1* 29.5*   PLATELETS 10*3/mm3 225 257  --  293       Results from last 7 days  Lab Units 10/04/18  0452 10/03/18  0629   SODIUM mmol/L 126* 127*   POTASSIUM mmol/L 3.7 3.5   CHLORIDE mmol/L 95* 98*   CO2 mmol/L 25.0 23.0   BUN mg/dL 14 14   CREATININE mg/dL 0.49* 0.58*   GLUCOSE mg/dL 80 112*   CALCIUM mg/dL 7.8* 8.2*       Results from last 7 days  Lab Units 10/04/18  0452 10/03/18  0629 10/02/18  0357   MAGNESIUM mg/dL 1.5 2.0 1.4   PHOSPHORUS mg/dL 1.9* 1.6* 2.4       Results from last 7 days  Lab Units 10/03/18  0629 10/02/18  0357 10/01/18  1640   ALK PHOS U/L 85 81  --    BILIRUBIN mg/dL 0.6 0.8  --    ALT (SGPT) U/L 23 21 25   AST (SGOT) U/L 46* 36* 39*       Lab Results   Component Value Date    SEDRATE 7 10/03/2018     No results found for: BNP  Lab Results   Component Value Date    CKTOTAL 212 (H) 10/01/2018     Lab Results   Component Value Date    TSH 3.112 10/01/2018     Lab Results   Component Value Date    LACTATE 1.5 10/01/2018     Lab Results   Component Value Date    CORTISOL 39.50 10/01/2018         Results from last 7 days  Lab Units 10/03/18  0945 10/03/18  0415 10/02/18  0545 10/01/18  1700   PH, ARTERIAL pH units 7.459* 7.403 7.450 7.454*   PCO2, ARTERIAL mm Hg 31.9* 37.5 34.2* 34.5*   PO2 ART mm Hg 136.0* 148.0* 102.0 179.0*   HCO3 ART mmol/L 22.6 23.4 23.8 24.2   FIO2 % 35 40 40 60       I reviewed the patient's results, images and medication.    Assessment/Plan   ASSESSMENT      Principal Problem:    Acute hypoxemic respiratory failure requiring intubation and  "ventilatory support (CMS/[facility])  Active Problems:    Hypoglycemia etiology unclear. Resolved    COPD and active cigarette abuse (CMS/[facility])    Altered mental state associated with hypoglycemia. Resolved    [place] coma scale on arrival 3-8 (CMS/[facility])     Multiple right rib fractures (ninth, 10th, 11th)    Hypotension improving    Anemia with hematocrit dropping from 36 to 29 over 16 hours    Hyponatremia. Suspect SIADH    Generalized abdominal pain    Weight loss      DISCUSSION: Patient has done well off of the ventilator. I was told initially that she could swallow and could tolerate a diet. Speech however reassessed her situation today and she definitely aspirates one to take in just about any substance. Because of this will need enteral feeds until she is strong enough. 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 Her sodium however is stable at 126 and we will just continue to watch for now. I still suspect she may have an underlying malignancy but have been unsuccessful in finding any obvious malignant process on her scans. She was living at home with a son and a 2-year-old great grandson. It is obvious that she should not return to that living situation and social service is looking into the situation.    Her blood pressure now appears much improved since we started midodrine and she is no longer requiring pressors.    Apparently case management has discussed the patient's situation with her son. She is living with her son and her great-grandchild and apparently \"they do not have enough money for food\".    PLAN     1. Bowel regimen to help with her constipation  2. Discontinue Matthews  3. We need to push physical therapy and OT  4. Social service to help us find a nursing facility to help her recover  5. NG tube and begin enteral feeds but continue IV fluids until she is at goal   6. Continue Zosyn for now  7. Follow-up results of spirometry   8. If maintains blood pressure may see how she does off of " midodrine  9. Follow-up hematocrit  10. Transfer to the floor and asked the hospitalist to assume attending role    Plan of care and goals reviewed with mulitdisciplinary team at daily rounds.    I discussed the patient's findings and my recommendations with patient and nursing staff    Time spent Critical care 35 min (It does not include procedure time).    Joe Christine MD  Intensive Care Medicine  10/04/18 8:45 PM       Electronically signed by Joe Christine MD at 10/4/2018  9:01 PM       Consult Notes (last 24 hours) (Notes from 10/4/2018  2:20 PM through 10/5/2018  2:20 PM)     No notes of this type exist for this encounter.           Nutrition Notes (last 24 hours) (Notes from 10/4/2018  2:20 PM through 10/5/2018  2:20 PM)      Darline Guerra, MS,RD,LD at 10/4/2018  6:36 PM     Attestation signed by Joe Christine MD at 10/4/2018  8:42 PM    I have reviewed the documentation above and agree.  Joe Christine MD  Pulmonary and Critical Care Medicine  10/04/18 8:42 PM                      Malnutrition Severity Assessment    Patient Name:  Devora Huddleston  YOB: 1945  MRN: [id number]  Admit Date:  10/1/2018    Patient meets criteria for : Severe malnutrition    Comments:Multiple criteria met for severe malnutrition r/t social/ environmental circumstances ae evidenced by po intake of < 50% for> 1 month r/t food insecurity, wt loss of 15 % and as pt allowed for nutrition focus physical exam w/ evidence of severe  muscle and fat wasting.    Malnutrition Type: Social/Environmental Circumstance Malnutrition     Malnutrition Type (last 8 hours)      Malnutrition Severity Assessment     Row Name 10/04/18 1810       Malnutrition Severity Assessment    Malnutrition Type Social/Environmental Circumstance Malnutrition    Row Name 10/04/18 1810       Physical Signs of Malnutrition (Social/Environmental)    Muscle Wasting Severe   per NFPE severe orbital and temporal wasting, clavicle and  acromion regions -severe, hand- dorsal severe, patella/ thigh- severe, calf severe    Fat Loss Severe   orbital,temporal, buccal - severe,  thoraic/ribs severe, triceps severe wasting    Fluid Accumulation None    Secondary Physical Signs Present (comment)   angular stomatitis; dermatits    Row Name 10/04/18 1810       Weight Status (Social/Environmental)    %UBW Mod (75-85%)    Weight Loss --    lbs pt reports she is thin at this; period of wt loss unknown    Row Name 10/04/18 1810       Energy Intake Status (Social/Environmental)    Energy Intake Severe (< or equal to 50% / > or equal to 1 mo)    Row Name 10/04/18 1810       Criteria Met (Must meet criteria for severity in at least 2 of these categories: M Wasting, Fat Loss, Fluid, Secondary Signs, Wt. Status, Intake)    Patient meets criteria for  Severe malnutrition          Electronically signed by:  Darline Guerra MS,RD,LD  10/04/18 6:36 PM        Electronically signed by Joe Christine MD at 10/4/2018  8:42 PM     Darline Guerra MS,RD,LD at 10/4/2018  6:31 PM      Consult Orders:    1. Inpatient Consult to Nutrition Services [[id number]] ordered by Joe Christine MD at 10/04/18 1602                Adult Nutrition  Assessment/PES    Patient Name:  Devora Huddleston  YOB: 1945  MRN: [id number]  Admit Date:  10/1/2018    Assessment Date:  10/4/2018    Comments:  Pt initiated on EN support; malnutrition severity assessment and EN assessment completed. Goal rate of Peptamen af rec at 55 m l/he w/ daily goal of volume of 1100 ml. Pt at high risk of refeeding syndrome; need to  Closely monitor - K+, phos, Mg++ levels. RD will monitor adequacy of delivery and tolerance to EN support. Please see assessment details in note.          Reason for Assessment     Row Name 10/04/18 9378          Reason for Assessment    Reason For Assessment per organizational policy;follow-up protocol;TF/PN;physician consult   MDR; malnutrition severity  assessment; nutrition assessment for EN support - 60 mins     Diagnosis neurologic conditions;pulmonary disease   AMS unresponsive in ED ARF (extubated );rib fractures; Abd pain, ? seizure, COPD : Hypoglycemia, Hyponatremia, Hypotension Hx HTN, CVA, recent UTI, active cigarette abuse     Identified At Risk by Screening Criteria MST SCORE 2+;BMI;difficulty chewing/swallowing             Nutrition/Diet History     Row Name 10/04/18 1756          Nutrition/Diet History    Factors Affecting Nutritional Intake difficulty/impaired swallowing;chewing difficulties/inability to chew food;socio-economic;abdominal pain;abdominal distension               Labs/Tests/Procedures/Meds     Row Name 10/04/18 1756          Labs/Procedures/Meds    Lab Results Reviewed reviewed, pertinent     Lab Results Comments low, phos, Mg - replaced per RN report        Diagnostic Tests/Procedures    Diagnostic Test/Procedure Reviewed reviewed, pertinent     Diagnostic Test/Procedures Comments SLP re-evval completed; all consistencies w/ aspiration - NPO recs noted; EN support ordered        Medications    Pertinent Medications Reviewed reviewed, pertinent     Pertinent Medications Comments multivitamin and bowel regimen ordered             Physical Findings     Row Name 10/04/18 1758          Physical Findings    Overall Physical Appearance generalized wasting;loss of subcutaneous fat;loss of muscle mass     Gastrointestinal constipation     Oral/Mouth Cavity --   edentulous , dentures at home     Skin --   bruising , dermatitis , angular stomatitis              Estimated/Assessed Needs     Row Name 10/04/18 1800          Calculation Measurements    Weight Used For Calculations 42.6 kg (93 lb 14.7 oz)        Estimated/Assessed Needs    Additional Documentation Calorie Requirements (Group);Viramontes Cimarron (Group);Fluid Requirements (Group);Protein Requirements (Group)        Calorie Requirements    Weight Used For Calorie Calculations 42.6 kg (93  lb 14.7 oz)     Estimated Calorie Requirement (kcal/day) 1300     Estimated Calorie Need Method Viramontes Collins;kcal/kg     Estimated Calorie Requirement Comment 1065- 1523 kcal/d range for gentle to aggressive repletion per predictive equations        Viramontes Collins    Activity Factors Other (comment)     Repletion Factors + 250     Estimated Kcal (Viramontes Collins)  x 1.25 = 1273 plus 250 =1523 kcal        KCAL/KG    25 Kcal/Kg (kcal) 1065     30 Kcal/Kg (kcal) 1278     35 Kcal/Kg (kcal) 1491        [place]-St. Jeor Equation    RMR ([place]-St. Jeor Equation) 894.38        Protein Requirements    Weight Used For Protein Calculations 42.6 kg (93 lb 14.7 oz)     Est Protein Requirement Amount (gms/kg) 1.5 gm protein     Estimated Protein Requirements (gms/day) 63.9        Fluid Requirements    Estimated Fluid Requirements (mL/day) 1300     Estimated Fluid Requirement Method RDA Method     RDA Method (mL) 1300                   Nutrition Prescription Ordered     Row Name 10/04/18 1804          Nutrition Prescription PO    Current PO Diet Soft Texture     Texture Ground     Fluid Consistency Thin     Supplement Boost Plus (Ensure Enlive, Ensure Plus)     Supplement Frequency 3 times a day     Common Modifiers Cardiac             Evaluation of Received Nutrient/Fluid Intake     Row Name 10/04/18 1804 10/04/18 1800       Calculation Measurements    Weight Used For Calculations  -- 42.6 kg (93 lb 14.7 oz)       PO Evaluation    Number of Days PO Intake Evaluated 2 days  --    Number of Meals 3  --    % PO Intake 25%; 2 meals undocumented observed by RD approx 5 % - few bites and sips of Boost Plus  --            Evaluation of Prescribed Nutrient/Fluid Intake     Row Name 10/04/18 1806          Weight Used For Calculations  --          Nutrition Prescribed Calorie Evaluation;Protein Evaluation;Fluid Evaluation;RDI;Fiber Evaluation          Enteral Calories (kcal) 1728    Total Calories (kcal) 1728          Enteral Protein  (gm) 109    Total Protein (gm) 109          Enteral  Fluid (mL) 1166    Total Fluid Intake (mL) 1166          Fiber 8 gm          RDI Met            Malnutrition Severity Assessment     Row Name 10/04/18 1810          Malnutrition Severity Assessment    Malnutrition Type Social/Environmental Circumstance Malnutrition        Physical Signs of Malnutrition (Social/Environmental)    Muscle Wasting Severe   per NFPE severe orbital and temporal wasting, clavicle and acromion regions -severe, hand- dorsal severe, patella/ thigh- severe, calf severe     Fat Loss Severe   orbital,temporal, buccal - severe,  thoraic/ribs severe, triceps severe wasting     Fluid Accumulation None     Secondary Physical Signs Present (comment)   angular stomatitis; dermatits        Weight Status (Social/Environmental)    %UBW Mod (75-85%)     Weight Loss --    lbs pt reports she is thin at this; period of wt loss unknown        Energy Intake Status (Social/Environmental)    Energy Intake Severe (< or equal to 50% / > or equal to 1 mo)        Criteria Met (Must meet criteria for severity in at least 2 of these categories: M Wasting, Fat Loss, Fluid, Secondary Signs, Wt. Status, Intake)    Patient meets criteria for  Severe malnutrition         Problem/Interventions:        Problem 1     Row Name 10/04/18 1820          Nutrition Diagnoses Problem 1    Problem 1 Inadequate Intake/Infusion     Macronutrient Kcal;Protein;Fat     Micronutrient Vitamin;Mineral     Etiology (related to) Factors Affecting Nutrition     Functional Diagnosis Chewing deficit;Other (comment)   edentulous , dentures at home     Food Habit/Preferences Socioeconomic Issues     Signs/Symptoms (evidenced by) Report of Mnimal PO Intake;Unintended Weight Change;PO Intake     Percent (%) intake recorded 20 %     Over number of meals 5     Unintended Weight Change Loss     Number of Pounds Lost unknown              Problem 2     Row Name 10/04/18 1824          Nutrition  Diagnoses Problem 2    Problem 2 Malnutrition     Etiology (related to) Factors Affecting Nutrition;Functional Diagnosis     Functional Diagnosis Dysphagia     Oral Chewing Difficulty;Swallowing Difficulty;Without Dentures     Other food insecurity r/t limited financial resources     Signs/Symptoms (evidenced by) Unintended Weight Change;BMI;Report of Minimal PO Intake     BMI 17 - 17.9     Unintended Weight Change Loss     Number of Pounds Lost 17     Weight loss time period uncertain per pt report                   Intervention Goal     Row Name 10/04/18 1828          Intervention Goal    General Nutrition support treatment;Meet nutritional needs for age/condition     TF/PN Inititiate TF/PN;Establish TF tolerance;Adjust TF/PN             Nutrition Intervention     Row Name 10/04/18 1828          Nutrition Intervention    RD/Tech Action Follow Tx progress;Care plan reviewd;Recommend/ordered     Recommended/Ordered EN             Nutrition Prescription     Row Name 10/04/18 1828          Nutrition Prescription PO    PO Prescription Begin/change diet     Begin/Change Diet to NPO        Nutrition Prescription EN    Enteral Prescription Enteral begin/change;Enteral to supply     Enteral Route ND     Product Peptamen AF     TF Delivery Method Continuous     Continuous TF Goal Rate (mL/hr) 55 mL/hr     Continuous TF Goal Volume (mL) 1100 mL     Water flush (mL)  20 mL     Water Flush Frequency Per hour     New EN Prescription Ordered? No, recommended        EN to Supply    Kcal/Day 1320 Kcal/Day     Protein (gm/day) 83 gm/day     TF Free H2O (mL) 891 mL     Total Free H2O (mL/day) 1371 mL/day     Fiber Per Day (gm/day) 7 gm/day        Other Orders    Supplement Vitamin mineral supplement             Education/Evaluation     Row Name 10/04/18 6092          Monitor/Evaluation    Monitor Per protocol;I&O;Pertinent labs;TF delivery/tolerance;Weight;Symptoms;Skin status         Electronically signed by:  Darline Guerra  MS,RD,LD  10/04/18 6:31 PM    Electronically signed by Darline Guerra, MS,RD,LD at 10/4/2018  6:36 PM          Physical Therapy Notes (last 24 hours) (Notes from 10/4/2018  2:20 PM through 10/5/2018  2:20 PM)      Althea Rtuh, PT at 10/4/2018  3:00 PM  Version 1 of 1         Acute Care - Physical Therapy Initial Evaluation  [facility]     Patient Name: Devora Huddleston  : 1945  MRN: [id number]  Today's Date: 10/4/2018   Onset of Illness/Injury or Date of Surgery: 10/01/18  Date of Referral to PT: 10/04/18  Referring Physician: MD Crandall      Admit Date: 10/1/2018    Visit Dx:     ICD-10-CM ICD-9-CM   1. Acute respiratory failure with hypoxemia (CMS/HCC) J96.01 518.81   2. Impaired functional mobility, balance, gait, and endurance Z74.09 V49.89     Patient Active Problem List   Diagnosis   • Acute respiratory failure (CMS/HCC)   • Altered mental state associated with hypoglycemia. 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 Resolved   • Generalized abdominal pain   • Weight loss   • Maria D coma scale on arrival 3-8 (CMS/HCC)    • Hypotension improving   • Multiple right rib fractures (ninth, 10th, 11th)   • Anemia with hematocrit dropping from 36 to 29 over 16 hours   • COPD and active cigarette abuse (CMS/HCC)     Past Medical History:   Diagnosis Date   • Hypertension    • Stroke (CMS/HCC)      History reviewed. No pertinent surgical history.     PT ASSESSMENT (last 12 hours)      Physical Therapy Evaluation     Row Name 10/04/18 1500          PT Evaluation Time/Intention    Subjective Information complains of;weakness  -SIXTO     Document Type evaluation  -SIXTO     Mode of Treatment physical therapy  -SIXTO     Patient Effort good  -SIXTO     Symptoms Noted During/After Treatment fatigue  -SIXTO     Row Name 10/04/18 1500          General Information    Patient Profile Reviewed? yes   -SIXTO     Onset of Illness/Injury or Date of Surgery 10/01/18  -SIXTO     Referring Physician MD Crandall  -SIXTO     Patient Observations alert;cooperative;agree to therapy  -SIXTO     Patient/Family Observations no family  -SIXTO     Prior Level of Function independent:;gait;transfer;bed mobility;ADL's  -SIXTO     Equipment Currently Used at Home none  -SIXTO     Pertinent History of Current Functional Problem patient was found down at home not breathing by family she was intubated in the field and admitted to [facility] with acute hypoxia  -SIXTO     Existing Precautions/Restrictions fall  -SIXTO     Limitations/Impairments safety/cognitive  -SIXTO     Risks Reviewed patient:;LOB;increased discomfort  -SIXTO     Benefits Reviewed patient:;increase strength;increase balance;decrease risk of DVT  -SIXTO     Barriers to Rehab medically complex;previous functional deficit;family issues  -SIXTO     Row Name 10/04/18 1500          Relationship/Environment    Lives With child(nitish), adult  -SIXTO     Row Name 10/04/18 1500          Resource/Environmental Concerns    Current Living Arrangements home/apartment/condo  -SIXTO     Resource/Environmental Concerns none  -SIXTO     Row Name 10/04/18 1500          Cognitive Assessment/Intervention- PT/OT    Orientation Status (Cognition) oriented to;person  -SIXTO     Follows Commands (Cognition) follows one step commands;75-90% accuracy  -SIXTO     Safety Deficit (Cognitive) judgment;safety precautions awareness;safety precautions follow-through/compliance;awareness of need for assistance  -SIXTO     Row Name 10/04/18 1500          Safety Issues, Functional Mobility    Safety Issues Affecting Function (Mobility) insight into deficits/self awareness;judgment;safety precautions follow-through/compliance;safety precaution awareness  -SIXTO     Impairments Affecting Function (Mobility) balance;cognition;endurance/activity tolerance;shortness of breath;strength  -SIXTO     Row Name 10/04/18 1500          Bed Mobility Assessment/Treatment    Bed  Mobility Assessment/Treatment rolling left;rolling right;scooting/bridging;supine-sit  -SIXTO     Rolling Left [place] (Bed Mobility) minimum assist (75% patient effort)  -SIXTO     Rolling Right [place] (Bed Mobility) minimum assist (75% patient effort)  -SIXTO     Scooting/Bridging [place] (Bed Mobility) moderate assist (50% patient effort)  -SIXTO     Supine-Sit [place] (Bed Mobility) moderate assist (50% patient effort);2 person assist  -SIXTO     Bed Mobility, Safety Issues decreased use of arms for pushing/pulling;decreased use of legs for bridging/pushing  -SIXTO     Row Name 10/04/18 1500          Transfer Assessment/Treatment    Transfer Assessment/Treatment sit-stand transfer;stand-sit transfer;toilet transfer;bed-chair transfer  -SIXTO     Bed-Chair [place] (Transfers) minimum assist (75% patient effort);2 person assist  -SIXTO     Assistive Device (Bed-Chair Transfers) walker, front-wheeled  -SIXTO     Sit-Stand [place] (Transfers) minimum assist (75% patient effort);2 person assist  -SIXTO     Stand-Sit [place] (Transfers) minimum assist (75% patient effort);2 person assist  -SIXTO     [place] Level (Toilet Transfer) minimum assist (75% patient effort);2 person assist  -SIXTO     Assistive Device (Toilet Transfer) walker, front-wheeled  -SIXTO     Row Name 10/04/18 1500          Sit-Stand Transfer    Assistive Device (Sit-Stand Transfers) walker, front-wheeled  -SIXTO     Row Name 10/04/18 1500          Stand-Sit Transfer    Assistive Device (Stand-Sit Transfers) walker, front-wheeled  -SIXTO     Row Name 10/04/18 1500          Toilet Transfer    Type (Toilet Transfer) sit-stand;stand-sit  -SIXTO     Row Name 10/04/18 1500          Gait/Stairs Assessment/Training    Gait/Stairs Assessment/Training gait/ambulation independence;gait/ambulation assistive device  -SIXTO     [place] Level (Gait) moderate assist (50% patient effort);2 person assist  -SIXTO     Assistive Device (Gait) walker, front-wheeled  -SIXTO      Distance in Feet (Gait) 25  -SIXTO     Pattern (Gait) step-to  -SIXTO     Deviations/Abnormal Patterns (Gait) ataxic;base of support, narrow;scissoring  -SIXTO     Bilateral Gait Deviations forward flexed posture  -SIXTO     Comment (Gait/Stairs) patient fatigue easily and has increased forward flexed posture with fatigue.   -SIXTO     Row Name 10/04/18 1500          General ROM    GENERAL ROM COMMENTS limited ROM all extremities   -SIXTO     Row Name 10/04/18 1500          MMT (Manual Muscle Testing)    General MMT Comments generalized weakness 4-/5  -SIXTO     Row Name 10/04/18 1500          Motor Assessment/Intervention    Additional Documentation Balance (Group);Therapeutic Exercise (Group);Therapeutic Exercise Interventions (Group)  -     Row Name 10/04/18 1500          Therapeutic Exercise    Therapeutic Exercise seated, lower extremities  -SIXTO     Additional Documentation Therapeutic Exercise (Row)  -     Row Name 10/04/18 1500          Lower Extremity Seated Therapeutic Exercise    Performed, Seated Lower Extremity (Therapeutic Exercise) hip flexion/extension;knee flexion/extension;ankle dorsiflexion/plantarflexion  -SIXTO     Exercise Type, Seated Lower Extremity (Therapeutic Exercise) AAROM (active assistive range of motion)  -SIXTO     Sets/Reps Detail, Seated Lower Extremity (Therapeutic Exercise) 1/10  -     Row Name 10/04/18 1500          Balance    Balance static sitting balance;static standing balance  -     Row Name 10/04/18 1500          Static Sitting Balance    Level of [place] (Unsupported Sitting, Static Balance) contact guard assist  -SIXTO     Sitting Position (Unsupported Sitting, Static Balance) sitting on edge of bed  -SIXTO     Time Able to Maintain Position (Unsupported Sitting, Static Balance) more than 5 minutes  -     Row Name 10/04/18 1500          Static Standing Balance    Level of [place] (Supported Standing, Static Balance) moderate assist, 50 to 74% patient effort  -SIXTO     Time Able to  Maintain Position (Supported Standing, Static Balance) 1 to 2 minutes  -SIXTO     Assistive Device Utilized (Supported Standing, Static Balance) rolling walker  -SIXTO     Row Name 10/04/18 1500          Pain Scale: FACES Pre/Post-Treatment    Pain: FACES Scale, Pretreatment 2-->hurts little bit  -SIXTO     Pain: FACES Scale, Post-Treatment 2-->hurts little bit  -SIXTO     Row Name             Wound 10/01/18 1845 Bilateral medial coccyx    Wound - Properties Group Date first assessed: 10/01/18  -SW Time first assessed: 1845  -SW Present On Admission : yes  -SW Side: Bilateral  -SW Orientation: medial  -SW Location: coccyx  -SW    Row Name 10/04/18 1500          Coping    Observed Emotional State calm;cooperative  -SIXTO     Verbalized Emotional State acceptance  -SIXTO     Row Name 10/04/18 1500          Plan of Care Review    Plan of Care Reviewed With patient  -SIXTO     Row Name 10/04/18 1500          Physical Therapy Clinical Impression    Date of Referral to PT 10/04/18  -SIXTO     PT Diagnosis (PT Clinical Impression) impaired bed mobility transfer and gait decreased strength and balance  -SIXTO     Patient/Family Goals Statement (PT Clinical Impression) unable to assess  -SIXTO     Criteria for Skilled Interventions Met (PT Clinical Impression) yes;treatment indicated  -SIXTO     Rehab Potential (PT Clinical Summary) fair, will monitor progress closely  -SIXTO     Care Plan Review (PT) care plan/treatment goals reviewed;risks/benefits reviewed;patient/other agree to care plan;evaluation/treatment results reviewed  -SIXTO     Row Name 10/04/18 1500          Vital Signs    Pre Systolic BP Rehab 112  -SIXTO     Pre Treatment Diastolic BP 60  -SIXTO     Post Systolic BP Rehab 118  -SIXTO     Post Treatment Diastolic BP 64  -SIXTO     Pretreatment Heart Rate (beats/min) 60  -SIXTO     Intratreatment Heart Rate (beats/min) 70  -SIXTO     Pre SpO2 (%) 97  -SIXTO     O2 Delivery Pre Treatment nasal cannula  -SIXTO     Post SpO2 (%) 96  -SIXTO     O2 Delivery Post Treatment  supplemental O2  -SIXTO     Pre Patient Position Supine  -SIXTO     Intra Patient Position Standing  -SIXTO     Post Patient Position Sitting  -SIXTO     Row Name 10/04/18 1500          Physical Therapy Goals    Bed Mobility Goal Selection (PT) bed mobility, PT goal 1  -SIXTO     Transfer Goal Selection (PT) transfer, PT goal 1  -SIXTO     Gait Training Goal Selection (PT) gait training, PT goal 1  -SIXTO     Row Name 10/04/18 1500          Bed Mobility Goal 1 (PT)    Activity/Assistive Device (Bed Mobility Goal 1, PT) sit to supine/supine to sit  -SIXTO     [place] Level/Cues Needed (Bed Mobility Goal 1, PT) independent  -SIXTO     Time Frame (Bed Mobility Goal 1, PT) long term goal (LTG);10 days  -SIXTO     Progress/Outcomes (Bed Mobility Goal 1, PT) goal ongoing  -SIXTO     Row Name 10/04/18 1500          Transfer Goal 1 (PT)    Activity/Assistive Device (Transfer Goal 1, PT) sit-to-stand/stand-to-sit  -SIXTO     [place] Level/Cues Needed (Transfer Goal 1, PT) independent  -SIXTO     Time Frame (Transfer Goal 1, PT) long term goal (LTG);10 days  -SIXTO     Progress/Outcome (Transfer Goal 1, PT) goal ongoing  -SIXTO     Row Name 10/04/18 1500          Gait Training Goal 1 (PT)    Activity/Assistive Device (Gait Training Goal 1, PT) gait (walking locomotion);assistive device use;walker, rolling  -SIXTO     [place] Level (Gait Training Goal 1, PT) minimum assist (75% or more patient effort)  -SIXTO     Distance (Gait Goal 1, PT) 100  -SIXTO     Time Frame (Gait Training Goal 1, PT) long term goal (LTG);10 days  -SIXTO     Progress/Outcome (Gait Training Goal 1, PT) goal ongoing  -SIXTO     Row Name 10/04/18 1500          Patient Education Goal (PT)    Activity (Patient Education Goal, PT) HEP  -SIXTO     [place]/Cues/Accuracy (Memory Goal 2, PT) verbalizes understanding  -SIXTO     Time Frame (Patient Education Goal, PT) long term goal (LTG);10 days  -SIXTO     Progress/Outcome (Patient Education Goal, PT) goal ongoing  -SIXTO     Row Name 10/04/18 1500           Positioning and Restraints    Pre-Treatment Position in bed  -SIXTO     Post Treatment Position chair  -SIXTO     In Chair notified nsg;reclined;sitting;call light within reach;exit alarm on;with SLP;waffle cushion  -SIXTO       User Key  (r) = Recorded By, (t) = Taken By, (c) = Cosigned By    Initials Name Provider Type    Althea Baum, PT Physical Therapist    Fatmata Chaves RN Registered Nurse          Physical Therapy Education     Title: PT OT SLP Therapies (Active)     Topic: Physical Therapy (Active)     Point: Mobility training (Active)    Learning Progress Summary     Learner Status Readiness Method Response Comment Documented by    Patient Active Acceptance E NR   10/04/18 1500          Point: Home exercise program (Active)    Learning Progress Summary     Learner Status Readiness Method Response Comment Documented by    Patient Active Acceptance E NR   10/04/18 1500          Point: Body mechanics (Active)    Learning Progress Summary     Learner Status Readiness Method Response Comment Documented by    Patient Active Acceptance E NR   10/04/18 1500          Point: Precautions (Active)    Learning Progress Summary     Learner Status Readiness Method Response Comment Documented by    Patient Active Acceptance E NR   10/04/18 1500                      User Key     Initials Effective Dates Name Provider Type Discipline     06/19/15 -  Althea Ruth, PT Physical Therapist PT                PT Recommendation and Plan  Anticipated Discharge Disposition (PT): skilled nursing facility  Planned Therapy Interventions (PT Eval): balance training, bed mobility training, gait training, home exercise program, strengthening, transfer training  Therapy Frequency (PT Clinical Impression): daily  Outcome Summary/Treatment Plan (PT)  Anticipated Discharge Disposition (PT): skilled nursing facility  Plan of Care Reviewed With: patient  Outcome Summary: PT eval completed patient is able to ambulate 25 ft  with mod assist using rolling walker . anticipate patient will need SNF placement at D/C          Outcome Measures     Row Name 10/04/18 1500             How much help from another person do you currently need...    Turning from your back to your side while in flat bed without using bedrails? 3  -SIXTO      Moving from lying on back to sitting on the side of a flat bed without bedrails? 2  -SIXTO      Moving to and from a bed to a chair (including a wheelchair)? 2  -SIXTO      Standing up from a chair using your arms (e.g., wheelchair, bedside chair)? 2  -SIXTO      Climbing 3-5 steps with a railing? 1  -SIXTO      To walk in hospital room? 2  -SIXTO      AM-PAC 6 Clicks Score 12  -SIXTO         Functional Assessment    Outcome Measure Options AM-PAC 6 Clicks Basic Mobility (PT)  -SIXTO        User Key  (r) = Recorded By, (t) = Taken By, (c) = Cosigned By    Initials Name Provider Type    Althea Baum PT Physical Therapist           Time Calculation:         PT Charges     Row Name 10/04/18 1500             Time Calculation    Start Time 1500  -SIXTO      PT Received On 10/04/18  -SIXTO      PT Goal Re-Cert Due Date 10/14/18  -        User Key  (r) = Recorded By, (t) = Taken By, (c) = Cosigned By    Initials Name Provider Type    Althea Baum PT Physical Therapist        Therapy Suggested Charges     Code   Minutes Charges    None           Therapy Charges for Today     Code Description Service Date Service Provider Modifiers Qty    30186849831 HC PT EVAL HIGH COMPLEXITY 4 10/4/2018 Althea Ruth, PT GP 1    14562868856 HC PT THER SUPP EA 15 MIN 10/4/2018 Althea Ruth PT GP 2          PT G-Codes  Outcome Measure Options: AM-PAC 6 Clicks Basic Mobility (PT)  AM-PAC 6 Clicks Score: 12      Althea Ruth PT  10/4/2018         Electronically signed by Althea Ruth PT at 10/4/2018  4:13 PM     Althea Ruth PT at 10/4/2018  4:12 PM  Version 1 of 1         Problem: Patient Care Overview  Goal: Plan  of Care Review  Outcome: Ongoing (interventions implemented as appropriate)   10/04/18 1500   Coping/Psychosocial   Plan of Care Reviewed With patient   OTHER   Outcome Summary PT eval completed patient is able to ambulate 25 ft with mod assist using rolling walker . anticipate patient will need SNF placement at D/C           Electronically signed by Althea Ruth PT at 10/4/2018  4:12 PM          Occupational Therapy Notes (last 24 hours) (Notes from 10/4/2018  2:20 PM through 10/5/2018  2:20 PM)      Helena Thomas, OT at 10/5/2018 10:12 AM          Acute Care - Occupational Therapy Initial Evaluation  [facility]     Patient Name: Devora Huddleston  : 1945  MRN: [id number]  Today's Date: 10/5/2018  Onset of Illness/Injury or Date of Surgery: 10/01/18  Date of Referral to OT: 10/04/18  Referring Physician: MD Emmett    Admit Date: 10/1/2018       ICD-10-CM ICD-9-CM   1. Acute respiratory failure with hypoxemia (CMS/HCC) J96.01 518.81   2. Impaired functional mobility, balance, gait, and endurance Z74.09 V49.89   3. Oropharyngeal dysphagia R13.12 787.22   4. Impaired mobility and ADLs Z74.09 799.89     Patient Active Problem List   Diagnosis   • Acute respiratory failure (CMS/HCC)   • Altered mental state associated with hypoglycemia. 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 Resolved   • Generalized abdominal pain   • Weight loss   • [place] coma scale on arrival 3-8 (CMS/HCC)    • Hypotension improving   • Multiple right rib fractures (ninth, 10th, 11th)   • Anemia with hematocrit dropping from 36 to 29 over 16 hours   • COPD and active cigarette abuse (CMS/HCC)     Past Medical History:   Diagnosis Date   • Hypertension    • Stroke (CMS/HCC)      History reviewed. No pertinent surgical history.       OT ASSESSMENT FLOWSHEET (last 72 hours)      Occupational Therapy  Evaluation     Row Name 10/05/18 0840                   OT Evaluation Time/Intention    Subjective Information complains of;fatigue;pain  -CL        Document Type evaluation  -CL        Mode of Treatment occupational therapy  -CL        Patient Effort good  -CL        Symptoms Noted During/After Treatment fatigue  -CL           General Information    Patient Profile Reviewed? yes  -CL        Onset of Illness/Injury or Date of Surgery 10/01/18  -CL        Referring Physician MD Emmett  -CL        Prior Level of Function independent:;all household mobility;transfer;ADL's;dressing;bathing   Pt questionnable historian, no family present  -CL        Equipment Currently Used at Home none   Pt questionnable historian, no family present  -CL        Pertinent History of Current Functional Problem Pt presented to the ED ater being found unresponsive. Pt required intubation 2/2 to hypoxic resp failure. CT (+) 3 R posterior rib fx d/t fall. Pt extubated on 10/03. PMH: CVA  -CL        Existing Precautions/Restrictions fall;other (see comments)   R sided posterior rib fxs  -CL        Limitations/Impairments safety/cognitive  -CL        Risks Reviewed patient:;LOB;nausea/vomiting;change in vital signs;increased discomfort;dizziness;lines disloged  -CL        Benefits Reviewed patient:;improve function;increase independence;increase balance;increase strength;decrease pain;increase knowledge  -CL        Barriers to Rehab medically complex;cognitive status  -CL           Relationship/Environment    Lives With child(nitish), adult;child(nitish), dependent   Son and 2 y.o. great grandchild  -CL           Resource/Environmental Concerns    Current Living Arrangements home/apartment/condo   Pt questionnable historian, no family present  -CL           Cognitive Assessment/Intervention- PT/OT    Affect/Mental Status (Cognitive) confused  -CL        Orientation Status (Cognition) oriented to;person;disoriented to;place  -CL        Follows  Commands (Cognition) follows one step commands;75-90% accuracy;repetition of directions required;verbal cues/prompting required;increased processing time needed  -CL        Cognitive Function (Cognitive) safety deficit  -CL        Safety Deficit (Cognitive) moderate deficit;awareness of need for assistance;insight into deficits/self awareness;safety precautions awareness  -CL        Personal Safety Interventions fall prevention program maintained;gait belt;nonskid shoes/slippers when out of bed  -CL           Bed Mobility Assessment/Treatment    Bed Mobility Assessment/Treatment supine-sit;sit-supine  -CL        Supine-Sit [place] (Bed Mobility) moderate assist (50% patient effort);2 person assist;verbal cues  -CL        Sit-Supine [place] (Bed Mobility) moderate assist (50% patient effort);2 person assist;verbal cues  -CL        Assistive Device (Bed Mobility) draw sheet;head of bed elevated  -CL           Transfer Assessment/Treatment    Transfer Assessment/Treatment sit-stand transfer;stand-sit transfer;toilet transfer  -CL        Comment (Transfers) Pt stood from the EOB and performed SPT to/from the BSC.   -CL           Sit-Stand Transfer    Sit-Stand [place] (Transfers) moderate assist (50% patient effort);2 person assist;verbal cues  -CL           Stand-Sit Transfer    Stand-Sit [place] (Transfers) moderate assist (50% patient effort);2 person assist;verbal cues  -CL           Toilet Transfer    Type (Toilet Transfer) stand pivot/stand step  -CL        [place] Level (Toilet Transfer) moderate assist (50% patient effort);2 person assist;verbal cues  -CL        Assistive Device (Toilet Transfer) commode, bedside without drop arms  -CL           ADL Assessment/Intervention    BADL Assessment/Intervention toileting  -CL           Toileting Assessment/Training    [place] Level (Toileting) change pad/brief;perform perineal hygiene;adjust/manage clothing;dependent (less than 25% patient  effort);two person assist required;verbal cues  -CL        Assistive Devices (Toileting) commode, bedside without drop arms  -CL        Toileting Position supported sitting;supported standing  -CL           General ROM    GENERAL ROM COMMENTS Formal assessment limited d/t cognitive status.   -CL           MMT (Manual Muscle Testing)    General MMT Comments Formal assessment limited d/t cognitive status. Observed grossly 3/5.   -CL           Motor Assessment/Interventions    Additional Documentation Balance (Group)  -CL           Static Sitting Balance    Level of [place] (Unsupported Sitting, Static Balance) contact guard assist  -CL        Sitting Position (Unsupported Sitting, Static Balance) sitting on edge of bed  -CL           Static Standing Balance    Level of [place] (Supported Standing, Static Balance) moderate assist, 50 to 74% patient effort  -CL        Assistive Device Utilized (Supported Standing, Static Balance) --   BUE support  -CL           Sensory Assessment/Intervention    Sensory General Assessment no sensation deficits identified  -CL           Positioning and Restraints    Pre-Treatment Position in bed  -CL        Post Treatment Position bed  -CL        In Bed notified nsg;fowlers;side lying right;call light within reach;encouraged to call for assist;exit alarm on;side rails up x3;RUE elevated;LUE elevated;legs elevated;heels elevated;pillow between legs  -CL           Pain Assessment    Additional Documentation Pain Scale 2: FACES Pre/Post-Treatment (Group)  -CL           Pain Scale 2: FACES Pre/Post-Treatment    Pain 2: FACES Scale, Pretreatment 2-->hurts little bit  -CL        Pain 2: FACES Scale, Post-Treatment 2-->hurts little bit  -CL        Pain Location 2 - Orientation generalized  -CL        Pre/Post Treatment Pain 2 Comment Tolerated.   -CL        Pain Intervention(s) 2 Repositioned;Ambulation/increased activity  -CL           Wound 10/01/18 4606 Bilateral medial coccyx     Wound - Properties Group Date first assessed: 10/01/18  -SW Time first assessed: 1845 -SW Present On Admission : yes  -SW Side: Bilateral  -SW Orientation: medial  -SW Location: coccyx  -SW       Clinical Impression (OT)    Date of Referral to OT 10/04/18  -CL        OT Diagnosis Decreased independence in ADLs.   -CL        Patient/Family Goals Statement (OT Eval) Return to PLOF.   -CL        Criteria for Skilled Therapeutic Interventions Met (OT Eval) yes;treatment indicated  -CL        Rehab Potential (OT Eval) good, to achieve stated therapy goals  -CL        Therapy Frequency (OT Eval) daily  -CL        Anticipated Equipment Needs at Discharge (OT) --   TBA further  -CL        Anticipated Discharge Disposition (OT) skilled nursing facility  -CL           Vital Signs    Pre Systolic BP Rehab 132  -CL        Pre Treatment Diastolic BP 69  -CL        Post Systolic BP Rehab 132  -CL        Post Treatment Diastolic BP 75  -CL        Pretreatment Heart Rate (beats/min) 57  -CL        Posttreatment Heart Rate (beats/min) 57  -CL        Pre SpO2 (%) 95  -CL        O2 Delivery Pre Treatment room air  -CL        Post SpO2 (%) 94  -CL        O2 Delivery Post Treatment room air  -CL        Pre Patient Position Supine  -CL        Intra Patient Position Standing  -CL        Post Patient Position Supine  -CL           OT Goals    Transfer Goal Selection (OT) transfer, OT goal 1  -CL        Dressing Goal Selection (OT) dressing, OT goal 1  -CL        Toileting Goal Selection (OT) toileting, OT goal 1  -CL        Strength Goal Selection (OT) strength, OT goal 1  -CL        Additional Documentation Strength Goal Selection (OT) (Row)  -CL           Transfer Goal 1 (OT)    Activity/Assistive Device (Transfer Goal 1, OT) sit-to-stand/stand-to-sit;toilet  -CL        [place] Level/Cues Needed (Transfer Goal 1, OT) minimum assist (75% or more patient effort);verbal cues required  -CL        Time Frame (Transfer Goal 1, OT) by  discharge;long term goal (LTG)  -CL        Progress/Outcome (Transfer Goal 1, OT) goal ongoing  -CL           Dressing Goal 1 (OT)    Activity/Assistive Device (Dressing Goal 1, OT) lower body dressing   Don/doff socks  -CL        [place]/Cues Needed (Dressing Goal 1, OT) verbal cues required;minimum assist (75% or more patient effort)  -CL        Time Frame (Dressing Goal 1, OT) by discharge;long term goal (LTG)  -CL        Progress/Outcome (Dressing Goal 1, OT) goal ongoing  -CL           Toileting Goal 1 (OT)    Activity/Device (Toileting Goal 1, OT) adjust/manage clothing;perform perineal hygiene  -CL        [place] Level/Cues Needed (Toileting Goal 1, OT) minimum assist (75% or more patient effort);verbal cues required  -CL        Time Frame (Toileting Goal 1, OT) by discharge;long term goal (LTG)  -CL        Progress/Outcome (Toileting Goal 1, OT) goal ongoing  -CL           Strength Goal 1 (OT)    Strength Goal 1 (OT) Pt will tolerate BUE AROM HEP x10 reps to promote ADL performance.   -CL        Time Frame (Strength Goal 1, OT) by discharge;long term goal (LTG)  -CL        Progress/Outcome (Strength Goal 1, OT) goal ongoing  -CL          User Key  (r) = Recorded By, (t) = Taken By, (c) = Cosigned By    Initials Name Effective Dates    Fatmata Chaves RN 06/16/16 -     CL Helena Thomas, OT 04/03/18 -            Occupational Therapy Education     Title: PT OT SLP Therapies (Active)     Topic: Occupational Therapy (Active)     Point: ADL training (Active)     Description: Instruct learner(s) on proper safety adaptation and remediation techniques during self care or transfers.   Instruct in proper use of assistive devices.   Learning Progress Summary     Learner Status Readiness Method Response Comment Documented by    Patient Active Acceptance E NR Pt educated on appropriate safety precautions, t/f techniques, role of OT, positioning, and benefits of therapy. CL 10/05/18 1009          Point:  Precautions (Active)     Description: Instruct learner(s) on prescribed precautions during self-care and functional transfers.   Learning Progress Summary     Learner Status Readiness Method Response Comment Documented by    Patient Active Acceptance E NR Pt educated on appropriate safety precautions, t/f techniques, role of OT, positioning, and benefits of therapy. CL 10/05/18 1009          Point: Body mechanics (Active)     Description: Instruct learner(s) on proper positioning and spine alignment during self-care, functional mobility activities and/or exercises.   Learning Progress Summary     Learner Status Readiness Method Response Comment Documented by    Patient Active Acceptance E NR Pt educated on appropriate safety precautions, t/f techniques, role of OT, positioning, and benefits of therapy. CL 10/05/18 1009                      User Key     Initials Effective Dates Name Provider Type Discipline    CL 04/03/18 -  Helena Thomas, OT Occupational Therapist OT                  OT Recommendation and Plan  Outcome Summary/Treatment Plan (OT)  Anticipated Equipment Needs at Discharge (OT):  (TBA further)  Anticipated Discharge Disposition (OT): skilled nursing facility  Therapy Frequency (OT Eval): daily  Plan of Care Review  Plan of Care Reviewed With: patient  Plan of Care Reviewed With: patient  Outcome Summary: OT completed a brief chart review relating to presenting physical deficits. Pt Mod Ax2 for t/fs, Dep for ADL performance. Pt limited d/t significant generalized weakness and confusion. Recommend cont skilled IPOT POC. Recommend pt DC to SNF.           Outcome Measures     Row Name 10/05/18 0840 10/04/18 1500          How much help from another person do you currently need...    Turning from your back to your side while in flat bed without using bedrails?  -- 3  -SIXTO     Moving from lying on back to sitting on the side of a flat bed without bedrails?  -- 2  -SIXTO     Moving to and from a bed to a chair  (including a wheelchair)?  -- 2  -SIXTO     Standing up from a chair using your arms (e.g., wheelchair, bedside chair)?  -- 2  -SIXTO     Climbing 3-5 steps with a railing?  -- 1  -SIXTO     To walk in hospital room?  -- 2  -SIXTO     AM-PAC 6 Clicks Score  -- 12  -SIXTO        How much help from another is currently needed...    Putting on and taking off regular lower body clothing? 1  -CL  --     Bathing (including washing, rinsing, and drying) 1  -CL  --     Toileting (which includes using toilet bed pan or urinal) 1  -CL  --     Putting on and taking off regular upper body clothing 2  -CL  --     Taking care of personal grooming (such as brushing teeth) 3  -CL  --     Eating meals 1  -CL  --     Score 9  -CL  --        Functional Assessment    Outcome Measure Options AM-PAC 6 Clicks Daily Activity (OT)  -CL AM-PAC 6 Clicks Basic Mobility (PT)  -SIXTO       User Key  (r) = Recorded By, (t) = Taken By, (c) = Cosigned By    Initials Name Provider Type    Althea Baum, PT Physical Therapist    CL Helena Thomas OT Occupational Therapist          Time Calculation:         Time Calculation- OT     Row Name 10/05/18 0840             Time Calculation- OT    OT Start Time 0840  -      OT Received On 10/05/18  -      OT Goal Re-Cert Due Date 10/15/18  -CL        User Key  (r) = Recorded By, (t) = Taken By, (c) = Cosigned By    Initials Name Provider Type    CL Helena Thomas OT Occupational Therapist        Therapy Suggested Charges     Code   Minutes Charges    None           Therapy Charges for Today     Code Description Service Date Service Provider Modifiers Qty    53706145624  OT EVAL LOW COMPLEXITY 4 10/5/2018 Helena Thomas OT GO 1    [id number]  OT THER SUPP EA 15 MIN 10/5/2018 Helena Thomas OT GO 2               Helena Thomas OT  10/5/2018    Electronically signed by Helena Thomas OT at 10/5/2018 10:12 AM     Helena Thomas OT at 10/5/2018 10:10 AM          Problem: Patient Care Overview  Goal: Plan of Care  Review  Outcome: Ongoing (interventions implemented as appropriate)   10/05/18 1010   Coping/Psychosocial   Plan of Care Reviewed With patient   OTHER   Outcome Summary OT completed a brief chart review relating to presenting physical deficits. Pt Mod Ax2 for t/fs, Dep for ADL performance. Pt limited d/t significant generalized weakness and confusion. Recommend cont skilled IPOT POC. Recommend pt DC to SNF.            Electronically signed by Helena Thomas OT at 10/5/2018 10:10 AM     Helena Thomas OT at 10/5/2018  9:07 AM        ICU Rounds: Cont PT per treatment plan, OT consult pending this date.     Electronically signed by Helena Thomas OT at 10/5/2018  9:09 AM          Speech Language Pathology Notes (last 24 hours) (Notes from 10/4/2018  2:20 PM through 10/5/2018  2:20 PM)      Crystal Gilmore MS CCC-SLP at 10/4/2018  5:07 PM          Acute Care - Speech Language Pathology   Swallow Initial Evaluation  [place]   Fiberoptic Endoscopic Evaluation of Swallowing (FEES)     Patient Name: Devoar Huddleston  : 1945  MRN: [id number]  Today's Date: 10/4/2018  Onset of Illness/Injury or Date of Surgery: 10/01/18     Referring Physician: MD Crandall      Admit Date: 10/1/2018    Visit Dx:     ICD-10-CM ICD-9-CM   1. Acute respiratory failure with hypoxemia (CMS/HCC) J96.01 518.81   2. Impaired functional mobility, balance, gait, and endurance Z74.09 V49.89   3. Oropharyngeal dysphagia R13.12 787.22     Patient Active Problem List   Diagnosis   • Acute respiratory failure (CMS/HCC)   • Altered mental state associated with hypoglycemia. 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 Resolved   • Generalized abdominal pain   • Weight loss   • [place] coma scale on arrival 3-8 (CMS/HCC)    • Hypotension improving   • Multiple right rib fractures (ninth, 10th, 11th)   • Anemia with  hematocrit dropping from 36 to 29 over 16 hours   • COPD and active cigarette abuse (CMS/HCC)     Past Medical History:   Diagnosis Date   • Hypertension    • Stroke (CMS/HCC)      History reviewed. No pertinent surgical history.       SWALLOW EVALUATION (last 72 hours)      SLP Adult Swallow Evaluation     Row Name 10/04/18 1430       Rehab Evaluation    Document Type  eval only    Subjective Information  no complaints    Patient Observations  lethargic, cooperative, agrees to therapy    Patient/Family Observations  No family present    Patient Effort  adequate       General Information    Patient Profile Reviewed  yes    Pertinent History Of Current Problem  Acute resp failure. Intubated 10/1-10/3/18. Severe malnutrition per dietician. AMS, unintentional weight loss, AMS. Coughing w/ lunch tray today.    Current Method of Nutrition Mech soft-chopped, thins  -RD    Precautions/Limitations, Vision      Precautions/Limitations, Hearing  WFL for purposes of eval    Prior Level of Function-Communication other (see comments)   baseline unknown  -RD    Prior Level of Function-Swallowing no diet consistency restrictions  -RD    Patient's Goals for Discharge patient did not state  -RD       Pain Assessment    Additional Documentation Pain Scale: FACES Pre/Post-Treatment (Group)  -RD       Pain Scale: FACES Pre/Post-Treatment    Pain: FACES Scale, Pretreatment 0-->no hurt  -RD    Pain: FACES Scale, Post-Treatment 0-->no hurt  -RD       Fiberoptic Endoscopic Evaluation of Swallowing (FEES)    Risks/Benefits Reviewed risks/benefits explained;patient;agreed to eval  -RD    Nasal Entry right:  -RD    Special Considerations scope #: [id number]  -RD       Anatomy and Physiology    Anatomic Considerations vocal folds;false vocal folds;anatomic deviation observed (see comments);other (see comments)  -RD    Comment [place]-red lesions on the posterior portion of the true vocal folds bilaterally, consistent w/ intubation. Pt noted w/  deep pockets in the false vocal folds bilaterally, appears to be an anatomical difference not impairment.   -RD    Velopharyngeal WFL  -RD    Base of Tongue symmetrical;range reduced  -RD    Epiglottis WFL  -RD    Laryngeal Function Breathing symmetrical  -RD    Laryngeal Function Phonation symmetrical  -RD    Laryngeal Function to Breath Hold TVT/FVF/Arytenoid;can't sustain closure  -RD    Secretion Rating Scale (Dl et al. 1996) 0- normal, no visible secretions  -RD    Secretion Description thin;clear  -RD    Ice Chips DNA  -RD    Spontaneous Swallow frequency reduced  -RD    Sensory sensed scope  -RD    Utensils Used Spoon;Cup;Straw  -RD    Consistencies Trialed thin liquids;nectar-thick liquids;pudding/puree  -RD       FEES Interpretation    Oral Phase prolonged manipulation;increased A-P transit time;prespill of liquids into pharynx  -RD    Oral Phase, Comment Moderate oral dysphagia. Prolonged manipulation and oral transit time w/ pudding. DNT solid. Inconsistent pre-spill in small amounts w/ thins and nectar.   -RD       Initiation of Pharyngeal Swallow    Initiation of Pharyngeal Swallow bolus in pyriform sinuses  -RD    Pharyngeal Phase impaired pharyngeal phase of swallowing  -RD    Penetration During the Swallow thin liquids;nectar-thick liquids;pudding/puree;secondary to delayed swallow initiation or mistiming;secondary to reduced laryngeal elevation;secondary to reduced vestibular closure  -RD    Aspiration During the Swallow thin liquids;nectar-thick liquids;pudding/puree;secondary to delayed swallow initiation or mistiming;secondary to reduced laryngeal elevation;secondary to reduced vestibular closure  -RD    Aspiration After the Swallow thin liquids;nectar-thick liquids;pudding/puree;secondary to residue;in laryngeal vestibule  -RD    Response to Aspiration no response, silent aspiration;inconsistent response;weak cough/throat clear;could not clear subglottic material;cleared subglottic material   -RD    Rosenbek's Scale thin:;nectar:;pudding/puree:;8-->Level 8  -RD    Residue thin liquids;nectar-thick liquids;pudding/puree;laryngeal vestibule;secondary to reduced posterior pharyngeal wall stripping;secondary to reduced laryngeal elevation  -RD    Response to Residue unable to clear residue;with spontaneous subsequent swallow;with cued swallow;other (see comments)   w/ cued cough  -RD    Attempted Compensatory Maneuvers bolus size;bolus presentation style;throat clear after swallow  -RD    Response to Attempted Compensatory Maneuvers did not prevent aspiration  -RD    Pharyngeal Phase, Comment Severe pharyngeal dysphagia. Pt lethargic during eval. Inconsistent delay to the pyriforms. Penetration/aspiration occurred during the swallow w/ thins, nectar, and pudding 2' decr'd elevation, reduced pharyngeal squeeze, and reduced vestibular closure. Pt unable to consistently clear vestibular residue and continued to aspirate during consecutive swallows w/ all consistencies tested. Pt fatigued t/o eval and aspirated all consistencies in larger amounts. Most of aspiration was silent, however pt inconsistently able to cough and suction some of aspirated material. Vestibular residue present w/ all consistencies, otherwise no significant pharyngeal residue observed.   -RD       Clinical Impression    SLP Swallowing Diagnosis severe;pharyngeal dysfunction;moderate;oral dysfunction  -RD    Functional Impact risk of aspiration/pneumonia;risk of malnutrition;risk of dehydration  -RD    Rehab Potential/Prognosis, Swallowing good, to achieve stated therapy goals  -RD    Swallow Criteria for Skilled Therapeutic Interventions Met  demonstrates skilled criteria       Recommendations    Therapy Frequency (Swallow) 5 days per week  -RD    Predicted Duration Therapy Intervention (Days) until discharge  -RD    SLP Diet Recommendation NPO;temporary alternate methods of nutrition/hydration  -RD    Recommended Precautions and Strategies  other (see comments)   Oral care BID/PRN  -RD    SLP Rec. for Method of Medication Administration meds via alternate route  -RD    Monitor for Signs of Aspiration  --    Anticipated Dischage Disposition unknown;anticipate therapy at next level of care  -RD      User Key  (r) = Recorded By, (t) = Taken By, (c) = Cosigned By    Initials Name Effective Dates    AV Alejandra Xiong, MS CCC-SLP 04/03/18 -     Crystal Donald, MS CCC-SLP 04/03/18 -     Emani Flynn, Speech Therapy Student 08/06/18 -         EDUCATION  The patient has been educated in the following areas:   Dysphagia (Swallowing Impairment) Oral Care/Hydration NPO rationale.    SLP Recommendation and Plan  SLP Swallowing Diagnosis: severe, pharyngeal dysfunction, moderate, oral dysfunction  SLP Diet Recommendation: NPO, temporary alternate methods of nutrition/hydration  Recommended Precautions and Strategies: other (see comments) (Oral care BID/PRN)           Swallow Criteria for Skilled Therapeutic Interventions Met: demonstrates skilled criteria  Anticipated Dischage Disposition: unknown, anticipate therapy at next level of care  Rehab Potential/Prognosis, Swallowing: good, to achieve stated therapy goals  Therapy Frequency (Swallow): 5 days per week  Predicted Duration Therapy Intervention (Days): until discharge       Plan of Care Reviewed With: patient  Plan of Care Review  Plan of Care Reviewed With: patient          SLP GOALS     Row Name 10/04/18 1430 10/04/18 1315 10/03/18 1300       Oral Nutrition/Hydration Goal 1 (SLP)    Oral Nutrition/Hydration Goal 1, SLP LTG: Pt will improve swallowing to begin to take some PO safely w/ 100% accuracy w/o cues.   -RD LTG: Pt will tolerate mechanical soft diet and thin liquids with no s/s of aspiration in the hospital settig with 100% accuracy.  -MP LTG: Pt will tolerate mechanical soft diet and thin liquids with no s/s of aspiration in the hospital settig with 100% accuracy.  -BRIGID (r) RADHA (t)  AV (c)    Time Frame (Oral Nutrition/Hydration Goal 1, SLP) by discharge  -RD by discharge  -MP by discharge  -AV (r) MF (t) AV (c)    Progress/Outcomes (Oral Nutrition/Hydration Goal 1, SLP) goal revised this date  -RD continuing progress toward goal  -MP  --       Oral Nutrition/Hydration Goal 2 (SLP)    Oral Nutrition/Hydration Goal 2, SLP Pt will tolerate H20 trials post oral care w/ no overt s/s of aspiration w/ 70% accuracy w/o cues.   -RD Pt will tolerate trials of mechanical soft textures and thin liquids with no s/s of aspiration with 100% accuracy.  -MP Pt will tolerate trials of mechanical soft textures and thin liquids with no s/s of aspiration with 100% accuracy.  -AV (r) MF (t) AV (c)    Time Frame (Oral Nutrition/Hydration Goal 2, SLP) short term goal (STG);by discharge  -RD short term goal (STG);by discharge  -MP short term goal (STG);by discharge  -AV (r) MF (t) AV (c)    Barriers (Oral Nutrition/Hydration Goal 2, SLP)  -- Pt w/ cough requiring suctioning following 2 bites of mashed potatoes and small sip of boost.  -MP  --    Progress/Outcomes (Oral Nutrition/Hydration Goal 2, SLP) goal revised this date  -RD continuing progress toward goal  -MP  --       Pharyngeal Strengthening Exercise Goal 1 (SLP)    Activity (Pharyngeal Strengthening Goal 1, SLP) increase timing;increase superior movement of the hyolaryngeal complex;increase closure at entrance to airway/closure of airway at glottis;increase squeeze/positive pressure generation  -RD  --  --    Increase Timing prepping - 3 second prep or suck swallow or 3-step swallow  -RD  --  --    Increase Superior Movement of the Hyolaryngeal Complex supraglottic swallow  -RD  --  --    Increase Closure at Entrance to Airway/Closure of Airway at Glottis super-supraglottic swallow  -RD  --  --    Increase Squeeze/Positive Pressure Generation effortful pitch glide (falsetto + pharyngeal squeeze);hard effortful swallow  -RD  --  --    [place]/Accuracy  (Pharyngeal Strengthening Goal 1, SLP) with minimal cues (75-90% accuracy)  -RD  --  --    Time Frame (Pharyngeal Strengthening Goal 1, SLP) short term goal (STG);by discharge  -RD  --  --      User Key  (r) = Recorded By, (t) = Taken By, (c) = Cosigned By    Initials Name Provider Type    Alejandra Solorzano, MS CCC-SLP Speech and Language Pathologist    Crystal Donald MS CCC-SLP Speech and Language Pathologist    Emani Flynn, Speech Therapy Student Speech Therapy Student    Terrence Saavedra, MS, CFY-SLP Speech and Language Pathologist               Time Calculation:         Time Calculation- SLP     Row Name 10/04/18 1706 10/04/18 1426          Time Calculation- SLP    SLP Start Time 1430  -RD 1315  -MP     SLP Received On 10/04/18  -RD 10/04/18  -MP       User Key  (r) = Recorded By, (t) = Taken By, (c) = Cosigned By    Initials Name Provider Type    Crystal Donald MS CCC-SLP Speech and Language Pathologist    Terrence Saavedra, MS, CFY-SLP Speech and Language Pathologist          Therapy Charges for Today     Code Description Service Date Service Provider Modifiers Qty    97739847205 HC ST FIBEROPTIC ENDO EVAL SWALL 7 10/4/2018 Crystal Gilmore MS CCC-SLP GN 1               Crystal Gilmore MS CCC-SLP  10/4/2018    Electronically signed by Crystal Gilmore MS CCC-SLP at 10/4/2018  5:12 PM     Crystal Gilmore MS CCC-SLP at 10/4/2018  5:03 PM        MBS/VFSS/FEES    Reason for Referral  Patient was referred for a FEES to assess the efficiency of his/her swallow function, rule out aspiration and make recommendations regarding safe dietary consistencies, effective compensatory strategies, and safe eating environment.        Referring Physician: MD Crandall          Recommendations/Treatment            Risks/Benefits Reviewed: risks/benefits explained, patient, agreed to eval  Nasal Entry: right:  Special Considerations: scope #: [id number]    SLP Swallowing Diagnosis: severe,  pharyngeal dysfunction, moderate, oral dysfunction  Functional Impact: risk of aspiration/pneumonia, risk of malnutrition, risk of dehydration  Rehab Potential/Prognosis, Swallowing: good, to achieve stated therapy goals    Therapy Frequency (Swallow): 5 days per week  Predicted Duration Therapy Intervention (Days): until discharge  SLP Diet Recommendation: NPO, temporary alternate methods of nutrition/hydration  Recommended Precautions and Strategies: other (see comments) (Oral care BID/PRN)  SLP Rec. for Method of Medication Administration: meds via alternate route  Anticipated Dischage Disposition: unknown, anticipate therapy at next level of care      Oral Preparation/ Oral Phase       Oral Phase: prolonged manipulation, increased A-P transit time, prespill of liquids into pharynx  Oral Phase, Comment: Prolonged manipulation and oral transit time w/ pudding. DNT solid. Inconsistent pre-spill in small amounts w/ thins and nectar.     Pharyngeal Phase       Initiation of Pharyngeal Swallow: bolus in pyriform sinuses  Pharyngeal Phase: impaired pharyngeal phase of swallowing  Penetration During the Swallow: thin liquids, nectar-thick liquids, pudding/puree, secondary to delayed swallow initiation or mistiming, secondary to reduced laryngeal elevation, secondary to reduced vestibular closure  Aspiration During the Swallow: thin liquids, nectar-thick liquids, pudding/puree, secondary to delayed swallow initiation or mistiming, secondary to reduced laryngeal elevation, secondary to reduced vestibular closure  Aspiration After the Swallow: thin liquids, nectar-thick liquids, pudding/puree, secondary to residue, in laryngeal vestibule  Response to Aspiration: no response, silent aspiration, inconsistent response, weak cough/throat clear, could not clear subglottic material, cleared subglottic material  Rosenbek's Scale: thin:, nectar:, pudding/puree:, 8-->Level 8  Residue: thin liquids, nectar-thick liquids,  pudding/puree, laryngeal vestibule, secondary to reduced posterior pharyngeal wall stripping, secondary to reduced laryngeal elevation  Response to Residue: unable to clear residue, with spontaneous subsequent swallow, with cued swallow, other (see comments) (w/ cued cough)  Attempted Compensatory Maneuvers: bolus size, bolus presentation style, throat clear after swallow  Response to Attempted Compensatory Maneuvers: did not prevent aspiration  Pharyngeal Phase, Comment: Severe pharyngeal dysphagia. Pt lethargic during eval. Inconsistent delay to the pyriforms. Penetration/aspiration occurred during the swallow w/ thins, nectar, and pudding 2' decr'd elevation, reduced pharyngeal squeeze, and reduced vestibular closure. Pt unable to consistently clear vestibular residue and continued to aspirate during consecutive swallows w/ all consistencies tested. Pt fatigued t/o eval and aspirated all consistencies in larger amounts. Most of aspiration was silent, however pt inconsistently able to cough and suction some of aspirated material. Vestibular residue present w/ all consistencies, otherwise no significant pharyngeal residue observed.         Cervical Esophageal Phase   N/A        Prognosis              Electronically signed by Crystal Gilmore MS CCC-SLP at 10/4/2018  5:03 PM     Crystal Gilmore MS CCC-SLP at 10/4/2018  5:02 PM          Problem: Patient Care Overview  Goal: Plan of Care Review   10/04/18 1430   Coping/Psychosocial   Plan of Care Reviewed With patient   SLP FEES evaluation completed as pt coughing w/ lunch tray. Dietician reports concern for severe malnutrition. Will address Moderate oral and severe pharyngeal dysphagia. Pt aspirated thins, nectar, and pudding consistencies. Mostly silent aspiration, but inconsistently sensed w/ coughing. Able to orally suction some of aspirated material, but could not clear all of subglottic residue. RECS: Safest NPO, meds alt route, Oral care BID/PRN,  initiate dys tx. Please see note for further details and recommendations.          Electronically signed by Crystal Gilmore MS CCC-SLP at 10/4/2018  5:02 PM     Terrence Padgett MS, CFFLAQUITO-SLP at 10/4/2018  2:26 PM          Acute Care - Speech Language Pathology   Swallow Treatment Note [facility]     Patient Name: Devora Huddleston  : 1945  MRN: [id number]  Today's Date: 10/4/2018               Admit Date: 10/1/2018    Visit Dx:      ICD-10-CM ICD-9-CM   1. Acute respiratory failure with hypoxemia (CMS/Formerly [facility]) J96.01 518.81     Patient Active Problem List   Diagnosis   • Acute respiratory failure (CMS/Formerly [facility])   • Altered mental state associated with hypoglycemia. 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 Resolved   • Generalized abdominal pain   • Weight loss   • Maria D coma scale on arrival 3-8 (CMS/Formerly [facility])    • Hypotension improving   • Multiple right rib fractures (ninth, 10th, 11th)   • Anemia with hematocrit dropping from 36 to 29 over 16 hours   • COPD and active cigarette abuse (CMS/Formerly [facility])       Therapy Treatment        Rehabilitation Treatment Summary     Row Name 10/04/18 1315             Treatment Time/Intention    Discipline speech language pathologist  -MP      Document Type therapy note (daily note)  -MP      Subjective Information no complaints  -MP      Care Plan Review care plan/treatment goals reviewed;patient/other agree to care plan  -MP      Therapy Frequency (Swallow) 5 days per week  -MP      Patient Effort good  -MP      Recorded by [MP] Terrence Padgett MS, JUDAH-SLP 10/04/18 1421      Row Name 10/04/18 1315             Pain Assessment    Additional Documentation Pain Scale: FACES Pre/Post-Treatment (Group)  -MP      Recorded by [MP] Terrence Padgett MS, JUDAH-SLP 10/04/18 1421      Row Name 10/04/18 1315             Pain Scale: FACES Pre/Post-Treatment    Pain:  FACES Scale, Pretreatment 2-->hurts little bit  -MP      Pain: FACES Scale, Post-Treatment 2-->hurts little bit  -MP      Recorded by [MP] Terrence Padgett MS, CFY-SLP 10/04/18 1421      Row Name                Wound 10/01/18 1845 Bilateral medial coccyx    Wound - Properties Group Date first assessed: 10/01/18 [SW] Time first assessed: 1845 [SW] Present On Admission : yes [SW] Side: Bilateral [SW] Orientation: medial [SW] Location: coccyx [SW] Recorded by:  [SW] Fatmata Villalobos RN 10/01/18 1905    Row Name 10/04/18 1315             Outcome Summary/Treatment Plan (SLP)    Daily Summary of Progress (SLP) progress towards functional goals is fair  -MP      Barriers to Overall Progress (SLP) ? cognitive status  -MP      Plan for Continued Treatment (SLP) SLP will f/u for FEES this PM  -MP      Anticipated Dischage Disposition unknown  -MP      Recorded by [MP] Terrence Padgett MS, CFY-SLP 10/04/18 1421        User Key  (r) = Recorded By, (t) = Taken By, (c) = Cosigned By    Initials Name Effective Dates Discipline     Fatmata Villalobos RN 06/16/16 -  Nurse    MP Terrence Padgett MS, CFY-SLP 08/15/18 -  SLP          Outcome Summary  Outcome Summary/Treatment Plan (SLP)  Daily Summary of Progress (SLP): progress towards functional goals is fair (10/04/18 1315 : Terrence Padgett MS, CFY-SLP)  Barriers to Overall Progress (SLP): ? cognitive status (10/04/18 1315 : Terrence Padgett MS, CFY-SLP)  Plan for Continued Treatment (SLP): SLP will f/u for FEES this PM (10/04/18 1315 : Terrence Padgett MS, CFY-SLP)  Anticipated Dischage Disposition: unknown (10/04/18 1315 : Terrence Padgett MS, CFY-SLP)            SLP GOALS     Row Name 10/04/18 1315 10/03/18 1300          Oral Nutrition/Hydration Goal 1 (SLP)    Oral Nutrition/Hydration Goal 1, SLP LTG: Pt will tolerate mechanical soft diet and thin liquids with no s/s of aspiration in the hospital settig with 100% accuracy.  -MP LTG: Pt will tolerate mechanical soft  diet and thin liquids with no s/s of aspiration in the hospital settig with 100% accuracy.  -AV (r) MF (t) AV (c)     Time Frame (Oral Nutrition/Hydration Goal 1, SLP) by discharge  -MP by discharge  -AV (r) MF (t) AV (c)     Progress/Outcomes (Oral Nutrition/Hydration Goal 1, SLP) continuing progress toward goal  -MP  --        Oral Nutrition/Hydration Goal 2 (SLP)    Oral Nutrition/Hydration Goal 2, SLP Pt will tolerate trials of mechanical soft textures and thin liquids with no s/s of aspiration with 100% accuracy.  -MP Pt will tolerate trials of mechanical soft textures and thin liquids with no s/s of aspiration with 100% accuracy.  -AV (r) MF (t) AV (c)     Time Frame (Oral Nutrition/Hydration Goal 2, SLP) short term goal (STG);by discharge  -MP short term goal (STG);by discharge  -AV (r) MF (t) AV (c)     Barriers (Oral Nutrition/Hydration Goal 2, SLP) Pt w/ cough requiring suctioning following 2 bites of mashed potatoes and small sip of boost.  -MP  --     Progress/Outcomes (Oral Nutrition/Hydration Goal 2, SLP) continuing progress toward goal  -MP  --       User Key  (r) = Recorded By, (t) = Taken By, (c) = Cosigned By    Initials Name Provider Type    Alejandra Solorzano, MS CCC-SLP Speech and Language Pathologist    Emani Flynn, Speech Therapy Student Speech Therapy Student    Terrence Saavedra MS, CFY-SLP Speech and Language Pathologist          EDUCATION  The patient has been educated in the following areas:   Dysphagia (Swallowing Impairment) Modified Diet Instruction.    SLP Recommendation and Plan                       Anticipated Dischage Disposition: unknown     Therapy Frequency (Swallow): 5 days per week          Plan of Care Reviewed With: patient  Plan of Care Review  Plan of Care Reviewed With: patient  Daily Summary of Progress (SLP): progress towards functional goals is fair  Plan for Continued Treatment (SLP): SLP will f/u for FEES this PM           Time Calculation:          Time Calculation- SLP     Row Name 10/04/18 1426             Time Calculation- SLP    SLP Start Time 1315  -MP      SLP Received On 10/04/18  -MP        User Key  (r) = Recorded By, (t) = Taken By, (c) = Cosigned By    Initials Name Provider Type    Terrence Saavedra MS, CFY-SLP Speech and Language Pathologist          Therapy Charges for Today     Code Description Service Date Service Provider Modifiers Qty    [id number] HC ST TREATMENT SWALLOW 3 10/4/2018 Terrence Padgett MS, CFY-APARNA GN 1                 Terrence Padgett MS, CFY-SLP  10/4/2018    Electronically signed by Terrence Padgett MS, CFY-SLP at 10/4/2018  2:27 PM     Dayron, Terrence, MS, CFY-SLP at 10/4/2018  2:26 PM          Problem: Patient Care Overview  Goal: Plan of Care Review  Outcome: Ongoing (interventions implemented as appropriate)   10/04/18 1423   Coping/Psychosocial   Plan of Care Reviewed With patient   SLP treatment completed. Pt seen when eating lunch. Pt w/ prolonged oral manipulation of soft solid and multiple swallows per bite. Cough following PO requiring suctioning. SLP will f/u for FEES this PM. Please see note for further details and recommendations.        Electronically signed by Terrence Padgett MS, CFY-SLP at 10/4/2018  2:26 PM       Respiratory Therapy Notes (last 24 hours) (Notes from 10/4/2018  2:20 PM through 10/5/2018  2:20 PM)     No notes of this type exist for this encounter.

## 2018-10-05 NOTE — PROGRESS NOTES
Intensivist Note     10/4/2018  Hospital Day: 3  * No surgery found *      Ms. Devora Huddleston, 73 y.o. female is followed for:  Principal Problem:    Acute hypoxemic respiratory failure requiring intubation and ventilatory support (CMS/[facility])  Active Problems:    Hypoglycemia etiology unclear. Resolved    COPD and active cigarette abuse (CMS/[facility])    Altered mental state associated with hypoglycemia. Resolved    Maria D coma scale on arrival 3-8 (CMS/[facility])     Multiple right rib fractures (ninth, 10th, 11th)    Hypotension improving    Anemia with hematocrit dropping from 36 to 29 over 16 hours    Hyponatremia. Suspect SIADH    Generalized abdominal pain    Weight loss       SUBJECTIVE     73-year-old white female found down and unresponsive by her son 10/1/18. Was intubated in the field and transported. Initial blood sugar was apparently 105 but dropped to 39 in route. Patient had apparently been complaining of increasing weakness for several weeks, had been losing weight, and 2 weeks PTA was evaluated for hyponatremia and seizures. No data was available, patient was intubated, and no family available. X-rays and CT scans revealed that she had 3 right posterior rib fractures so presumably fell at home. CTs of the head however revealed no evidence of injury or stroke. The only pertinent history from the transferring records indicated that she had apparently had a CVA in the past but we had no specifics. Patient was hypotensive in the ER but did respond initially to fluids. When her blood pressure drifted down again she was placed on norepinephrine but that has since been weaned off. In addition on 10/3/18 she continue to improve, was alert, and was able to be extubated without difficulty.     Today the patient is awake, alert and cooperative. She has been complaining bitterly of constipation and in fact continually tries to disimpact herself digitally. She remains afebrile and has a normal white count. Patient  "initially had a significant leukocytosis on admission but has returned to normal and she remains afebrile. She was initially treated with vancomycin and Zosyn but the vancomycin was discontinued 10/3/18 and all cultures remain no growth. She remains thin, profoundly debilitated, and malnourished. I see no way that she should return to her home environment    The patient's relevant past medical, surgical and social history were reviewed and updated in Epic as appropriate.    OBJECTIVE     /82 (BP Location: Right arm, Patient Position: Lying)   Pulse 52   Temp 98.1 °F (36.7 °C) (Oral)   Resp 12   Ht 157.5 cm (62.01\")   Wt 42.6 kg (93 lb 14.7 oz)   SpO2 96%   BMI 17.17 kg/m²       Flowsheet Rows      First Filed Value   Admission Height  157.5 cm (62\") Documented at 10/01/2018 1700   Admission Weight  36.3 kg (80 lb) Documented at 10/01/2018 1700        Intake & Output (last day)       10/04 0701 - 10/05 0700    P.O. 175    I.V. (mL/kg) 848 (19.9)    IV Piggyback 50    Total Intake(mL/kg) 1073 (25.2)    Net +1073         Unmeasured Urine Occurrence 2 x    Unmeasured Stool Occurrence 1 x          Exam:  General Exam:  Elderly cachectic appearing white female awake and alert  HEENT: Pupils equal and reactive. Nose and throat clear  Neck:                          Supple, no JVD, thyromegaly, or adenopathy. Right IJ line in place.  Lungs: Clear but diminished breath sounds  Cardiovascular: RRR without murmurs or gallops. HR 60 (sinus)  Abdomen: Soft nontender without organomegaly or masses. Scaphoid   and rectal: Matthews catheter to be removed today  Extremities: No cyanosis clubbing edema.  Neurologic:                 Symmetric strength but diffusely weak. No focal deficits. Follows all commands.    Chest X-Ray 10/4/18: Normal heart size, hyperinflated lungs, no pulmonary parenchymal infiltrates or masses.        Results from last 7 days  Lab Units 10/04/18  0452 10/03/18  0629 10/02/18  2008 10/02/18  0357 "   WBC 10*3/mm3 9.25 9.89  --  21.98*   HEMOGLOBIN g/dL 9.1* 10.6* 11.2* 10.2*   HEMATOCRIT % 26.7* 31.8* 33.1* 29.5*   PLATELETS 10*3/mm3 225 257  --  293       Results from last 7 days  Lab Units 10/04/18  0452 10/03/18  0629   SODIUM mmol/L 126* 127*   POTASSIUM mmol/L 3.7 3.5   CHLORIDE mmol/L 95* 98*   CO2 mmol/L 25.0 23.0   BUN mg/dL 14 14   CREATININE mg/dL 0.49* 0.58*   GLUCOSE mg/dL 80 112*   CALCIUM mg/dL 7.8* 8.2*       Results from last 7 days  Lab Units 10/04/18  0452 10/03/18  0629 10/02/18  0357   MAGNESIUM mg/dL 1.5 2.0 1.4   PHOSPHORUS mg/dL 1.9* 1.6* 2.4       Results from last 7 days  Lab Units 10/03/18  0629 10/02/18  0357 10/01/18  1640   ALK PHOS U/L 85 81  --    BILIRUBIN mg/dL 0.6 0.8  --    ALT (SGPT) U/L 23 21 25   AST (SGOT) U/L 46* 36* 39*       Lab Results   Component Value Date    SEDRATE 7 10/03/2018     No results found for: BNP  Lab Results   Component Value Date    CKTOTAL 212 (H) 10/01/2018     Lab Results   Component Value Date    TSH 3.112 10/01/2018     Lab Results   Component Value Date    LACTATE 1.5 10/01/2018     Lab Results   Component Value Date    CORTISOL 39.50 10/01/2018         Results from last 7 days  Lab Units 10/03/18  0945 10/03/18  0415 10/02/18  0545 10/01/18  1700   PH, ARTERIAL pH units 7.459* 7.403 7.450 7.454*   PCO2, ARTERIAL mm Hg 31.9* 37.5 34.2* 34.5*   PO2 ART mm Hg 136.0* 148.0* 102.0 179.0*   HCO3 ART mmol/L 22.6 23.4 23.8 24.2   FIO2 % 35 40 40 60       I reviewed the patient's results, images and medication.    Assessment/Plan   ASSESSMENT      Principal Problem:    Acute hypoxemic respiratory failure requiring intubation and ventilatory support (CMS/[facility])  Active Problems:    Hypoglycemia etiology unclear. Resolved    COPD and active cigarette abuse (CMS/[facility])    Altered mental state associated with hypoglycemia. Resolved    [place] coma scale on arrival 3-8 (CMS/[facility])     Multiple right rib fractures (ninth, 10th, 11th)    Hypotension improving     "Anemia with hematocrit dropping from 36 to 29 over 16 hours    Hyponatremia. Suspect SIADH    Generalized abdominal pain    Weight loss      DISCUSSION: Patient has done well off of the ventilator. I was told initially that she could swallow and could tolerate a diet. Speech however reassessed her situation today and she definitely aspirates one to take in just about any substance. Because of this will need enteral feeds until she is strong enough. 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 Her sodium however is stable at 126 and we will just continue to watch for now. I still suspect she may have an underlying malignancy but have been unsuccessful in finding any obvious malignant process on her scans. She was living at home with a son and a 2-year-old great grandson. It is obvious that she should not return to that living situation and social service is looking into the situation.    Her blood pressure now appears much improved since we started midodrine and she is no longer requiring pressors.    Apparently case management has discussed the patient's situation with her son. She is living with her son and her great-grandchild and apparently \"they do not have enough money for food\".    PLAN     1. Bowel regimen to help with her constipation  2. Discontinue Matthews  3. We need to push physical therapy and OT  4. Social service to help us find a nursing facility to help her recover  5. NG tube and begin enteral feeds but continue IV fluids until she is at goal   6. Continue Zosyn for now  7. Follow-up results of spirometry   8. If maintains blood pressure may see how she does off of midodrine  9. Follow-up hematocrit  10. Transfer to the floor and asked the hospitalist to assume attending role    Plan of care and goals reviewed with mulitdisciplinary team at daily rounds.    I discussed the patient's findings and my recommendations with patient and nursing staff    Time spent Critical care 35 min (It does not " include procedure time).    Joe Christine MD  Intensive Care Medicine  10/04/18 8:45 PM

## 2018-10-06 ENCOUNTER — APPOINTMENT (OUTPATIENT)
Dept: GENERAL RADIOLOGY | Facility: HOSPITAL | Age: 73
End: 2018-10-06

## 2018-10-06 PROBLEM — K59.00 CONSTIPATION: Status: ACTIVE | Noted: 2018-10-06

## 2018-10-06 LAB
ALBUMIN SERPL-MCNC: 2.86 G/DL (ref 3.2–4.8)
ANION GAP SERPL CALCULATED.3IONS-SCNC: 6 MMOL/L (ref 3–11)
BACTERIA SPEC AEROBE CULT: NORMAL
BACTERIA SPEC AEROBE CULT: NORMAL
BASOPHILS # BLD AUTO: 0.02 10*3/MM3 (ref 0–0.2)
BASOPHILS NFR BLD AUTO: 0.2 % (ref 0–1)
BUN BLD-MCNC: 12 MG/DL (ref 9–23)
BUN/CREAT SERPL: 38.7 (ref 7–25)
CALCIUM SPEC-SCNC: 7.6 MG/DL (ref 8.7–10.4)
CHLORIDE SERPL-SCNC: 102 MMOL/L (ref 99–109)
CO2 SERPL-SCNC: 24 MMOL/L (ref 20–31)
CREAT BLD-MCNC: 0.31 MG/DL (ref 0.6–1.3)
DEPRECATED RDW RBC AUTO: 47.3 FL (ref 37–54)
EOSINOPHIL # BLD AUTO: 0.04 10*3/MM3 (ref 0–0.3)
EOSINOPHIL NFR BLD AUTO: 0.4 % (ref 0–3)
ERYTHROCYTE [DISTWIDTH] IN BLOOD BY AUTOMATED COUNT: 14.6 % (ref 11.3–14.5)
GFR SERPL CREATININE-BSD FRML MDRD: >150 ML/MIN/1.73
GLUCOSE BLD-MCNC: 102 MG/DL (ref 70–100)
GLUCOSE BLDC GLUCOMTR-MCNC: 134 MG/DL (ref 70–130)
GLUCOSE BLDC GLUCOMTR-MCNC: 86 MG/DL (ref 70–130)
GLUCOSE BLDC GLUCOMTR-MCNC: 88 MG/DL (ref 70–130)
GLUCOSE BLDC GLUCOMTR-MCNC: 99 MG/DL (ref 70–130)
HCT VFR BLD AUTO: 30 % (ref 34.5–44)
HGB BLD-MCNC: 10 G/DL (ref 11.5–15.5)
IMM GRANULOCYTES # BLD: 0.03 10*3/MM3 (ref 0–0.03)
IMM GRANULOCYTES NFR BLD: 0.3 % (ref 0–0.6)
LYMPHOCYTES # BLD AUTO: 0.6 10*3/MM3 (ref 0.6–4.8)
LYMPHOCYTES NFR BLD AUTO: 6.1 % (ref 24–44)
MAGNESIUM SERPL-MCNC: 1.7 MG/DL (ref 1.3–2.7)
MCH RBC QN AUTO: 29.3 PG (ref 27–31)
MCHC RBC AUTO-ENTMCNC: 33.3 G/DL (ref 32–36)
MCV RBC AUTO: 88 FL (ref 80–99)
MONOCYTES # BLD AUTO: 0.49 10*3/MM3 (ref 0–1)
MONOCYTES NFR BLD AUTO: 4.9 % (ref 0–12)
NEUTROPHILS # BLD AUTO: 8.72 10*3/MM3 (ref 1.5–8.3)
NEUTROPHILS NFR BLD AUTO: 88.1 % (ref 41–71)
PHOSPHATE SERPL-MCNC: 1.9 MG/DL (ref 2.4–5.1)
PLATELET # BLD AUTO: 259 10*3/MM3 (ref 150–450)
PMV BLD AUTO: 9.1 FL (ref 6–12)
POTASSIUM BLD-SCNC: 3.7 MMOL/L (ref 3.5–5.5)
RBC # BLD AUTO: 3.41 10*6/MM3 (ref 3.89–5.14)
SODIUM BLD-SCNC: 132 MMOL/L (ref 132–146)
WBC NRBC COR # BLD: 9.9 10*3/MM3 (ref 3.5–10.8)

## 2018-10-06 PROCEDURE — 82962 GLUCOSE BLOOD TEST: CPT

## 2018-10-06 PROCEDURE — 99233 SBSQ HOSP IP/OBS HIGH 50: CPT | Performed by: HOSPITALIST

## 2018-10-06 PROCEDURE — 80069 RENAL FUNCTION PANEL: CPT | Performed by: INTERNAL MEDICINE

## 2018-10-06 PROCEDURE — 25010000002 PIPERACILLIN SOD-TAZOBACTAM PER 1 G: Performed by: INTERNAL MEDICINE

## 2018-10-06 PROCEDURE — 25010000002 ENOXAPARIN PER 10 MG: Performed by: INTERNAL MEDICINE

## 2018-10-06 PROCEDURE — 71045 X-RAY EXAM CHEST 1 VIEW: CPT

## 2018-10-06 PROCEDURE — 83735 ASSAY OF MAGNESIUM: CPT | Performed by: INTERNAL MEDICINE

## 2018-10-06 PROCEDURE — 85025 COMPLETE CBC W/AUTO DIFF WBC: CPT | Performed by: INTERNAL MEDICINE

## 2018-10-06 RX ORDER — DOCUSATE SODIUM 50 MG/5 ML
100 LIQUID (ML) ORAL 2 TIMES DAILY
Status: DISCONTINUED | OUTPATIENT
Start: 2018-10-06 | End: 2018-10-19 | Stop reason: HOSPADM

## 2018-10-06 RX ORDER — SODIUM PHOSPHATE, DIBASIC AND SODIUM PHOSPHATE, MONOBASIC 7; 19 G/133ML; G/133ML
1 ENEMA RECTAL ONCE AS NEEDED
Status: DISCONTINUED | OUTPATIENT
Start: 2018-10-06 | End: 2018-10-19 | Stop reason: HOSPADM

## 2018-10-06 RX ADMIN — TAZOBACTAM SODIUM AND PIPERACILLIN SODIUM 3.38 G: 375; 3 INJECTION, SOLUTION INTRAVENOUS at 09:01

## 2018-10-06 RX ADMIN — POLYETHYLENE GLYCOL 3350 17 G: 17 POWDER, FOR SOLUTION ORAL at 09:01

## 2018-10-06 RX ADMIN — MAGNESIUM SULFATE HEPTAHYDRATE 4 G: 40 INJECTION, SOLUTION INTRAVENOUS at 12:28

## 2018-10-06 RX ADMIN — FAMOTIDINE 20 MG: 20 TABLET ORAL at 20:23

## 2018-10-06 RX ADMIN — DOCUSATE SODIUM 100 MG: 50 LIQUID ORAL at 20:23

## 2018-10-06 RX ADMIN — SENNOSIDES AND DOCUSATE SODIUM 2 TABLET: 8.6; 5 TABLET ORAL at 20:23

## 2018-10-06 RX ADMIN — DOCUSATE SODIUM 100 MG: 50 LIQUID ORAL at 09:01

## 2018-10-06 RX ADMIN — TAZOBACTAM SODIUM AND PIPERACILLIN SODIUM 3.38 G: 375; 3 INJECTION, SOLUTION INTRAVENOUS at 00:00

## 2018-10-06 RX ADMIN — POTASSIUM & SODIUM PHOSPHATES POWDER PACK 280-160-250 MG 2 PACKET: 280-160-250 PACK at 12:28

## 2018-10-06 RX ADMIN — FAMOTIDINE 20 MG: 20 TABLET ORAL at 09:01

## 2018-10-06 RX ADMIN — ENOXAPARIN SODIUM 30 MG: 30 INJECTION SUBCUTANEOUS at 20:23

## 2018-10-06 RX ADMIN — Medication 1 TABLET: at 09:01

## 2018-10-06 RX ADMIN — CLOPIDOGREL BISULFATE 75 MG: 75 TABLET ORAL at 09:01

## 2018-10-06 NOTE — PROGRESS NOTES
Intensivist Note     10/5/2018  Hospital Day: 4  * No surgery found *      Ms. Devora Huddleston, 73 y.o. female is followed for:  Principal Problem:    Acute hypoxemic respiratory failure requiring intubation and ventilatory support (CMS/[facility])  Active Problems:    Hypoglycemia etiology unclear. Resolved    COPD and active cigarette abuse (CMS/[facility])    Altered mental state associated with hypoglycemia. Resolved    Maria D coma scale on arrival 3-8 (CMS/[facility])     Multiple right rib fractures (ninth, 10th, 11th)    Hypotension improving    Anemia with hematocrit dropping from 36 to 29 over 16 hours    Hyponatremia. Suspect SIADH    Generalized abdominal pain    Weight loss       SUBJECTIVE     73-year-old white female found down and unresponsive by her son 10/1/18. Was intubated in the field and transported. Initial blood sugar was apparently 105 but dropped to 39 in route. Patient had apparently been complaining of increasing weakness for several weeks, had been losing weight, and 2 weeks PTA was evaluated for hyponatremia and seizures. No data was available, patient was intubated, and no family initially available. X-rays and CT scans revealed that she had 3 right posterior rib fractures so presumably fell at home. CTs of the head however revealed no evidence of injury or stroke. The only pertinent history from the transferring records indicated that she had apparently had a CVA in the past but we had no specifics. Patient was hypotensive in the ER but did respond initially to fluids. When her blood pressure drifted down again she was placed on norepinephrine but that has since been weaned off. In addition, on 10/3/18 she continue to improve, was alert, and was able to be extubated without difficulty.      By 10/4/18 the patient was awake, alert and cooperative. He was however confused, complained bitterly of constipation and in fact had to be restrained to avoid her attempts to disimpact herself digitally. Today she  "remains afebrile with a normal WBC. She initially had a significant leukocytosis on admission but it returned to normal and she remains afebrile. She was initially treated with vancomycin and Zosyn but the vancomycin was discontinued 10/3/18 and all cultures remain no growth. She remains thin, profoundly debilitated, and malnourished. She is presently on enteral feeds because of dysphagia. She pulled her NG tube earlier this morning but it is being replaced.  look into her home situation as it is obvious she is not eating, and she wrote a note initially saying \"I need help\".    Should also note that she had urinary retention last evening with residual of 875 mL.    The patient's relevant past medical, surgical and social history were reviewed and updated in Epic as appropriate.    OBJECTIVE     /70   Pulse 59   Temp 97.8 °F (36.6 °C) (Oral)   Resp 16   Ht 157.5 cm (62.01\")   Wt 42.6 kg (93 lb 14.7 oz)   SpO2 95%   BMI 17.17 kg/m²   Oxygen Concentration (%): 2 liters nasal oxygen       Flowsheet Rows      First Filed Value   Admission Height  157.5 cm (62\") Documented at 10/01/2018 1700   Admission Weight  36.3 kg (80 lb) Documented at 10/01/2018 1700        Intake & Output (last day)       10/05 0701 - 10/06 0700    I.V. (mL/kg) 894 (21)    Other 361    NG/    Total Intake(mL/kg) 1532 (36)    Urine (mL/kg/hr) 800 (1.2)    Total Output 800    Net +732         Unmeasured Stool Occurrence 5 x          Exam:  General Exam:  Elderly cachectic white female in NAD  HEENT: Pupils equal and reactive. NG tube in place  Neck:                          Supple, no JVD, thyromegaly, or adenopathy. Right IJ line in place.  Lungs: Clear anteriorly and laterally  Cardiovascular: Regular rate and rhythm without murmurs or gallops.  Abdomen: Soft nontender without organomegaly or masses.   and rectal: Deferred.  Extremities: No cyanosis clubbing edema.  Neurologic:                 Symmetric strength " but diffusely weak. No focal deficits. Confused but cooperative. Oriented to person, and place.    Chest X-Ray: No film today    Abdominal film 10/5/18: NG tube is in the distal stomach. Stool throughout the colon.    INFUSIONS    dextrose 5 % and sodium chloride 0.9 % with KCl 20 mEq 75 mL/hr Last Rate: 75 mL/hr (10/05/18 1104)         Results from last 7 days  Lab Units 10/05/18  0430 10/04/18  0452 10/03/18  0629   WBC 10*3/mm3 9.56 9.25 9.89   HEMOGLOBIN g/dL 10.0* 9.1* 10.6*   HEMATOCRIT % 29.5* 26.7* 31.8*   PLATELETS 10*3/mm3 256 225 257       Results from last 7 days  Lab Units 10/05/18  2015 10/05/18  0430 10/04/18  0452   SODIUM mmol/L  --  129* 126*   POTASSIUM mmol/L 3.7 2.9* 3.7   CHLORIDE mmol/L  --  98* 95*   CO2 mmol/L  --  25.0 25.0   BUN mg/dL  --  9 14   CREATININE mg/dL  --  0.38* 0.49*   GLUCOSE mg/dL  --  92 80   CALCIUM mg/dL  --  7.5* 7.8*       Results from last 7 days  Lab Units 10/05/18  0430 10/04/18  0452 10/03/18  0629   MAGNESIUM mg/dL 1.8 1.5 2.0   PHOSPHORUS mg/dL 2.0* 1.9* 1.6*       Results from last 7 days  Lab Units 10/03/18  0629 10/02/18  0357 10/01/18  1640   ALK PHOS U/L 85 81  --    BILIRUBIN mg/dL 0.6 0.8  --    ALT (SGPT) U/L 23 21 25   AST (SGOT) U/L 46* 36* 39*       Lab Results   Component Value Date    SEDRATE 7 10/03/2018     No results found for: BNP  Lab Results   Component Value Date    CKTOTAL 212 (H) 10/01/2018     Lab Results   Component Value Date    TSH 3.112 10/01/2018     Lab Results   Component Value Date    LACTATE 1.5 10/01/2018     Lab Results   Component Value Date    CORTISOL 39.50 10/01/2018         Results from last 7 days  Lab Units 10/03/18  0945 10/03/18  0415 10/02/18  0545 10/01/18  1700   PH, ARTERIAL pH units 7.459* 7.403 7.450 7.454*   PCO2, ARTERIAL mm Hg 31.9* 37.5 34.2* 34.5*   PO2 ART mm Hg 136.0* 148.0* 102.0 179.0*   HCO3 ART mmol/L 22.6 23.4 23.8 24.2   FIO2 % 35 40 40 60       I reviewed the patient's results, images and  medication.    Assessment/Plan   ASSESSMENT      Principal Problem:    Acute hypoxemic respiratory failure requiring intubation and ventilatory support (CMS/[facility])  Active Problems:    Hypoglycemia etiology unclear. Resolved    COPD and active cigarette abuse (CMS/[facility])    Altered mental state associated with hypoglycemia. Resolved    Maria D coma scale on arrival 3-8 (CMS/[facility])     Multiple right rib fractures (ninth, 10th, 11th)    Hypotension improving    Anemia with hematocrit dropping from 36 to 29 over 16 hours    Hyponatremia. Suspect SIADH    Generalized abdominal pain    Weight loss      DISCUSSION: Gas exchange adequate, hemodynamically stable, renal function normal, alert but mildly confused (suspected chronic dementia). Has been ordered out to the floor but apparently no beds available. Should not return home in my opinion as it appears her care was inadequate and she cannot be supervised. Presently looking for a facility for longer term care. Hyponatremia is improving. Still with problems for constipation and will need additional attempts to clear her of impacted stool.    I am still not clear why she became so debilitated, and has SIADH. Still suspect an underlying malignancy but cannot prove it    PLAN     1. Resume enteral feeds after NG tube replaced  2. Must mobilize and continue physical therapy  3. Continue cathartics and enemas till clear  4. Discontinue midodrine as blood pressure now okay.  5. Follow-up on hyponatremia  6. Transfer to the floor when bed finally available  7. Hopefully can find a facility for her to go as I do not feel she should return home.  8. If continues with urinary retention, lupe Matthews  9. Replace potassium    Plan of care and goals reviewed with mulitdisciplinary team at daily rounds.    I discussed the patient's findings and my recommendations with patient and nursing staff    Time spent Critical care 35 min (It does not include procedure time).    Joe Christine,  MD  Intensive Care Medicine  10/05/18 10:52 PM

## 2018-10-06 NOTE — PLAN OF CARE
Problem: Restraint, Nonbehavioral (Nonviolent)  Goal: Nonbehavioral (Nonviolent) Restraint: Preservation of Dignity and Wellbeing  Outcome: Outcome(s) achieved Date Met: 10/06/18

## 2018-10-06 NOTE — PLAN OF CARE
Problem: Skin Injury Risk (Adult)  Goal: Skin Health and Integrity  Outcome: Ongoing (interventions implemented as appropriate)      Problem: Patient Care Overview  Goal: Plan of Care Review  Outcome: Ongoing (interventions implemented as appropriate)    Goal: Individualization and Mutuality  Outcome: Ongoing (interventions implemented as appropriate)    Goal: Discharge Needs Assessment  Outcome: Ongoing (interventions implemented as appropriate)    Goal: Interprofessional Rounds/Family Conf  Outcome: Ongoing (interventions implemented as appropriate)      Problem: Infection, Risk/Actual (Adult)  Goal: Infection Prevention/Resolution  Outcome: Ongoing (interventions implemented as appropriate)      Problem: Patient Care Overview  Goal: Plan of Care Review  Outcome: Ongoing (interventions implemented as appropriate)    Goal: Individualization and Mutuality  Outcome: Ongoing (interventions implemented as appropriate)    Goal: Discharge Needs Assessment  Outcome: Ongoing (interventions implemented as appropriate)    Goal: Interprofessional Rounds/Family Conf  Outcome: Ongoing (interventions implemented as appropriate)

## 2018-10-06 NOTE — PROGRESS NOTES
"    [facility] Medicine Services  PROGRESS NOTE    Patient Name: Devora Huddleston  : 1945  MRN: [id number]    Date of Admission: 10/1/2018  Length of Stay: 5  Primary Care Physician: Evelyn Francisco MD    Subjective   Subjective     CC:  F/U found down unresponsive    HPI:  Patient seen this morning. On the phone with her son. Says she needs help moving her bowels. \"I haven't eaten in three days.\"    Review of Systems  Gen-no fevers, no chills  CV-no chest pain, no palpitations  Resp-no cough, no dyspnea  GI-no nausea, no abd pain, +constipation    Otherwise ROS is negative except as mentioned in the HPI.    Objective   Objective     Vital Signs:   Temp:  [97.7 °F (36.5 °C)-98.8 °F (37.1 °C)] 98.8 °F (37.1 °C)  Heart Rate:  [55-74] 69  Resp:  [16-18] 16  BP: (115-148)/(59-86) 139/77  Total (NIH Stroke Scale): 4     Physical Exam:  Gen-no acute distress, cachectic  HENT-NCAT, mucous membranes moist  CV-RRR, S1 S2 normal, no m/r/g  Resp-CTAB, no wheezes or rales  Abd-soft, NT, ND, +BS  Ext-no edema  Neuro-A&Ox2, mild confusion, follows commands, no focal deficits  Skin-no rashes  Psych-appropriate mood      Results Reviewed:  I have personally reviewed current lab, radiology, and data and agree.      Results from last 7 days  Lab Units 10/06/18  0430 10/05/18  0430 10/04/18  0452 10/03/18  0629  10/01/18  1618   WBC 10*3/mm3 9.90 9.56 9.25 9.89  < >  --    HEMOGLOBIN g/dL 10.0* 10.0* 9.1* 10.6*  < >  --    HEMOGLOBIN, POC   --   --   --   --   < >  --    HEMATOCRIT % 30.0* 29.5* 26.7* 31.8*  < >  --    HEMATOCRIT POC   --   --   --   --   < >  --    PLATELETS 10*3/mm3 259 256 225 257  < >  --    INR   --   --   --  1.04  --  1.0   < > = values in this interval not displayed.    Results from last 7 days  Lab Units 10/06/18  0430 10/05/18  2015 10/05/18  0430 10/04/18  0452 10/03/18  0629  10/02/18  0357  10/01/18  1640   SODIUM mmol/L 132  --  129* 126* 127*  < > 126*  < > 119* "   POTASSIUM mmol/L 3.7 3.7 2.9* 3.7 3.5  < > 2.3*  --  3.5   CHLORIDE mmol/L 102  --  98* 95* 98*  --  93*  --  86*   CO2 mmol/L 24.0  --  25.0 25.0 23.0  --  23.0  --  23.0   BUN mg/dL 12  --  9 14 14  --  27*  --  34*   CREATININE mg/dL 0.31*  --  0.38* 0.49* 0.58*  --  0.73  --  1.04   GLUCOSE mg/dL 102*  --  92 80 112*  --  104*  --  178*   CALCIUM mg/dL 7.6*  --  7.5* 7.8* 8.2*  --  8.2*  --  8.6*   ALT (SGPT) U/L  --   --   --   --  23  --  21  --  25   AST (SGOT) U/L  --   --   --   --  46*  --  36*  --  39*   < > = values in this interval not displayed.  Estimated Creatinine Clearance: 43.4 mL/min (A) (by C-G formula based on SCr of 0.31 mg/dL (L)).  No results found for: BNP    Microbiology Results Abnormal     Procedure Component Value - Date/Time    Blood Culture - Blood, [[id number]]  (Normal) Collected:  10/01/18 1745    Lab Status:  Preliminary result Specimen:  Blood from Wrist, Right Updated:  10/05/18 1830     Blood Culture No growth at 4 days    Blood Culture - Blood, [[id number]]  (Normal) Collected:  10/01/18 1715    Lab Status:  Preliminary result Specimen:  Blood from Arm, Right Updated:  10/05/18 1830     Blood Culture No growth at 4 days          Imaging Results (last 24 hours)     Procedure Component Value Units Date/Time    XR Chest 1 View [[id number]] Updated:  10/06/18 0434    XR Abdomen KUB [[id number]] Collected:  10/05/18 0839     Updated:  10/05/18 1111    Narrative:       EXAMINATION: XR ABDOMEN KUB-      INDICATION: NG placement for feeding; J96.01-Acute respiratory failure  with hypoxia; Z74.09-Other reduced mobility; R13.12-Dysphagia,  oropharyngeal phase.      COMPARISON: 10/04/2018.     FINDINGS: Nasogastric tube terminates within the right lower midabdomen  presumably within the distal portion of a distended gastric lumen.  Gas-filled bowel loops overlie the lower midabdomen.           Impression:       Nasogastric tube terminates lower right mid abdomen likely  within the distal  portion of a distended gastric lumen.     D:  10/05/2018  E:  10/05/2018     This report was finalized on 10/5/2018 11:09 AM by Dr. Damian Marks.           Results for orders placed during the hospital encounter of 10/01/18   Adult Transthoracic Echo Complete W/ Cont if Necessary Per Protocol    Narrative · Left ventricular systolic function is normal.  · Estimated EF appears to be in the range of 61 - 65%  · All left ventricular wall segments contract normally.  · Normal left atrial pressure.  · Right ventricular cavity is mildly dilated  · Right ventricular wall thickness is consistent with mild hypertrophy.  · Right atrial cavity size is mildly dilated. The inferior vena cava is   dilated. IVC inspiratory collapse is absent. A prominent Eustachian valve   is present.  · Moderate tricuspid valve regurgitation is present. Estimated right   ventricular systolic pressure from tricuspid regurgitation is mildly   elevated (35-45 mmHg).          I have reviewed the medications.      clopidogrel 75 mg Oral Daily   docusate sodium 100 mg Nasogastric BID   enoxaparin 30 mg Subcutaneous Nightly   famotidine 20 mg Oral BID   multivitamin 1 tablet Oral Daily   piperacillin-tazobactam 3.375 g Intravenous Q8H   polyethylene glycol 17 g Oral Daily   sennosides-docusate sodium 2 tablet Oral Nightly         Assessment/Plan   Assessment / Plan     Hospital Problem List     * (Principal)Acute hypoxemic respiratory failure requiring intubation and ventilatory support (CMS/HCC)    Altered mental state associated with hypoglycemia. Resolved    Hyponatremia. Suspect SIADH    Hypoglycemia etiology unclear. Resolved    Generalized abdominal pain    Weight loss    [place] coma scale on arrival 3-8 (CMS/HCC)     Hypotension improving    Multiple right rib fractures (ninth, 10th, 11th)    Anemia with hematocrit dropping from 36 to 29 over 16 hours    COPD and active cigarette abuse (CMS/HCC)    Constipation             Brief Hospital Course  to date:  Devora Huddleston is a 73 y.o. female with hx of COPD, HTN, and CVA presents after being found down and unresponsive by her son on 10/1/18. Was intubated in the field. Glucose dropped to 39 en route (initially 105). Patient had reportedly complained of increasing weakness and weight loss for several weeks, and had recent evaluation for hyponatremia and seizures. Admitted to [facility] ICU. CT head, CT perfusion, CTA head and neck all negative. Further imaging revealed 3 right sided rib fractures, presumably from a fall at home. Possible RLL atelectasis vs. infiltrate. She was also hypotensive and requiring pressors. Treated empirically with Vanc and Zosyn however all cultures remain no growth. She was started on enteral feeds due to dysphagia. Patient extubated 10/3. Transferred to floor, hospitalists assumed care on 10/6.      Assessment & Plan:  --Stop ATBx. Cultures no growth.  --Sodium stable. Presumed SIADH. Monitor.  --Supportive care for rib fractures.   --SLP following. Continue tube feeds.  --PT/OT. Needs placement. CM following.     DVT Prophylaxis:  Lovenox    CODE STATUS:   Code Status and Medical Interventions:   Ordered at: 10/01/18 1819     Code Status:    CPR     Medical Interventions (Level of Support Prior to Arrest):    Full       Disposition: I expect the patient to be discharged TBD    Electronically signed by Kathy Landrum MD, 10/06/18, 11:16 AM.

## 2018-10-07 ENCOUNTER — APPOINTMENT (OUTPATIENT)
Dept: GENERAL RADIOLOGY | Facility: HOSPITAL | Age: 73
End: 2018-10-07

## 2018-10-07 PROBLEM — G93.41 METABOLIC ENCEPHALOPATHY: Status: ACTIVE | Noted: 2018-10-01

## 2018-10-07 LAB
GLUCOSE BLDC GLUCOMTR-MCNC: 104 MG/DL (ref 70–130)
GLUCOSE BLDC GLUCOMTR-MCNC: 82 MG/DL (ref 70–130)
GLUCOSE BLDC GLUCOMTR-MCNC: 90 MG/DL (ref 70–130)
GLUCOSE BLDC GLUCOMTR-MCNC: 94 MG/DL (ref 70–130)
GLUCOSE BLDC GLUCOMTR-MCNC: 96 MG/DL (ref 70–130)
MAGNESIUM SERPL-MCNC: 1.7 MG/DL (ref 1.3–2.7)
PHOSPHATE SERPL-MCNC: 2.4 MG/DL (ref 2.4–5.1)

## 2018-10-07 PROCEDURE — 84100 ASSAY OF PHOSPHORUS: CPT | Performed by: HOSPITALIST

## 2018-10-07 PROCEDURE — 82962 GLUCOSE BLOOD TEST: CPT

## 2018-10-07 PROCEDURE — 97530 THERAPEUTIC ACTIVITIES: CPT

## 2018-10-07 PROCEDURE — 99233 SBSQ HOSP IP/OBS HIGH 50: CPT | Performed by: HOSPITALIST

## 2018-10-07 PROCEDURE — 83735 ASSAY OF MAGNESIUM: CPT | Performed by: HOSPITALIST

## 2018-10-07 PROCEDURE — 25810000003 SODIUM CHLORIDE 0.9 % WITH KCL 40 MEQ/L 40-0.9 MEQ/L-% SOLUTION: Performed by: INTERNAL MEDICINE

## 2018-10-07 PROCEDURE — 25010000002 ENOXAPARIN PER 10 MG: Performed by: INTERNAL MEDICINE

## 2018-10-07 PROCEDURE — 74018 RADEX ABDOMEN 1 VIEW: CPT

## 2018-10-07 RX ORDER — QUETIAPINE FUMARATE 25 MG/1
25 TABLET, FILM COATED ORAL NIGHTLY
Status: DISCONTINUED | OUTPATIENT
Start: 2018-10-07 | End: 2018-10-09

## 2018-10-07 RX ADMIN — POTASSIUM & SODIUM PHOSPHATES POWDER PACK 280-160-250 MG 2 PACKET: 280-160-250 PACK at 17:35

## 2018-10-07 RX ADMIN — POLYETHYLENE GLYCOL 3350 17 G: 17 POWDER, FOR SOLUTION ORAL at 09:28

## 2018-10-07 RX ADMIN — MAGNESIUM SULFATE HEPTAHYDRATE 4 G: 40 INJECTION, SOLUTION INTRAVENOUS at 15:27

## 2018-10-07 RX ADMIN — Medication 1 TABLET: at 09:28

## 2018-10-07 RX ADMIN — SENNOSIDES AND DOCUSATE SODIUM 2 TABLET: 8.6; 5 TABLET ORAL at 21:59

## 2018-10-07 RX ADMIN — DOCUSATE SODIUM 100 MG: 50 LIQUID ORAL at 09:29

## 2018-10-07 RX ADMIN — DOCUSATE SODIUM 100 MG: 50 LIQUID ORAL at 21:59

## 2018-10-07 RX ADMIN — POTASSIUM CHLORIDE AND SODIUM CHLORIDE 50 ML/HR: 900; 300 INJECTION, SOLUTION INTRAVENOUS at 22:00

## 2018-10-07 RX ADMIN — ENOXAPARIN SODIUM 30 MG: 30 INJECTION SUBCUTANEOUS at 21:59

## 2018-10-07 RX ADMIN — QUETIAPINE FUMARATE 25 MG: 25 TABLET ORAL at 21:59

## 2018-10-07 RX ADMIN — FAMOTIDINE 20 MG: 20 TABLET ORAL at 21:59

## 2018-10-07 RX ADMIN — CLOPIDOGREL BISULFATE 75 MG: 75 TABLET ORAL at 09:28

## 2018-10-07 RX ADMIN — FAMOTIDINE 20 MG: 20 TABLET ORAL at 09:28

## 2018-10-07 NOTE — SIGNIFICANT NOTE
Pt continues to be very confused. No matter the redirection or monitoring, has already pulled out multiple lines/tubes or trying.    Soft BUE wrist restraints for now as less risk of medicating her for now.    Please f/u when rounding.

## 2018-10-07 NOTE — PLAN OF CARE
Problem: Infection, Risk/Actual (Adult)  Goal: Infection Prevention/Resolution  Outcome: Ongoing (interventions implemented as appropriate)      Problem: Fall Risk (Adult)  Goal: Absence of Fall  Outcome: Ongoing (interventions implemented as appropriate)      Problem: Restraint, Nonbehavioral (Nonviolent)  Goal: Rationale and Justification  Outcome: Ongoing (interventions implemented as appropriate)

## 2018-10-07 NOTE — NURSING NOTE
Pt with low raghavendra score, impaired mobility and at risk for further skin breakdown.  Low air loss mattress and regular bed frame ordered from [facility] [phone].

## 2018-10-07 NOTE — THERAPY TREATMENT NOTE
"Acute Care - Physical Therapy Treatment Note  [facility]     Patient Name: Devora Huddleston  : 1945  MRN: [id number]  Today's Date: 10/7/2018  Onset of Illness/Injury or Date of Surgery: 10/01/18  Date of Referral to PT: 10/04/18  Referring Physician: MD Emmett    Admit Date: 10/1/2018    Visit Dx:    ICD-10-CM ICD-9-CM   1. Acute respiratory failure with hypoxemia (CMS/[facility]) J96.01 518.81   2. Impaired functional mobility, balance, gait, and endurance Z74.09 V49.89   3. Oropharyngeal dysphagia R13.12 787.22   4. Impaired mobility and ADLs Z74.09 799.89     Patient Active Problem List   Diagnosis   • Acute respiratory failure (CMS/[facility])   • Metabolic encephalopathy   • Acute hypoxemic respiratory failure requiring intubation and ventilatory support (CMS/[facility])   • Hyponatremia, suspect SIADH   • Hypoglycemia, resolved   • Generalized abdominal pain   • Weight loss   • Hypotension, resolved   • Multiple right rib fractures (ninth, 10th, 11th)   • Anemia   • COPD and active cigarette abuse (CMS/[facility])   • Constipation       Therapy Treatment          Rehabilitation Treatment Summary     Row Name 10/07/18 1408             Treatment Time/Intention    Discipline physical therapist  -DM      Document Type therapy note (daily note)  -DM      Subjective Information complains of;weakness;pain;dyspnea   dsg.toHeelsPerWOC;nsg.req.noGt d/tHeelDecub(\"EOBactiv.only\")  -DM      Mode of Treatment individual therapy;physical therapy  -DM      Patient/Family Observations sup.in bed;tele,iv,TF,lyons,mult bruises/contusions,WAFF boots,dry flow btwn LE's(incont.diarrhea);fx R ribs;exit alarm;nsg@ BS D/T pt pulled all lines out @2am & put into wrist restraints, but just d/c'd this am;leaving o2 off d/t pt fidgety; mepilex B heels  -DM      Care Plan Review care plan/treatment goals reviewed;patient/other agree to care plan  -DM      Therapy Frequency (PT Clinical Impression) daily  -DM      Patient Effort fair  -DM      Existing " "Precautions/Restrictions fall;other (see comments)   AMS;fvyayeAucyvXgb9uo/WristRestr/justD/C'd;fxRribs9,10,11;TF  -DM      Treatment Considerations/Comments --   NSG.req.noGt.d/tBheelDecub/mepilex;ConsiderHeel OffloadShoes  -DM      Recorded by [DM] Terri Magaña, PT 10/07/18 1438      Row Name 10/07/18 1408             Vital Signs    Pre Systolic BP Rehab 133  -DM      Pre Treatment Diastolic BP 80  -DM      Intra Systolic BP Rehab 144  -DM      Intra Treatment Diastolic BP 86  -DM      Post Systolic BP Rehab 149  -DM      Post Treatment Diastolic BP 93  -DM      Pretreatment Heart Rate (beats/min) 73  -DM      Intratreatment Heart Rate (beats/min) 80  -DM      Posttreatment Heart Rate (beats/min) 77  -DM      Pre SpO2 (%) 95  -DM      O2 Delivery Pre Treatment room air  -DM      Intra SpO2 (%) 94  -DM      O2 Delivery Intra Treatment room air  -DM      Post SpO2 (%) 96  -DM      O2 Delivery Post Treatment room air  -DM      Pre Patient Position Supine  -DM      Intra Patient Position Sitting  -DM      Post Patient Position Supine  -DM      Recorded by [DM] Terri Magaña, PT 10/07/18 1438      Row Name 10/07/18 1408             Cognitive Assessment/Intervention    Additional Documentation Cognitive Assessment/Intervention (Group)  -DM      Recorded by [DM] Terri Magaña, PT 10/07/18 1438      Row Name 10/07/18 1408             Cognitive Assessment/Intervention- PT/OT    Affect/Mental Status (Cognitive) confused  -DM      Orientation Status (Cognition) oriented to;person   can'tRecall loc.,[facility],mo.(\"sept\",\"Aug?\"),yr.  -DM      Follows Commands (Cognition) follows one step commands;50-74% accuracy  -DM      Cognitive Function (Cognitive) attention deficit;memory deficit;safety deficit  -DM      Attention Deficit (Cognitive) mild deficit  -DM      Memory Deficit (Cognitive) moderate deficit  -DM      Safety Deficit (Cognitive) moderate deficit;impulsivity  -DM      Personal Safety Interventions fall " prevention program maintained;gait belt;nonskid shoes/slippers when out of bed   issuedSocks,Kleenexes,dryFlowPads  -DM      Recorded by [DM] Terri Magaña, PT 10/07/18 1438      Row Name 10/07/18 1408             Safety Issues, Functional Mobility    Impairments Affecting Function (Mobility) balance;cognition;endurance/activity tolerance;pain;shortness of breath;strength  -DM      Recorded by [DM] Terri Magaña, PT 10/07/18 1438      Row Name 10/07/18 1408             Bed Mobility Assessment/Treatment    Bed Mobility Assessment/Treatment rolling left;rolling right;scooting/bridging;supine-sit;sit-supine  -DM      Rolling Left [place] (Bed Mobility) moderate assist (50% patient effort)  -DM      Rolling Right [place] (Bed Mobility) maximum assist (25% patient effort)   inc.R rib pain  -DM      Scooting/Bridging [place] (Bed Mobility) verbal cues;maximum assist (25% patient effort)  -DM      Supine-Sit [place] (Bed Mobility) maximum assist (25% patient effort);2 person assist  -DM      Sit-Supine [place] (Bed Mobility) verbal cues;dependent (less than 25% patient effort);2 person assist   onsetBM/diarrhea;toSup;padChanged;bedpanPlaced(2ndBM)  -DM      Bed Mobility, Safety Issues cognitive deficits limit understanding;decreased use of arms for pushing/pulling;decreased use of legs for bridging/pushing;impaired trunk control for bed mobility  -DM      Assistive Device (Bed Mobility) bed rails;draw sheet;head of bed elevated  -DM      Comment (Bed Mobility) cues to reach/graspBedrail  -DM      Recorded by [DM] Terri Magaña, PT 10/07/18 1438      Row Name 10/07/18 1408             Transfer Assessment/Treatment    Comment (Transfers) def. per nsg req.(heel decubs(  -DM      Recorded by [DM] Terri Magaña, PT 10/07/18 1438      Row Name 10/07/18 1408             Gait/Stairs Assessment/Training    [place] Level (Gait) unable to assess   def. per nsg req.  -DM      Recorded by [DM]  "Terri Magaña, PT 10/07/18 1438      Row Name 10/07/18 1408             Motor Skills Assessment/Interventions    Additional Documentation Balance (Group);Balance Interventions (Group);Therapeutic Exercise (Group);Therapeutic Exercise Interventions (Group)  -DM      Recorded by [DM] Terri Magaña, PT 10/07/18 1438      Row Name 10/07/18 1408             Therapeutic Exercise    Upper Extremity Range of Motion (Therapeutic Exercise) shoulder flexion/extension, bilateral;shoulder abduction/adduction, bilateral;elbow flexion/extension, bilateral   inc.SOB;Def.sh.horiz abd  -DM      Lower Extremity (Therapeutic Exercise) heel slides, bilateral;LAQ (long arc quad), bilateral;marching while seated;quad sets, bilateral;SAQ (short arc quad), bilateral  -DM      Lower Extremity Range of Motion (Therapeutic Exercise) hip abduction/adduction, bilateral;hip internal/external rotation, bilateral;ankle dorsiflexion/plantar flexion, bilateral  -DM      Exercise Type (Therapeutic Exercise) AAROM (active assistive range of motion);AROM (active range of motion);isometric contraction, static  -DM      Position (Therapeutic Exercise) seated;supine  -DM      Sets/Reps (Therapeutic Exercise) 1/10  -DM      Comment (Therapeutic Exercise) cued to count aloud w/PT to focus on task;\"letGo;I'll do it\",then unable to stay on task;rests btwnSets d/t fatigue  -DM      Recorded by [DM] Terri Magaña, PT 10/07/18 1438      Row Name 10/07/18 1408             Balance    Balance static sitting balance;dynamic sitting balance  -DM      Recorded by [DM] Terri Magaña, PT 10/07/18 1438      Row Name 10/07/18 1408             Static Sitting Balance    Level of [place] (Unsupported Sitting, Static Balance) minimal assist, 75% patient effort  -DM      Sitting Position (Unsupported Sitting, Static Balance) sitting on edge of bed  -DM      Time Able to Maintain Position (Unsupported Sitting, Static Balance) more than 5 minutes  -DM      " Comment (Unsupported Sitting, Static Balance) trunk/neckExt,PLB,WSoverCof G,LE exer  -DM      Recorded by [DM] Terri Magaña, PT 10/07/18 1438      Row Name 10/07/18 1408             Dynamic Sitting Balance    Level of [place], Reaches Outside Midline (Sitting, Dynamic Balance) moderate assist, 50 to 74% patient effort   PTinFront,techBehind;pillow toSupp.back;resist.trunkExt.exer  -DM      Sitting Position, Reaches Outside Midline (Sitting, Dynamic Balance) sitting on edge of bed   pillows underElbows;reachingAcrossMidline,dynBal,scootingF/B  -DM      Recorded by [DM] Terri Magaña, PT 10/07/18 1438      Row Name 10/07/18 1408             Positioning and Restraints    Pre-Treatment Position in bed  -DM      Post Treatment Position bed  -DM      In Bed notified nsg;supine;call light within reach;encouraged to call for assist;exit alarm on;with nsg;RUE elevated;LUE elevated;legs elevated;waffle boots/both   req.Bedpan reposn.x 2;awaitNsg(pt.supv.24/7 d/tPullsLines)  -DM      Recorded by [DM] Terri Magaña, PT 10/07/18 1438      Row Name 10/07/18 1408             Pain Assessment    Additional Documentation Pain Scale: FACES Pre/Post-Treatment (Group)  -DM      Recorded by [DM] Terri Magaña, PT 10/07/18 1438      Row Name 10/07/18 1408             Pain Scale: Numbers Pre/Post-Treatment    Pain Location - Side Bilateral  -DM      Pain Location back  -DM      Pain Intervention(s) Repositioned;Rest  -DM      Recorded by [DM] Terri Magaña, PT 10/07/18 1438      Row Name 10/07/18 1408             Pain Scale: FACES Pre/Post-Treatment    Pain: FACES Scale, Pretreatment 4-->hurts little more  -DM      Pain: FACES Scale, Post-Treatment 6-->hurts even more  -DM      Recorded by [DM] Terri Magaña, PT 10/07/18 1438      Row Name                Wound 10/01/18 1845 Bilateral medial coccyx    Wound - Properties Group Date first assessed: 10/01/18 [SW] Time first assessed: 1845 [SW] Present On Admission : yes  [SW] Side: Bilateral [SW] Orientation: medial [SW] Location: coccyx [SW] Additional Comments: shearing [CP] Recorded by:  [CP] Mahsa Almanzar APRN 10/07/18 1148 [SW] Fatmata Villalobos RN 10/01/18 1905    Row Name                [REMOVED] Wound 10/06/18 1500 Right heel    Wound - Properties Group Date first assessed: 10/06/18 [MC] Time first assessed: 1500 [MC] Side: Right [MC] Location: heel [MC] Stage, Pressure Injury: Stage 1 [MC] Resolution Date: 10/07/18 [CP] Resolution Time: 1015 [CP] Recorded by:  [CP] Mahsa Almanzar APRN 10/07/18 1151 [MC] Maci Best RN 10/06/18 1749    Row Name                [REMOVED] Wound 10/06/18 1500 Left heel blisters    Wound - Properties Group Date first assessed: 10/06/18 [MC] Time first assessed: 1500 [MC] Side: Left [MC] Location: heel [MC] Type: blisters [MC] Resolution Date: 10/07/18 [CP] Resolution Time: 1015 [CP] Recorded by:  [CP] Mahsa Almanzar APRN 10/07/18 1159 [MC] Maci Best RN 10/06/18 1750    Row Name                [REMOVED] Wound 10/06/18 1500 Right elbow pressure injury    Wound - Properties Group Date first assessed: 10/06/18 [MC] Time first assessed: 1500 [MC] Side: Right [MC] Location: elbow [MC] Type: pressure injury [MC] Stage, Pressure Injury: Stage 2 [MC] Resolution Date: 10/07/18 [CP] Resolution Time: 1015 [CP] Recorded by:  [CP] Mahsa Almanzar APRN 10/07/18 1200 [MC] Maci Best RN 10/06/18 1754    Row Name                Wound 10/07/18 1015 Right heel blisters    Wound - Properties Group Date first assessed: 10/07/18 [CP] Time first assessed: 1015 [CP] Present On Admission : no;picture taken [CP] Side: Right [CP] Location: heel [CP] Type: blisters [CP] Additional Comments: with bruise or DTPI [CP] Recorded by:  [CP] Mahsa Almanzar, JEFF 10/07/18 1155    Row Name                Wound 10/06/18 1500 Left heel blisters    Wound - Properties Group Date first assessed: 10/06/18 [CP] Time first assessed: 1500  [CP] Present On Admission : no [CP2] Side: Left [CP2] Location: heel [CP2] Type: blisters [CP2] Recorded by:  [CP] Mahsa Almanzar, APRN 10/07/18 1159 [CP2] Mahsa Almanzar, APRN 10/07/18 1157    Row Name                Wound 10/06/18 1500 Right elbow pressure injury    Wound - Properties Group Date first assessed: 10/06/18 [CP] Time first assessed: 1500 [CP] Present On Admission : no;picture taken [CP] Side: Right [CP] Location: elbow [CP] Type: pressure injury [CP] Stage, Pressure Injury: unstageable [CP] Recorded by:  [CP] Mahsa Almanzar, APRN 10/07/18 1200    Row Name 10/07/18 1408             Plan of Care Review    Plan of Care Reviewed With patient  -DM      Recorded by [DM] eTrri Magaña, PT 10/07/18 1438      Row Name 10/07/18 1408             Outcome Summary/Treatment Plan (PT)    Daily Summary of Progress (PT) progress toward functional goals is gradual  -DM      Anticipated Discharge Disposition (PT) skilled nursing facility  -DM      Recorded by [DM] Terri Magaña, PT 10/07/18 1438        User Key  (r) = Recorded By, (t) = Taken By, (c) = Cosigned By    Initials Name Effective Dates Discipline    DM Terri Magaña, PT 06/19/15 -  PT    CP Mahsa Almanzar, APRN 06/08/18 -  Nurse    Fatmata Chaves RN 06/16/16 -  Nurse    Maci Elkins RN 06/16/16 -  Nurse          Wound 10/01/18 5069 Bilateral medial coccyx (Active)   Wound Image   10/7/2018 10:15 AM   Dressing Appearance open to air 10/7/2018 10:15 AM   Base clean;dry;pink 10/7/2018 10:15 AM   Periwound intact;dry;pink;blanchable 10/7/2018 10:15 AM   Edges irregular 10/7/2018 10:15 AM   Drainage Amount none 10/7/2018 10:15 AM   Care, Wound cleansed with;barrier applied 10/7/2018 10:15 AM   Dressing Care, Wound open to air 10/7/2018 10:15 AM       Wound 10/07/18 1015 Right heel blisters (Active)   Wound Image   10/7/2018 10:15 AM   Dressing Appearance dry;intact 10/7/2018 10:15 AM   Base clean;dry;pink;purple  10/7/2018 10:15 AM   Periwound intact;dry;pink;blanchable 10/7/2018 10:15 AM   Edges irregular 10/7/2018 10:15 AM   Wound Length (cm) 0.8 cm 10/7/2018 10:15 AM   Wound Width (cm) 1.8 cm 10/7/2018 10:15 AM   Wound Depth (cm) 0 cm 10/7/2018 10:15 AM   Drainage Amount none 10/7/2018 10:15 AM   Care, Wound cleansed with;sterile normal saline 10/7/2018 10:15 AM   Dressing Care, Wound dressing applied;foam;low-adherent;petroleum-based 10/7/2018 10:15 AM       Wound 10/06/18 1500 Left heel blisters (Active)   Dressing Appearance intact;dry 10/7/2018 10:15 AM   Base clean;dry;pink 10/7/2018 10:15 AM   Periwound intact;dry;pink;blanchable 10/7/2018 10:15 AM   Edges irregular 10/7/2018 10:15 AM   Drainage Amount none 10/7/2018 10:15 AM   Care, Wound cleansed with;sterile normal saline 10/7/2018 10:15 AM   Dressing Care, Wound dressing applied;foam;low-adherent;petroleum-based 10/7/2018 10:15 AM       Wound 10/06/18 1500 Right elbow pressure injury (Active)   Wound Image   10/7/2018 10:15 AM   Dressing Appearance dry;intact 10/7/2018 10:15 AM   Base clean;dry;yellow 10/7/2018 10:15 AM   Red (%), Wound Tissue Color 10 10/7/2018 10:15 AM   Yellow (%), Wound Tissue Color 90 10/7/2018 10:15 AM   Periwound intact;dry;pink;blanchable 10/7/2018 10:15 AM   Edges irregular 10/7/2018 10:15 AM   Wound Length (cm) 1.8 cm 10/7/2018 10:15 AM   Wound Width (cm) 1 cm 10/7/2018 10:15 AM   Wound Depth (cm) 0.1 cm 10/7/2018 10:15 AM   Drainage Amount none 10/7/2018 10:15 AM   Care, Wound cleansed with;sterile normal saline 10/7/2018 10:15 AM   Dressing Care, Wound dressing changed;foam;low-adherent;petroleum-based 10/7/2018 10:15 AM             Physical Therapy Education     Title: PT OT SLP Therapies (Active)     Topic: Physical Therapy (Active)     Point: Mobility training (Active)    Learning Progress Summary     Learner Status Readiness Method Response Comment Documented by    Patient Active Acceptance E,D NR  DM 10/07/18 1438     Active  Acceptance E NR  SIXTO 10/04/18 1500          Point: Home exercise program (Active)    Learning Progress Summary     Learner Status Readiness Method Response Comment Documented by    Patient Active Acceptance E,D NR  DM 10/07/18 1438     Active Acceptance E NR  SIXTO 10/04/18 1500          Point: Body mechanics (Active)    Learning Progress Summary     Learner Status Readiness Method Response Comment Documented by    Patient Active Acceptance E,D NR  DM 10/07/18 1438     Active Acceptance E NR  SIXTO 10/04/18 1500          Point: Precautions (Active)    Learning Progress Summary     Learner Status Readiness Method Response Comment Documented by    Patient Active Acceptance E,D NR  DM 10/07/18 1438     Active Acceptance E NR  SIXTO 10/04/18 1500                      User Key     Initials Effective Dates Name Provider Type Discipline    SIXTO 06/19/15 -  Althea Ruth, PT Physical Therapist PT     06/19/15 -  Terri Magaña, PT Physical Therapist PT                    PT Recommendation and Plan  Anticipated Discharge Disposition (PT): skilled nursing facility  Therapy Frequency (PT Clinical Impression): daily  Outcome Summary/Treatment Plan (PT)  Daily Summary of Progress (PT): progress toward functional goals is gradual  Anticipated Discharge Disposition (PT): skilled nursing facility  Plan of Care Reviewed With: patient  Progress: improving  Outcome Summary: Able to michael EOB activ./dyn sit.bal. 10 min.but limited by elev. BP,desat to low 90'son RA,low HGB (10.0),impaired cognit(pulling lines) & latia. heel decub(nsg req.no WB,but PT suggested considering order for  heel offload shoes for mobil.)          Outcome Measures     Row Name 10/07/18 1408 10/05/18 0840 10/04/18 1500       How much help from another person do you currently need...    Turning from your back to your side while in flat bed without using bedrails? 2  -DM  -- 3  -SIXTO    Moving from lying on back to sitting on the side of a flat bed without bedrails? 2  -DM   -- 2  -SIXTO    Moving to and from a bed to a chair (including a wheelchair)? 2  -DM  -- 2  -SIXTO    Standing up from a chair using your arms (e.g., wheelchair, bedside chair)? 2  -DM  -- 2  -SIXTO    Climbing 3-5 steps with a railing? 1  -DM  -- 1  -SIXTO    To walk in hospital room? 2  -DM  -- 2  -SIXTO    AM-PAC 6 Clicks Score 11  -DM  -- 12  -SIXTO       How much help from another is currently needed...    Putting on and taking off regular lower body clothing?  -- 1  -CL  --    Bathing (including washing, rinsing, and drying)  -- 1  -CL  --    Toileting (which includes using toilet bed pan or urinal)  -- 1  -CL  --    Putting on and taking off regular upper body clothing  -- 2  -CL  --    Taking care of personal grooming (such as brushing teeth)  -- 3  -CL  --    Eating meals  -- 1  -CL  --    Score  -- 9  -CL  --       Functional Assessment    Outcome Measure Options AM-PAC 6 Clicks Basic Mobility (PT)  -DM AM-PAC 6 Clicks Daily Activity (OT)  -CL AM-PAC 6 Clicks Basic Mobility (PT)  -SIXTO      User Key  (r) = Recorded By, (t) = Taken By, (c) = Cosigned By    Initials Name Provider Type    Althea Baum, PT Physical Therapist    Terri Alexander, PT Physical Therapist    CL Helena Thomas, OT Occupational Therapist           Time Calculation:         PT Charges     Row Name 10/07/18 1447 10/07/18 1408          Time Calculation    Start Time 1408  -DM  --     PT Received On 10/07/18  -DM  --     PT Goal Re-Cert Due Date 10/14/18  -DM  --        Time Calculation- PT    Total Timed Code Minutes- PT 24 minute(s)  -DM  --        Timed Charges    39542 - PT Therapeutic Activity Minutes  -- 24  -DM       User Key  (r) = Recorded By, (t) = Taken By, (c) = Cosigned By    Initials Name Provider Type    Terri Alexander, PT Physical Therapist        Therapy Suggested Charges     Code   Minutes Charges    94105 (CPT®) Hc Pt Neuromusc Re Education Ea 15 Min      65252 (CPT®) Hc Pt Ther Proc Ea 15 Min      00345 (CPT®) Hc Gait  Training Ea 15 Min      86332 (CPT®) Hc Pt Therapeutic Act Ea 15 Min 24 2    18821 (CPT®) Hc Pt Manual Therapy Ea 15 Min      14956 (CPT®) Hc Pt Iontophoresis Ea 15 Min      63880 (CPT®) Hc Pt Elec Stim Ea-Per 15 Min      94510 (CPT®) Hc Pt Ultrasound Ea 15 Min      40254 (CPT®) Hc Pt Self Care/Mgmt/Train Ea 15 Min      02437 (CPT®) Hc Pt Prosthetic (S) Train Initial Encounter, Each 15 Min      95894 (CPT®) Hc Pt Orthotic(S)/Prosthetic(S) Encounter, Each 15 Min      54201 (CPT®) Hc Orthotic(S) Mgmt/Train Initial Encounter, Each 15min      Total  24 2        Therapy Charges for Today     Code Description Service Date Service Provider Modifiers Qty    84995114170 HC PT THERAPEUTIC ACT EA 15 MIN 10/7/2018 Terri Magaña, PT GP 2    42276339443 HC PT THER SUPP EA 15 MIN 10/7/2018 Terri Magaña, PT GP 2          PT G-Codes  Outcome Measure Options: AM-PAC 6 Clicks Basic Mobility (PT)  AM-PAC 6 Clicks Score: 11  Score: 9    Terri Magaña, PT  10/7/2018

## 2018-10-07 NOTE — PLAN OF CARE
Problem: Patient Care Overview  Goal: Plan of Care Review  Outcome: Ongoing (interventions implemented as appropriate)   10/07/18 1438   Coping/Psychosocial   Plan of Care Reviewed With patient   Coping/Psychosocial   Patient Agreement with Plan of Care agrees   Plan of Care Review   Progress improving   OTHER   Outcome Summary Able to michael EOB activ./dyn sit.bal. 10 min.but limited by elev. BP,desat to low 90'son RA,low HGB (10.0),impaired cognit(pulling lines) & latia. heel decub(nsg req.no WB,but PT suggested considering order for heel offload shoes for mobil.)

## 2018-10-07 NOTE — PROGRESS NOTES
[facility] Medicine Services  PROGRESS NOTE    Patient Name: Devora Huddleston  : 1945  MRN: [id number]    Date of Admission: 10/1/2018  Length of Stay: 6  Primary Care Physician: Evelyn Francisco MD    Subjective   Subjective     CC:  F/U found down unresponsive    HPI:  Patient seen this morning. Placed in soft restraints overnight due to confusion, trying to pull out lines/tubes. This morning remains confused. Has no complaints. Says she is breathing fine. Says she is moving her bowels.    Review of Systems  Gen-no fevers, no chills  CV-no chest pain, no palpitations  Resp-no cough, no dyspnea  GI-no nausea, no abd pain    Otherwise ROS is negative except as mentioned in the HPI.    Objective   Objective     Vital Signs:   Temp:  [97.6 °F (36.4 °C)-99.6 °F (37.6 °C)] 97.9 °F (36.6 °C)  Heart Rate:  [73-82] 73  Resp:  [16-18] 18  BP: (124-147)/(65-81) 133/80  Total (NIH Stroke Scale): 4     Physical Exam:  Gen-no acute distress, cachectic  HENT-NCAT, mucous membranes moist  CV-RRR, S1 S2 normal, no m/r/g  Resp-CTAB, no wheezes or rales  Abd-soft, NT, ND, +BS  Ext-no edema  Neuro-A&Ox2, confused about situation and recent events, but follows commands, no focal deficits; in restraints  Skin-no rashes  Psych-appropriate mood, calm      Results Reviewed:  I have personally reviewed current lab, radiology, and data and agree.      Results from last 7 days  Lab Units 10/06/18  0430 10/05/18  0430 10/04/18  0452 10/03/18  0629  10/01/18  1618   WBC 10*3/mm3 9.90 9.56 9.25 9.89  < >  --    HEMOGLOBIN g/dL 10.0* 10.0* 9.1* 10.6*  < >  --    HEMOGLOBIN, POC   --   --   --   --   < >  --    HEMATOCRIT % 30.0* 29.5* 26.7* 31.8*  < >  --    HEMATOCRIT POC   --   --   --   --   < >  --    PLATELETS 10*3/mm3 259 256 225 257  < >  --    INR   --   --   --  1.04  --  1.0   < > = values in this interval not displayed.    Results from last 7 days  Lab Units 10/06/18  0430 10/05/18  2015  10/05/18  0430 10/04/18  0452 10/03/18  0629  10/02/18  0357  10/01/18  1640   SODIUM mmol/L 132  --  129* 126* 127*  < > 126*  < > 119*   POTASSIUM mmol/L 3.7 3.7 2.9* 3.7 3.5  < > 2.3*  --  3.5   CHLORIDE mmol/L 102  --  98* 95* 98*  --  93*  --  86*   CO2 mmol/L 24.0  --  25.0 25.0 23.0  --  23.0  --  23.0   BUN mg/dL 12  --  9 14 14  --  27*  --  34*   CREATININE mg/dL 0.31*  --  0.38* 0.49* 0.58*  --  0.73  --  1.04   GLUCOSE mg/dL 102*  --  92 80 112*  --  104*  --  178*   CALCIUM mg/dL 7.6*  --  7.5* 7.8* 8.2*  --  8.2*  --  8.6*   ALT (SGPT) U/L  --   --   --   --  23  --  21  --  25   AST (SGOT) U/L  --   --   --   --  46*  --  36*  --  39*   < > = values in this interval not displayed.  Estimated Creatinine Clearance: 43.4 mL/min (A) (by C-G formula based on SCr of 0.31 mg/dL (L)).  No results found for: BNP    Microbiology Results Abnormal     Procedure Component Value - Date/Time    Blood Culture - Blood, [[id number]]  (Normal) Collected:  10/01/18 1745    Lab Status:  Final result Specimen:  Blood from Wrist, Right Updated:  10/06/18 1830     Blood Culture No growth at 5 days    Blood Culture - Blood, [[id number]]  (Normal) Collected:  10/01/18 1715    Lab Status:  Final result Specimen:  Blood from Arm, Right Updated:  10/06/18 1830     Blood Culture No growth at 5 days          Imaging Results (last 24 hours)     Procedure Component Value Units Date/Time    XR Abdomen KUB [[id number]] Collected:  10/07/18 0012     Updated:  10/07/18 0135    Narrative:       EXAM:    XR Abdomen, 1 View    EXAM DATE/TIME:    10/7/2018 12:12 AM    CLINICAL HISTORY:    73 years old, female; Device placement; Nasogastric tube.    TECHNIQUE:    Frontal supine view of the abdomen/pelvis.    COMPARISON:    CR XR ABDOMEN KUB 10/5/2018 6:48 AM    FINDINGS:    Nasogastric tube terminates in the mid abdomen.      Diffuse gaseous distention of several loops of small and large bowel in the   central abdomen.  Prominent stool in the  rectum.  No visualized free   intraperitoneal air.      Levoconvex curvature of the lumbar spine without acute fracture.  Aortic   atherosclerosis.      Impression:         Nasogastric tube in good position with persistent gaseous distention small   and large bowel loops.    THIS DOCUMENT HAS BEEN ELECTRONICALLY SIGNED BY DAVEY SOUTH MD    XR Chest 1 View [[id number]] Collected:  10/06/18 1201     Updated:  10/06/18 1356    Narrative:       EXAMINATION: XR CHEST 1 VW - 10/06/2018     INDICATION:  J96.01-Acute respiratory failure with hypoxia; Z74.09-Other  reduced mobility; R13.12-Dysphagia, oropharyngeal phase.     TECHNIQUE:  Single view frontal chest.     COMPARISONS: 10/4/2018.     FINDINGS:  Background emphysematous change. Likely trace effusions. No  pneumothorax or segmental air space disease. Unchanged cardiomediastinal  silhouette. Atherosclerosis aortic knob. Right IJ central line in good  position. Nasogastric tube has been added, its terminus below the  diaphragm and out of view.       Impression:       Essentially stable exam.     This report was finalized on 10/6/2018 1:54 PM by Dimas Krishna.           Results for orders placed during the hospital encounter of 10/01/18   Adult Transthoracic Echo Complete W/ Cont if Necessary Per Protocol    Narrative · Left ventricular systolic function is normal.  · Estimated EF appears to be in the range of 61 - 65%  · All left ventricular wall segments contract normally.  · Normal left atrial pressure.  · Right ventricular cavity is mildly dilated  · Right ventricular wall thickness is consistent with mild hypertrophy.  · Right atrial cavity size is mildly dilated. The inferior vena cava is   dilated. IVC inspiratory collapse is absent. A prominent Eustachian valve   is present.  · Moderate tricuspid valve regurgitation is present. Estimated right   ventricular systolic pressure from tricuspid regurgitation is mildly   elevated (35-45 mmHg).          I have reviewed  the medications.      clopidogrel 75 mg Oral Daily   docusate sodium 100 mg Nasogastric BID   enoxaparin 30 mg Subcutaneous Nightly   famotidine 20 mg Oral BID   multivitamin 1 tablet Oral Daily   polyethylene glycol 17 g Oral Daily   sennosides-docusate sodium 2 tablet Oral Nightly         Assessment/Plan   Assessment / Plan     Hospital Problem List     * (Principal)Acute hypoxemic respiratory failure requiring intubation and ventilatory support (CMS/HCC)    Metabolic encephalopathy    Hyponatremia, suspect SIADH    Hypoglycemia, resolved    Generalized abdominal pain    Weight loss    Hypotension, resolved    Multiple right rib fractures (ninth, 10th, 11th)    Anemia    COPD and active cigarette abuse (CMS/HCC)    Constipation             Brief Hospital Course to date:  Devora Huddleston is a 73 y.o. female with hx of COPD, HTN, and CVA presents after being found down and unresponsive by her son on 10/1/18. Was intubated in the field. Glucose dropped to 39 en route (initially 105). Patient had reportedly complained of increasing weakness and weight loss for several weeks, and had recent evaluation for hyponatremia and seizures. Admitted to [facility] ICU. CT head, CT perfusion, CTA head and neck all negative. Further imaging revealed 3 right sided rib fractures, presumably from a fall at home. Possible RLL atelectasis vs. infiltrate. She was also hypotensive and requiring pressors. Treated empirically with Vanc and Zosyn however all cultures remain no growth. She was started on enteral feeds due to dysphagia. Patient extubated 10/3. Transferred to floor, hospitalists assumed care on 10/6.      Assessment & Plan:  --Stopped ATBx. Cultures no growth.  --Sodium stable. Presumed SIADH. Monitor. Labs in AM.  --Supportive care for rib fractures.   --SLP following. Continue tube feeds and reevaluate for PO readiness.  --Start Seroquel 25 mg QHS for confusion. Possible hospital delirium. Not receiving any meds that may be  causing this (i.e. opiates, anxiolytics).  --PT/OT. Needs placement. CM following.     DVT Prophylaxis:  Lovenox    CODE STATUS:   Code Status and Medical Interventions:   Ordered at: 10/01/18 1819     Code Status:    CPR     Medical Interventions (Level of Support Prior to Arrest):    Full       Disposition: I expect the patient to be discharged TBD    Electronically signed by Kathy Landrum MD, 10/07/18, 11:45 AM.

## 2018-10-07 NOTE — PLAN OF CARE
Problem: Patient Care Overview  Goal: Plan of Care Review  Outcome: Ongoing (interventions implemented as appropriate)   10/07/18 1015   Coping/Psychosocial   Plan of Care Reviewed With patient;other (see comments)  (Maci MCBRIDE)   Plan of Care Review   Progress no change   OTHER   Outcome Summary WOC nurse consulted to assess heels, buttocks and right elbow wounds. Pt has several small areas of shearing buttocks and coccyx region; cleaned with blue skin wipes; Z-guard applied; NICOLE with dry gunner pads. Pt has unstagable PI vs skin tear left elbow; Xeroform and foam dressing applied; plan Thera Honey to wound with foam dressing. Pt has large intact blisters bilateral heels; right heel with DTPI vs bruising; Xeroform and foam dressings applied to both heels; black heel boots in place. MARLA bed ordered due to low weight, moisture, poor nutrition, high skin risk. WOC nurse will f/u. Please contact WOC nurse as needed for concerns.

## 2018-10-08 ENCOUNTER — APPOINTMENT (OUTPATIENT)
Dept: GENERAL RADIOLOGY | Facility: HOSPITAL | Age: 73
End: 2018-10-08

## 2018-10-08 ENCOUNTER — ANCILLARY PROCEDURE (OUTPATIENT)
Dept: SPEECH THERAPY | Facility: HOSPITAL | Age: 73
End: 2018-10-08
Attending: INTERNAL MEDICINE

## 2018-10-08 LAB
ALBUMIN SERPL-MCNC: 3.4 G/DL (ref 3.2–4.8)
ALP SERPL-CCNC: 107 U/L (ref 25–100)
ALT SERPL W P-5'-P-CCNC: 23 U/L (ref 7–40)
ANION GAP SERPL CALCULATED.3IONS-SCNC: 10 MMOL/L (ref 3–11)
AST SERPL-CCNC: 36 U/L (ref 0–33)
BILIRUB SERPL-MCNC: 0.6 MG/DL (ref 0.3–1.2)
BUN BLD-MCNC: 12 MG/DL (ref 9–23)
CALCIUM SPEC-SCNC: 8.3 MG/DL (ref 8.7–10.4)
CHLORIDE SERPL-SCNC: 95 MMOL/L (ref 99–109)
CHOLEST SERPL-MCNC: 116 MG/DL (ref 0–200)
CO2 SERPL-SCNC: 25 MMOL/L (ref 20–31)
CREAT BLD-MCNC: 0.28 MG/DL (ref 0.6–1.3)
CRP SERPL-MCNC: 3.66 MG/DL (ref 0–1)
DEPRECATED RDW RBC AUTO: 46.1 FL (ref 37–54)
ERYTHROCYTE [DISTWIDTH] IN BLOOD BY AUTOMATED COUNT: 14.3 % (ref 11.3–14.5)
GLUCOSE BLD-MCNC: 74 MG/DL (ref 70–100)
GLUCOSE BLDC GLUCOMTR-MCNC: 109 MG/DL (ref 70–130)
GLUCOSE BLDC GLUCOMTR-MCNC: 65 MG/DL (ref 70–130)
GLUCOSE BLDC GLUCOMTR-MCNC: 71 MG/DL (ref 70–130)
GLUCOSE BLDC GLUCOMTR-MCNC: 94 MG/DL (ref 70–130)
HCT VFR BLD AUTO: 32.2 % (ref 34.5–44)
HGB BLD-MCNC: 10.8 G/DL (ref 11.5–15.5)
MAGNESIUM SERPL-MCNC: 1.7 MG/DL (ref 1.3–2.7)
MAGNESIUM SERPL-MCNC: 1.7 MG/DL (ref 1.3–2.7)
MCH RBC QN AUTO: 29.3 PG (ref 27–31)
MCHC RBC AUTO-ENTMCNC: 33.5 G/DL (ref 32–36)
MCV RBC AUTO: 87.5 FL (ref 80–99)
PHOSPHATE SERPL-MCNC: 2.9 MG/DL (ref 2.4–5.1)
PHOSPHATE SERPL-MCNC: 2.9 MG/DL (ref 2.4–5.1)
PLATELET # BLD AUTO: 278 10*3/MM3 (ref 150–450)
PMV BLD AUTO: 8.6 FL (ref 6–12)
POTASSIUM BLD-SCNC: 3.8 MMOL/L (ref 3.5–5.5)
PREALB SERPL-MCNC: 7.9 MG/DL (ref 10–40)
PROT SERPL-MCNC: 5.3 G/DL (ref 5.7–8.2)
RBC # BLD AUTO: 3.68 10*6/MM3 (ref 3.89–5.14)
SODIUM BLD-SCNC: 130 MMOL/L (ref 132–146)
TRIGL SERPL-MCNC: 66 MG/DL (ref 0–150)
WBC NRBC COR # BLD: 7.4 10*3/MM3 (ref 3.5–10.8)

## 2018-10-08 PROCEDURE — 86140 C-REACTIVE PROTEIN: CPT

## 2018-10-08 PROCEDURE — 97530 THERAPEUTIC ACTIVITIES: CPT | Performed by: OCCUPATIONAL THERAPIST

## 2018-10-08 PROCEDURE — 83735 ASSAY OF MAGNESIUM: CPT

## 2018-10-08 PROCEDURE — 85027 COMPLETE CBC AUTOMATED: CPT | Performed by: HOSPITALIST

## 2018-10-08 PROCEDURE — 84100 ASSAY OF PHOSPHORUS: CPT

## 2018-10-08 PROCEDURE — 84478 ASSAY OF TRIGLYCERIDES: CPT

## 2018-10-08 PROCEDURE — 99233 SBSQ HOSP IP/OBS HIGH 50: CPT | Performed by: INTERNAL MEDICINE

## 2018-10-08 PROCEDURE — 82465 ASSAY BLD/SERUM CHOLESTEROL: CPT

## 2018-10-08 PROCEDURE — 25010000002 ENOXAPARIN PER 10 MG: Performed by: INTERNAL MEDICINE

## 2018-10-08 PROCEDURE — 84100 ASSAY OF PHOSPHORUS: CPT | Performed by: HOSPITALIST

## 2018-10-08 PROCEDURE — 80053 COMPREHEN METABOLIC PANEL: CPT

## 2018-10-08 PROCEDURE — 74018 RADEX ABDOMEN 1 VIEW: CPT

## 2018-10-08 PROCEDURE — 84134 ASSAY OF PREALBUMIN: CPT

## 2018-10-08 PROCEDURE — 82962 GLUCOSE BLOOD TEST: CPT

## 2018-10-08 PROCEDURE — 92612 ENDOSCOPY SWALLOW (FEES) VID: CPT

## 2018-10-08 PROCEDURE — 83735 ASSAY OF MAGNESIUM: CPT | Performed by: HOSPITALIST

## 2018-10-08 RX ADMIN — QUETIAPINE FUMARATE 25 MG: 25 TABLET ORAL at 20:36

## 2018-10-08 RX ADMIN — FAMOTIDINE 20 MG: 20 TABLET ORAL at 09:34

## 2018-10-08 RX ADMIN — Medication 1 TABLET: at 09:34

## 2018-10-08 RX ADMIN — DOCUSATE SODIUM 100 MG: 50 LIQUID ORAL at 20:36

## 2018-10-08 RX ADMIN — ENOXAPARIN SODIUM 30 MG: 30 INJECTION SUBCUTANEOUS at 20:36

## 2018-10-08 RX ADMIN — SENNOSIDES AND DOCUSATE SODIUM 2 TABLET: 8.6; 5 TABLET ORAL at 20:36

## 2018-10-08 RX ADMIN — FAMOTIDINE 20 MG: 20 TABLET ORAL at 20:36

## 2018-10-08 RX ADMIN — CLOPIDOGREL BISULFATE 75 MG: 75 TABLET ORAL at 09:34

## 2018-10-08 RX ADMIN — MAGNESIUM SULFATE HEPTAHYDRATE 4 G: 40 INJECTION, SOLUTION INTRAVENOUS at 12:29

## 2018-10-08 NOTE — PLAN OF CARE
Problem: Patient Care Overview  Goal: Plan of Care Review  Outcome: Ongoing (interventions implemented as appropriate)   10/08/18 2453   Coping/Psychosocial   Plan of Care Reviewed With patient   Plan of Care Review   Progress (Re-evaluation)   SLP re-evaluation completed. Will continue to address dysphagia. FEES completed this date, pt aspirated with thin liquids, NT liquids, and regular textures. Recommend level 2 diet and honey thick liquids. Please see note for further details and recommendations.

## 2018-10-08 NOTE — PROGRESS NOTES
"    [facility] Medicine Services  PROGRESS NOTE    Patient Name: Devora Huddleston  : 1945  MRN: [id number]    Date of Admission: 10/1/2018  Length of Stay: 7  Primary Care Physician: Evelyn Francisco MD    Subjective   Subjective     CC:  F/U found down unresponsive    HPI:  Pt states that she does not understand when things are \"in this condition\"- further clarification revealed that she means why she is restraints.  She remains confused, states that she just wants to go home and wants a protein shake.   Review of Systems  Gen-no fevers, no chills  CV-no chest pain, no palpitations  Resp-no cough, no dyspnea  GI-no nausea, no abd pain    Otherwise ROS is negative except as mentioned in the HPI.    Objective   Objective     Vital Signs:   Temp:  [97.4 °F (36.3 °C)-98.8 °F (37.1 °C)] 98.8 °F (37.1 °C)  Heart Rate:  [73-84] 78  Resp:  [16-18] 16  BP: (125-147)/(76-90) 125/83  Total (NIH Stroke Scale): 4     Physical Exam:  Gen-no acute distress, cachectic  HENT-NCAT, mucous membranes moist  CV-RRR, S1 S2 normal, no m/r/g  Resp-CTAB, no wheezes or rales  Abd-soft, NT, ND, +BS  Ext-no edema  Neuro-A&Ox1, knows which city she is in but does not know the president nor the year (initially states in 1948, then says 18, then , then ), but follows commands, no focal deficits; in restraints  Skin-no rashes  Psych-appropriate mood, calm      Results Reviewed:  I have personally reviewed current lab, radiology, and data and agree.      Results from last 7 days  Lab Units 10/08/18  0704 10/06/18  0430 10/05/18  0430  10/03/18  0629  10/01/18  1618   WBC 10*3/mm3 7.40 9.90 9.56  < > 9.89  < >  --    HEMOGLOBIN g/dL 10.8* 10.0* 10.0*  < > 10.6*  < >  --    HEMOGLOBIN, POC   --   --   --   --   --   < >  --    HEMATOCRIT % 32.2* 30.0* 29.5*  < > 31.8*  < >  --    HEMATOCRIT POC   --   --   --   --   --   < >  --    PLATELETS 10*3/mm3 278 259 256  < > 257  < >  --    INR   --   --   --   --  " 1.04  --  1.0   < > = values in this interval not displayed.    Results from last 7 days  Lab Units 10/08/18  0704 10/06/18  0430 10/05/18  2015 10/05/18  0430  10/03/18  0629  10/02/18  0357   SODIUM mmol/L 130* 132  --  129*  < > 127*  < > 126*   POTASSIUM mmol/L 3.8 3.7 3.7 2.9*  < > 3.5  < > 2.3*   CHLORIDE mmol/L 95* 102  --  98*  < > 98*  --  93*   CO2 mmol/L 25.0 24.0  --  25.0  < > 23.0  --  23.0   BUN mg/dL 12 12  --  9  < > 14  --  27*   CREATININE mg/dL 0.28* 0.31*  --  0.38*  < > 0.58*  --  0.73   GLUCOSE mg/dL 74 102*  --  92  < > 112*  --  104*   CALCIUM mg/dL 8.3* 7.6*  --  7.5*  < > 8.2*  --  8.2*   ALT (SGPT) U/L 23  --   --   --   --  23  --  21   AST (SGOT) U/L 36*  --   --   --   --  46*  --  36*   < > = values in this interval not displayed.  Estimated Creatinine Clearance: 46.3 mL/min (A) (by C-G formula based on SCr of 0.28 mg/dL (L)).  No results found for: BNP    Microbiology Results Abnormal     Procedure Component Value - Date/Time    Blood Culture - Blood, [[id number]]  (Normal) Collected:  10/01/18 1745    Lab Status:  Final result Specimen:  Blood from Wrist, Right Updated:  10/06/18 1830     Blood Culture No growth at 5 days    Blood Culture - Blood, [[id number]]  (Normal) Collected:  10/01/18 1715    Lab Status:  Final result Specimen:  Blood from Arm, Right Updated:  10/06/18 1830     Blood Culture No growth at 5 days          Imaging Results (last 24 hours)     Procedure Component Value Units Date/Time    XR Abdomen KUB [[id number]] Collected:  10/08/18 0148     Updated:  10/08/18 0259    Narrative:       EXAM:  XR Abdomen, 1 View    CLINICAL HISTORY:  73 years old, female; Acute respiratory failure with hypoxia; Dysphagia,   oropharyngeal phase; Other reduced mobility; Other reduced mobility; Device   placement; Gi device; Nasogastric tube; Additional info: Ng tube placement    TECHNIQUE:  Frontal supine view of the abdomen/pelvis.    COMPARISON:  CR XR ABDOMEN KUB 10/7/2018 12:18  AM    FINDINGS:  Intraperitoneal space:  No free air.  Gastrointestinal tract:  Gaseous distention of multiple loops of bowel, similar   to comparison study.  Organs:  Normal as visualized.  Bones/joints:  Normal.  Tubes, lines and devices:  Enteric tube tip is in the distal stomach.      Impression:         Enteric tube tip within the distal stomach.        THIS DOCUMENT HAS BEEN ELECTRONICALLY SIGNED BY EYAD DUMONT MD        Results for orders placed during the hospital encounter of 10/01/18   Adult Transthoracic Echo Complete W/ Cont if Necessary Per Protocol    Narrative · Left ventricular systolic function is normal.  · Estimated EF appears to be in the range of 61 - 65%  · All left ventricular wall segments contract normally.  · Normal left atrial pressure.  · Right ventricular cavity is mildly dilated  · Right ventricular wall thickness is consistent with mild hypertrophy.  · Right atrial cavity size is mildly dilated. The inferior vena cava is   dilated. IVC inspiratory collapse is absent. A prominent Eustachian valve   is present.  · Moderate tricuspid valve regurgitation is present. Estimated right   ventricular systolic pressure from tricuspid regurgitation is mildly   elevated (35-45 mmHg).          I have reviewed the medications.      clopidogrel 75 mg Oral Daily   docusate sodium 100 mg Nasogastric BID   enoxaparin 30 mg Subcutaneous Nightly   famotidine 20 mg Oral BID   multivitamin 1 tablet Oral Daily   polyethylene glycol 17 g Oral Daily   QUEtiapine 25 mg Oral Nightly   sennosides-docusate sodium 2 tablet Oral Nightly         Assessment/Plan   Assessment / Plan     Hospital Problem List     * (Principal)Acute hypoxemic respiratory failure requiring intubation and ventilatory support (CMS/HCC)    Metabolic encephalopathy    Hyponatremia, suspect SIADH    Hypoglycemia, resolved    Generalized abdominal pain    Weight loss    Hypotension, resolved    Multiple right rib fractures (ninth, 10th, 11th)     Anemia    COPD and active cigarette abuse (CMS/HCC)    Constipation             Brief Hospital Course to date:  Devora Huddleston is a 73 y.o. female with hx of COPD, HTN, and CVA presents after being found down and unresponsive by her son on 10/1/18. Was intubated in the field. Glucose dropped to 39 en route (initially 105). Patient had reportedly complained of increasing weakness and weight loss for several weeks, and had recent evaluation for hyponatremia and seizures. Admitted to [facility] ICU. CT head, CT perfusion, CTA head and neck all negative. Further imaging revealed 3 right sided rib fractures, presumably from a fall at home. Possible RLL atelectasis vs. infiltrate. She was also hypotensive and requiring pressors. Treated empirically with Vanc and Zosyn however all cultures remain no growth. She was started on enteral feeds due to dysphagia. Patient extubated 10/3. Transferred to floor, hospitalists assumed care on 10/6.      Assessment & Plan:  --Stopped ATBx. Cultures no growth.  --Sodium slightly worse today. Presumed SIADH. Monitor. Labs in AM.  --Supportive care for rib fractures.   --SLP following. Continue tube feeds and reevaluate for PO readiness.  --Started Seroquel 25 mg QHS for confusion. Possible hospital delirium. Not receiving any meds that may be causing this (i.e. opiates, anxiolytics).  --PT/OT. Needs placement. CM following.     DVT Prophylaxis:  Lovenox    CODE STATUS:   Code Status and Medical Interventions:   Ordered at: 10/01/18 1819     Code Status:    CPR     Medical Interventions (Level of Support Prior to Arrest):    Full       Disposition: I expect the patient to be discharged TBD    Electronically signed by Akila Butt MD, 10/08/18, 11:23 AM.

## 2018-10-08 NOTE — PLAN OF CARE
Problem: Patient Care Overview  Goal: Plan of Care Review   10/08/18 0356   OTHER   Outcome Summary Pt continues to be disoriented to place, time, and situation. Order for restraints requested after pt pulled out NG tube. Tube replaced and verified, tube feed resumed. Frequent leaking of urinary catheter noted, catheter replaced, no further leaking noted. Pt appears to have slept only minimally during this shift. VSS, maintaining in normal sinus rhythmn on room air.       Problem: Patient Care Overview  Goal: Plan of Care Review  Outcome: Ongoing (interventions implemented as appropriate)      Problem: Restraint, Nonbehavioral (Nonviolent)  Goal: Rationale and Justification  Outcome: Ongoing (interventions implemented as appropriate)    Goal: Nonbehavioral (Nonviolent) Restraint: Absence of Injury/Harm  Outcome: Ongoing (interventions implemented as appropriate)    Goal: Nonbehavioral (Nonviolent) Restraint: Achievement of Discontinuation Criteria  Outcome: Ongoing (interventions implemented as appropriate)    Goal: Nonbehavioral (Nonviolent) Restraint: Preservation of Dignity and Wellbeing  Outcome: Ongoing (interventions implemented as appropriate)

## 2018-10-08 NOTE — PROGRESS NOTES
Clinical Nutrition   Reason For Visit: Follow-up protocol, EN    Patient Name: Devora Huddleston  YOB: 1945  MRN: [id number]  Date of Encounter: 10/08/18 10:28 AM  Admission date: 10/1/2018            Nutrition Assessment     Hospital Problem List  Principal Problem:    Acute hypoxemic respiratory failure requiring intubation and ventilatory support (CMS/HCC)  Active Problems:    Metabolic encephalopathy    Hyponatremia, suspect SIADH    Hypoglycemia, resolved    Generalized abdominal pain    Weight loss    Hypotension, resolved    Multiple right rib fractures (ninth, 10th, 11th)    Anemia    COPD and active cigarette abuse (CMS/HCC)    Constipation          PMH: She  has a past medical history of Hypertension and Stroke (CMS/[facility]).   PSxH: She  has no past surgical history on file.           Anthropometrics        10/8/2018 45.45 kg 100 lb 3.2 oz Bed scale   10/4/2018 42.6 kg 93 lb 14.7 oz Bed scale   10/2/2018 (No Data)  (No Data)  -   10/2/2018 36.288 kg 80 lb -   10/1/2018 36.288 kg 80 lb Estimated          Labs reviewed   Labs reviewed: Yes    Results from last 7 days  Lab Units 10/08/18  0704 10/07/18  0628 10/06/18  0430 10/05/18  2015 10/05/18  0430   SODIUM mmol/L 130*  --  132  --  129*   POTASSIUM mmol/L 3.8  --  3.7 3.7 2.9*   CHLORIDE mmol/L 95*  --  102  --  98*   CO2 mmol/L 25.0  --  24.0  --  25.0   BUN mg/dL 12  --  12  --  9   CREATININE mg/dL 0.28*  --  0.31*  --  0.38*   GLUCOSE mg/dL 74  --  102*  --  92   CALCIUM mg/dL 8.3*  --  7.6*  --  7.5*   PHOSPHORUS mg/dL 2.9  2.9 2.4 1.9*  --  2.0*   MAGNESIUM mg/dL 1.7  1.7 1.7 1.7  --  1.8   CHOLESTEROL mg/dL 116  --   --   --   --    TRIGLYCERIDES mg/dL 66  --   --   --   --        Results from last 7 days  Lab Units 10/08/18  0704 10/06/18  0430 10/05/18  0430 10/04/18  0452   WBC 10*3/mm3 7.40 9.90 9.56 9.25   ALBUMIN g/dL 3.40 2.86*  --  2.89*   PREALBUMIN mg/dL 7.9*  --   --   --    CRP mg/dL 3.66*  --   --   --    CHOLESTEROL  mg/dL 116  --   --   --    TRIGLYCERIDES mg/dL 66  --   --   --        Results from last 7 days  Lab Units 10/08/18  0546 10/07/18  2332 10/07/18  1615 10/07/18  1114 10/07/18  0524 10/07/18  0017   GLUCOSE mg/dL 71 94 90 104 82 96     No results found for: HGBA1C  Medications reviewed   Medications reviewed: Yes      Current Nutrition Prescription   PO: NPO Diet  Oral Nutrition Supplement:  EN: Peptamen AF   Route: NG  Verified at bedside: Yes     Evaluation of Received Nutrient/Fluid Intake:  2 Days:   2 day average EN delivery:  1213 mL  1456 kcal ( 110%)  92 gm protein (111%)  6.3 gm fiber 1413 mL total water      Nutrition Diagnosis     Problem Swallowing difficulty   Etiology Oropharyngeal dysphagia   Signs/Symptoms SLP--FEES eval       Problem Malnutrition -severe environmental   Etiology Socioeconomic issues; edentulous   Signs/Symptoms Report of minimal intake <75% for > 1month; weight loss of 15%; severe muscle wasting in orbital and temporal regions, clavicle and acromion  Regions; fat loss in orbital , buccal and thoracic ribs regions       Intervention   Intervention: Follow treatment progress, Care plan reviewed      Goal:   General: Nutrition support treatment  PO: Initiate diet  EN/PN: Maintain EN        Monitoring/Evaluation:       Monitoring/Evaluation: Per protocol, EN delivery/tolerance, GI status, Symptoms  Will Continue to follow per protocol  Ruthy Silva RD  Time Spent: 30 min

## 2018-10-08 NOTE — PLAN OF CARE
Problem: Patient Care Overview  Goal: Plan of Care Review  Outcome: Ongoing (interventions implemented as appropriate)   10/08/18 0350   Coping/Psychosocial   Plan of Care Reviewed With patient   Plan of Care Review   Progress no change   OTHER   Outcome Summary WOC nurse follow-up for possible PI to left heel and right elbow. Assessment performed. Left heel presents with friction blister willed with serous fluid. Blister drained and applied Xeroform and foam. Right heel presents with roofed, small friction blister. Will leave as is for now. Right elbow presents with abrasion/skin tear. Xeroform in place with foam dressing. Continue with current POC and daily dressing changes. WOC nurse will continue to follow. Please contact WOC nurse if needs arise. Thanks

## 2018-10-08 NOTE — PROGRESS NOTES
Continued Stay Note   [facility]     Patient Name: Devora Huddleston  MRN: [id number]  Today's Date: 10/8/2018    Admit Date: 10/1/2018          Discharge Plan     Row Name 10/08/18 1501       Plan    Plan SNF at discharge     Patient/Family in Agreement with Plan yes    Plan Comments Spoke with patient at bedside regarding home situation and need for rehab prior to returning home since her son has small children at home as well. Patient verbalized understanding and was in agreement with this. States she wants to go somewhere in [place] if possible. CM will fax referrals once her condition improves - CM following - imrtha [phone]               Discharge Codes    No documentation.       Expected Discharge Date and Time     Expected Discharge Date Expected Discharge Time    Oct 6, 2018             Mirtha Laws RN

## 2018-10-08 NOTE — THERAPY RE-EVALUATION
Acute Care - Speech Language Pathology   Swallow Re-Evaluation [facility]     Patient Name: Devora Huddleston  : 1945  MRN: [id number]  Today's Date: 10/8/2018  Onset of Illness/Injury or Date of Surgery: 10/01/18     Referring Physician: MD Emmett      Admit Date: 10/1/2018    Visit Dx:     ICD-10-CM ICD-9-CM   1. Acute respiratory failure with hypoxemia (CMS/HCC) J96.01 518.81   2. Impaired functional mobility, balance, gait, and endurance Z74.09 V49.89   3. Oropharyngeal dysphagia R13.12 787.22   4. Impaired mobility and ADLs Z74.09 799.89     Patient Active Problem List   Diagnosis   • Acute respiratory failure (CMS/HCC)   • Metabolic encephalopathy   • Acute hypoxemic respiratory failure requiring intubation and ventilatory support (CMS/HCC)   • Hyponatremia, suspect SIADH   • Hypoglycemia, resolved   • Generalized abdominal pain   • Weight loss   • Hypotension, resolved   • Multiple right rib fractures (ninth, 10th, 11th)   • Anemia   • COPD and active cigarette abuse (CMS/HCC)   • Constipation     Past Medical History:   Diagnosis Date   • Hypertension    • Stroke (CMS/HCC)      History reviewed. No pertinent surgical history.       SWALLOW EVALUATION (last 72 hours)      SLP Adult Swallow Evaluation     Row Name 10/08/18 1400                   Rehab Evaluation    Document Type (P)  re-evaluation  -        Subjective Information (P)  no complaints  -        Patient Observations (P)  alert;cooperative  -        Patient/Family Observations (P)  no family present  -        Patient Effort (P)  good  -           General Information    Patient Profile Reviewed (P)  yes  -MF        Pertinent History Of Current Problem (P)  admit with acute respiratory failure  -        Current Method of Nutrition (P)  NPO  -        Precautions/Limitations, Vision (P)  WFL;for purposes of eval  -MF        Precautions/Limitations, Hearing (P)  WFL;for purposes of eval  -MF        Prior Level of  Function-Communication (P)  WFL  -        Prior Level of Function-Swallowing (P)  no diet consistency restrictions  -        Plans/Goals Discussed with (P)  patient;agreed upon  -        Patient's Goals for Discharge (P)  patient did not state  -           Pain Assessment    Additional Documentation (P)  Pain Scale: FACES Pre/Post-Treatment (Group)  -           Pain Scale: FACES Pre/Post-Treatment    Pain: FACES Scale, Pretreatment (P)  0-->no hurt  -        Pain: FACES Scale, Post-Treatment (P)  0-->no hurt  -           Oral Motor and Function    Dentition Assessment (P)  upper dentures/partial in place;lower dentures/partial in place  -        Secretion Management (P)  WNL/WFL  -        Mucosal Quality (P)  moist, healthy  -           Oral Musculature and Cranial Nerve Assessment    Oral Motor General Assessment (P)  [facility]  -           General Eating/Swallowing Observations    Respiratory Support Currently in Use (P)  nasal cannula  -        Eating/Swallowing Skills (P)  fed by SLP  -        Positioning During Eating (P)  upright in bed  -        Utensils Used (P)  spoon;cup;straw  -        Consistencies Trialed (P)  regular textures;thin liquids;nectar/syrup-thick liquids;honey-thick liquids;pudding thick  -           Respiratory    Respiratory Status (P)  [facility]  -           Fiberoptic Endoscopic Evaluation of Swallowing (FEES)    Nasal Entry (P)  right:  -        Special Considerations (P)  scope 918  -           Anatomy and Physiology    Utensils Used (P)  Spoon;Cup;Straw  -        Consistencies Trialed (P)  thin liquids;nectar-thick liquids;honey-thick liquids;pudding/puree;Regular textures  -           FEES Interpretation    Oral Phase (P)  prolonged manipulation;increased A-P transit time;prespill of liquids into pharynx  -           Initiation of Pharyngeal Swallow    Initiation of Pharyngeal Swallow (P)  bolus in pyriform sinuses  -        Pharyngeal Phase (P)   impaired pharyngeal phase of swallowing  -MF        Aspiration During the Swallow (P)  thin liquids;nectar-thick liquids;regular textures;secondary to reduced vestibular closure  -MF        Aspiration After the Swallow (P)  thin liquids;nectar-thick liquids;regular textures;secondary to residue;in pyriform sinuses  -MF        Response to Aspiration (P)  no response, silent aspiration;inconsistent response  -MF        Residue (P)  thin liquids;nectar-thick liquids;regular Textures;pyriform sinuses;secondary to reduced laryngeal elevation  -MF        Response to Residue (P)  cleared residue;with spontaneous subsequent swallow   at end of evaluation  -        FEES Summary (P)  FEES completed this date. Pt presents with moderate oropharyngeal dysphagia. Pt aspirated on thin liquids, NT liquids, and regular textures. Aspiration occured during the swallow 2' reduced closure of the vestibule. Aspiration occured during the swallow 2' to residue in the pyriform sinuses and in vestibule that did not clear. Pt throat cleared during the study x1, all other aspiration was silent. Rec: Level 2 diet and honey thick liquids.  -          User Key  (r) = Recorded By, (t) = Taken By, (c) = Cosigned By    Initials Name Effective Dates    Emani Flynn Speech Therapy Student 08/06/18 -         EDUCATION  The patient has been educated in the following areas:   Dysphagia (Swallowing Impairment).    SLP Recommendation and Plan                                        Plan of Care Reviewed With: (P) patient  Plan of Care Review  Plan of Care Reviewed With: (P) patient  Progress: (P)  (Re-evaluation)             Time Calculation:         Time Calculation- SLP     Row Name 10/08/18 6532             Time Calculation- SLP    SLP Start Time (P)  1400  -      SLP Received On (P)  10/08/18  -        User Key  (r) = Recorded By, (t) = Taken By, (c) = Cosigned By    Initials Name Provider Type    Emani Flynn Speech Therapy  Student Speech Therapy Student          Therapy Charges for Today     Code Description Service Date Service Provider Modifiers Qty    [id number]  ST FIBEROPTIC ENDO EVAL SWALL 6 10/8/2018 Emani Gamble, Speech Therapy Student GN 1               Emani Gamble, Speech Therapy Student  10/8/2018

## 2018-10-08 NOTE — PLAN OF CARE
Problem: Patient Care Overview  Goal: Plan of Care Review  Outcome: Ongoing (interventions implemented as appropriate)   10/08/18 1529   Coping/Psychosocial   Plan of Care Reviewed With patient   Coping/Psychosocial   Patient Agreement with Plan of Care agrees   Plan of Care Review   Progress improving   OTHER   Outcome Summary Pt. moved supine to sit on EOB with mod A. Pt. was able to tolerate sitting on EOB for >10 min. with CGA-mod A. Pt.'s level of assistance changed many times during >10 min of sitting EOB.

## 2018-10-08 NOTE — THERAPY TREATMENT NOTE
Acute Care - Occupational Therapy Treatment Note  [facility]     Patient Name: Devora Huddleston  : 1945  MRN: [id number]  Today's Date: 10/8/2018  Onset of Illness/Injury or Date of Surgery: 10/01/18  Date of Referral to OT: 10/04/18  Referring Physician: MD Emmett    Admit Date: 10/1/2018       ICD-10-CM ICD-9-CM   1. Acute respiratory failure with hypoxemia (CMS/HCC) J96.01 518.81   2. Impaired functional mobility, balance, gait, and endurance Z74.09 V49.89   3. Oropharyngeal dysphagia R13.12 787.22   4. Impaired mobility and ADLs Z74.09 799.89     Patient Active Problem List   Diagnosis   • Acute respiratory failure (CMS/HCC)   • Metabolic encephalopathy   • Acute hypoxemic respiratory failure requiring intubation and ventilatory support (CMS/HCC)   • Hyponatremia, suspect SIADH   • Hypoglycemia, resolved   • Generalized abdominal pain   • Weight loss   • Hypotension, resolved   • Multiple right rib fractures (ninth, 10th, 11th)   • Anemia   • COPD and active cigarette abuse (CMS/HCC)   • Constipation     Past Medical History:   Diagnosis Date   • Hypertension    • Stroke (CMS/HCC)      History reviewed. No pertinent surgical history.    Therapy Treatment          Rehabilitation Treatment Summary     Row Name 10/08/18 1500             Treatment Time/Intention    Discipline occupational therapist  -SD      Document Type therapy note (daily note)  -SD      Subjective Information complains of;pain  -SD      Mode of Treatment occupational therapy  -SD      Patient/Family Observations Pt. supine in bed. NG tube, IV, tele, & lyons cath. Nsg. student present. No family present.  -SD      Care Plan Review care plan/treatment goals reviewed;risks/benefits reviewed;current/potential barriers reviewed;patient/other agree to care plan  -SD      Total Minutes, Occupational Therapy Treatment 23  -SD      Patient Effort good  -SD      Recorded by [SD] Katerin Shine, AYANA 10/08/18 1529      Row Name 10/08/18  1500             Vital Signs    Pre Patient Position Supine  -SD      Intra Patient Position Sitting  -SD      Post Patient Position Supine  -SD      Recorded by [SD] Katerin Shine OT 10/08/18 1529      Row Name 10/08/18 1500             Cognitive Assessment/Intervention- PT/OT    Affect/Mental Status (Cognitive) confused  -SD      Orientation Status (Cognition) oriented to;person;other (see comments)   stated her  & that we are in [place]  -SD      Follows Commands (Cognition) follows one step commands;75-90% accuracy;verbal cues/prompting required  -SD      Cognitive Function (Cognitive) safety deficit  -SD      Safety Deficit (Cognitive) moderate deficit;insight into deficits/self awareness;safety precautions awareness;safety precautions follow-through/compliance  -SD      Personal Safety Interventions fall prevention program maintained;gait belt;nonskid shoes/slippers when out of bed;muscle strengthening facilitated  -SD      Recorded by [SD] Katerin Shine OT 10/08/18 152      Row Name 10/08/18 1500             Bed Mobility Assessment/Treatment    Rolling Right [place] (Bed Mobility) minimum assist (75% patient effort)  -SD      Scooting/Bridging [place] (Bed Mobility) moderate assist (50% patient effort)  -SD      Supine-Sit [place] (Bed Mobility) moderate assist (50% patient effort)  -SD      Sit-Supine [place] (Bed Mobility) moderate assist (50% patient effort)  -SD      Assistive Device (Bed Mobility) bed rails;draw sheet;head of bed elevated  -SD      Recorded by [SD] Katerin Shine OT 10/08/18 1529      Row Name 10/08/18 1500             Therapeutic Exercise    87479 - OT Therapeutic Activity Minutes 23  -SD      Recorded by [SD] Katerin Shine OT 10/08/18 1529      Row Name 10/08/18 1500             Static Sitting Balance    Level of [place] (Unsupported Sitting, Static Balance) contact guard assist;moderate assist, 50 to 74% patient  effort  -SD      Sitting Position (Unsupported Sitting, Static Balance) sitting on edge of bed  -SD      Time Able to Maintain Position (Unsupported Sitting, Static Balance) more than 5 minutes;other (see comments)   pt. sat EOB for >10 min  -SD      Comment (Unsupported Sitting, Static Balance) pt. leaning posteriorly  -SD      Recorded by [SD] Katerin Shine, OT 10/08/18 1529      Row Name 10/08/18 1500             Dynamic Sitting Balance    Level of [place], Reaches Outside Midline (Sitting, Dynamic Balance) minimal assist, 75% patient effort;moderate assist, 50 to 74% patient effort  -SD      Sitting Position, Reaches Outside Midline (Sitting, Dynamic Balance) sitting on edge of bed  -SD      Comment, Reaches Outside Midline (Sitting, Dynamic Balance) pt. completing sitting balance exercises for increased postural control  -SD      Recorded by [SD] Katerin Shine, OT 10/08/18 1529      Row Name 10/08/18 1500             Positioning and Restraints    Pre-Treatment Position in bed  -SD      Post Treatment Position bed  -SD      In Bed supine;call light within reach;encouraged to call for assist;exit alarm on;with nsg  -SD      Recorded by [SD] Katerin Shine, OT 10/08/18 1529      Row Name 10/08/18 1500             Pain Scale: FACES Pre/Post-Treatment    Pain: FACES Scale, Pretreatment 4-->hurts little more  -SD      Pain: FACES Scale, Post-Treatment 4-->hurts little more  -SD      Pre/Post Treatment Pain Comment pt. stated that she hurts all over  -SD      Recorded by [SD] Katerin Shine, OT 10/08/18 1529      Row Name                Wound 10/01/18 1845 Bilateral medial coccyx    Wound - Properties Group Date first assessed: 10/01/18 [SW] Time first assessed: 1845 [SW] Present On Admission : yes [SW] Side: Bilateral [SW] Orientation: medial [SW] Location: coccyx [SW] Additional Comments: shearing [CP] Recorded by:  [CP] Mahsa Almanzar APRN 10/07/18 1148 [SW] Wilfredo  Fatmata RENEE RN 10/01/18 1905    Row Name                Wound 10/07/18 1015 Right heel blisters    Wound - Properties Group Date first assessed: 10/07/18 [CP] Time first assessed: 1015 [CP] Present On Admission : no;picture taken [CP] Side: Right [CP] Location: heel [CP] Type: blisters [CP] Additional Comments: with bruise or DTPI [CP] Recorded by:  [CP] Mahsa Almanzar APRN 10/07/18 1155    Row Name                Wound 10/06/18 1500 Left heel blisters    Wound - Properties Group Date first assessed: 10/06/18 [CP] Time first assessed: 1500 [CP] Present On Admission : no [CP2] Side: Left [CP2] Location: heel [CP2] Type: blisters [CP2] Recorded by:  [CP] Mahsa Almanzar APRN 10/07/18 1159 [CP2] Mahsa Almanzar APRN 10/07/18 1157    Row Name                Wound 10/06/18 1500 Right elbow pressure injury    Wound - Properties Group Date first assessed: 10/06/18 [CP] Time first assessed: 1500 [CP] Present On Admission : no;picture taken [CP] Side: Right [CP] Location: elbow [CP] Type: pressure injury [CP] Stage, Pressure Injury: unstageable [CP] Recorded by:  [CP] Mahsa Almanzar APRN 10/07/18 1200    Row Name 10/08/18 1500             Plan of Care Review    Plan of Care Reviewed With patient  -SD      Recorded by [SD] Katerin Shine OT 10/08/18 1529      Row Name 10/08/18 1500             Outcome Summary/Treatment Plan (OT)    Daily Summary of Progress (OT) progress toward functional goals as expected  -SD      Plan for Continued Treatment (OT) cont OT POC  -SD      Anticipated Discharge Disposition (OT) skilled nursing facility  -SD      Recorded by [SD] Katerin Shine OT 10/08/18 1529        User Key  (r) = Recorded By, (t) = Taken By, (c) = Cosigned By    Initials Name Effective Dates Discipline    SD Katerin Shine OT 06/08/18 -  OT    CP Mahsa Almanzar APRN 06/08/18 -  Nurse    Fatmata Chaves RN 06/16/16 -  Nurse        Wound 10/01/18 4755 Bilateral  medial coccyx (Active)   Dressing Appearance open to air 10/8/2018  8:00 AM   Base dry 10/8/2018  8:00 AM   Periwound intact;dry;pink;blanchable 10/8/2018  8:00 AM   Drainage Amount none 10/8/2018  8:00 AM   Care, Wound cleansed with 10/7/2018  9:55 PM   Dressing Care, Wound open to air 10/7/2018  9:55 PM   Periwound Care, Wound barrier ointment applied 10/8/2018  8:00 AM       Wound 10/07/18 1015 Right heel blisters (Active)   Dressing Appearance dry;intact 10/8/2018  8:00 AM   Closure CARLOS 10/7/2018  9:55 PM   Base dressing in place, unable to visualize 10/8/2018  8:00 AM       Wound 10/06/18 1500 Left heel blisters (Active)   Dressing Appearance intact;dry 10/8/2018  8:00 AM   Closure CARLOS 10/7/2018  9:55 PM   Base dressing in place, unable to visualize 10/8/2018  8:00 AM   Periwound blistered 10/8/2018  7:50 AM   Drainage Characteristics/Odor serous 10/8/2018  7:50 AM   Drainage Amount large 10/8/2018  7:50 AM   Care, Wound other (see comments) 10/8/2018  7:50 AM   Dressing Care, Wound dressing changed;foam;low-adherent;non-adherent;petroleum-based 10/8/2018  7:50 AM       Wound 10/06/18 1500 Right elbow pressure injury (Active)   Dressing Appearance dry;intact 10/8/2018  8:00 AM   Closure CARLOS 10/7/2018  9:55 PM   Base dressing in place, unable to visualize 10/8/2018  8:00 AM         Occupational Therapy Education     Title: PT OT SLP Therapies (Active)     Topic: Occupational Therapy (Active)     Point: ADL training (Active)     Description: Instruct learner(s) on proper safety adaptation and remediation techniques during self care or transfers.   Instruct in proper use of assistive devices.   Learning Progress Summary     Learner Status Readiness Method Response Comment Documented by    Patient Active Acceptance E NR Pt educated on appropriate safety precautions, t/f techniques, role of OT, positioning, and benefits of therapy. CL 10/05/18 1009          Point: Precautions (Active)     Description: Instruct  learner(s) on prescribed precautions during self-care and functional transfers.   Learning Progress Summary     Learner Status Readiness Method Response Comment Documented by    Patient Active Acceptance E NR Pt educated on appropriate safety precautions, t/f techniques, role of OT, positioning, and benefits of therapy. CL 10/05/18 1009          Point: Body mechanics (Active)     Description: Instruct learner(s) on proper positioning and spine alignment during self-care, functional mobility activities and/or exercises.   Learning Progress Summary     Learner Status Readiness Method Response Comment Documented by    Patient Active Acceptance E NR Pt educated on appropriate safety precautions, t/f techniques, role of OT, positioning, and benefits of therapy. CL 10/05/18 1009                      User Key     Initials Effective Dates Name Provider Type Discipline    CL 04/03/18 -  Helena Thomas, OT Occupational Therapist OT                OT Recommendation and Plan  Outcome Summary/Treatment Plan (OT)  Daily Summary of Progress (OT): progress toward functional goals as expected  Plan for Continued Treatment (OT): cont OT POC  Anticipated Discharge Disposition (OT): skilled nursing facility  Daily Summary of Progress (OT): progress toward functional goals as expected  Plan of Care Review  Plan of Care Reviewed With: patient  Plan of Care Reviewed With: patient  Outcome Summary: Pt. moved supine to sit on EOB with mod A. Pt. was able to tolerate sitting on EOB for >10 min. with CGA-mod A. Pt.'s level of assistance changed many times during >10 min of sitting EOB.         Outcome Measures     Row Name 10/08/18 1500 10/07/18 8581          How much help from another person do you currently need...    Turning from your back to your side while in flat bed without using bedrails?  -- 2  -DM     Moving from lying on back to sitting on the side of a flat bed without bedrails?  -- 2  -DM     Moving to and from a bed to a chair  (including a wheelchair)?  -- 2  -DM     Standing up from a chair using your arms (e.g., wheelchair, bedside chair)?  -- 2  -DM     Climbing 3-5 steps with a railing?  -- 1  -DM     To walk in hospital room?  -- 2  -DM     AM-PAC 6 Clicks Score  -- 11  -DM        How much help from another is currently needed...    Putting on and taking off regular lower body clothing? 1  -SD  --     Bathing (including washing, rinsing, and drying) 1  -SD  --     Toileting (which includes using toilet bed pan or urinal) 1  -SD  --     Putting on and taking off regular upper body clothing 2  -SD  --     Taking care of personal grooming (such as brushing teeth) 3  -SD  --     Eating meals 3  -SD  --     Score 11  -SD  --        Functional Assessment    Outcome Measure Options AM-PAC 6 Clicks Daily Activity (OT)  -SD AM-PAC 6 Clicks Basic Mobility (PT)  -DM       User Key  (r) = Recorded By, (t) = Taken By, (c) = Cosigned By    Initials Name Provider Type    Katerin Luz, OT Occupational Therapist    Terri Alexander, PT Physical Therapist           Time Calculation:         Time Calculation- OT     Row Name 10/08/18 1500             Time Calculation- OT    OT Start Time 1500  -SD      OT Stop Time 1523  -SD      OT Time Calculation (min) 23 min  -SD      OT Received On 10/08/18  -SD      OT Goal Re-Cert Due Date 10/15/18  -SD         Timed Charges    42399 - OT Therapeutic Activity Minutes 23  -SD        User Key  (r) = Recorded By, (t) = Taken By, (c) = Cosigned By    Initials Name Provider Type    Katerin Luz, OT Occupational Therapist           Therapy Suggested Charges     Code   Minutes Charges    17382 (CPT®) Hc Ot Neuromusc Re Education Ea 15 Min      75534 (CPT®) Hc Ot Ther Proc Ea 15 Min      27770 (CPT®) Hc Ot Therapeutic Act Ea 15 Min 23 2    90274 (CPT®) Hc Ot Manual Therapy Ea 15 Min      92948 (CPT®) Hc Ot Iontophoresis Ea 15 Min      71499 (CPT®) Hc Ot Elec Stim Ea-Per 15 Min      41460  (CPT®) Hc Ot Ultrasound Ea 15 Min      43877 (CPT®) Hc Ot Self Care/Mgmt/Train Ea 15 Min      Total  23 2        Therapy Charges for Today     Code Description Service Date Service Provider Modifiers Qty    80193750418 HC OT THERAPEUTIC ACT EA 15 MIN 10/8/2018 Katerin Shine, AYANA GO 2               Katerin Shine OT  10/8/2018

## 2018-10-09 LAB
ANION GAP SERPL CALCULATED.3IONS-SCNC: 8 MMOL/L (ref 3–11)
BUN BLD-MCNC: 15 MG/DL (ref 9–23)
BUN/CREAT SERPL: 55.6 (ref 7–25)
CALCIUM SPEC-SCNC: 8.5 MG/DL (ref 8.7–10.4)
CHLORIDE SERPL-SCNC: 92 MMOL/L (ref 99–109)
CO2 SERPL-SCNC: 27 MMOL/L (ref 20–31)
CREAT BLD-MCNC: 0.27 MG/DL (ref 0.6–1.3)
DEPRECATED RDW RBC AUTO: 45.3 FL (ref 37–54)
ERYTHROCYTE [DISTWIDTH] IN BLOOD BY AUTOMATED COUNT: 14.2 % (ref 11.3–14.5)
GFR SERPL CREATININE-BSD FRML MDRD: >150 ML/MIN/1.73
GLUCOSE BLD-MCNC: 80 MG/DL (ref 70–100)
GLUCOSE BLDC GLUCOMTR-MCNC: 103 MG/DL (ref 70–130)
GLUCOSE BLDC GLUCOMTR-MCNC: 112 MG/DL (ref 70–130)
GLUCOSE BLDC GLUCOMTR-MCNC: 85 MG/DL (ref 70–130)
GLUCOSE BLDC GLUCOMTR-MCNC: 94 MG/DL (ref 70–130)
HCT VFR BLD AUTO: 31.8 % (ref 34.5–44)
HGB BLD-MCNC: 10.7 G/DL (ref 11.5–15.5)
MAGNESIUM SERPL-MCNC: 1.4 MG/DL (ref 1.3–2.7)
MAGNESIUM SERPL-MCNC: 1.6 MG/DL (ref 1.3–2.7)
MCH RBC QN AUTO: 29.3 PG (ref 27–31)
MCHC RBC AUTO-ENTMCNC: 33.6 G/DL (ref 32–36)
MCV RBC AUTO: 87.1 FL (ref 80–99)
OSMOLALITY SERPL: 261 MOSM/KG (ref 275–295)
OSMOLALITY UR: 514 MOSM/KG (ref 300–1100)
PLATELET # BLD AUTO: 358 10*3/MM3 (ref 150–450)
PMV BLD AUTO: 8.8 FL (ref 6–12)
POTASSIUM BLD-SCNC: 4.1 MMOL/L (ref 3.5–5.5)
RBC # BLD AUTO: 3.65 10*6/MM3 (ref 3.89–5.14)
SODIUM BLD-SCNC: 123 MMOL/L (ref 132–146)
SODIUM BLD-SCNC: 127 MMOL/L (ref 132–146)
SODIUM UR-SCNC: 140 MMOL/L (ref 30–90)
WBC NRBC COR # BLD: 10.16 10*3/MM3 (ref 3.5–10.8)

## 2018-10-09 PROCEDURE — 25010000002 MAGNESIUM SULFATE 2 GM/50ML SOLUTION: Performed by: NURSE PRACTITIONER

## 2018-10-09 PROCEDURE — 25010000002 LORAZEPAM PER 2 MG: Performed by: INTERNAL MEDICINE

## 2018-10-09 PROCEDURE — 84295 ASSAY OF SERUM SODIUM: CPT | Performed by: NURSE PRACTITIONER

## 2018-10-09 PROCEDURE — 83935 ASSAY OF URINE OSMOLALITY: CPT | Performed by: INTERNAL MEDICINE

## 2018-10-09 PROCEDURE — 83930 ASSAY OF BLOOD OSMOLALITY: CPT | Performed by: INTERNAL MEDICINE

## 2018-10-09 PROCEDURE — 97110 THERAPEUTIC EXERCISES: CPT

## 2018-10-09 PROCEDURE — 25010000002 LORAZEPAM PER 2 MG: Performed by: FAMILY MEDICINE

## 2018-10-09 PROCEDURE — 99233 SBSQ HOSP IP/OBS HIGH 50: CPT | Performed by: INTERNAL MEDICINE

## 2018-10-09 PROCEDURE — 85027 COMPLETE CBC AUTOMATED: CPT | Performed by: INTERNAL MEDICINE

## 2018-10-09 PROCEDURE — 84300 ASSAY OF URINE SODIUM: CPT | Performed by: INTERNAL MEDICINE

## 2018-10-09 PROCEDURE — 83735 ASSAY OF MAGNESIUM: CPT | Performed by: INTERNAL MEDICINE

## 2018-10-09 PROCEDURE — 80048 BASIC METABOLIC PNL TOTAL CA: CPT | Performed by: INTERNAL MEDICINE

## 2018-10-09 PROCEDURE — 25010000002 ENOXAPARIN PER 10 MG: Performed by: INTERNAL MEDICINE

## 2018-10-09 PROCEDURE — 82962 GLUCOSE BLOOD TEST: CPT

## 2018-10-09 PROCEDURE — 97530 THERAPEUTIC ACTIVITIES: CPT | Performed by: OCCUPATIONAL THERAPIST

## 2018-10-09 PROCEDURE — 83735 ASSAY OF MAGNESIUM: CPT | Performed by: NURSE PRACTITIONER

## 2018-10-09 RX ORDER — LORAZEPAM 2 MG/ML
0.5 INJECTION INTRAMUSCULAR EVERY 6 HOURS PRN
Status: DISCONTINUED | OUTPATIENT
Start: 2018-10-09 | End: 2018-10-19 | Stop reason: HOSPADM

## 2018-10-09 RX ORDER — LORAZEPAM 2 MG/ML
0.5 INJECTION INTRAMUSCULAR ONCE
Status: COMPLETED | OUTPATIENT
Start: 2018-10-09 | End: 2018-10-09

## 2018-10-09 RX ADMIN — MAGNESIUM SULFATE HEPTAHYDRATE 2 G: 40 INJECTION, SOLUTION INTRAVENOUS at 21:55

## 2018-10-09 RX ADMIN — FAMOTIDINE 20 MG: 20 TABLET ORAL at 09:57

## 2018-10-09 RX ADMIN — DOCUSATE SODIUM 100 MG: 50 LIQUID ORAL at 09:57

## 2018-10-09 RX ADMIN — ENOXAPARIN SODIUM 30 MG: 30 INJECTION SUBCUTANEOUS at 20:27

## 2018-10-09 RX ADMIN — POLYETHYLENE GLYCOL 3350 17 G: 17 POWDER, FOR SOLUTION ORAL at 09:57

## 2018-10-09 RX ADMIN — DOCUSATE SODIUM 100 MG: 50 LIQUID ORAL at 20:27

## 2018-10-09 RX ADMIN — LORAZEPAM 0.5 MG: 2 INJECTION INTRAMUSCULAR; INTRAVENOUS at 03:10

## 2018-10-09 RX ADMIN — FAMOTIDINE 20 MG: 20 TABLET ORAL at 20:27

## 2018-10-09 RX ADMIN — LORAZEPAM 0.5 MG: 2 INJECTION INTRAMUSCULAR; INTRAVENOUS at 16:34

## 2018-10-09 RX ADMIN — CLOPIDOGREL BISULFATE 75 MG: 75 TABLET ORAL at 09:57

## 2018-10-09 RX ADMIN — LORAZEPAM 0.5 MG: 2 INJECTION INTRAMUSCULAR; INTRAVENOUS at 11:19

## 2018-10-09 RX ADMIN — Medication 1 TABLET: at 09:57

## 2018-10-09 NOTE — PLAN OF CARE
Problem: Patient Care Overview  Goal: Plan of Care Review  Outcome: Ongoing (interventions implemented as appropriate)   10/09/18 1611   Coping/Psychosocial   Plan of Care Reviewed With patient   Coping/Psychosocial   Patient Agreement with Plan of Care unable to participate   Plan of Care Review   Progress no change   OTHER   Outcome Summary Pt. holding eyes closed during most of tx session. Pt.'s son called & she spoke with him on phone, appropriately holding the phone to her ear, but then puling at her lyons, IV lines, NG, & pulling gown off. Therapist attempted to redirect pt. Supine to sit on EOB with mod A, then pt. leaned back requiring max A from therapist to hold pt. upright. Max A x 2 to pull pt. up in bed.

## 2018-10-09 NOTE — PROGRESS NOTES
[facility] Medicine Services  PROGRESS NOTE    Patient Name: Devora Huddleston  : 1945  MRN: [id number]    Date of Admission: 10/1/2018  Length of Stay: 8  Primary Care Physician: Evelyn Francisco MD    Subjective   Subjective     CC:  F/U found down unresponsive    HPI:  Pt lethargic this am but responsive and appears to be pulling on [place] (which is under a blanket) when I walked into her room.  She received one time dose of Ativan around 3 am due to anxiety per RN.      Review of Systems  Gen-no fevers, no chills  CV-no chest pain, no palpitations  Resp-no cough, no dyspnea  GI-no nausea, no abd pain    Otherwise ROS is negative except as mentioned in the HPI.    Objective   Objective     Vital Signs:   Temp:  [97.2 °F (36.2 °C)-98.6 °F (37 °C)] 98.6 °F (37 °C)  Heart Rate:  [] 120  Resp:  [16-18] 18  BP: (132-155)/(74-97) 141/97  Total (NIH Stroke Scale): 4     Physical Exam:  Gen-no acute distress, cachectic  HENT-NCAT, mucous membranes moist  CV-RRR, S1 S2 normal, no m/r/g  Resp-CTAB, no wheezes or rales  Abd-soft, NT, ND, +BS  Ext-no edema  Neuro-A&Ox1, knows which city she is in but does not know the president nor the year (initially states in 1948, then says 18, then 1800, then ), but follows commands, no focal deficits; no currently in restraints  Skin-no rashes  Psych-appropriate mood, calm      Results Reviewed:  I have personally reviewed current lab, radiology, and data and agree.      Results from last 7 days  Lab Units 10/09/18  0547 10/08/18  0704 10/06/18  0430  10/03/18  0629   WBC 10*3/mm3 10.16 7.40 9.90  < > 9.89   HEMOGLOBIN g/dL 10.7* 10.8* 10.0*  < > 10.6*   HEMATOCRIT % 31.8* 32.2* 30.0*  < > 31.8*   PLATELETS 10*3/mm3 358 278 259  < > 257   INR   --   --   --   --  1.04   < > = values in this interval not displayed.    Results from last 7 days  Lab Units 10/09/18  0547 10/08/18  0704 10/06/18  0430  10/03/18  0629   SODIUM mmol/L 127* 130* 132   < > 127*   POTASSIUM mmol/L 4.1 3.8 3.7  < > 3.5   CHLORIDE mmol/L 92* 95* 102  < > 98*   CO2 mmol/L 27.0 25.0 24.0  < > 23.0   BUN mg/dL 15 12 12  < > 14   CREATININE mg/dL 0.27* 0.28* 0.31*  < > 0.58*   GLUCOSE mg/dL 80 74 102*  < > 112*   CALCIUM mg/dL 8.5* 8.3* 7.6*  < > 8.2*   ALT (SGPT) U/L  --  23  --   --  23   AST (SGOT) U/L  --  36*  --   --  46*   < > = values in this interval not displayed.  Estimated Creatinine Clearance: 43 mL/min (A) (by C-G formula based on SCr of 0.27 mg/dL (L)).  No results found for: BNP    Microbiology Results Abnormal     Procedure Component Value - Date/Time    Blood Culture - Blood, [[id number]]  (Normal) Collected:  10/01/18 1745    Lab Status:  Final result Specimen:  Blood from Wrist, Right Updated:  10/06/18 1830     Blood Culture No growth at 5 days    Blood Culture - Blood, [[id number]]  (Normal) Collected:  10/01/18 1715    Lab Status:  Final result Specimen:  Blood from Arm, Right Updated:  10/06/18 1830     Blood Culture No growth at 5 days          Imaging Results (last 24 hours)     Procedure Component Value Units Date/Time    Fiberoptic Endo (fees) [[id number]] Resulted:  10/08/18 1436     Updated:  10/08/18 1436    Narrative:       This procedure was auto-finalized with no dictation required.        Results for orders placed during the hospital encounter of 10/01/18   Adult Transthoracic Echo Complete W/ Cont if Necessary Per Protocol    Narrative · Left ventricular systolic function is normal.  · Estimated EF appears to be in the range of 61 - 65%  · All left ventricular wall segments contract normally.  · Normal left atrial pressure.  · Right ventricular cavity is mildly dilated  · Right ventricular wall thickness is consistent with mild hypertrophy.  · Right atrial cavity size is mildly dilated. The inferior vena cava is   dilated. IVC inspiratory collapse is absent. A prominent Eustachian valve   is present.  · Moderate tricuspid valve regurgitation is present.  Estimated right   ventricular systolic pressure from tricuspid regurgitation is mildly   elevated (35-45 mmHg).          I have reviewed the medications.      clopidogrel 75 mg Oral Daily   docusate sodium 100 mg Nasogastric BID   enoxaparin 30 mg Subcutaneous Nightly   famotidine 20 mg Oral BID   multivitamin 1 tablet Oral Daily   polyethylene glycol 17 g Oral Daily   sennosides-docusate sodium 2 tablet Oral Nightly         Assessment/Plan   Assessment / Plan     Active Hospital Problems    Diagnosis   • **Acute hypoxemic respiratory failure requiring intubation and ventilatory support (CMS/[facility])   • Constipation   • COPD and active cigarette abuse (CMS/[facility])   • Multiple right rib fractures (ninth, 10th, 11th)   • Anemia   • Metabolic encephalopathy   • Hyponatremia, suspect SIADH   • Hypoglycemia, resolved   • Generalized abdominal pain   • Weight loss   • Hypotension, resolved          Brief Hospital Course to date:  Devora Huddleston is a 73 y.o. female with hx of COPD, HTN, and CVA presents after being found down and unresponsive by her son on 10/1/18. Was intubated in the field. Glucose dropped to 39 en route (initially 105). Patient had reportedly complained of increasing weakness and weight loss for several weeks, and had recent evaluation for hyponatremia and seizures. Admitted to [facility] ICU. CT head, CT perfusion, CTA head and neck all negative. Further imaging revealed 3 right sided rib fractures, presumably from a fall at home. Possible RLL atelectasis vs. infiltrate. She was also hypotensive and requiring pressors. Treated empirically with Vanc and Zosyn however all cultures remain no growth. She was started on enteral feeds due to dysphagia. Patient extubated 10/3. Transferred to floor, hospitalists assumed care on 10/6.      Assessment & Plan:  --Stopped ATBx. Cultures no growth.  --Sodium slightly worse today, workup c/w SIADH. Worse today. Will decrease FW with TFs.  Recheck in am.   --Supportive care  for rib fractures.   --SLP following. Continue tube feeds and reevaluate for PO readiness.  --Started Seroquel 25 mg QHS for confusion, but appears to worsen her symptoms. Will d/c Seroquel.  PRN Ativan for anxiety. If confusion worsens, though, will need to stop anxiolytics.  Also, worsening hyponatremia could be contributing.   --PT/OT. Needs placement. CM following.     DVT Prophylaxis:  Lovenox    CODE STATUS:   Code Status and Medical Interventions:   Ordered at: 10/01/18 1819     Code Status:    CPR     Medical Interventions (Level of Support Prior to Arrest):    Full       Disposition: I expect the patient to be discharged TBD    Electronically signed by Akila Butt MD, 10/09/18, 1:48 PM.

## 2018-10-09 NOTE — PLAN OF CARE
Problem: Patient Care Overview  Goal: Plan of Care Review   10/09/18 1323   Coping/Psychosocial   Plan of Care Reviewed With patient   Plan of Care Review   Progress no change   OTHER   Outcome Summary Deferred bed mobility and transfers d/t cognition, combative, and inability to follow commands. Completed bed level ther ex with cues for redirection to task. Will continue to progress pt as able per POC.

## 2018-10-09 NOTE — PLAN OF CARE
Problem: Skin Injury Risk (Adult)  Goal: Skin Health and Integrity  Outcome: Ongoing (interventions implemented as appropriate)      Problem: Infection, Risk/Actual (Adult)  Goal: Infection Prevention/Resolution  Outcome: Ongoing (interventions implemented as appropriate)      Problem: Fall Risk (Adult)  Goal: Absence of Fall  Outcome: Ongoing (interventions implemented as appropriate)

## 2018-10-09 NOTE — THERAPY TREATMENT NOTE
Acute Care - Occupational Therapy Treatment Note  [facility]     Patient Name: Devora Huddleston  : 1945  MRN: [id number]  Today's Date: 10/9/2018  Onset of Illness/Injury or Date of Surgery: 10/01/18  Date of Referral to OT: 10/04/18  Referring Physician: MD Emmett    Admit Date: 10/1/2018       ICD-10-CM ICD-9-CM   1. Acute respiratory failure with hypoxemia (CMS/HCC) J96.01 518.81   2. Impaired functional mobility, balance, gait, and endurance Z74.09 V49.89   3. Oropharyngeal dysphagia R13.12 787.22   4. Impaired mobility and ADLs Z74.09 799.89     Patient Active Problem List   Diagnosis   • Acute respiratory failure (CMS/HCC)   • Metabolic encephalopathy   • Acute hypoxemic respiratory failure requiring intubation and ventilatory support (CMS/HCC)   • Hyponatremia, suspect SIADH   • Hypoglycemia, resolved   • Generalized abdominal pain   • Weight loss   • Hypotension, resolved   • Multiple right rib fractures (ninth, 10th, 11th)   • Anemia   • COPD and active cigarette abuse (CMS/HCC)   • Constipation     Past Medical History:   Diagnosis Date   • Hypertension    • Stroke (CMS/HCC)      History reviewed. No pertinent surgical history.    Therapy Treatment          Rehabilitation Treatment Summary     Row Name 10/09/18 1544 10/09/18 1323          Treatment Time/Intention    Discipline occupational therapist  -SD physical therapist  -VG     Document Type therapy note (daily note)  -SD therapy note (daily note)  -VG     Subjective Information no complaints  -SD no complaints  -VG     Mode of Treatment occupational therapy  -SD individual therapy;physical therapy  -VG     Patient/Family Observations Pt. in bed, NG tube, IV, lyons cath; no family present.  -SD In bed, NG tube, IV, tele, lyons cath; no family present.  -VG     Care Plan Review patient/other agree to care plan  -SD patient/other agree to care plan  -VG     Therapy Frequency (PT Clinical Impression)  -- daily  -VG     Total Minutes,  Occupational Therapy Treatment 19  -SD  --     Patient Effort adequate  -SD adequate  -VG     Comment pt. very confused, pulling at lines  -SD  --     Existing Precautions/Restrictions  -- fall;other (see comments)   confused/combative  -VG     Recorded by [SD] Katerin Shine, OT 10/09/18 1610 [VG] Gemma Leigh, PT 10/09/18 1342     Row Name 10/09/18 1544             Vital Signs    Pre Patient Position Supine  -SD      Intra Patient Position Sitting  -SD      Post Patient Position Supine  -SD      Recorded by [SD] Katerin Shine, OT 10/09/18 1610      Row Name 10/09/18 1544 10/09/18 1323          Cognitive Assessment/Intervention- PT/OT    Affect/Mental Status (Cognitive) confused  -SD confused  -VG     Behavioral Issues (Cognitive) disinhibition;uncooperative;combative/physical outbursts  -SD uncooperative;disinhibition;combative/physical outbursts  -VG     Orientation Status (Cognition) oriented to;person  -SD oriented to;person  -VG     Follows Commands (Cognition) follows one step commands;0-24% accuracy;verbal cues/prompting required;repetition of directions required  -SD follows one step commands;75-90% accuracy;verbal cues/prompting required  -VG     Cognitive Function (Cognitive) safety deficit;memory deficit;attention deficit  -SD memory deficit;safety deficit  -VG     Memory Deficit (Cognitive)  -- moderate deficit;working memory  -VG     Safety Deficit (Cognitive) severe deficit;ability to follow commands;awareness of need for assistance;impulsivity;insight into deficits/self awareness;judgment;problem solving;safety precautions awareness;safety precautions follow-through/compliance  -SD moderate deficit;insight into deficits/self awareness;impulsivity  -VG     Personal Safety Interventions fall prevention program maintained;supervised activity  -SD fall prevention program maintained;supervised activity  -VG     Recorded by [SD] Katerin Shine, OT 10/09/18 1610 [VG] Lu  Gemma BLEDSOE, PT 10/09/18 1342     Row Name 10/09/18 1544 10/09/18 1323          Bed Mobility Assessment/Treatment    Supine-Sit [place] (Bed Mobility) moderate assist (50% patient effort);maximum assist (25% patient effort)  -SD  --     Sit-Supine [place] (Bed Mobility) moderate assist (50% patient effort);maximum assist (25% patient effort)  -SD  --     Assistive Device (Bed Mobility) draw sheet;head of bed elevated;bed rails  -SD  --     Comment (Bed Mobility) max A x 2 to scoot pt. up in the bed  -SD Deferred d/t disorientation, combative, inability to follow commands  -VG     Recorded by [SD] Katerin Shine, OT 10/09/18 1610 [VG] Gemma Leigh, PT 10/09/18 1342     Row Name 10/09/18 1323             Transfer Assessment/Treatment    Comment (Transfers) Deferred d/t disorientation, combative, inability to follow commands  -VG      Recorded by [VG] Gemma Leigh, PT 10/09/18 1342      Row Name 10/09/18 1323             Gait/Stairs Assessment/Training    Comment (Gait/Stairs) Deferred d/t disorientation, combative, inability to follow commands  -VG      Recorded by [VG] Gemma Leigh, PT 10/09/18 1342      Row Name 10/09/18 1544             ADL Assessment/Intervention    36969 - OT Self Care/Mgmt Minutes 5  -SD      BADL Assessment/Intervention grooming  -SD      Recorded by [SD] Katerin Shine, OT 10/09/18 1610      Row Name 10/09/18 1544             Grooming Assessment/Training    [place] Level (Grooming) hair care, combing/brushing;dependent (less than 25% patient effort)  -SD      Grooming Position supine  -SD      Comment (Grooming) pt. requesting to wash & comb her hair, but throwing brush out of her hand  -SD      Recorded by [SD] Katerin Shine, OT 10/09/18 1610      Row Name 10/09/18 1323             Therapeutic Exercise    52651 - PT Therapeutic Exercise Minutes 14  -VG      Recorded by [VG] Gemma Leigh, PT 10/09/18 1342      Row Name 10/09/18 1323              Therapeutic Exercise    Lower Extremity (Therapeutic Exercise) heel slides, bilateral;SLR (straight leg raise), bilateral  -VG      Lower Extremity Range of Motion (Therapeutic Exercise) hip abduction/adduction, bilateral;ankle dorsiflexion/plantar flexion, bilateral  -VG      Exercise Type (Therapeutic Exercise) AROM (active range of motion);AAROM (active assistive range of motion)  -VG      Position (Therapeutic Exercise) supine  -VG      Sets/Reps (Therapeutic Exercise) 15x  -VG      Comment (Therapeutic Exercise) Cues for redirection to task. MICHA for SLR and hip abd/add.  -VG      Recorded by [VG] Gemma Leigh, PT 10/09/18 1342      Row Name 10/09/18 1545             Static Sitting Balance    Level of [place] (Unsupported Sitting, Static Balance) maximal assist, 25 to 49% patient effort;moderate assist, 50 to 74% patient effort  -SD      Sitting Position (Unsupported Sitting, Static Balance) sitting on edge of bed  -SD      Time Able to Maintain Position (Unsupported Sitting, Static Balance) 45 to 60 seconds  -SD      Comment (Unsupported Sitting, Static Balance) pt. brought to sitting on EOB, then leaning back & saying she wanted to lie down  -SD      Recorded by [SD] Katerin Shine, OT 10/09/18 1610      Row Name 10/09/18 1544 10/09/18 1323          Positioning and Restraints    Pre-Treatment Position in bed  -SD in bed  -VG     Post Treatment Position bed  -SD bed  -VG     In Bed supine;notified nsg;call light within reach;encouraged to call for assist;exit alarm on;side rails up x3  -SD supine;call light within reach;encouraged to call for assist;exit alarm on;side rails up x3  -VG     Recorded by [SD] Katerin Shine, OT 10/09/18 1610 [VG] Gemma Leigh, PT 10/09/18 1342     Row Name 10/09/18 1544 10/09/18 1323          Pain Scale: FACES Pre/Post-Treatment    Pain: FACES Scale, Pretreatment 0-->no hurt  -SD 2-->hurts little bit  -VG     Pain: FACES Scale, Post-Treatment  0-->no hurt  -SD 2-->hurts little bit  -VG     Recorded by [SD] Katerin Shine, OT 10/09/18 1610 [VG] Gemma Leigh, PT 10/09/18 1342     Row Name                Wound 10/01/18 1845 Bilateral medial coccyx    Wound - Properties Group Date first assessed: 10/01/18 [SW] Time first assessed: 1845 [SW] Present On Admission : yes [SW] Side: Bilateral [SW] Orientation: medial [SW] Location: coccyx [SW] Additional Comments: shearing [CP] Recorded by:  [CP] Mahsa Almanzar APRN 10/07/18 1148 [SW] Fatmata Villalobos RN 10/01/18 1905    Row Name                Wound 10/07/18 1015 Right heel blisters    Wound - Properties Group Date first assessed: 10/07/18 [CP] Time first assessed: 1015 [CP] Present On Admission : no;picture taken [CP] Side: Right [CP] Location: heel [CP] Type: blisters [CP] Additional Comments: with bruise or DTPI [CP] Recorded by:  [CP] Mahsa Almanzar APRN 10/07/18 1155    Row Name                Wound 10/06/18 1500 Left heel blisters    Wound - Properties Group Date first assessed: 10/06/18 [CP] Time first assessed: 1500 [CP] Present On Admission : no [CP2] Side: Left [CP2] Location: heel [CP2] Type: blisters [CP2] Recorded by:  [CP] Mahsa Almanzar APRN 10/07/18 1159 [CP2] Mahsa Almanzar, APRN 10/07/18 1157    Row Name                Wound 10/06/18 1500 Right elbow pressure injury    Wound - Properties Group Date first assessed: 10/06/18 [CP] Time first assessed: 1500 [CP] Present On Admission : no;picture taken [CP] Side: Right [CP] Location: elbow [CP] Type: pressure injury [CP] Stage, Pressure Injury: unstageable [CP] Recorded by:  [CP] Mahsa Almanzar APRN 10/07/18 1200    Row Name 10/09/18 1544 10/09/18 1323          Coping    Observed Emotional State agitated;combative;restless  -SD combative;agitated  -VG     Verbalized Emotional State  -- frustration  -VG     Recorded by [SD] Katerin Shine, OT 10/09/18 1610 [VG] Gemma Leigh, PT 10/09/18 1342      Row Name 10/09/18 1544 10/09/18 1323          Plan of Care Review    Plan of Care Reviewed With patient  -SD patient  -VG     Recorded by [SD] Katerin Shine, OT 10/09/18 1610 [VG] Gemma Leigh, PT 10/09/18 1342     Row Name 10/09/18 1544             Outcome Summary/Treatment Plan (OT)    Daily Summary of Progress (OT) progress toward functional goals is gradual  -SD      Barriers to Overall Progress (OT) confusion  -SD      Plan for Continued Treatment (OT) cont OT POC  -SD      Anticipated Discharge Disposition (OT) skilled nursing facility  -SD      Recorded by [SD] Katerin Shine, OT 10/09/18 1610      Row Name 10/09/18 1323             Outcome Summary/Treatment Plan (PT)    Daily Summary of Progress (PT) progress toward functional goals is gradual  -VG      Anticipated Discharge Disposition (PT) skilled nursing facility  -VG      Recorded by [VG] Gemma Leigh, PT 10/09/18 1342        User Key  (r) = Recorded By, (t) = Taken By, (c) = Cosigned By    Initials Name Effective Dates Discipline    SD Katerin Shine, OT 06/08/18 -  OT    Mahsa Matthews APRN 06/08/18 -  Nurse    Fatmata Chaves RN 06/16/16 -  Nurse    Gemma Garcia, PT 05/29/18 -  PT        Wound 10/01/18 1845 Bilateral medial coccyx (Active)   Dressing Appearance open to air 10/8/2018  8:00 PM   Base dry 10/8/2018  8:00 PM   Periwound intact;dry;pink;blanchable 10/8/2018  8:00 PM   Edges irregular 10/8/2018  8:00 PM   Drainage Amount none 10/8/2018  8:00 PM       Wound 10/07/18 1015 Right heel blisters (Active)   Dressing Appearance dry;intact 10/8/2018  8:00 PM   Base dressing in place, unable to visualize 10/8/2018  8:00 PM   Periwound intact;dry;pink;blanchable 10/8/2018  8:00 PM   Drainage Amount none 10/8/2018  8:00 PM       Wound 10/06/18 1500 Left heel blisters (Active)   Dressing Appearance intact;dried drainage 10/8/2018  8:00 PM   Base dressing in place, unable to visualize 10/8/2018   8:00 PM       Wound 10/06/18 1500 Right elbow pressure injury (Active)   Dressing Appearance dry;intact 10/8/2018  8:00 PM   Base dressing in place, unable to visualize 10/8/2018  8:00 PM         Occupational Therapy Education     Title: PT OT SLP Therapies (Active)     Topic: Occupational Therapy (Active)     Point: ADL training (Active)     Description: Instruct learner(s) on proper safety adaptation and remediation techniques during self care or transfers.   Instruct in proper use of assistive devices.   Learning Progress Summary     Learner Status Readiness Method Response Comment Documented by    Patient Active Acceptance E NR Pt educated on appropriate safety precautions, t/f techniques, role of OT, positioning, and benefits of therapy. CL 10/05/18 1009          Point: Precautions (Active)     Description: Instruct learner(s) on prescribed precautions during self-care and functional transfers.   Learning Progress Summary     Learner Status Readiness Method Response Comment Documented by    Patient Active Acceptance E NR Pt educated on appropriate safety precautions, t/f techniques, role of OT, positioning, and benefits of therapy. CL 10/05/18 1009          Point: Body mechanics (Active)     Description: Instruct learner(s) on proper positioning and spine alignment during self-care, functional mobility activities and/or exercises.   Learning Progress Summary     Learner Status Readiness Method Response Comment Documented by    Patient Active Acceptance E NR Pt educated on appropriate safety precautions, t/f techniques, role of OT, positioning, and benefits of therapy. CL 10/05/18 1009                      User Key     Initials Effective Dates Name Provider Type Discipline    CL 04/03/18 -  Helena Thomas OT Occupational Therapist OT                OT Recommendation and Plan  Outcome Summary/Treatment Plan (OT)  Daily Summary of Progress (OT): progress toward functional goals is gradual  Barriers to Overall  Progress (OT): confusion  Plan for Continued Treatment (OT): cont OT POC  Anticipated Discharge Disposition (OT): skilled nursing facility  Daily Summary of Progress (OT): progress toward functional goals is gradual  Plan of Care Review  Plan of Care Reviewed With: patient  Plan of Care Reviewed With: patient  Outcome Summary: Pt. holding eyes closed during most of tx session. Pt.'s son called & she spoke with him on phone, appropriately holding the phone to her ear, but then puling at her lyons, IV lines, NG, & pulling gown off. Therapist attempted to redirect pt. Supine to sit on EOB with mod A, then pt. leaned back requiring max A from therapist to hold pt. upright. Max A x 2 to pull pt. up in bed.        Outcome Measures     Row Name 10/09/18 1544 10/09/18 1323 10/08/18 1500       How much help from another person do you currently need...    Turning from your back to your side while in flat bed without using bedrails?  -- 2  -VG  --    Moving from lying on back to sitting on the side of a flat bed without bedrails?  -- 2  -VG  --    Moving to and from a bed to a chair (including a wheelchair)?  -- 2  -VG  --    Standing up from a chair using your arms (e.g., wheelchair, bedside chair)?  -- 2  -VG  --    Climbing 3-5 steps with a railing?  -- 1  -VG  --    To walk in hospital room?  -- 1  -VG  --    AM-PAC 6 Clicks Score  -- 10  -VG  --       How much help from another is currently needed...    Putting on and taking off regular lower body clothing? 1  -SD  -- 1  -SD    Bathing (including washing, rinsing, and drying) 1  -SD  -- 1  -SD    Toileting (which includes using toilet bed pan or urinal) 1  -SD  -- 1  -SD    Putting on and taking off regular upper body clothing 1  -SD  -- 2  -SD    Taking care of personal grooming (such as brushing teeth) 2  -SD  -- 3  -SD    Eating meals 1  -SD  -- 3  -SD    Score 7  -SD  -- 11  -SD       Functional Assessment    Outcome Measure Options AM-PAC 6 Clicks Daily Activity (OT)   -SD AM-PAC 6 Clicks Basic Mobility (PT)  -VG AM-[facility] 6 Clicks Daily Activity (OT)  -SD    Row Name 10/07/18 1408             How much help from another person do you currently need...    Turning from your back to your side while in flat bed without using bedrails? 2  -DM      Moving from lying on back to sitting on the side of a flat bed without bedrails? 2  -DM      Moving to and from a bed to a chair (including a wheelchair)? 2  -DM      Standing up from a chair using your arms (e.g., wheelchair, bedside chair)? 2  -DM      Climbing 3-5 steps with a railing? 1  -DM      To walk in hospital room? 2  -DM      AM-PAC 6 Clicks Score 11  -DM         Functional Assessment    Outcome Measure Options AM-PAC 6 Clicks Basic Mobility (PT)  -DM        User Key  (r) = Recorded By, (t) = Taken By, (c) = Cosigned By    Initials Name Provider Type    Katerin Luz, OT Occupational Therapist    Terri Alexander, PT Physical Therapist    VG Gemma Leigh, PT Physical Therapist           Time Calculation:         Time Calculation- OT     Row Name 10/09/18 1544             Time Calculation- OT    OT Start Time 1544  -SD      OT Stop Time 1603  -SD      OT Time Calculation (min) 19 min  -SD      OT Received On 10/09/18  -SD      OT Goal Re-Cert Due Date 10/15/18  -SD         Timed Charges    67189 - OT Self Care/Mgmt Minutes 5  -SD        User Key  (r) = Recorded By, (t) = Taken By, (c) = Cosigned By    Initials Name Provider Type    Katerin Luz, OT Occupational Therapist           Therapy Suggested Charges     Code   Minutes Charges    44618 (CPT®) Hc Ot Neuromusc Re Education Ea 15 Min      48323 (CPT®) Hc Ot Ther Proc Ea 15 Min      92056 (CPT®) Hc Ot Therapeutic Act Ea 15 Min      22397 (CPT®) Hc Ot Manual Therapy Ea 15 Min      10827 (CPT®) Hc Ot Iontophoresis Ea 15 Min      03644 (CPT®) Hc Ot Elec Stim Ea-Per 15 Min      04329 (CPT®) Hc Ot Ultrasound Ea 15 Min      29471 (CPT®) Hc Ot Self  Care/Mgmt/Train Ea 15 Min 5     Total  5         Therapy Charges for Today     Code Description Service Date Service Provider Modifiers Qty    83115480597 HC OT THERAPEUTIC ACT EA 15 MIN 10/8/2018 Katerin Shine OT GO 2    82302453903 HC OT THERAPEUTIC ACT EA 15 MIN 10/9/2018 Ktaerin Shine OT GO 1               Katerin Shine, OT  10/9/2018

## 2018-10-09 NOTE — THERAPY TREATMENT NOTE
Acute Care - Physical Therapy Treatment Note  [facility]     Patient Name: Devora Huddleston  : 1945  MRN: [id number]  Today's Date: 10/9/2018  Onset of Illness/Injury or Date of Surgery: 10/01/18  Date of Referral to PT: 10/04/18  Referring Physician: MD Emmett    Admit Date: 10/1/2018    Visit Dx:    ICD-10-CM ICD-9-CM   1. Acute respiratory failure with hypoxemia (CMS/HCC) J96.01 518.81   2. Impaired functional mobility, balance, gait, and endurance Z74.09 V49.89   3. Oropharyngeal dysphagia R13.12 787.22   4. Impaired mobility and ADLs Z74.09 799.89     Patient Active Problem List   Diagnosis   • Acute respiratory failure (CMS/HCC)   • Metabolic encephalopathy   • Acute hypoxemic respiratory failure requiring intubation and ventilatory support (CMS/[facility])   • Hyponatremia, suspect SIADH   • Hypoglycemia, resolved   • Generalized abdominal pain   • Weight loss   • Hypotension, resolved   • Multiple right rib fractures (ninth, 10th, 11th)   • Anemia   • COPD and active cigarette abuse (CMS/[facility])   • Constipation       Therapy Treatment          Rehabilitation Treatment Summary     Row Name 10/09/18 1323             Treatment Time/Intention    Discipline physical therapist  -VG      Document Type therapy note (daily note)  -VG      Subjective Information no complaints  -VG      Mode of Treatment individual therapy;physical therapy  -VG      Patient/Family Observations In bed, NG tube, IV, tele, lyons cath; no family present.  -VG      Care Plan Review patient/other agree to care plan  -VG      Therapy Frequency (PT Clinical Impression) daily  -VG      Patient Effort adequate  -VG      Existing Precautions/Restrictions fall;other (see comments)   confused/combative  -VG      Recorded by [VG] Gemma Leigh, PT 10/09/18 1342      Row Name 10/09/18 1323             Cognitive Assessment/Intervention- PT/OT    Affect/Mental Status (Cognitive) confused  -VG      Behavioral Issues (Cognitive)  uncooperative;disinhibition;combative/physical outbursts  -VG      Orientation Status (Cognition) oriented to;person  -VG      Follows Commands (Cognition) follows one step commands;75-90% accuracy;verbal cues/prompting required  -VG      Cognitive Function (Cognitive) memory deficit;safety deficit  -VG      Memory Deficit (Cognitive) moderate deficit;working memory  -VG      Safety Deficit (Cognitive) moderate deficit;insight into deficits/self awareness;impulsivity  -VG      Personal Safety Interventions fall prevention program maintained;supervised activity  -VG      Recorded by [VG] Gemma Leigh, PT 10/09/18 1342      Row Name 10/09/18 1323             Bed Mobility Assessment/Treatment    Comment (Bed Mobility) Deferred d/t disorientation, combative, inability to follow commands  -VG      Recorded by [VG] Gemma Leigh, PT 10/09/18 1342      Row Name 10/09/18 1323             Transfer Assessment/Treatment    Comment (Transfers) Deferred d/t disorientation, combative, inability to follow commands  -VG      Recorded by [VG] Gemma Leigh, PT 10/09/18 1342      Row Name 10/09/18 1323             Gait/Stairs Assessment/Training    Comment (Gait/Stairs) Deferred d/t disorientation, combative, inability to follow commands  -VG      Recorded by [VG] Gemma Leigh, PT 10/09/18 1342      Row Name 10/09/18 1323             Therapeutic Exercise    54434 - PT Therapeutic Exercise Minutes 14  -VG      Recorded by [VG] Gemma Leigh, PT 10/09/18 1342      Row Name 10/09/18 1323             Therapeutic Exercise    Lower Extremity (Therapeutic Exercise) heel slides, bilateral;SLR (straight leg raise), bilateral  -VG      Lower Extremity Range of Motion (Therapeutic Exercise) hip abduction/adduction, bilateral;ankle dorsiflexion/plantar flexion, bilateral  -VG      Exercise Type (Therapeutic Exercise) AROM (active range of motion);AAROM (active assistive range of motion)  -VG      Position (Therapeutic Exercise)  supine  -VG      Sets/Reps (Therapeutic Exercise) 15x  -VG      Comment (Therapeutic Exercise) Cues for redirection to task. MICHA for SLR and hip abd/add.  -VG      Recorded by [VG] Gemma Leigh, PT 10/09/18 1342      Row Name 10/09/18 1323             Positioning and Restraints    Pre-Treatment Position in bed  -VG      Post Treatment Position bed  -VG      In Bed supine;call light within reach;encouraged to call for assist;exit alarm on;side rails up x3  -VG      Recorded by [VG] Gemma Leigh, PT 10/09/18 1342      Row Name 10/09/18 1323             Pain Scale: FACES Pre/Post-Treatment    Pain: FACES Scale, Pretreatment 2-->hurts little bit  -VG      Pain: FACES Scale, Post-Treatment 2-->hurts little bit  -VG      Recorded by [VG] Gemma Leigh, PT 10/09/18 1342      Row Name                Wound 10/01/18 1845 Bilateral medial coccyx    Wound - Properties Group Date first assessed: 10/01/18 [SW] Time first assessed: 1845 [SW] Present On Admission : yes [SW] Side: Bilateral [SW] Orientation: medial [SW] Location: coccyx [SW] Additional Comments: shearing [CP] Recorded by:  [CP] Mahsa Almanzar APRN 10/07/18 1148 [SW] Fatmata Villalobos RN 10/01/18 1905    Row Name                Wound 10/07/18 1015 Right heel blisters    Wound - Properties Group Date first assessed: 10/07/18 [CP] Time first assessed: 1015 [CP] Present On Admission : no;picture taken [CP] Side: Right [CP] Location: heel [CP] Type: blisters [CP] Additional Comments: with bruise or DTPI [CP] Recorded by:  [CP] Mahsa Almanzar APRN 10/07/18 1155    Row Name                Wound 10/06/18 1500 Left heel blisters    Wound - Properties Group Date first assessed: 10/06/18 [CP] Time first assessed: 1500 [CP] Present On Admission : no [CP2] Side: Left [CP2] Location: heel [CP2] Type: blisters [CP2] Recorded by:  [CP] Mahsa Almanzar APRN 10/07/18 1159 [CP2] Mahsa Almanzar, JEFF 10/07/18 1157    Row Name                Wound  10/06/18 1500 Right elbow pressure injury    Wound - Properties Group Date first assessed: 10/06/18 [CP] Time first assessed: 1500 [CP] Present On Admission : no;picture taken [CP] Side: Right [CP] Location: elbow [CP] Type: pressure injury [CP] Stage, Pressure Injury: unstageable [CP] Recorded by:  [CP] Mahsa Almanzar, APRN 10/07/18 1200    Row Name 10/09/18 1323             Coping    Observed Emotional State combative;agitated  -VG      Verbalized Emotional State frustration  -VG      Recorded by [VG] Gemma Leigh, PT 10/09/18 1342      Row Name 10/09/18 1323             Plan of Care Review    Plan of Care Reviewed With patient  -VG      Recorded by [VG] Gemma Leigh, PT 10/09/18 1342      Row Name 10/09/18 1323             Outcome Summary/Treatment Plan (PT)    Daily Summary of Progress (PT) progress toward functional goals is gradual  -VG      Anticipated Discharge Disposition (PT) skilled nursing facility  -VG      Recorded by [VG] Gemma Leigh, PT 10/09/18 1342        User Key  (r) = Recorded By, (t) = Taken By, (c) = Cosigned By    Initials Name Effective Dates Discipline    CP Mahsa Almanzar, APRN 06/08/18 -  Nurse    Fatmata Chaves RN 06/16/16 -  Nurse    Gemma Garcia, PT 05/29/18 -  PT          Wound 10/01/18 1845 Bilateral medial coccyx (Active)   Dressing Appearance open to air 10/8/2018  8:00 PM   Base dry 10/8/2018  8:00 PM   Periwound intact;dry;pink;blanchable 10/8/2018  8:00 PM   Edges irregular 10/8/2018  8:00 PM   Drainage Amount none 10/8/2018  8:00 PM   Care, Wound barrier applied 10/8/2018  4:00 PM   Dressing Care, Wound open to air 10/8/2018  4:00 PM   Periwound Care, Wound barrier ointment applied 10/8/2018  4:00 PM       Wound 10/07/18 1015 Right heel blisters (Active)   Dressing Appearance dry;intact 10/8/2018  8:00 PM   Base dressing in place, unable to visualize 10/8/2018  8:00 PM   Periwound intact;dry;pink;blanchable 10/8/2018  8:00 PM   Drainage  Amount none 10/8/2018  8:00 PM   Care, Wound cleansed with;sterile water 10/8/2018  4:00 PM       Wound 10/06/18 1500 Left heel blisters (Active)   Dressing Appearance intact;dried drainage 10/8/2018  8:00 PM   Base dressing in place, unable to visualize 10/8/2018  8:00 PM       Wound 10/06/18 1500 Right elbow pressure injury (Active)   Dressing Appearance dry;intact 10/8/2018  8:00 PM   Base dressing in place, unable to visualize 10/8/2018  8:00 PM             Physical Therapy Education     Title: PT OT SLP Therapies (Active)     Topic: Physical Therapy (Active)     Point: Mobility training (Active)    Learning Progress Summary     Learner Status Readiness Method Response Comment Documented by    Patient Active Acceptance ED NR   10/07/18 1438     Active Acceptance E NR   10/04/18 1500          Point: Home exercise program (Active)    Learning Progress Summary     Learner Status Readiness Method Response Comment Documented by    Patient Active Acceptance E NR Educated on HEP and benefits of activity. VG 10/09/18 1323     Active Acceptance ED NR   10/07/18 1438     Active Acceptance E NR  SIXTO 10/04/18 1500          Point: Body mechanics (Active)    Learning Progress Summary     Learner Status Readiness Method Response Comment Documented by    Patient Active Acceptance ED NR   10/07/18 1438     Active Acceptance E NR  SIXTO 10/04/18 1500          Point: Precautions (Active)    Learning Progress Summary     Learner Status Readiness Method Response Comment Documented by    Patient Active Acceptance ED NR   10/07/18 1438     Active Acceptance E NR   10/04/18 1500                      User Key     Initials Effective Dates Name Provider Type Discipline     06/19/15 -  Althea Ruth, PT Physical Therapist PT    DM 06/19/15 -  Terri Magaña, PT Physical Therapist PT    VG 05/29/18 -  Gemma Leigh, PT Physical Therapist PT                    PT Recommendation and Plan  Anticipated Discharge  Disposition (PT): skilled nursing facility  Therapy Frequency (PT Clinical Impression): daily  Outcome Summary/Treatment Plan (PT)  Daily Summary of Progress (PT): progress toward functional goals is gradual  Anticipated Discharge Disposition (PT): skilled nursing facility  Plan of Care Reviewed With: patient  Progress: no change  Outcome Summary: Deferred bed mobility and transfers d/t cognition, combative, and inability to follow commands. Completed bed level ther ex with cues for redirection to task. Will continue to progress pt as able per POC.          Outcome Measures     Row Name 10/09/18 1323 10/08/18 1500 10/07/18 1408       How much help from another person do you currently need...    Turning from your back to your side while in flat bed without using bedrails? 2  -VG  -- 2  -DM    Moving from lying on back to sitting on the side of a flat bed without bedrails? 2  -VG  -- 2  -DM    Moving to and from a bed to a chair (including a wheelchair)? 2  -VG  -- 2  -DM    Standing up from a chair using your arms (e.g., wheelchair, bedside chair)? 2  -VG  -- 2  -DM    Climbing 3-5 steps with a railing? 1  -VG  -- 1  -DM    To walk in hospital room? 1  -VG  -- 2  -DM    AM-PAC 6 Clicks Score 10  -VG  -- 11  -DM       How much help from another is currently needed...    Putting on and taking off regular lower body clothing?  -- 1  -SD  --    Bathing (including washing, rinsing, and drying)  -- 1  -SD  --    Toileting (which includes using toilet bed pan or urinal)  -- 1  -SD  --    Putting on and taking off regular upper body clothing  -- 2  -SD  --    Taking care of personal grooming (such as brushing teeth)  -- 3  -SD  --    Eating meals  -- 3  -SD  --    Score  -- 11  -SD  --       Functional Assessment    Outcome Measure Options AM-PAC 6 Clicks Basic Mobility (PT)  -VG AM-PAC 6 Clicks Daily Activity (OT)  -SD AM-PAC 6 Clicks Basic Mobility (PT)  -DM      User Key  (r) = Recorded By, (t) = Taken By, (c) = Cosigned  By    Initials Name Provider Type    Katerin Luz, OT Occupational Therapist    Terri Alexander, PT Physical Therapist    VG Gemma Leigh, PT Physical Therapist           Time Calculation:         PT Charges     Row Name 10/09/18 1323             Time Calculation    Start Time 1323  -VG      PT Received On 10/09/18  -VG      PT Goal Re-Cert Due Date 10/14/18  -VG         Time Calculation- PT    Total Timed Code Minutes- PT 14 minute(s)  -VG         Timed Charges    47343 - PT Therapeutic Exercise Minutes 14  -VG        User Key  (r) = Recorded By, (t) = Taken By, (c) = Cosigned By    Initials Name Provider Type    VG Gemma Leigh, PT Physical Therapist        Therapy Suggested Charges     Code   Minutes Charges    39097 (CPT®) Hc Pt Neuromusc Re Education Ea 15 Min      93112 (CPT®) Hc Pt Ther Proc Ea 15 Min 14 1    41009 (CPT®) Hc Gait Training Ea 15 Min      52214 (CPT®) Hc Pt Therapeutic Act Ea 15 Min      00078 (CPT®) Hc Pt Manual Therapy Ea 15 Min      92903 (CPT®) Hc Pt Iontophoresis Ea 15 Min      83787 (CPT®) Hc Pt Elec Stim Ea-Per 15 Min      28375 (CPT®) Hc Pt Ultrasound Ea 15 Min      50640 (CPT®) Hc Pt Self Care/Mgmt/Train Ea 15 Min      10455 (CPT®) Hc Pt Prosthetic (S) Train Initial Encounter, Each 15 Min      89900 (CPT®) Hc Pt Orthotic(S)/Prosthetic(S) Encounter, Each 15 Min      31438 (CPT®) Hc Orthotic(S) Mgmt/Train Initial Encounter, Each 15min      Total  14 1        Therapy Charges for Today     Code Description Service Date Service Provider Modifiers Qty    [id number] HC PT THER PROC EA 15 MIN 10/9/2018 Gemma Leigh, PT GP 1          PT G-Codes  Outcome Measure Options: AM-PAC 6 Clicks Basic Mobility (PT)  AM-PAC 6 Clicks Score: 10  Score: 11    Ira Leigh, PT  10/9/2018

## 2018-10-10 LAB
ANION GAP SERPL CALCULATED.3IONS-SCNC: 7 MMOL/L (ref 3–11)
BUN BLD-MCNC: 17 MG/DL (ref 9–23)
BUN/CREAT SERPL: 54.8 (ref 7–25)
CALCIUM SPEC-SCNC: 8 MG/DL (ref 8.7–10.4)
CHLORIDE SERPL-SCNC: 92 MMOL/L (ref 99–109)
CO2 SERPL-SCNC: 26 MMOL/L (ref 20–31)
CREAT BLD-MCNC: 0.31 MG/DL (ref 0.6–1.3)
DEPRECATED RDW RBC AUTO: 44.6 FL (ref 37–54)
ERYTHROCYTE [DISTWIDTH] IN BLOOD BY AUTOMATED COUNT: 14.5 % (ref 11.3–14.5)
GFR SERPL CREATININE-BSD FRML MDRD: >150 ML/MIN/1.73
GLUCOSE BLD-MCNC: 89 MG/DL (ref 70–100)
GLUCOSE BLDC GLUCOMTR-MCNC: 101 MG/DL (ref 70–130)
GLUCOSE BLDC GLUCOMTR-MCNC: 127 MG/DL (ref 70–130)
GLUCOSE BLDC GLUCOMTR-MCNC: 157 MG/DL (ref 70–130)
GLUCOSE BLDC GLUCOMTR-MCNC: 401 MG/DL (ref 70–130)
HCT VFR BLD AUTO: 30.3 % (ref 34.5–44)
HGB BLD-MCNC: 10.2 G/DL (ref 11.5–15.5)
MAGNESIUM SERPL-MCNC: 2.3 MG/DL (ref 1.3–2.7)
MCH RBC QN AUTO: 29 PG (ref 27–31)
MCHC RBC AUTO-ENTMCNC: 33.7 G/DL (ref 32–36)
MCV RBC AUTO: 86.1 FL (ref 80–99)
PLATELET # BLD AUTO: 365 10*3/MM3 (ref 150–450)
PMV BLD AUTO: 8.9 FL (ref 6–12)
POTASSIUM BLD-SCNC: 3.8 MMOL/L (ref 3.5–5.5)
RBC # BLD AUTO: 3.52 10*6/MM3 (ref 3.89–5.14)
SODIUM BLD-SCNC: 123 MMOL/L (ref 132–146)
SODIUM BLD-SCNC: 125 MMOL/L (ref 132–146)
WBC NRBC COR # BLD: 8.13 10*3/MM3 (ref 3.5–10.8)

## 2018-10-10 PROCEDURE — 99233 SBSQ HOSP IP/OBS HIGH 50: CPT | Performed by: INTERNAL MEDICINE

## 2018-10-10 PROCEDURE — 83735 ASSAY OF MAGNESIUM: CPT | Performed by: INTERNAL MEDICINE

## 2018-10-10 PROCEDURE — 85027 COMPLETE CBC AUTOMATED: CPT | Performed by: INTERNAL MEDICINE

## 2018-10-10 PROCEDURE — 80048 BASIC METABOLIC PNL TOTAL CA: CPT | Performed by: INTERNAL MEDICINE

## 2018-10-10 PROCEDURE — 25010000002 ENOXAPARIN PER 10 MG: Performed by: INTERNAL MEDICINE

## 2018-10-10 PROCEDURE — 84295 ASSAY OF SERUM SODIUM: CPT | Performed by: INTERNAL MEDICINE

## 2018-10-10 PROCEDURE — 25010000002 MAGNESIUM SULFATE 2 GM/50ML SOLUTION: Performed by: NURSE PRACTITIONER

## 2018-10-10 PROCEDURE — 82962 GLUCOSE BLOOD TEST: CPT

## 2018-10-10 RX ADMIN — FAMOTIDINE 20 MG: 20 TABLET ORAL at 10:57

## 2018-10-10 RX ADMIN — MAGNESIUM SULFATE HEPTAHYDRATE 2 G: 40 INJECTION, SOLUTION INTRAVENOUS at 00:00

## 2018-10-10 RX ADMIN — CLOPIDOGREL BISULFATE 75 MG: 75 TABLET ORAL at 10:58

## 2018-10-10 RX ADMIN — FAMOTIDINE 20 MG: 20 TABLET ORAL at 21:45

## 2018-10-10 RX ADMIN — ENOXAPARIN SODIUM 30 MG: 30 INJECTION SUBCUTANEOUS at 21:45

## 2018-10-10 RX ADMIN — Medication 10 ML: at 15:02

## 2018-10-10 RX ADMIN — Medication 10 ML: at 15:01

## 2018-10-10 RX ADMIN — Medication 1 TABLET: at 10:57

## 2018-10-10 RX ADMIN — MAGNESIUM SULFATE HEPTAHYDRATE 2 G: 40 INJECTION, SOLUTION INTRAVENOUS at 02:17

## 2018-10-10 RX ADMIN — DOCUSATE SODIUM 100 MG: 50 LIQUID ORAL at 10:57

## 2018-10-10 RX ADMIN — POLYETHYLENE GLYCOL 3350 17 G: 17 POWDER, FOR SOLUTION ORAL at 10:58

## 2018-10-10 NOTE — PLAN OF CARE
Problem: Skin Injury Risk (Adult)  Goal: Skin Health and Integrity  Outcome: Ongoing (interventions implemented as appropriate)      Problem: Infection, Risk/Actual (Adult)  Goal: Infection Prevention/Resolution  Outcome: Ongoing (interventions implemented as appropriate)      Problem: Fall Risk (Adult)  Goal: Absence of Fall  Outcome: Ongoing (interventions implemented as appropriate)      Problem: Patient Care Overview  Goal: Plan of Care Review  Outcome: Ongoing (interventions implemented as appropriate)   10/10/18 0352   Coping/Psychosocial   Plan of Care Reviewed With patient   Plan of Care Review   Progress improving   OTHER   Outcome Summary Pt was drowsy and somnolent during most of the shift. At 0200 she woke up and demanded warm food. Ate her dinner heated up and thickened liquids. Ate all of dinner 10/8/18 but had poor intake over the course of the day yesterday Appears oriented with most of everything but is iffy with time. Not actively trying to pull lines. Haven't given any lorazepam during shift. D/C'd free water from NG feedings due to hyponatremia. Magnesium @ 1.4 replaced. Continuing to monitor and POC.

## 2018-10-10 NOTE — DISCHARGE PLACEMENT REQUEST
"Referral for Rehab, possibility for Long term care     Thank you   Mirtha Laws [phone]     Devora Huddleston  (73 y.o. Female)     Date of Birth Social Security Number Address Home Phone MRN    1945  [place]  [facility] [phone] [id number]    Scientologist Marital Status          None Unknown       Admission Date Admission Type Admitting Provider Attending Provider Department, Room/Bed    10/1/18 Emergency Akila Butt MD Howard, Gabriela Kirk, MD [facility] 6B, N631/1    Discharge Date Discharge Disposition Discharge Destination                       Attending Provider:  Akila Butt MD    Allergies:  Not on File    Isolation:  None   Infection:  None   Code Status:  CPR    Ht:  157.5 cm (62.01\")   Wt:  44.8 kg (98 lb 12.8 oz)    Admission Cmt:  None   Principal Problem:  Acute hypoxemic respiratory failure requiring intubation and ventilatory support (CMS/[facility]) [J96.01]                 Active Insurance as of 10/1/2018     Primary Coverage     Payor Plan Insurance Group Employer/Plan Group    MEDICARE MEDICARE A & B      Payor Plan Address Payor Plan Phone Number Effective From Effective To    [address] [phone] 4/1/1986     [facility] [id number]       Subscriber Name Subscriber Birth Date Member ID       DEVORA HUDDLESTON 1945 [id number]           Secondary Coverage     Payor Plan Insurance Group Employer/Plan Group    [employer] [place]ICAID      Payor Plan Address Payor Plan Phone Number Effective From Effective To    [address] [address] [phone] 10/1/2018     [facility] [id number]       Subscriber Name Subscriber Birth Date Member ID       DEVORA HUDDLESTON 1945 [id number]                 Emergency Contacts      (Rel.) Home Phone Work Phone Mobile Phone    Molina Huddleston (Son) [phone] -- --               History & Physical      Dhruv Crandall MD at 10/1/2018  7:13 PM      " "    Pulmonary/Critical Care History and Physical Exam     LOS: 0 days   Patient Care Team:  Provider, No Known as PCP - General    Chief Complaint:    Chief Complaint   Patient presents with   • Loss of Consciousness     Source of history: Chart, attempted to call available phone number from chart and message says that number is unavailable.    Subjective     HPI:     73-year-old female who arrived by [facility] to the emergency department here after being found down and unresponsive by family in her home.  She reportedly was found by her son unresponsive and \"not breathing\".  The patient's blood sugar apparently was 105 initially, but 39 mg/dL en route.  Initially, the patient had an oxygen saturation of 47% and was intubated in the field.  The ER was able to contact the patient's son however he was noted to be a poor historian.  He does report that she has had increased weakness over the last few weeks and states that she was evaluated for a seizure 2 weeks previously and had hyponatremia at that point.  He denied that she has diabetes.  She reportedly has a history of CVA in the past.    CT head done emergency department showed no acute abnormality.  CT perfusion study was negative according to the ER attending physician although I do not see an official report is taking this.  CT angiogram is pending.  The patient was started on empiric antibiotics for sepsis.  She was significantly hypotensive in the ER although responds to fluids before drifting back down to 60s to 70s systolic.    On my evaluation, the patient is intubated and drowsy but is arousable.  She follows commands in all 4 extremities.  She does report abdominal tenderness.    I attempted to call the patient's son however I was unable to contact them as the patient's son's phone number message states that the numbers unavailable.    History taken from: patient    Past Medical History:   Diagnosis Date   • Stroke (CMS/[facility])        History reviewed. No " pertinent surgical history.    History reviewed. No pertinent family history.    Social History     Social History   • Marital status: Unknown     Spouse name: N/A   • Number of children: N/A   • Years of education: N/A     Occupational History   • Not on file.     Social History Main Topics   • Smoking status: Unknown If Ever Smoked   • Smokeless tobacco: Not on file   • Alcohol use Defer   • Drug use: Unknown   • Sexual activity: Defer     Other Topics Concern   • Not on file     Social History Narrative   • No narrative on file       Not on File    PMH/FH/SocH were reviewed by me and updates were made.     Review of Systems:    Review of 14 systems was completed with positives and pertinent negatives noted in the subjective section.  All other systems reviewed and are negative.   Exceptions are noted below:    Unable to obtain secondary to altered mental status, intubation.    Objective     Vital Signs  Temp:  [94 °F (34.4 °C)-96.6 °F (35.9 °C)] 96.6 °F (35.9 °C)  Heart Rate:  [67-86] 71  Resp:  [14-16] 16  BP: ()/(36-80) 115/64  FiO2 (%):  [60 %] 60 %  Body mass index is 14.63 kg/m².  FiO2 (%):  [60 %] 60 %  S RR:  [12] 12  PEEP/CPAP (cm H2O):  [5 cm H20] 5 cm H20    Physical Exam:     General Appearance:    Drowsy, on ventilator, cachectic, in no acute distress   Head:    Normocephalic, without obvious abnormality, atraumatic   Eyes:            Lids and lashes normal, conjunctivae and sclerae normal, no   icterus, no pallor, corneas clear, PERRL   ENMT:   Ears appear intact with no abnormalities noted      No oral lesions, edentulous, no thrush, oral mucosa moist, oral endotracheal tube in place.     Neck:   No adenopathy, supple, trachea midline, no thyromegaly, no   carotid bruit, no JVD       Lungs/resp:     On ventilator, symmetric chest rise, no crepitus, clear to      auscultation bilaterally, no chest wall tenderness                  Heart/CV:    Regular rhythm and normal rate, normal S1 and S2, no             murmur   Abdomen/GI:     Normal bowel sounds, no masses, no organomegaly, soft, moderately distended, mild diffuse tenderness to palpation.         G/U:     Deferred   Extremities/MSK:   No clubbing, cyanosis or edema.  Normal tone.  No deformities.    Pulses:   Pulses palpable and equal bilaterally   Skin:   No bleeding, multiple bruises.  Some erythema of bilateral lower extremities.  Wrinkling of skin lower extremities suggesting recent improvement of chronic edema.     Hem/Lymph:   No cervical or supraclavicular adenopathy.    Neurologic:   Moves all extremities with no obvious focal motor deficit.  Cranial nerves 2 - 12 grossly intact            Psychiatric:  Drowsy, nonagitated.     Results Review:     I reviewed the patient's new clinical results.     Results from last 7 days  Lab Units 10/01/18  1640   SODIUM mmol/L 119*   POTASSIUM mmol/L 3.5   CHLORIDE mmol/L 86*   CO2 mmol/L 23.0   BUN mg/dL 34*   CREATININE mg/dL 1.04   CALCIUM mg/dL 8.6*   ALT (SGPT) U/L 25   AST (SGOT) U/L 39*   GLUCOSE mg/dL 178*       Results from last 7 days  Lab Units 10/01/18  1640   WBC 10*3/mm3 10.47   HEMOGLOBIN g/dL 12.6   HEMATOCRIT % 36.9   PLATELETS 10*3/mm3 317       Results from last 7 days  Lab Units 10/01/18  1700   PH, ARTERIAL pH units 7.454*   PO2 ART mm Hg 179.0*   PCO2, ARTERIAL mm Hg 34.5*   HCO3 ART mmol/L 24.2       I reviewed the patient's new imaging including images and reports.    Medication Review:     sodium chloride 1,000 mL Intravenous Once          Assessment/Plan     Hospital Problem List     * (Principal)Acute respiratory failure (CMS/HCC)    Altered mental state    Acute respiratory failure with hypoxemia (CMS/HCC)    Hyponatremia    Hypoglycemia    Generalized abdominal pain    Weight loss    Maria D coma scale total score 3-8 (CMS/HCC) - on arrival    Hypotension        73-year-old female with a history of hypertension, reported history of prior stroke, recent weight loss and episode of  hyponatremia/seizure was brought to the emergency department after being found down by family at home.  She was intubated in the field for hypoxemic respiratory failure.  Blood sugar en route was 39 mg/dL and was replaced.  She has been persistently hypotensive since arrival.  Pro-calcitonin is low.    Chest x-ray was read as no significant abnormality however I am concerned about a possible AP window density/left upper lobe density (although this may be due to rotation).  Additionally, she seems to have some abdominal tenderness and distention on exam.  Her son reports that she was recently treated with doxycycline for a urinary tract infection.  She has poor food intake and has had persistent weight loss over the past year and a half.    I think there is a chance that the patient had a seizure/post ictal state, possibly triggered by hypoglycemia, hyponatremia or a combination.  I suspect she has an underlying issue causing her poor oral intake and weight loss.  Chest x-ray seems abnormal to me and I think she needs a CT scan of the chest, abdomen, and pelvis to rule out possible malignancy and evaluate for the possibility of mesenteric ischemia or intra-abdominal abscess.    Plan:  1.  Admit to the intensive care unit.  2.  Fluid resuscitation.  3.  May require central line, pressors.  4.  Continue antibiotics for now, repeat pro-calcitonin in a.m.  May be able to de-escalate antibiotics soon.  5.  Follow-up cultures.  6.  Check serum cortisol.  7.  Check CT scan of the chest as well as abdomen/pelvis to evaluate abnormal chest x-ray (rule out left upper lobe/AP window pathology) and further evaluate patient's abdominal pain.  8.  Monitor sodium to avoid overly rapid correction.  9.  Hopefully can be weaned from mechanical ventilation tomorrow.      Dhruv Crandall MD  10/01/18  8:01 PM    60 minutes of critical care provided. This time excludes other billable procedures. Time does include preparation of  documents, medical consultations, review of old records, and direct bedside care. Patient was at high risk for life-threatening deterioration due to acute respiratory failure, hypotension.       Addendum: The patient's son, Bryn Huddleston, called back and I was able to get some additional history which I added to the history above.  He is currently only available at the patient's home phone number, as his cell phone has been turned off.  He states that he is going to try to come to the hospital tomorrow, but needs to get his phone turned back on so he can use the map function.    Patient's PCP is Evelyn Francisco [phone].       Electronically signed by Dhruv Crandall MD at 10/1/2018  8:23 PM          Physician Progress Notes (most recent note)      Akila Butt MD at 10/10/2018  9:53 AM              [facility] Medicine Services  PROGRESS NOTE    Patient Name: Devora Huddleston  : 1945  MRN: [id number]    Date of Admission: 10/1/2018  Length of Stay: 9  Primary Care Physician: Evelyn Francisco MD    Subjective   Subjective     CC:  F/U found down unresponsive    HPI:  Pt more alert this am, but still confused.  States that she needs a chair so she can drive.     Review of Systems  Gen-no fevers, no chills  CV-no chest pain, no palpitations  Resp-no cough, no dyspnea  GI-no nausea, no abd pain    Otherwise ROS is negative except as mentioned in the HPI.    Objective   Objective     Vital Signs:   Temp:  [97.2 °F (36.2 °C)-98.6 °F (37 °C)] 97.2 °F (36.2 °C)  Heart Rate:  [74-92] 89  Resp:  [14-18] 18  BP: (101-120)/(60-84) 106/67  Total (NIH Stroke Scale): 4     Physical Exam:  Gen-no acute distress, cachectic  HENT-NCAT, mucous membranes moist  CV-RRR, S1 S2 normal, no m/r/g  Resp-CTAB, no wheezes or rales  Abd-soft, NT, ND, +BS  Ext-no edema  Neuro-A&Ox1, knows which city she is in but does not know the president nor the year, but follows commands, no focal deficits;  not currently in restraints  Skin-no rashes  Psych-appropriate mood, calm      Results Reviewed:  I have personally reviewed current lab, radiology, and data and agree.      Results from last 7 days  Lab Units 10/10/18  0618 10/09/18  0547 10/08/18  0704   WBC 10*3/mm3 8.13 10.16 7.40   HEMOGLOBIN g/dL 10.2* 10.7* 10.8*   HEMATOCRIT % 30.3* 31.8* 32.2*   PLATELETS 10*3/mm3 365 358 278       Results from last 7 days  Lab Units 10/10/18  0618 10/09/18  2034 10/09/18  0547 10/08/18  0704   SODIUM mmol/L 125* 123* 127* 130*   POTASSIUM mmol/L 3.8  --  4.1 3.8   CHLORIDE mmol/L 92*  --  92* 95*   CO2 mmol/L 26.0  --  27.0 25.0   BUN mg/dL 17  --  15 12   CREATININE mg/dL 0.31*  --  0.27* 0.28*   GLUCOSE mg/dL 89  --  80 74   CALCIUM mg/dL 8.0*  --  8.5* 8.3*   ALT (SGPT) U/L  --   --   --  23   AST (SGOT) U/L  --   --   --  36*     Estimated Creatinine Clearance: 45.6 mL/min (A) (by C-G formula based on SCr of 0.31 mg/dL (L)).  No results found for: BNP    Microbiology Results Abnormal     Procedure Component Value - Date/Time    Blood Culture - Blood, [[id number]]  (Normal) Collected:  10/01/18 1745    Lab Status:  Final result Specimen:  Blood from Wrist, Right Updated:  10/06/18 1830     Blood Culture No growth at 5 days    Blood Culture - Blood, [[id number]]  (Normal) Collected:  10/01/18 1715    Lab Status:  Final result Specimen:  Blood from Arm, Right Updated:  10/06/18 1830     Blood Culture No growth at 5 days          Imaging Results (last 24 hours)     ** No results found for the last 24 hours. **        Results for orders placed during the hospital encounter of 10/01/18   Adult Transthoracic Echo Complete W/ Cont if Necessary Per Protocol    Narrative · Left ventricular systolic function is normal.  · Estimated EF appears to be in the range of 61 - 65%  · All left ventricular wall segments contract normally.  · Normal left atrial pressure.  · Right ventricular cavity is mildly dilated  · Right ventricular wall  thickness is consistent with mild hypertrophy.  · Right atrial cavity size is mildly dilated. The inferior vena cava is   dilated. IVC inspiratory collapse is absent. A prominent Eustachian valve   is present.  · Moderate tricuspid valve regurgitation is present. Estimated right   ventricular systolic pressure from tricuspid regurgitation is mildly   elevated (35-45 mmHg).          I have reviewed the medications.      clopidogrel 75 mg Oral Daily   docusate sodium 100 mg Nasogastric BID   enoxaparin 30 mg Subcutaneous Nightly   famotidine 20 mg Oral BID   multivitamin 1 tablet Oral Daily   polyethylene glycol 17 g Oral Daily   sennosides-docusate sodium 2 tablet Oral Nightly         Assessment/Plan   Assessment / Plan     Active Hospital Problems    Diagnosis   • **Acute hypoxemic respiratory failure requiring intubation and ventilatory support (CMS/HCC)   • Constipation   • COPD and active cigarette abuse (CMS/HCC)   • Multiple right rib fractures (ninth, 10th, 11th)   • Anemia   • Metabolic encephalopathy   • Hyponatremia, suspect SIADH   • Hypoglycemia, resolved   • Generalized abdominal pain   • Weight loss   • Hypotension, resolved          Brief Hospital Course to date:  Devora Huddleston is a 73 y.o. female with hx of COPD, HTN, and CVA presents after being found down and unresponsive by her son on 10/1/18. Was intubated in the field. Glucose dropped to 39 en route (initially 105). Patient had reportedly complained of increasing weakness and weight loss for several weeks, and had recent evaluation for hyponatremia and seizures. Admitted to [facility] ICU. CT head, CT perfusion, CTA head and neck all negative. Further imaging revealed 3 right sided rib fractures, presumably from a fall at home. Possible RLL atelectasis vs. infiltrate. She was also hypotensive and requiring pressors. Treated empirically with Vanc and Zosyn however all cultures remain no growth. She was started on enteral feeds due to dysphagia.  Patient extubated 10/3. Transferred to floor, hospitalists assumed care on 10/6.      Assessment & Plan:  --Stopped ATBx. Cultures no growth.  --Sodium slightly worse today, workup c/w SIADH. Worse today. FW in TFs has now been stopped. Repeat labs in am  --Supportive care for rib fractures.   --SLP following. Continue tube feeds, diet started now.  Wean TFs as PO intake improves  --Started Seroquel 25 mg QHS for confusion, but appears to worsen her symptoms. D/c'd Seroquel.  PRN Ativan for anxiety. If confusion worsens, though, will need to stop anxiolytics.  Also, worsening hyponatremia could be contributing.   --PT/OT. Needs placement. CM following.     DVT Prophylaxis:  Lovenox    CODE STATUS:   Code Status and Medical Interventions:   Ordered at: 10/01/18 1819     Code Status:    CPR     Medical Interventions (Level of Support Prior to Arrest):    Full       Disposition: I expect the patient to be discharged 2-3 days to rehab    Electronically signed by Akila Butt MD, 10/10/18, 12:53 PM.        Electronically signed by Akila Butt MD at 10/10/2018 12:56 PM       Consult Notes (most recent note)     No notes of this type exist for this encounter.           Physical Therapy Notes (most recent note)      Gemma Leigh PT at 10/9/2018  1:45 PM  Version 1 of 1         Acute Care - Physical Therapy Treatment Note  [facility]     Patient Name: Devora Huddleston  : 1945  MRN: [id number]  Today's Date: 10/9/2018  Onset of Illness/Injury or Date of Surgery: 10/01/18  Date of Referral to PT: 10/04/18  Referring Physician: MD Emmett    Admit Date: 10/1/2018    Visit Dx:    ICD-10-CM ICD-9-CM   1. Acute respiratory failure with hypoxemia (CMS/[facility]) J96.01 518.81   2. Impaired functional mobility, balance, gait, and endurance Z74.09 V49.89   3. Oropharyngeal dysphagia R13.12 787.22   4. Impaired mobility and ADLs Z74.09 799.89     Patient Active Problem List   Diagnosis   • Acute  respiratory failure (CMS/[facility])   • Metabolic encephalopathy   • Acute hypoxemic respiratory failure requiring intubation and ventilatory support (CMS/[facility])   • Hyponatremia, suspect SIADH   • Hypoglycemia, resolved   • Generalized abdominal pain   • Weight loss   • Hypotension, resolved   • Multiple right rib fractures (ninth, 10th, 11th)   • Anemia   • COPD and active cigarette abuse (CMS/[facility])   • Constipation       Therapy Treatment          Rehabilitation Treatment Summary     Row Name 10/09/18 1323             Treatment Time/Intention    Discipline physical therapist  -VG      Document Type therapy note (daily note)  -VG      Subjective Information no complaints  -VG      Mode of Treatment individual therapy;physical therapy  -VG      Patient/Family Observations In bed, NG tube, IV, tele, lyons cath; no family present.  -VG      Care Plan Review patient/other agree to care plan  -VG      Therapy Frequency (PT Clinical Impression) daily  -VG      Patient Effort adequate  -VG      Existing Precautions/Restrictions fall;other (see comments)   confused/combative  -VG      Recorded by [VG] Gemma Leigh, PT 10/09/18 1342      Row Name 10/09/18 1323             Cognitive Assessment/Intervention- PT/OT    Affect/Mental Status (Cognitive) confused  -VG      Behavioral Issues (Cognitive) uncooperative;disinhibition;combative/physical outbursts  -VG      Orientation Status (Cognition) oriented to;person  -VG      Follows Commands (Cognition) follows one step commands;75-90% accuracy;verbal cues/prompting required  -VG      Cognitive Function (Cognitive) memory deficit;safety deficit  -VG      Memory Deficit (Cognitive) moderate deficit;working memory  -VG      Safety Deficit (Cognitive) moderate deficit;insight into deficits/self awareness;impulsivity  -VG      Personal Safety Interventions fall prevention program maintained;supervised activity  -VG      Recorded by [VG] Gemma Leigh, PT 10/09/18 1342      Row Name  10/09/18 1323             Bed Mobility Assessment/Treatment    Comment (Bed Mobility) Deferred d/t disorientation, combative, inability to follow commands  -VG      Recorded by [VG] Gemma Leigh, PT 10/09/18 1342      Row Name 10/09/18 1323             Transfer Assessment/Treatment    Comment (Transfers) Deferred d/t disorientation, combative, inability to follow commands  -VG      Recorded by [VG] Gemma Leigh, PT 10/09/18 1342      Row Name 10/09/18 1323             Gait/Stairs Assessment/Training    Comment (Gait/Stairs) Deferred d/t disorientation, combative, inability to follow commands  -VG      Recorded by [VG] Gemma Leigh, PT 10/09/18 1342      Row Name 10/09/18 1323             Therapeutic Exercise    [id number] - PT Therapeutic Exercise Minutes 14  -VG      Recorded by [VG] Gemma Leigh, PT 10/09/18 1342      Row Name 10/09/18 1323             Therapeutic Exercise    Lower Extremity (Therapeutic Exercise) heel slides, bilateral;SLR (straight leg raise), bilateral  -VG      Lower Extremity Range of Motion (Therapeutic Exercise) hip abduction/adduction, bilateral;ankle dorsiflexion/plantar flexion, bilateral  -VG      Exercise Type (Therapeutic Exercise) AROM (active range of motion);AAROM (active assistive range of motion)  -VG      Position (Therapeutic Exercise) supine  -VG      Sets/Reps (Therapeutic Exercise) 15x  -VG      Comment (Therapeutic Exercise) Cues for redirection to task. MICHA for SLR and hip abd/add.  -VG      Recorded by [VG] Gemma Leigh, PT 10/09/18 1342      Row Name 10/09/18 1323             Positioning and Restraints    Pre-Treatment Position in bed  -VG      Post Treatment Position bed  -VG      In Bed supine;call light within reach;encouraged to call for assist;exit alarm on;side rails up x3  -VG      Recorded by [VG] Gemma Leigh, PT 10/09/18 1342      Row Name 10/09/18 1323             Pain Scale: FACES Pre/Post-Treatment    Pain: FACES Scale, Pretreatment  2-->hurts little bit  -VG      Pain: FACES Scale, Post-Treatment 2-->hurts little bit  -VG      Recorded by [VG] Gemma Leigh, PT 10/09/18 1342      Row Name                Wound 10/01/18 1845 Bilateral medial coccyx    Wound - Properties Group Date first assessed: 10/01/18 [SW] Time first assessed: 1845 [SW] Present On Admission : yes [SW] Side: Bilateral [SW] Orientation: medial [SW] Location: coccyx [SW] Additional Comments: shearing [CP] Recorded by:  [CP] Mahsa Almanzar APRN 10/07/18 1148 [SW] Fatmata Villalobos RN 10/01/18 1905    Row Name                Wound 10/07/18 1015 Right heel blisters    Wound - Properties Group Date first assessed: 10/07/18 [CP] Time first assessed: 1015 [CP] Present On Admission : no;picture taken [CP] Side: Right [CP] Location: heel [CP] Type: blisters [CP] Additional Comments: with bruise or DTPI [CP] Recorded by:  [CP] Mahsa Almanzar APRN 10/07/18 1155    Row Name                Wound 10/06/18 1500 Left heel blisters    Wound - Properties Group Date first assessed: 10/06/18 [CP] Time first assessed: 1500 [CP] Present On Admission : no [CP2] Side: Left [CP2] Location: heel [CP2] Type: blisters [CP2] Recorded by:  [CP] Mahsa Almanzar APRN 10/07/18 1159 [CP2] Mahsa Almanzar APRN 10/07/18 1157    Row Name                Wound 10/06/18 1500 Right elbow pressure injury    Wound - Properties Group Date first assessed: 10/06/18 [CP] Time first assessed: 1500 [CP] Present On Admission : no;picture taken [CP] Side: Right [CP] Location: elbow [CP] Type: pressure injury [CP] Stage, Pressure Injury: unstageable [CP] Recorded by:  [CP] Mahsa Almanzar APRN 10/07/18 1200    Row Name 10/09/18 1323             Coping    Observed Emotional State combative;agitated  -VG      Verbalized Emotional State frustration  -VG      Recorded by [VG] Gemma Leigh, PT 10/09/18 1342      Row Name 10/09/18 1323             Plan of Care Review    Plan of Care Reviewed  With patient  -VG      Recorded by [VG] Gemma Leigh, PT 10/09/18 1342      Row Name 10/09/18 1323             Outcome Summary/Treatment Plan (PT)    Daily Summary of Progress (PT) progress toward functional goals is gradual  -VG      Anticipated Discharge Disposition (PT) skilled nursing facility  -VG      Recorded by [VG] Gemma Leigh, PT 10/09/18 1342        User Key  (r) = Recorded By, (t) = Taken By, (c) = Cosigned By    Initials Name Effective Dates Discipline    CP Mahsa Almanzar APRN 06/08/18 -  Nurse    Fatmata Chaves RN 06/16/16 -  Nurse    Gemma Garcia, PT 05/29/18 -  PT          Wound 10/01/18 1845 Bilateral medial coccyx (Active)   Dressing Appearance open to air 10/8/2018  8:00 PM   Base dry 10/8/2018  8:00 PM   Periwound intact;dry;pink;blanchable 10/8/2018  8:00 PM   Edges irregular 10/8/2018  8:00 PM   Drainage Amount none 10/8/2018  8:00 PM   Care, Wound barrier applied 10/8/2018  4:00 PM   Dressing Care, Wound open to air 10/8/2018  4:00 PM   Periwound Care, Wound barrier ointment applied 10/8/2018  4:00 PM       Wound 10/07/18 1015 Right heel blisters (Active)   Dressing Appearance dry;intact 10/8/2018  8:00 PM   Base dressing in place, unable to visualize 10/8/2018  8:00 PM   Periwound intact;dry;pink;blanchable 10/8/2018  8:00 PM   Drainage Amount none 10/8/2018  8:00 PM   Care, Wound cleansed with;sterile water 10/8/2018  4:00 PM       Wound 10/06/18 1500 Left heel blisters (Active)   Dressing Appearance intact;dried drainage 10/8/2018  8:00 PM   Base dressing in place, unable to visualize 10/8/2018  8:00 PM       Wound 10/06/18 1500 Right elbow pressure injury (Active)   Dressing Appearance dry;intact 10/8/2018  8:00 PM   Base dressing in place, unable to visualize 10/8/2018  8:00 PM             Physical Therapy Education     Title: PT OT SLP Therapies (Active)     Topic: Physical Therapy (Active)     Point: Mobility training (Active)    Learning Progress  Summary     Learner Status Readiness Method Response Comment Documented by    Patient Active Acceptance E,D NR  DM 10/07/18 1438     Active Acceptance E NR  SIXTO 10/04/18 1500          Point: Home exercise program (Active)    Learning Progress Summary     Learner Status Readiness Method Response Comment Documented by    Patient Active Acceptance E NR Educated on HEP and benefits of activity. VG 10/09/18 1323     Active Acceptance E,D NR  DM 10/07/18 1438     Active Acceptance E NR  SIXTO 10/04/18 1500          Point: Body mechanics (Active)    Learning Progress Summary     Learner Status Readiness Method Response Comment Documented by    Patient Active Acceptance E,D NR  DM 10/07/18 1438     Active Acceptance E NR  SIXTO 10/04/18 1500          Point: Precautions (Active)    Learning Progress Summary     Learner Status Readiness Method Response Comment Documented by    Patient Active Acceptance E,D NR   10/07/18 1438     Active Acceptance E NR   10/04/18 1500                      User Key     Initials Effective Dates Name Provider Type Discipline     06/19/15 -  Althea Ruth, PT Physical Therapist PT     06/19/15 -  Terri Magaña, PT Physical Therapist PT     05/29/18 -  Gemma Leigh, PT Physical Therapist PT                    PT Recommendation and Plan  Anticipated Discharge Disposition (PT): skilled nursing facility  Therapy Frequency (PT Clinical Impression): daily  Outcome Summary/Treatment Plan (PT)  Daily Summary of Progress (PT): progress toward functional goals is gradual  Anticipated Discharge Disposition (PT): skilled nursing facility  Plan of Care Reviewed With: patient  Progress: no change  Outcome Summary: Deferred bed mobility and transfers d/t cognition, combative, and inability to follow commands. Completed bed level ther ex with cues for redirection to task. Will continue to progress pt as able per POC.          Outcome Measures     Row Name 10/09/18 1323 10/08/18 1500 10/07/18 1408        How much help from another person do you currently need...    Turning from your back to your side while in flat bed without using bedrails? 2  -VG  -- 2  -DM    Moving from lying on back to sitting on the side of a flat bed without bedrails? 2  -VG  -- 2  -DM    Moving to and from a bed to a chair (including a wheelchair)? 2  -VG  -- 2  -DM    Standing up from a chair using your arms (e.g., wheelchair, bedside chair)? 2  -VG  -- 2  -DM    Climbing 3-5 steps with a railing? 1  -VG  -- 1  -DM    To walk in hospital room? 1  -VG  -- 2  -DM    AM-PAC 6 Clicks Score 10  -VG  -- 11  -DM       How much help from another is currently needed...    Putting on and taking off regular lower body clothing?  -- 1  -SD  --    Bathing (including washing, rinsing, and drying)  -- 1  -SD  --    Toileting (which includes using toilet bed pan or urinal)  -- 1  -SD  --    Putting on and taking off regular upper body clothing  -- 2  -SD  --    Taking care of personal grooming (such as brushing teeth)  -- 3  -SD  --    Eating meals  -- 3  -SD  --    Score  -- 11  -SD  --       Functional Assessment    Outcome Measure Options AM-PAC 6 Clicks Basic Mobility (PT)  -VG AM-PAC 6 Clicks Daily Activity (OT)  -SD AM-[facility] 6 Clicks Basic Mobility (PT)  -DM      User Key  (r) = Recorded By, (t) = Taken By, (c) = Cosigned By    Initials Name Provider Type    Katerin Luz, OT Occupational Therapist    Terri Alexander, PT Physical Therapist    Gemma Garcia, PT Physical Therapist           Time Calculation:         PT Charges     Row Name 10/09/18 1323             Time Calculation    Start Time 1323  -VG      PT Received On 10/09/18  -      PT Goal Re-Cert Due Date 10/14/18  -         Time Calculation- PT    Total Timed Code Minutes- PT 14 minute(s)  -         Timed Charges    19774 - PT Therapeutic Exercise Minutes 14  -VG        User Key  (r) = Recorded By, (t) = Taken By, (c) = Cosigned By    Initials Name Provider  Type    VG Gemma Leigh, PT Physical Therapist        Therapy Suggested Charges     Code   Minutes Charges    93055 (CPT®) Hc Pt Neuromusc Re Education Ea 15 Min      79006 (CPT®) Hc Pt Ther Proc Ea 15 Min 14 1    72217 (CPT®) Hc Gait Training Ea 15 Min      09191 (CPT®) Hc Pt Therapeutic Act Ea 15 Min      11341 (CPT®) Hc Pt Manual Therapy Ea 15 Min      10339 (CPT®) Hc Pt Iontophoresis Ea 15 Min      20246 (CPT®) Hc Pt Elec Stim Ea-Per 15 Min      91205 (CPT®) Hc Pt Ultrasound Ea 15 Min      90644 (CPT®) Hc Pt Self Care/Mgmt/Train Ea 15 Min      56340 (CPT®) Hc Pt Prosthetic (S) Train Initial Encounter, Each 15 Min      70125 (CPT®) Hc Pt Orthotic(S)/Prosthetic(S) Encounter, Each 15 Min      75658 (CPT®) Hc Orthotic(S) Mgmt/Train Initial Encounter, Each 15min      Total  14 1        Therapy Charges for Today     Code Description Service Date Service Provider Modifiers Qty    96708572288 HC PT THER PROC EA 15 MIN 10/9/2018 Gemma Leigh, PT GP 1          PT G-Codes  Outcome Measure Options: AM-PAC 6 Clicks Basic Mobility (PT)  AM-PAC 6 Clicks Score: 10  Score: 11    Ira Leigh, PT  10/9/2018         Electronically signed by Gemma Leigh, PT at 10/9/2018  1:45 PM          Occupational Therapy Notes (most recent note)      Katerin Shine, OT at 10/9/2018  4:14 PM          Acute Care - Occupational Therapy Treatment Note  [facility]     Patient Name: Devora Huddleston  : 1945  MRN: [id number]  Today's Date: 10/9/2018  Onset of Illness/Injury or Date of Surgery: 10/01/18  Date of Referral to OT: 10/04/18  Referring Physician: MD Emmett    Admit Date: 10/1/2018       ICD-10-CM ICD-9-CM   1. Acute respiratory failure with hypoxemia (CMS/HCC) J96.01 518.81   2. Impaired functional mobility, balance, gait, and endurance Z74.09 V49.89   3. Oropharyngeal dysphagia R13.12 787.22   4. Impaired mobility and ADLs Z74.09 799.89     Patient Active Problem List   Diagnosis   • Acute respiratory  failure (CMS/HCC)   • Metabolic encephalopathy   • Acute hypoxemic respiratory failure requiring intubation and ventilatory support (CMS/HCC)   • Hyponatremia, suspect SIADH   • Hypoglycemia, resolved   • Generalized abdominal pain   • Weight loss   • Hypotension, resolved   • Multiple right rib fractures (ninth, 10th, 11th)   • Anemia   • COPD and active cigarette abuse (CMS/HCC)   • Constipation     Past Medical History:   Diagnosis Date   • Hypertension    • Stroke (CMS/HCC)      History reviewed. No pertinent surgical history.    Therapy Treatment          Rehabilitation Treatment Summary     Row Name 10/09/18 1544 10/09/18 1323          Treatment Time/Intention    Discipline occupational therapist  -SD physical therapist  -VG     Document Type therapy note (daily note)  -SD therapy note (daily note)  -VG     Subjective Information no complaints  -SD no complaints  -VG     Mode of Treatment occupational therapy  -SD individual therapy;physical therapy  -VG     Patient/Family Observations Pt. in bed, NG tube, IV, lyons cath; no family present.  -SD In bed, NG tube, IV, tele, lyons cath; no family present.  -VG     Care Plan Review patient/other agree to care plan  -SD patient/other agree to care plan  -VG     Therapy Frequency (PT Clinical Impression)  -- daily  -VG     Total Minutes, Occupational Therapy Treatment 19  -SD  --     Patient Effort adequate  -SD adequate  -VG     Comment pt. very confused, pulling at lines  -SD  --     Existing Precautions/Restrictions  -- fall;other (see comments)   confused/combative  -VG     Recorded by [SD] Katerin Shine, OT 10/09/18 1610 [VG] Gemma Leigh, PT 10/09/18 1342     Row Name 10/09/18 1544             Vital Signs    Pre Patient Position Supine  -SD      Intra Patient Position Sitting  -SD      Post Patient Position Supine  -SD      Recorded by [SD] Katerin Shine, OT 10/09/18 1610      Row Name 10/09/18 1544 10/09/18 1323          Cognitive  Assessment/Intervention- PT/OT    Affect/Mental Status (Cognitive) confused  -SD confused  -VG     Behavioral Issues (Cognitive) disinhibition;uncooperative;combative/physical outbursts  -SD uncooperative;disinhibition;combative/physical outbursts  -VG     Orientation Status (Cognition) oriented to;person  -SD oriented to;person  -VG     Follows Commands (Cognition) follows one step commands;0-24% accuracy;verbal cues/prompting required;repetition of directions required  -SD follows one step commands;75-90% accuracy;verbal cues/prompting required  -VG     Cognitive Function (Cognitive) safety deficit;memory deficit;attention deficit  -SD memory deficit;safety deficit  -VG     Memory Deficit (Cognitive)  -- moderate deficit;working memory  -VG     Safety Deficit (Cognitive) severe deficit;ability to follow commands;awareness of need for assistance;impulsivity;insight into deficits/self awareness;judgment;problem solving;safety precautions awareness;safety precautions follow-through/compliance  -SD moderate deficit;insight into deficits/self awareness;impulsivity  -VG     Personal Safety Interventions fall prevention program maintained;supervised activity  -SD fall prevention program maintained;supervised activity  -VG     Recorded by [SD] Katerin Shine OT 10/09/18 1610 [VG] Gemma Leigh, PT 10/09/18 1342     Row Name 10/09/18 1544 10/09/18 1323          Bed Mobility Assessment/Treatment    Supine-Sit [place] (Bed Mobility) moderate assist (50% patient effort);maximum assist (25% patient effort)  -SD  --     Sit-Supine [place] (Bed Mobility) moderate assist (50% patient effort);maximum assist (25% patient effort)  -SD  --     Assistive Device (Bed Mobility) draw sheet;head of bed elevated;bed rails  -SD  --     Comment (Bed Mobility) max A x 2 to scoot pt. up in the bed  -SD Deferred d/t disorientation, combative, inability to follow commands  -VG     Recorded by [SD] Katerin Shine, OT  10/09/18 1610 [VG] Gemma Leigh, PT 10/09/18 1342     Row Name 10/09/18 1323             Transfer Assessment/Treatment    Comment (Transfers) Deferred d/t disorientation, combative, inability to follow commands  -VG      Recorded by [VG] Gemma Leigh, PT 10/09/18 1342      Row Name 10/09/18 1323             Gait/Stairs Assessment/Training    Comment (Gait/Stairs) Deferred d/t disorientation, combative, inability to follow commands  -VG      Recorded by [VG] Gemma Leigh, PT 10/09/18 1342      Row Name 10/09/18 1544             ADL Assessment/Intervention    48410 - OT Self Care/Mgmt Minutes 5  -SD      BADL Assessment/Intervention grooming  -SD      Recorded by [SD] Katerin Shine, OT 10/09/18 1610      Row Name 10/09/18 1544             Grooming Assessment/Training    [place] Level (Grooming) hair care, combing/brushing;dependent (less than 25% patient effort)  -SD      Grooming Position supine  -SD      Comment (Grooming) pt. requesting to wash & comb her hair, but throwing brush out of her hand  -SD      Recorded by [SD] Katerin Shine, OT 10/09/18 1610      Row Name 10/09/18 1323             Therapeutic Exercise    05740 - PT Therapeutic Exercise Minutes 14  -VG      Recorded by [VG] Gemma Leigh, PT 10/09/18 1342      Row Name 10/09/18 1323             Therapeutic Exercise    Lower Extremity (Therapeutic Exercise) heel slides, bilateral;SLR (straight leg raise), bilateral  -VG      Lower Extremity Range of Motion (Therapeutic Exercise) hip abduction/adduction, bilateral;ankle dorsiflexion/plantar flexion, bilateral  -VG      Exercise Type (Therapeutic Exercise) AROM (active range of motion);AAROM (active assistive range of motion)  -VG      Position (Therapeutic Exercise) supine  -VG      Sets/Reps (Therapeutic Exercise) 15x  -VG      Comment (Therapeutic Exercise) Cues for redirection to task. MICHA for SLR and hip abd/add.  -VG      Recorded by [VG] Gemma Leigh, PT  10/09/18 1342      Row Name 10/09/18 1544             Static Sitting Balance    Level of [place] (Unsupported Sitting, Static Balance) maximal assist, 25 to 49% patient effort;moderate assist, 50 to 74% patient effort  -SD      Sitting Position (Unsupported Sitting, Static Balance) sitting on edge of bed  -SD      Time Able to Maintain Position (Unsupported Sitting, Static Balance) 45 to 60 seconds  -SD      Comment (Unsupported Sitting, Static Balance) pt. brought to sitting on EOB, then leaning back & saying she wanted to lie down  -SD      Recorded by [SD] Katerin Shine, OT 10/09/18 1610      Row Name 10/09/18 1544 10/09/18 1323          Positioning and Restraints    Pre-Treatment Position in bed  -SD in bed  -VG     Post Treatment Position bed  -SD bed  -VG     In Bed supine;notified nsg;call light within reach;encouraged to call for assist;exit alarm on;side rails up x3  -SD supine;call light within reach;encouraged to call for assist;exit alarm on;side rails up x3  -VG     Recorded by [SD] Katerin Shine, OT 10/09/18 1610 [VG] Gemma Leigh, PT 10/09/18 1342     Row Name 10/09/18 1544 10/09/18 1323          Pain Scale: FACES Pre/Post-Treatment    Pain: FACES Scale, Pretreatment 0-->no hurt  -SD 2-->hurts little bit  -VG     Pain: FACES Scale, Post-Treatment 0-->no hurt  -SD 2-->hurts little bit  -VG     Recorded by [SD] Katerin Shine, OT 10/09/18 1610 [VG] Gemma Leigh, PT 10/09/18 1342     Row Name                Wound 10/01/18 1845 Bilateral medial coccyx    Wound - Properties Group Date first assessed: 10/01/18 [SW] Time first assessed: 1845 [SW] Present On Admission : yes [SW] Side: Bilateral [SW] Orientation: medial [SW] Location: coccyx [SW] Additional Comments: shearing [CP] Recorded by:  [CP] Mahsa Almanzar, JEFF 10/07/18 1148 [SW] Fatmata Villalobos RN 10/01/18 1905    Row Name                Wound 10/07/18 1015 Right heel blisters    Wound - Properties  Group Date first assessed: 10/07/18 [CP] Time first assessed: 1015 [CP] Present On Admission : no;picture taken [CP] Side: Right [CP] Location: heel [CP] Type: blisters [CP] Additional Comments: with bruise or DTPI [CP] Recorded by:  [CP] Mahsa Almanzar, APRN 10/07/18 1155    Row Name                Wound 10/06/18 1500 Left heel blisters    Wound - Properties Group Date first assessed: 10/06/18 [CP] Time first assessed: 1500 [CP] Present On Admission : no [CP2] Side: Left [CP2] Location: heel [CP2] Type: blisters [CP2] Recorded by:  [CP] Mahsa Almanzar, APRN 10/07/18 1159 [CP2] Mahsa Almanzar, APRN 10/07/18 1157    Row Name                Wound 10/06/18 1500 Right elbow pressure injury    Wound - Properties Group Date first assessed: 10/06/18 [CP] Time first assessed: 1500 [CP] Present On Admission : no;picture taken [CP] Side: Right [CP] Location: elbow [CP] Type: pressure injury [CP] Stage, Pressure Injury: unstageable [CP] Recorded by:  [CP] Mahsa Almanzar, JEFF 10/07/18 1200    Row Name 10/09/18 1544 10/09/18 1323          Coping    Observed Emotional State agitated;combative;restless  -SD combative;agitated  -VG     Verbalized Emotional State  -- frustration  -VG     Recorded by [SD] Katerin Shine, OT 10/09/18 1610 [VG] Gemma Leigh, PT 10/09/18 1342     Row Name 10/09/18 1544 10/09/18 1323          Plan of Care Review    Plan of Care Reviewed With patient  -SD patient  -VG     Recorded by [SD] Katerin Shine, OT 10/09/18 1610 [VG] Gemma Leigh, PT 10/09/18 1342     Row Name 10/09/18 1544             Outcome Summary/Treatment Plan (OT)    Daily Summary of Progress (OT) progress toward functional goals is gradual  -SD      Barriers to Overall Progress (OT) confusion  -SD      Plan for Continued Treatment (OT) cont OT POC  -SD      Anticipated Discharge Disposition (OT) skilled nursing facility  -SD      Recorded by [SD] Katerin Shine, OT 10/09/18 2906       Row Name 10/09/18 1323             Outcome Summary/Treatment Plan (PT)    Daily Summary of Progress (PT) progress toward functional goals is gradual  -VG      Anticipated Discharge Disposition (PT) skilled nursing facility  -VG      Recorded by [VG] Gemma Leigh, PT 10/09/18 1342        User Key  (r) = Recorded By, (t) = Taken By, (c) = Cosigned By    Initials Name Effective Dates Discipline    Katerin Luz, OT 06/08/18 -  OT    Mahsa Matthews APRN 06/08/18 -  Nurse    Fatmata Chaves RN 06/16/16 -  Nurse    Gemma Garcia, PT 05/29/18 -  PT        Wound 10/01/18 1845 Bilateral medial coccyx (Active)   Dressing Appearance open to air 10/8/2018  8:00 PM   Base dry 10/8/2018  8:00 PM   Periwound intact;dry;pink;blanchable 10/8/2018  8:00 PM   Edges irregular 10/8/2018  8:00 PM   Drainage Amount none 10/8/2018  8:00 PM       Wound 10/07/18 1015 Right heel blisters (Active)   Dressing Appearance dry;intact 10/8/2018  8:00 PM   Base dressing in place, unable to visualize 10/8/2018  8:00 PM   Periwound intact;dry;pink;blanchable 10/8/2018  8:00 PM   Drainage Amount none 10/8/2018  8:00 PM       Wound 10/06/18 1500 Left heel blisters (Active)   Dressing Appearance intact;dried drainage 10/8/2018  8:00 PM   Base dressing in place, unable to visualize 10/8/2018  8:00 PM       Wound 10/06/18 1500 Right elbow pressure injury (Active)   Dressing Appearance dry;intact 10/8/2018  8:00 PM   Base dressing in place, unable to visualize 10/8/2018  8:00 PM         Occupational Therapy Education     Title: PT OT SLP Therapies (Active)     Topic: Occupational Therapy (Active)     Point: ADL training (Active)     Description: Instruct learner(s) on proper safety adaptation and remediation techniques during self care or transfers.   Instruct in proper use of assistive devices.   Learning Progress Summary     Learner Status Readiness Method Response Comment Documented by    Patient Active Acceptance  E NR Pt educated on appropriate safety precautions, t/f techniques, role of OT, positioning, and benefits of therapy. CL 10/05/18 1009          Point: Precautions (Active)     Description: Instruct learner(s) on prescribed precautions during self-care and functional transfers.   Learning Progress Summary     Learner Status Readiness Method Response Comment Documented by    Patient Active Acceptance E NR Pt educated on appropriate safety precautions, t/f techniques, role of OT, positioning, and benefits of therapy. CL 10/05/18 1009          Point: Body mechanics (Active)     Description: Instruct learner(s) on proper positioning and spine alignment during self-care, functional mobility activities and/or exercises.   Learning Progress Summary     Learner Status Readiness Method Response Comment Documented by    Patient Active Acceptance E NR Pt educated on appropriate safety precautions, t/f techniques, role of OT, positioning, and benefits of therapy. CL 10/05/18 1009                      User Key     Initials Effective Dates Name Provider Type Discipline    CL 04/03/18 -  Helena Thomas, OT Occupational Therapist OT                OT Recommendation and Plan  Outcome Summary/Treatment Plan (OT)  Daily Summary of Progress (OT): progress toward functional goals is gradual  Barriers to Overall Progress (OT): confusion  Plan for Continued Treatment (OT): cont OT POC  Anticipated Discharge Disposition (OT): skilled nursing facility  Daily Summary of Progress (OT): progress toward functional goals is gradual  Plan of Care Review  Plan of Care Reviewed With: patient  Plan of Care Reviewed With: patient  Outcome Summary: Pt. holding eyes closed during most of tx session. Pt.'s son called & she spoke with him on phone, appropriately holding the phone to her ear, but then puling at her lyons, IV lines, NG, & pulling gown off. Therapist attempted to redirect pt. Supine to sit on EOB with mod A, then pt. leaned back requiring max  A from therapist to hold pt. upright. Max A x 2 to pull pt. up in bed.        Outcome Measures     Row Name 10/09/18 1544 10/09/18 1323 10/08/18 1500       How much help from another person do you currently need...    Turning from your back to your side while in flat bed without using bedrails?  -- 2  -VG  --    Moving from lying on back to sitting on the side of a flat bed without bedrails?  -- 2  -VG  --    Moving to and from a bed to a chair (including a wheelchair)?  -- 2  -VG  --    Standing up from a chair using your arms (e.g., wheelchair, bedside chair)?  -- 2  -VG  --    Climbing 3-5 steps with a railing?  -- 1  -VG  --    To walk in hospital room?  -- 1  -VG  --    AM-[facility] 6 Clicks Score  -- 10  -VG  --       How much help from another is currently needed...    Putting on and taking off regular lower body clothing? 1  -SD  -- 1  -SD    Bathing (including washing, rinsing, and drying) 1  -SD  -- 1  -SD    Toileting (which includes using toilet bed pan or urinal) 1  -SD  -- 1  -SD    Putting on and taking off regular upper body clothing 1  -SD  -- 2  -SD    Taking care of personal grooming (such as brushing teeth) 2  -SD  -- 3  -SD    Eating meals 1  -SD  -- 3  -SD    Score 7  -SD  -- 11  -SD       Functional Assessment    Outcome Measure Options AM-PAC 6 Clicks Daily Activity (OT)  -SD AM-PAC 6 Clicks Basic Mobility (PT)  -VG AM-[facility] 6 Clicks Daily Activity (OT)  -SD    Row Name 10/07/18 1408             How much help from another person do you currently need...    Turning from your back to your side while in flat bed without using bedrails? 2  -DM      Moving from lying on back to sitting on the side of a flat bed without bedrails? 2  -DM      Moving to and from a bed to a chair (including a wheelchair)? 2  -DM      Standing up from a chair using your arms (e.g., wheelchair, bedside chair)? 2  -DM      Climbing 3-5 steps with a railing? 1  -DM      To walk in hospital room? 2  -DM      AM-PAC 6 Clicks Score  11  -DM         Functional Assessment    Outcome Measure Options AM-PAC 6 Clicks Basic Mobility (PT)  -DM        User Key  (r) = Recorded By, (t) = Taken By, (c) = Cosigned By    Initials Name Provider Type    Katerin Luz, OT Occupational Therapist    Terri Alexander, PT Physical Therapist    Gemma Garcia, PT Physical Therapist           Time Calculation:         Time Calculation- OT     Row Name 10/09/18 1544             Time Calculation- OT    OT Start Time 1544  -SD      OT Stop Time 1603  -SD      OT Time Calculation (min) 19 min  -SD      OT Received On 10/09/18  -SD      OT Goal Re-Cert Due Date 10/15/18  -SD         Timed Charges    57126 - OT Self Care/Mgmt Minutes 5  -SD        User Key  (r) = Recorded By, (t) = Taken By, (c) = Cosigned By    Initials Name Provider Type    Katerin Luz, OT Occupational Therapist           Therapy Suggested Charges     Code   Minutes Charges    73240 (CPT®) Hc Ot Neuromusc Re Education Ea 15 Min      20820 (CPT®) Hc Ot Ther Proc Ea 15 Min      93040 (CPT®) Hc Ot Therapeutic Act Ea 15 Min      40678 (CPT®) Hc Ot Manual Therapy Ea 15 Min      32137 (CPT®) Hc Ot Iontophoresis Ea 15 Min      57182 (CPT®) Hc Ot Elec Stim Ea-Per 15 Min      53667 (CPT®) Hc Ot Ultrasound Ea 15 Min      36047 (CPT®) Hc Ot Self Care/Mgmt/Train Ea 15 Min 5     Total  5         Therapy Charges for Today     Code Description Service Date Service Provider Modifiers Qty    50491404056 HC OT THERAPEUTIC ACT EA 15 MIN 10/8/2018 Katerin Shine OT GO 2    [id number] HC OT THERAPEUTIC ACT EA 15 MIN 10/9/2018 Katerin Shine OT GO 1               Katerin Shine OT  10/9/2018    Electronically signed by Katerin Shine OT at 10/9/2018  4:14 PM

## 2018-10-10 NOTE — PROGRESS NOTES
[facility] Medicine Services  PROGRESS NOTE    Patient Name: Devora Huddleston  : 1945  MRN: [id number]    Date of Admission: 10/1/2018  Length of Stay: 9  Primary Care Physician: Evelyn Francisco MD    Subjective   Subjective     CC:  F/U found down unresponsive    HPI:  Pt more alert this am, but still confused.  States that she needs a chair so she can drive.     Review of Systems  Gen-no fevers, no chills  CV-no chest pain, no palpitations  Resp-no cough, no dyspnea  GI-no nausea, no abd pain    Otherwise ROS is negative except as mentioned in the HPI.    Objective   Objective     Vital Signs:   Temp:  [97.2 °F (36.2 °C)-98.6 °F (37 °C)] 97.2 °F (36.2 °C)  Heart Rate:  [74-92] 89  Resp:  [14-18] 18  BP: (101-120)/(60-84) 106/67  Total (NIH Stroke Scale): 4     Physical Exam:  Gen-no acute distress, cachectic  HENT-NCAT, mucous membranes moist  CV-RRR, S1 S2 normal, no m/r/g  Resp-CTAB, no wheezes or rales  Abd-soft, NT, ND, +BS  Ext-no edema  Neuro-A&Ox1, knows which city she is in but does not know the president nor the year, but follows commands, no focal deficits; not currently in restraints  Skin-no rashes  Psych-appropriate mood, calm      Results Reviewed:  I have personally reviewed current lab, radiology, and data and agree.      Results from last 7 days  Lab Units 10/10/18  0618 10/09/18  0547 10/08/18  0704   WBC 10*3/mm3 8.13 10.16 7.40   HEMOGLOBIN g/dL 10.2* 10.7* 10.8*   HEMATOCRIT % 30.3* 31.8* 32.2*   PLATELETS 10*3/mm3 365 358 278       Results from last 7 days  Lab Units 10/10/18  0618 10/09/18  2034 10/09/18  0547 10/08/18  0704   SODIUM mmol/L 125* 123* 127* 130*   POTASSIUM mmol/L 3.8  --  4.1 3.8   CHLORIDE mmol/L 92*  --  92* 95*   CO2 mmol/L 26.0  --  27.0 25.0   BUN mg/dL 17  --  15 12   CREATININE mg/dL 0.31*  --  0.27* 0.28*   GLUCOSE mg/dL 89  --  80 74   CALCIUM mg/dL 8.0*  --  8.5* 8.3*   ALT (SGPT) U/L  --   --   --  23   AST (SGOT) U/L  --   --    --  36*     Estimated Creatinine Clearance: 45.6 mL/min (A) (by C-G formula based on SCr of 0.31 mg/dL (L)).  No results found for: BNP    Microbiology Results Abnormal     Procedure Component Value - Date/Time    Blood Culture - Blood, [[id number]]  (Normal) Collected:  10/01/18 1745    Lab Status:  Final result Specimen:  Blood from Wrist, Right Updated:  10/06/18 1830     Blood Culture No growth at 5 days    Blood Culture - Blood, [[id number]]  (Normal) Collected:  10/01/18 1715    Lab Status:  Final result Specimen:  Blood from Arm, Right Updated:  10/06/18 1830     Blood Culture No growth at 5 days          Imaging Results (last 24 hours)     ** No results found for the last 24 hours. **        Results for orders placed during the hospital encounter of 10/01/18   Adult Transthoracic Echo Complete W/ Cont if Necessary Per Protocol    Narrative · Left ventricular systolic function is normal.  · Estimated EF appears to be in the range of 61 - 65%  · All left ventricular wall segments contract normally.  · Normal left atrial pressure.  · Right ventricular cavity is mildly dilated  · Right ventricular wall thickness is consistent with mild hypertrophy.  · Right atrial cavity size is mildly dilated. The inferior vena cava is   dilated. IVC inspiratory collapse is absent. A prominent Eustachian valve   is present.  · Moderate tricuspid valve regurgitation is present. Estimated right   ventricular systolic pressure from tricuspid regurgitation is mildly   elevated (35-45 mmHg).          I have reviewed the medications.      clopidogrel 75 mg Oral Daily   docusate sodium 100 mg Nasogastric BID   enoxaparin 30 mg Subcutaneous Nightly   famotidine 20 mg Oral BID   multivitamin 1 tablet Oral Daily   polyethylene glycol 17 g Oral Daily   sennosides-docusate sodium 2 tablet Oral Nightly         Assessment/Plan   Assessment / Plan     Active Hospital Problems    Diagnosis   • **Acute hypoxemic respiratory failure requiring  intubation and ventilatory support (CMS/[facility])   • Constipation   • COPD and active cigarette abuse (CMS/[facility])   • Multiple right rib fractures (ninth, 10th, 11th)   • Anemia   • Metabolic encephalopathy   • Hyponatremia, suspect SIADH   • Hypoglycemia, resolved   • Generalized abdominal pain   • Weight loss   • Hypotension, resolved          Brief Hospital Course to date:  Devora Huddleston is a 73 y.o. female with hx of COPD, HTN, and CVA presents after being found down and unresponsive by her son on 10/1/18. Was intubated in the field. Glucose dropped to 39 en route (initially 105). Patient had reportedly complained of increasing weakness and weight loss for several weeks, and had recent evaluation for hyponatremia and seizures. Admitted to [facility] ICU. CT head, CT perfusion, CTA head and neck all negative. Further imaging revealed 3 right sided rib fractures, presumably from a fall at home. Possible RLL atelectasis vs. infiltrate. She was also hypotensive and requiring pressors. Treated empirically with Vanc and Zosyn however all cultures remain no growth. She was started on enteral feeds due to dysphagia. Patient extubated 10/3. Transferred to floor, hospitalists assumed care on 10/6.      Assessment & Plan:  --Stopped ATBx. Cultures no growth.  --Sodium slightly worse today, workup c/w SIADH. Worse today. FW in TFs has now been stopped. Repeat labs in am  --Supportive care for rib fractures.   --SLP following. Continue tube feeds, diet started now.  Wean TFs as PO intake improves  --Started Seroquel 25 mg QHS for confusion, but appears to worsen her symptoms. D/c'd Seroquel.  PRN Ativan for anxiety. If confusion worsens, though, will need to stop anxiolytics.  Also, worsening hyponatremia could be contributing.   --PT/OT. Needs placement. CM following.     DVT Prophylaxis:  Lovenox    CODE STATUS:   Code Status and Medical Interventions:   Ordered at: 10/01/18 1181     Code Status:    CPR     Medical Interventions  (Level of Support Prior to Arrest):    Full       Disposition: I expect the patient to be discharged 2-3 days to rehab    Electronically signed by Akila Butt MD, 10/10/18, 12:53 PM.

## 2018-10-10 NOTE — PROGRESS NOTES
Continued Stay Note  [facility]     Patient Name: Devora Huddleston  MRN: [id number]  Today's Date: 10/10/2018    Admit Date: 10/1/2018          Discharge Plan     Row Name 10/10/18 1443       Plan    Plan SNF Referrals     Patient/Family in Agreement with Plan yes    Plan Comments Spoke with patient's son at bedside  -He asked that referrals be sent to the facilities in [facility] and [place] in [facility]. Referrals have been faxed - Will await return calls to discuss acceptance and bed status - CM following - Mirtha [phone]               Discharge Codes    No documentation.       Expected Discharge Date and Time     Expected Discharge Date Expected Discharge Time    Oct 6, 2018             Mirtha Laws RN

## 2018-10-11 LAB
ANION GAP SERPL CALCULATED.3IONS-SCNC: 9 MMOL/L (ref 3–11)
BUN BLD-MCNC: 16 MG/DL (ref 9–23)
BUN/CREAT SERPL: 51.6 (ref 7–25)
CALCIUM SPEC-SCNC: 7.8 MG/DL (ref 8.7–10.4)
CHLORIDE SERPL-SCNC: 94 MMOL/L (ref 99–109)
CO2 SERPL-SCNC: 22 MMOL/L (ref 20–31)
CREAT BLD-MCNC: 0.31 MG/DL (ref 0.6–1.3)
DEPRECATED RDW RBC AUTO: 47.1 FL (ref 37–54)
ERYTHROCYTE [DISTWIDTH] IN BLOOD BY AUTOMATED COUNT: 14.8 % (ref 11.3–14.5)
GFR SERPL CREATININE-BSD FRML MDRD: >150 ML/MIN/1.73
GLUCOSE BLD-MCNC: 104 MG/DL (ref 70–100)
GLUCOSE BLD-MCNC: 86 MG/DL (ref 70–100)
GLUCOSE BLDC GLUCOMTR-MCNC: 117 MG/DL (ref 70–130)
GLUCOSE BLDC GLUCOMTR-MCNC: 135 MG/DL (ref 70–130)
GLUCOSE BLDC GLUCOMTR-MCNC: 493 MG/DL (ref 70–130)
GLUCOSE BLDC GLUCOMTR-MCNC: 99 MG/DL (ref 70–130)
HCT VFR BLD AUTO: 27.6 % (ref 34.5–44)
HGB BLD-MCNC: 9.3 G/DL (ref 11.5–15.5)
MCH RBC QN AUTO: 29.9 PG (ref 27–31)
MCHC RBC AUTO-ENTMCNC: 33.7 G/DL (ref 32–36)
MCV RBC AUTO: 88.7 FL (ref 80–99)
OSMOLALITY SERPL: 263 MOSM/KG (ref 275–295)
PLATELET # BLD AUTO: 359 10*3/MM3 (ref 150–450)
PMV BLD AUTO: 8.6 FL (ref 6–12)
POTASSIUM BLD-SCNC: 3.8 MMOL/L (ref 3.5–5.5)
RBC # BLD AUTO: 3.11 10*6/MM3 (ref 3.89–5.14)
SODIUM BLD-SCNC: 125 MMOL/L (ref 132–146)
SODIUM BLD-SCNC: 127 MMOL/L (ref 132–146)
WBC NRBC COR # BLD: 7.4 10*3/MM3 (ref 3.5–10.8)

## 2018-10-11 PROCEDURE — 85027 COMPLETE CBC AUTOMATED: CPT | Performed by: INTERNAL MEDICINE

## 2018-10-11 PROCEDURE — 83930 ASSAY OF BLOOD OSMOLALITY: CPT | Performed by: INTERNAL MEDICINE

## 2018-10-11 PROCEDURE — 80048 BASIC METABOLIC PNL TOTAL CA: CPT | Performed by: INTERNAL MEDICINE

## 2018-10-11 PROCEDURE — 25010000002 ENOXAPARIN PER 10 MG: Performed by: INTERNAL MEDICINE

## 2018-10-11 PROCEDURE — 99233 SBSQ HOSP IP/OBS HIGH 50: CPT | Performed by: INTERNAL MEDICINE

## 2018-10-11 PROCEDURE — 84295 ASSAY OF SERUM SODIUM: CPT | Performed by: INTERNAL MEDICINE

## 2018-10-11 PROCEDURE — 82947 ASSAY GLUCOSE BLOOD QUANT: CPT

## 2018-10-11 RX ADMIN — ENOXAPARIN SODIUM 30 MG: 30 INJECTION SUBCUTANEOUS at 22:39

## 2018-10-11 RX ADMIN — DOCUSATE SODIUM 100 MG: 50 LIQUID ORAL at 10:40

## 2018-10-11 RX ADMIN — CLOPIDOGREL BISULFATE 75 MG: 75 TABLET ORAL at 10:40

## 2018-10-11 RX ADMIN — FAMOTIDINE 20 MG: 20 TABLET ORAL at 22:39

## 2018-10-11 RX ADMIN — POLYETHYLENE GLYCOL 3350 17 G: 17 POWDER, FOR SOLUTION ORAL at 10:40

## 2018-10-11 RX ADMIN — Medication 1 TABLET: at 10:40

## 2018-10-11 RX ADMIN — FAMOTIDINE 20 MG: 20 TABLET ORAL at 10:40

## 2018-10-11 NOTE — PLAN OF CARE
Problem: Skin Injury Risk (Adult)  Goal: Skin Health and Integrity  Outcome: Ongoing (interventions implemented as appropriate)      Problem: Infection, Risk/Actual (Adult)  Goal: Infection Prevention/Resolution  Outcome: Ongoing (interventions implemented as appropriate)      Problem: Fall Risk (Adult)  Goal: Absence of Fall  Outcome: Ongoing (interventions implemented as appropriate)      Problem: Patient Care Overview  Goal: Plan of Care Review  Outcome: Ongoing (interventions implemented as appropriate)   10/11/18 0330   Coping/Psychosocial   Plan of Care Reviewed With patient   Plan of Care Review   Progress improving   OTHER   Outcome Summary Pt still having confusion, behavior appropriate. VSS. Tube feed changed to Peptamin 1.5 at goal rate of 60 ml/ hr. Continue to monitor.

## 2018-10-11 NOTE — PROGRESS NOTES
[facility] Medicine Services  PROGRESS NOTE    Patient Name: Devora Huddleston  : 1945  MRN: [id number]    Date of Admission: 10/1/2018  Length of Stay: 10  Primary Care Physician: Evelyn Francisco MD    Subjective   Subjective     CC:  F/U found down unresponsive    HPI:  Pt more alert today, still confused at times. No issues overnight per RN.    Review of Systems  Gen-no fevers, no chills  CV-no chest pain, no palpitations  Resp-no cough, no dyspnea  GI-no nausea, no abd pain    Otherwise ROS is negative except as mentioned in the HPI.    Objective   Objective     Vital Signs:   Temp:  [97.2 °F (36.2 °C)-99.3 °F (37.4 °C)] 97.4 °F (36.3 °C)  Heart Rate:  [75-88] 85  Resp:  [18] 18  BP: (102-122)/(51-68) 109/64  Total (NIH Stroke Scale): 4     Physical Exam:  Gen-no acute distress, cachectic  HENT-NCAT, mucous membranes moist  CV-RRR, S1 S2 normal, no m/r/g  Resp-CTAB, no wheezes or rales  Abd-soft, NT, ND, +BS  Ext-no edema  Neuro-A&Ox2, knows which city she is in and which year it is but does not know the president,follows commands, no focal deficits; not currently in restraints  Skin-no rashes  Psych-appropriate mood, calm      Results Reviewed:  I have personally reviewed current lab, radiology, and data and agree.      Results from last 7 days  Lab Units 10/11/18  0538 10/10/18  0618 10/09/18  0547   WBC 10*3/mm3 7.40 8.13 10.16   HEMOGLOBIN g/dL 9.3* 10.2* 10.7*   HEMATOCRIT % 27.6* 30.3* 31.8*   PLATELETS 10*3/mm3 359 365 358       Results from last 7 days  Lab Units 10/11/18  1159 10/11/18  0538 10/10/18  1307 10/10/18  0618  10/09/18  0547 10/08/18  0704   SODIUM mmol/L  --  125* 123* 125*  < > 127* 130*   POTASSIUM mmol/L  --  3.8  --  3.8  --  4.1 3.8   CHLORIDE mmol/L  --  94*  --  92*  --  92* 95*   CO2 mmol/L  --  22.0  --  26.0  --  27.0 25.0   BUN mg/dL  --  16  --  17  --  15 12   CREATININE mg/dL  --  0.31*  --  0.31*  --  0.27* 0.28*   GLUCOSE mg/dL 86 104*   --  89  --  80 74   CALCIUM mg/dL  --  7.8*  --  8.0*  --  8.5* 8.3*   ALT (SGPT) U/L  --   --   --   --   --   --  23   AST (SGOT) U/L  --   --   --   --   --   --  36*   < > = values in this interval not displayed.  Estimated Creatinine Clearance: 46.2 mL/min (A) (by C-G formula based on SCr of 0.31 mg/dL (L)).  No results found for: BNP    Microbiology Results Abnormal     Procedure Component Value - Date/Time    Blood Culture - Blood, [[id number]]  (Normal) Collected:  10/01/18 1745    Lab Status:  Final result Specimen:  Blood from Wrist, Right Updated:  10/06/18 1830     Blood Culture No growth at 5 days    Blood Culture - Blood, [[id number]]  (Normal) Collected:  10/01/18 1715    Lab Status:  Final result Specimen:  Blood from Arm, Right Updated:  10/06/18 1830     Blood Culture No growth at 5 days          Imaging Results (last 24 hours)     ** No results found for the last 24 hours. **        Results for orders placed during the hospital encounter of 10/01/18   Adult Transthoracic Echo Complete W/ Cont if Necessary Per Protocol    Narrative · Left ventricular systolic function is normal.  · Estimated EF appears to be in the range of 61 - 65%  · All left ventricular wall segments contract normally.  · Normal left atrial pressure.  · Right ventricular cavity is mildly dilated  · Right ventricular wall thickness is consistent with mild hypertrophy.  · Right atrial cavity size is mildly dilated. The inferior vena cava is   dilated. IVC inspiratory collapse is absent. A prominent Eustachian valve   is present.  · Moderate tricuspid valve regurgitation is present. Estimated right   ventricular systolic pressure from tricuspid regurgitation is mildly   elevated (35-45 mmHg).          I have reviewed the medications.      clopidogrel 75 mg Oral Daily   docusate sodium 100 mg Nasogastric BID   enoxaparin 30 mg Subcutaneous Nightly   famotidine 20 mg Oral BID   multivitamin 1 tablet Oral Daily   polyethylene glycol 17  g Oral Daily   sennosides-docusate sodium 2 tablet Oral Nightly         Assessment/Plan   Assessment / Plan     Active Hospital Problems    Diagnosis   • **Acute hypoxemic respiratory failure requiring intubation and ventilatory support (CMS/[facility])   • Constipation   • COPD and active cigarette abuse (CMS/[facility])   • Multiple right rib fractures (ninth, 10th, 11th)   • Anemia   • Metabolic encephalopathy   • Hyponatremia, suspect SIADH   • Hypoglycemia, resolved   • Generalized abdominal pain   • Weight loss   • Hypotension, resolved          Brief Hospital Course to date:  Devora Huddleston is a 73 y.o. female with hx of COPD, HTN, and CVA presents after being found down and unresponsive by her son on 10/1/18. Was intubated in the field. Glucose dropped to 39 en route (initially 105). Patient had reportedly complained of increasing weakness and weight loss for several weeks, and had recent evaluation for hyponatremia and seizures. Admitted to [facility] ICU. CT head, CT perfusion, CTA head and neck all negative. Further imaging revealed 3 right sided rib fractures, presumably from a fall at home. Possible RLL atelectasis vs. infiltrate. She was also hypotensive and requiring pressors. Treated empirically with Vanc and Zosyn however all cultures remain no growth. She was started on enteral feeds due to dysphagia. Patient extubated 10/3. Transferred to floor, hospitalists assumed care on 10/6.      Assessment & Plan:  --Stopped ATBx. Cultures no growth.  --Sodium slightly worse today, workup c/w SIADH. Stable today. FW in TFs has now been stopped. On fluid restriction. NAL consulted, appreciate their assistance.   --Supportive care for rib fractures.   --SLP following. Continue tube feeds, diet started now.  Wean TFs as PO intake improves  --Started Seroquel 25 mg QHS for confusion, but appears to worsen her symptoms. D/c'd Seroquel.  PRN Ativan for anxiety. If confusion worsens, though, will need to stop anxiolytics.  Also,  worsening hyponatremia could be contributing.   --PT/OT. Needs placement. CM following.     DVT Prophylaxis:  Lovenox    CODE STATUS:   Code Status and Medical Interventions:   Ordered at: 10/01/18 1819     Code Status:    CPR     Medical Interventions (Level of Support Prior to Arrest):    Full       Disposition: I expect the patient to be discharged 2-3 days to rehab    Electronically signed by Akila Butt MD, 10/11/18, 2:52 PM.

## 2018-10-11 NOTE — CONSULTS
Referring Provider: Akila Butt M.D.   Reason for Consultation: Hyponatremia     Subjective     Chief complaint unresponsive.     History of present illness:  Devora Huddleston is a 73 y.o. female with hx of COPD, HTN, and CVA presents after being found down and unresponsive by her son on 10/1/18. Admitted to [facility] ICU after intubation. CT head, CT perfusion, CTA head and neck all negative. At presentation, sodium was 119 which improved to 132 on 10/6 than has been trending down. Nephrology service has been consulted for further evaluation and treatment of hyponatremia.     History  Past Medical History:   Diagnosis Date   • Hypertension    • Stroke (CMS/HCC)    , History reviewed. No pertinent surgical history., History reviewed. No pertinent family history.,   Social History   Substance Use Topics   • Smoking status: Current Every Day Smoker     Years: 50.00     Types: Cigarettes   • Smokeless tobacco: Not on file   • Alcohol use Defer   ,   Prescriptions Prior to Admission   Medication Sig Dispense Refill Last Dose   • clonazePAM (KlonoPIN) 0.5 MG tablet Take 0.5 mg by mouth 3 (Three) Times a Day As Needed for Anxiety.      • clopidogrel (PLAVIX) 75 MG tablet Take 75 mg by mouth Daily.      • [] doxycycline (VIBRAMYCIN) 100 MG capsule Take 100 mg by mouth 2 (Two) Times a Day.      • esomeprazole (nexIUM) 40 MG capsule Take 40 mg by mouth Daily.      • ketorolac (ACULAR) 0.5 % ophthalmic solution Administer 1 drop to both eyes 4 (Four) Times a Day As Needed (allergies).      • losartan (COZAAR) 100 MG tablet Take 100 mg by mouth Daily.      • pramipexole (MIRAPEX) 1 MG tablet Take 1 mg by mouth 3 (Three) Times a Day.      • spironolactone-hydrochlorothiazide (ALDACTAZIDE) 25-25 MG tablet Take 1 tablet by mouth Daily.      , Scheduled Meds:    clopidogrel 75 mg Oral Daily   docusate sodium 100 mg Nasogastric BID   enoxaparin 30 mg Subcutaneous Nightly   famotidine 20 mg Oral BID   multivitamin 1 tablet  Oral Daily   polyethylene glycol 17 g Oral Daily   sennosides-docusate sodium 2 tablet Oral Nightly   , Continuous Infusions:   , PRN Meds:  •  bisacodyl  •  fleet enema  •  LORazepam  •  magnesium sulfate **OR** magnesium sulfate **OR** magnesium sulfate  •  potassium & sodium phosphates **OR** potassium & sodium phosphates  •  potassium chloride  •  potassium phosphate infusion greater than 15 mMoles **OR** potassium phosphate infusion greater than 15 mMoles **OR** potassium phosphate infusion 15 mMol or less **OR** [DISCONTINUED] sodium phosphate IVPB **OR** [DISCONTINUED] sodium phosphate IVPB **OR** [DISCONTINUED] sodium phosphate IVPB  •  sodium chloride  •  sodium chloride and Allergies:  Patient has no allergy information on record.    Review of Systems  Pertinent items are noted in HPI    Objective     Vital Signs  Temp:  [97.2 °F (36.2 °C)-99.3 °F (37.4 °C)] 97.2 °F (36.2 °C)  Heart Rate:  [75-89] 77  Resp:  [18] 18  BP: (102-122)/(51-68) 102/51    I/O this shift:  In: 100 [P.O.:100]  Out: -   I/O last 3 completed shifts:  In: 3984 [P.O.:960; I.V.:150; Other:150; NG/GT:2724]  Out: 1650 [Urine:1650]    Physical Exam:     General Appearance:    Alert, cooperative, in no acute distress   Head:    Normocephalic, without obvious abnormality, atraumatic   Eyes:            Lids and lashes normal, conjunctivae and sclerae normal, no   icterus, no pallor, corneas clear, PERRLA   Ears:    Ears appear intact with no abnormalities noted   Throat:   No oral lesions, no thrush, oral mucosa moist   Neck:   No adenopathy, supple, trachea midline, no thyromegaly, no     carotid bruit, no JVD       Lungs:     Clear to auscultation,respirations regular, even and                   unlabored    Heart:    Regular rhythm and normal rate, normal S1 and S2, no            murmur, no gallop, no rub, no click   Breast Exam:    Deferred   Abdomen:     Normal bowel sounds, no masses, no organomegaly, soft        non-tender,  non-distended, no guarding, no rebound                 tenderness   Genitalia:    Deferred   Extremities:   Moves all extremities well, no edema, no cyanosis, no              redness   Pulses:   Pulses palpable and equal bilaterally   Skin:   No bleeding, bruising or rash   Lymph nodes:   No palpable adenopathy   Neurologic:   Cranial nerves 2 - 12 grossly intact, sensation intact, DTR        present and equal bilaterally       Results Review:   I reviewed the patient's new clinical results.    WBC WBC   Date Value Ref Range Status   10/11/2018 7.40 3.50 - 10.80 10*3/mm3 Final   10/10/2018 8.13 3.50 - 10.80 10*3/mm3 Final   10/09/2018 10.16 3.50 - 10.80 10*3/mm3 Final      HGB Hemoglobin   Date Value Ref Range Status   10/11/2018 9.3 (L) 11.5 - 15.5 g/dL Final   10/10/2018 10.2 (L) 11.5 - 15.5 g/dL Final   10/09/2018 10.7 (L) 11.5 - 15.5 g/dL Final      HCT Hematocrit   Date Value Ref Range Status   10/11/2018 27.6 (L) 34.5 - 44.0 % Final   10/10/2018 30.3 (L) 34.5 - 44.0 % Final   10/09/2018 31.8 (L) 34.5 - 44.0 % Final      Platlets No results found for: LABPLAT   MCV MCV   Date Value Ref Range Status   10/11/2018 88.7 80.0 - 99.0 fL Final   10/10/2018 86.1 80.0 - 99.0 fL Final   10/09/2018 87.1 80.0 - 99.0 fL Final          Sodium Sodium   Date Value Ref Range Status   10/11/2018 125 (L) 132 - 146 mmol/L Final   10/10/2018 123 (L) 132 - 146 mmol/L Final   10/10/2018 125 (L) 132 - 146 mmol/L Final   10/09/2018 123 (L) 132 - 146 mmol/L Final   10/09/2018 127 (L) 132 - 146 mmol/L Final      Potassium Potassium   Date Value Ref Range Status   10/11/2018 3.8 3.5 - 5.5 mmol/L Final   10/10/2018 3.8 3.5 - 5.5 mmol/L Final   10/09/2018 4.1 3.5 - 5.5 mmol/L Final      Chloride Chloride   Date Value Ref Range Status   10/11/2018 94 (L) 99 - 109 mmol/L Final   10/10/2018 92 (L) 99 - 109 mmol/L Final   10/09/2018 92 (L) 99 - 109 mmol/L Final      CO2 CO2   Date Value Ref Range Status   10/11/2018 22.0 20.0 - 31.0 mmol/L  Final   10/10/2018 26.0 20.0 - 31.0 mmol/L Final   10/09/2018 27.0 20.0 - 31.0 mmol/L Final      BUN BUN   Date Value Ref Range Status   10/11/2018 16 9 - 23 mg/dL Final   10/10/2018 17 9 - 23 mg/dL Final   10/09/2018 15 9 - 23 mg/dL Final      Creatinine Creatinine   Date Value Ref Range Status   10/11/2018 0.31 (L) 0.60 - 1.30 mg/dL Final   10/10/2018 0.31 (L) 0.60 - 1.30 mg/dL Final   10/09/2018 0.27 (L) 0.60 - 1.30 mg/dL Final      Calcium Calcium   Date Value Ref Range Status   10/11/2018 7.8 (L) 8.7 - 10.4 mg/dL Final   10/10/2018 8.0 (L) 8.7 - 10.4 mg/dL Final   10/09/2018 8.5 (L) 8.7 - 10.4 mg/dL Final      PO4 No results found for: CAPO4   Albumin No results found for: ALBUMIN   Magnesium Magnesium   Date Value Ref Range Status   10/10/2018 2.3 1.3 - 2.7 mg/dL Final   10/09/2018 1.4 1.3 - 2.7 mg/dL Final   10/09/2018 1.6 1.3 - 2.7 mg/dL Final      Uric Acid No results found for: URICACID           clopidogrel 75 mg Oral Daily   docusate sodium 100 mg Nasogastric BID   enoxaparin 30 mg Subcutaneous Nightly   famotidine 20 mg Oral BID   multivitamin 1 tablet Oral Daily   polyethylene glycol 17 g Oral Daily   sennosides-docusate sodium 2 tablet Oral Nightly          Assessment/Plan       Acute hypoxemic respiratory failure requiring intubation and ventilatory support (CMS/[facility])    Metabolic encephalopathy    Hyponatremia, suspect SIADH    Hypoglycemia, resolved    Generalized abdominal pain    Weight loss    Hypotension, resolved    Multiple right rib fractures (ninth, 10th, 11th)    Anemia    COPD and active cigarette abuse (CMS/[facility])    Constipation      1- Hyponatremia - At presentation 119. Since than has been improving with 132 reading on 10/6/18. Since than it has been trending down to 123 yesterday. Today Sodium is 125. At home, was on HCTZ.     Plan:  - fluid restriction  - Urine sodium  - Urine and serum osmolality.   - Check sodium level q 8 hr       I discussed the patients findings and my  recommendations with patient and nursing staff    Emily Ngo MD  10/11/18  @[facility]

## 2018-10-11 NOTE — PROGRESS NOTES
Adult Nutrition  Assessment/PES    Patient Name:  Devora Huddleston  YOB: 1945  MRN: [id number]  Admit Date:  10/1/2018    Assessment Date:  10/10/2018    Comments:  Changed TF to Peptamen 1.5 over 14 hrs to decrease free water to 647 ml vs 1069 in Peptamen AF. Added Boost Pudding. Watch p.o If poor consider adding 1 Prostat/da via tube.           Reason for Assessment     Row Name 10/10/18 2037          Reason for Assessment    Reason For Assessment follow-up protocol;TF/PN   F/u EN w adjustment- 60 mins     Diagnosis neurologic conditions;pulmonary disease   AMS unresponsive in ED ARF (extubated );rib fractures; Abd pain, ? seizure, COPD : Hypoglycemia, Hyponatremia, Hypotension Hx HTN, CVA, recent UTI, active cigarette abuse now coccyx decub, presumed SIADH             Nutrition/Diet History     Row Name 10/10/18 2038          Nutrition/Diet History    Factors Affecting Nutritional Intake other (see comments)   per RN MD order to minimize free water delivery. Pt would expresses would like to remove tube. Allows will try pudding suppl             Anthropometrics     Row Name 10/10/18 2039          Anthropometrics    Height Method --   Ht 62in Wt 98 lbs             Labs/Tests/Procedures/Meds     Row Name 10/10/18 2039          Labs/Procedures/Meds    Lab Results Reviewed reviewed     Lab Results Comments low Na+                Estimated/Assessed Needs     Row Name 10/10/18 2048          Calorie Requirements    Estimated Calorie Requirement (kcal/day) --   9795-3630/da this adm        Protein Requirements    Est Protein Requirement Amount (gms/kg) --   83 gm/da this adm             Nutrition Prescription Ordered     Row Name 10/10/18 2040          Nutrition Prescription PO    Current PO Diet Dysphagia     Dysphagia Level 2  Pureed     Fluid Consistency Honey thick     Common Modifiers Cardiac        Nutrition Prescription EN    Enteral Route NG     Product Peptamen AF (Vital AF 1.2)     TF Delivery  Method Continuous     Continuous TF Goal Rate (mL/hr) 60 mL/hr     Continuous TF Current Rate (mL/hr) 60 mL/hr     Water flush (mL)  0 mL             Evaluation of Received Nutrient/Fluid Intake     Row Name 10/10/18 2049          PO Evaluation    Number of Meals 7     % PO Intake 30        EN Evaluation    Number of Days EN Intake Evaluated Insufficient Data             Evaluation of Prescribed Nutrient/Fluid Intake     Row Name 10/10/18 2049          Evaluation of Prescribed Nutrient/Fluid Intake    Nutrition Prescribed --   on Peptamen AF over 22 hr/da        Calories at Prescribed Goal    Enteral Calories (kcal) 1584     % Kcal Needs 117        Protein at Prescribed Goal    Enteral Protein (gm) 100     % Protein Needs 121        Fluid at Prescribed Goal    Enteral  Fluid (mL) 1069        Fiber at Prescribed Goal    Fiber 8 gm           Problem/Interventions:        Problem 1     Row Name 10/10/18 2053          Nutrition Diagnoses Problem 1    Problem 1 Inadequate Intake/Infusion   prior to adm     Macronutrient Kcal;Protein;Fat     Micronutrient Vitamin;Mineral     Etiology (related to) Factors Affecting Nutrition     Functional Diagnosis Chewing deficit;Other (comment)   edentulous , dentures at home     Food Habit/Preferences Socioeconomic Issues     Signs/Symptoms (evidenced by) Report of Mnimal PO Intake;Unintended Weight Change;PO Intake     Percent (%) intake recorded 30 %   now     Over number of meals 7     Unintended Weight Change Loss     Number of Pounds Lost unknown              Problem 2     Row Name 10/10/18 2053          Nutrition Diagnoses Problem 2    Problem 2 Malnutrition     Etiology (related to) Factors Affecting Nutrition;Functional Diagnosis     Functional Diagnosis Dysphagia     Oral Chewing Difficulty;Swallowing Difficulty;Without Dentures     Other food insecurity r/t limited financial resources     Signs/Symptoms (evidenced by) Unintended Weight Change;BMI;Report of Minimal PO Intake      BMI 17 - 17.9     Unintended Weight Change Loss     Number of Pounds Lost 17     Weight loss time period uncertain per pt report             Problem 3     Row Name 10/10/18 2055          Nutrition Diagnoses Problem 3    Problem 3 Needs Alternate Composition     Etiology (related to) --   presumed SIADH     Signs/Symptoms (evidenced by) Biochemical     Specific Labs Noted Na+                 Intervention Goal     Row Name 10/10/18 2055          Intervention Goal    General Nutrition support treatment     PO Increase intake     TF/PN Adjust TF/PN             Nutrition Intervention     Row Name 10/10/18 2057          Nutrition Intervention    RD/Tech Action Advise alternate selection;Supplement provided;Follow Tx progress;Care plan reviewd             Nutrition Prescription     Row Name 10/10/18 2057          Nutrition Prescription PO    PO Prescription Begin/change supplement     Supplement Boost Pudding     Supplement Frequency Daily        Nutrition Prescription EN    Enteral Prescription Enteral begin/change;Enteral to supply     TF Delivery Method Cyclic     Cyclic TF Goal Volume (mL) 840 mL     Cyclic TF Goal Rate (mL/hr) 60 mL/hr     Cyclic TF Starting Rate (mL/hr) 60 mL/hr     Cyclic TF Cycle Over (hrs)  6512-9480     Water flush (mL)  0 mL     New EN Prescription Ordered? Yes        EN to Supply    Kcal/Day 1260 Kcal/Day     Protein (gm/day) 57 gm/day     Meet Estimated Kcal Need (%) 93 %     Meet Estimated Protein Need (%) 69 %     TF Free H2O (mL) 647 mL             Education/Evaluation     Row Name 10/10/18 2107          Monitor/Evaluation    Monitor Per protocol;I&O;Pertinent labs;TF delivery/tolerance;PO intake;Supplement intake;Skin status;Symptoms;Weight         Electronically signed by:  Malgorzata Dukes RD  10/10/18 9:07 PM

## 2018-10-12 ENCOUNTER — APPOINTMENT (OUTPATIENT)
Dept: GENERAL RADIOLOGY | Facility: HOSPITAL | Age: 73
End: 2018-10-12
Attending: INTERNAL MEDICINE

## 2018-10-12 PROBLEM — E43 SEVERE MALNUTRITION (HCC): Status: ACTIVE | Noted: 2018-10-01

## 2018-10-12 LAB
ANION GAP SERPL CALCULATED.3IONS-SCNC: 9 MMOL/L (ref 3–11)
BUN BLD-MCNC: 16 MG/DL (ref 9–23)
BUN/CREAT SERPL: 51.6 (ref 7–25)
CALCIUM SPEC-SCNC: 8.3 MG/DL (ref 8.7–10.4)
CHLORIDE SERPL-SCNC: 97 MMOL/L (ref 99–109)
CO2 SERPL-SCNC: 23 MMOL/L (ref 20–31)
CREAT BLD-MCNC: 0.31 MG/DL (ref 0.6–1.3)
DEPRECATED RDW RBC AUTO: 49.8 FL (ref 37–54)
ERYTHROCYTE [DISTWIDTH] IN BLOOD BY AUTOMATED COUNT: 15.4 % (ref 11.3–14.5)
GFR SERPL CREATININE-BSD FRML MDRD: >150 ML/MIN/1.73
GLUCOSE BLD-MCNC: 97 MG/DL (ref 70–100)
GLUCOSE BLDC GLUCOMTR-MCNC: 109 MG/DL (ref 70–130)
GLUCOSE BLDC GLUCOMTR-MCNC: 121 MG/DL (ref 70–130)
HCT VFR BLD AUTO: 28 % (ref 34.5–44)
HGB BLD-MCNC: 9.1 G/DL (ref 11.5–15.5)
MCH RBC QN AUTO: 29.4 PG (ref 27–31)
MCHC RBC AUTO-ENTMCNC: 32.5 G/DL (ref 32–36)
MCV RBC AUTO: 90.3 FL (ref 80–99)
OSMOLALITY UR: 583 MOSM/KG (ref 300–1100)
PLATELET # BLD AUTO: 378 10*3/MM3 (ref 150–450)
PMV BLD AUTO: 8.3 FL (ref 6–12)
POTASSIUM BLD-SCNC: 3.8 MMOL/L (ref 3.5–5.5)
RBC # BLD AUTO: 3.1 10*6/MM3 (ref 3.89–5.14)
SODIUM BLD-SCNC: 127 MMOL/L (ref 132–146)
SODIUM BLD-SCNC: 128 MMOL/L (ref 132–146)
SODIUM BLD-SCNC: 129 MMOL/L (ref 132–146)
SODIUM UR-SCNC: 104 MMOL/L (ref 30–90)
WBC NRBC COR # BLD: 8.21 10*3/MM3 (ref 3.5–10.8)

## 2018-10-12 PROCEDURE — 84295 ASSAY OF SERUM SODIUM: CPT | Performed by: INTERNAL MEDICINE

## 2018-10-12 PROCEDURE — 84300 ASSAY OF URINE SODIUM: CPT | Performed by: INTERNAL MEDICINE

## 2018-10-12 PROCEDURE — 80048 BASIC METABOLIC PNL TOTAL CA: CPT | Performed by: INTERNAL MEDICINE

## 2018-10-12 PROCEDURE — 83935 ASSAY OF URINE OSMOLALITY: CPT | Performed by: INTERNAL MEDICINE

## 2018-10-12 PROCEDURE — 92611 MOTION FLUOROSCOPY/SWALLOW: CPT

## 2018-10-12 PROCEDURE — 82962 GLUCOSE BLOOD TEST: CPT

## 2018-10-12 PROCEDURE — 74230 X-RAY XM SWLNG FUNCJ C+: CPT

## 2018-10-12 PROCEDURE — 25010000002 ENOXAPARIN PER 10 MG: Performed by: INTERNAL MEDICINE

## 2018-10-12 PROCEDURE — 97110 THERAPEUTIC EXERCISES: CPT

## 2018-10-12 PROCEDURE — 97530 THERAPEUTIC ACTIVITIES: CPT

## 2018-10-12 PROCEDURE — 99233 SBSQ HOSP IP/OBS HIGH 50: CPT | Performed by: INTERNAL MEDICINE

## 2018-10-12 PROCEDURE — 85027 COMPLETE CBC AUTOMATED: CPT | Performed by: INTERNAL MEDICINE

## 2018-10-12 RX ADMIN — ENOXAPARIN SODIUM 30 MG: 30 INJECTION SUBCUTANEOUS at 21:32

## 2018-10-12 RX ADMIN — BARIUM SULFATE 20 ML: 400 PASTE ORAL at 14:28

## 2018-10-12 RX ADMIN — BARIUM SULFATE 100 ML: 0.81 POWDER, FOR SUSPENSION ORAL at 14:28

## 2018-10-12 RX ADMIN — FAMOTIDINE 20 MG: 20 TABLET ORAL at 21:32

## 2018-10-12 RX ADMIN — FAMOTIDINE 20 MG: 20 TABLET ORAL at 10:47

## 2018-10-12 RX ADMIN — Medication 1 TABLET: at 10:47

## 2018-10-12 RX ADMIN — CLOPIDOGREL BISULFATE 75 MG: 75 TABLET ORAL at 10:46

## 2018-10-12 NOTE — THERAPY TREATMENT NOTE
Acute Care - Occupational Therapy Treatment Note  [facility]     Patient Name: Devora Huddleston  : 1945  MRN: [id number]  Today's Date: 10/12/2018  Onset of Illness/Injury or Date of Surgery: 10/01/18  Date of Referral to OT: 10/04/18  Referring Physician: MD Emmett    Admit Date: 10/1/2018       ICD-10-CM ICD-9-CM   1. Acute respiratory failure with hypoxemia (CMS/HCC) J96.01 518.81   2. Impaired functional mobility, balance, gait, and endurance Z74.09 V49.89   3. Oropharyngeal dysphagia R13.12 787.22   4. Impaired mobility and ADLs Z74.09 799.89     Patient Active Problem List   Diagnosis   • Acute respiratory failure (CMS/HCC)   • Metabolic encephalopathy   • Acute hypoxemic respiratory failure requiring intubation and ventilatory support (CMS/HCC)   • Hyponatremia, suspect SIADH   • Hypoglycemia, resolved   • Generalized abdominal pain   • Weight loss   • Hypotension, resolved   • Multiple right rib fractures (ninth, 10th, 11th)   • Anemia   • COPD and active cigarette abuse (CMS/HCC)   • Constipation     Past Medical History:   Diagnosis Date   • Hypertension    • Stroke (CMS/HCC)      History reviewed. No pertinent surgical history.    Therapy Treatment          Rehabilitation Treatment Summary     Row Name 10/12/18 1047             Treatment Time/Intention    Discipline occupational therapist  -AC      Document Type therapy note (daily note)  -AC      Subjective Information complains of;weakness  -AC      Patient/Family Observations Pt received in bed  -AC      Patient Effort good  -AC      Existing Precautions/Restrictions fall   confused, h/o cambativeness, NG  -AC      Recorded by [AC] Anna Best, OT 10/12/18 1156      Row Name 10/12/18 1049             Cognitive Assessment/Intervention- PT/OT    Affect/Mental Status (Cognitive) confused  -AC      Orientation Status (Cognition) oriented to;person   knew she was in hospital, but not name of hosp  -AC      Follows Commands (Cognition)  follows one step commands;50-74% accuracy   followed direction for grooming, andd UE ther ex  -AC      Safety Deficit (Cognitive) at risk behavior observed;awareness of need for assistance;judgment;problem solving;safety precautions awareness  -AC      Personal Safety Interventions fall prevention program maintained;gait belt;nonskid shoes/slippers when out of bed  -AC      Recorded by [AC] Anan Best, OT 10/12/18 1156      Row Name 10/12/18 [facility]             Bed Mobility Assessment/Treatment    Scooting/Bridging [place] (Bed Mobility) minimum assist (75% patient effort)  -AC      Supine-Sit [place] (Bed Mobility) moderate assist (50% patient effort)  -AC      Assistive Device (Bed Mobility) head of bed elevated  -AC      Comment (Bed Mobility) pt able to bring legs to EOB, required assist to bring trunk to midline  -AC      Recorded by [AC] Anna Best, OT 10/12/18 1156      Row Name 10/12/18 [facility]             Functional Mobility    Functional Mobility- Ind. Level --  -AC      Recorded by [AC] Anna Best, OT 10/12/18 1445      Row Name 10/12/18 [facility]7             Transfer Assessment/Treatment    Transfer Assessment/Treatment bed-chair transfer;sit-stand transfer;stand-sit transfer  -AC      Recorded by [AC] Anna Best, OT 10/12/18 1445      Row Name 10/12/18 [facility]             Bed-Chair Transfer    Bed-Chair [place] (Transfers) verbal cues;moderate assist (50% patient effort)  -      Assistive Device (Bed-Chair Transfers) --   gait belt and UE support  -AC      Recorded by [AC] Anna Best, OT 10/12/18 1445      Row Name 10/12/18 [facility]             Sit-Stand Transfer    Sit-Stand [place] (Transfers) verbal cues;moderate assist (50% patient effort)  -      Assistive Device (Sit-Stand Transfers) --   gait belt and UE support  -AC      Recorded by [AC] Anna Best, OT 10/12/18 1445      Row Name 10/12/18 [facility]             Stand-Sit Transfer    Stand-Sit [place] (Transfers)  moderate assist (50% patient effort)  -AC      Recorded by [AC] Anna Best, OT 10/12/18 1445      Row Name 10/12/18 1047             ADL Assessment/Intervention    46187 - OT Self Care/Mgmt Minutes 5  -AC      BADL Assessment/Intervention grooming  -AC      Recorded by [AC] Anna Best, OT 10/12/18 1445      Row Name 10/12/18 1047             Grooming Assessment/Training    [place] Level (Grooming) hair care, combing/brushing;wash face, hands;set up  -AC      Grooming Position supported sitting  -AC      Recorded by [AC] Anna Best, OT 10/12/18 1445      Row Name 10/12/18 1047             BADL Safety/Performance    Impairments, BADL Safety/Performance grasp/prehension;strength  -AC      Recorded by [AC] Anna Best, OT 10/12/18 1445      Row Name 10/12/18 1047             Therapeutic Exercise    91575 - OT Therapeutic Exercise Minutes 5  -AC      25917 - OT Therapeutic Activity Minutes 20  -AC      Recorded by [AC] Anna Best, OT 10/12/18 [facility]      Row Name 10/12/18 1047             Therapeutic Exercise    Upper Extremity Range of Motion (Therapeutic Exercise) shoulder flexion/extension, bilateral;shoulder horizontal abduction/adduction, bilateral;elbow flexion/extension, bilateral;forearm supination/pronation, bilateral  -AC      Exercise Type (Therapeutic Exercise) AROM (active range of motion)  -AC      Position (Therapeutic Exercise) seated  -AC      Sets/Reps (Therapeutic Exercise) 2/10  -AC      Recorded by [AC] Anna Best, OT 10/12/18 1445      Row Name 10/12/18 1047             Static Sitting Balance    Level of [place] (Unsupported Sitting, Static Balance) contact guard assist  -AC      Time Able to Maintain Position (Unsupported Sitting, Static Balance) 1 to 2 minutes  -AC      Recorded by [AC] Anna Best, OT 10/12/18 1445      Row Name 10/12/18 1047             Positioning and Restraints    Pre-Treatment Position in bed  -AC      Post Treatment Position chair  -AC       In Chair reclined;call light within reach;encouraged to call for assist;exit alarm on  -AC      Recorded by [AC] Anna Best, OT 10/12/18 1445      Row Name 10/12/18 1047             Pain Scale: Numbers Pre/Post-Treatment    Pain Location back  -AC      Recorded by [AC] Anna Best, OT 10/12/18 1445      Row Name 10/12/18 1047             Pain Scale 2: FACES Pre/Post-Treatment    Pain 2: FACES Scale, Pretreatment 2-->hurts little bit  -AC      Pain 2: FACES Scale, Post-Treatment 2-->hurts little bit  -AC      Pain Intervention(s) 2 Repositioned  -AC      Recorded by [AC] Anna Best, OT 10/12/18 1445      Row Name                Wound 10/01/18 1845 Bilateral medial coccyx    Wound - Properties Group Date first assessed: 10/01/18 [SW] Time first assessed: 1845 [SW] Present On Admission : yes [SW] Side: Bilateral [SW] Orientation: medial [SW] Location: coccyx [SW] Additional Comments: shearing [CP] Recorded by:  [CP] Mahsa Almanzar APRN 10/07/18 1148 [SW] Fatmata Villalobos RN 10/01/18 1905    Row Name                Wound 10/07/18 1015 Right heel blisters    Wound - Properties Group Date first assessed: 10/07/18 [CP] Time first assessed: 1015 [CP] Present On Admission : no;picture taken [CP] Side: Right [CP] Location: heel [CP] Type: blisters [CP] Additional Comments: with bruise or DTPI [CP] Recorded by:  [CP] Mahsa Almanzar APRN 10/07/18 1155    Row Name                Wound 10/06/18 1500 Left heel blisters    Wound - Properties Group Date first assessed: 10/06/18 [CP] Time first assessed: 1500 [CP] Present On Admission : no [CP2] Side: Left [CP2] Location: heel [CP2] Type: blisters [CP2] Recorded by:  [CP] Mahsa Almanzar APRN 10/07/18 1159 [CP2] Mahsa Almanzar APRN 10/07/18 1157    Row Name                Wound 10/06/18 1500 Right elbow pressure injury    Wound - Properties Group Date first assessed: 10/06/18 [CP] Time first assessed: 1500 [CP] Present On Admission : no;picture  taken [CP] Side: Right [CP] Location: elbow [CP] Type: pressure injury [CP] Stage, Pressure Injury: unstageable [CP] Recorded by:  [CP] Mahsa Almanzar APRN 10/07/18 1200      User Key  (r) = Recorded By, (t) = Taken By, (c) = Cosigned By    Initials Name Effective Dates Discipline    AC Anna Best, OT 06/23/15 -  OT    CP Mahsa Almanzar APRN 06/08/18 -  Nurse    Fatmata Chaves RN 06/16/16 -  Nurse        Wound 10/01/18 1845 Bilateral medial coccyx (Active)   Dressing Appearance dry;intact 10/12/2018 12:00 PM   Base dressing in place, unable to visualize 10/12/2018 12:00 PM   Periwound intact;dry;pink;blanchable 10/11/2018  8:52 PM   Drainage Amount none 10/11/2018  8:52 PM   Dressing Care, Wound foam;low-adherent 10/11/2018  8:52 PM       Wound 10/07/18 1015 Right heel blisters (Active)   Dressing Appearance dry;intact 10/12/2018 12:00 PM   Base dressing in place, unable to visualize 10/12/2018 12:00 PM   Periwound intact;dry;pink;blanchable 10/11/2018  6:00 PM   Edges irregular 10/11/2018  6:00 PM   Drainage Amount none 10/11/2018  8:52 PM   Care, Wound cleansed with;sterile normal saline 10/11/2018  6:00 PM   Dressing Care, Wound petroleum-based;foam;low-adherent 10/11/2018  8:52 PM       Wound 10/06/18 1500 Left heel blisters (Active)   Dressing Appearance dry;intact 10/12/2018 12:00 PM   Base dressing in place, unable to visualize 10/12/2018 12:00 PM   Periwound pink 10/11/2018  6:00 PM   Edges irregular 10/11/2018  6:00 PM   Drainage Characteristics/Odor serosanguineous 10/11/2018  6:00 PM   Drainage Amount scant 10/11/2018  6:00 PM   Care, Wound cleansed with;sterile normal saline 10/11/2018  6:00 PM   Dressing Care, Wound petroleum-based;foam;low-adherent 10/11/2018  8:52 PM       Wound 10/06/18 1500 Right elbow pressure injury (Active)   Dressing Appearance dry;intact 10/12/2018 12:00 PM   Base dressing in place, unable to visualize 10/12/2018 12:00 PM   Periwound  intact;dry;pink;blanchable 10/11/2018  6:00 PM   Drainage Amount none 10/11/2018  6:00 PM   Care, Wound cleansed with;sterile normal saline 10/11/2018  6:00 PM   Dressing Care, Wound foam;low-adherent 10/11/2018  8:52 PM         Occupational Therapy Education     Title: PT OT SLP Therapies (Active)     Topic: Occupational Therapy (Active)     Point: ADL training (Done)     Description: Instruct learner(s) on proper safety adaptation and remediation techniques during self care or transfers.   Instruct in proper use of assistive devices.   Learning Progress Summary     Learner Status Readiness Method Response Comment Documented by    Patient Done Acceptance E VU,NR sequencing with bed mobility, safety with transfer,  UE HEP AC 10/12/18 1445     Active Acceptance E NR Pt educated on appropriate safety precautions, t/f techniques, role of OT, positioning, and benefits of therapy. CL 10/05/18 1009          Point: Precautions (Active)     Description: Instruct learner(s) on prescribed precautions during self-care and functional transfers.   Learning Progress Summary     Learner Status Readiness Method Response Comment Documented by    Patient Active Acceptance E NR Pt educated on appropriate safety precautions, t/f techniques, role of OT, positioning, and benefits of therapy. CL 10/05/18 1009          Point: Body mechanics (Active)     Description: Instruct learner(s) on proper positioning and spine alignment during self-care, functional mobility activities and/or exercises.   Learning Progress Summary     Learner Status Readiness Method Response Comment Documented by    Patient Active Acceptance E NR Pt educated on appropriate safety precautions, t/f techniques, role of OT, positioning, and benefits of therapy. CL 10/05/18 1009                      User Key     Initials Effective Dates Name Provider Type Discipline    AC 06/23/15 -  Anna Best OT Occupational Therapist OT    CL 04/03/18 -  Helena Thomas OT  Occupational Therapist OT                OT Recommendation and Plan     Plan of Care Review  Plan of Care Reviewed With: patient  Plan of Care Reviewed With: patient  Outcome Summary: Pt mod A  supine to sit,  mod A stand pivot bed to chair with UE support.  Pt setup to wash face and brush hair.  Pt with good effort with UE ther ex.  Pt limited by confusion and weakness.         Outcome Measures     Row Name 10/12/18 1022 10/09/18 1544          How much help from another is currently needed...    Putting on and taking off regular lower body clothing? 1  -AC 1  -SD     Bathing (including washing, rinsing, and drying) 2  -AC 1  -SD     Toileting (which includes using toilet bed pan or urinal) 1  -AC 1  -SD     Putting on and taking off regular upper body clothing 2  -AC 1  -SD     Taking care of personal grooming (such as brushing teeth) 3  -AC 2  -SD     Eating meals 3  -AC 1  -SD     Score 12  -AC 7  -SD        Functional Assessment    Outcome Measure Options  -- AM-PAC 6 Clicks Daily Activity (OT)  -SD       User Key  (r) = Recorded By, (t) = Taken By, (c) = Cosigned By    Initials Name Provider Type    Anna Gonzalez, OT Occupational Therapist    Katerin Luz, OT Occupational Therapist           Time Calculation:         Time Calculation- OT     Row Name 10/12/18 1047 10/12/18 1022          Time Calculation- OT    OT Start Time  -- 1022  -     Total Timed Code Minutes- OT  -- 30 minute(s)  -AC     OT Received On  -- 10/12/18  -     OT Goal Re-Cert Due Date  -- 10/15/18  -        Timed Charges    33587 - OT Therapeutic Exercise Minutes 5  -AC  --     10654 - OT Therapeutic Activity Minutes 20  -AC  --     45302 - OT Self Care/Mgmt Minutes 5  -AC  --       User Key  (r) = Recorded By, (t) = Taken By, (c) = Cosigned By    Initials Name Provider Type    Anna Gonzalez, OT Occupational Therapist           Therapy Suggested Charges     Code   Minutes Charges    77384 (CPT®)  Ot Neuromusc Re  Education Ea 15 Min      59436 (CPT®) Hc Ot Ther Proc Ea 15 Min 5 1    66973 (CPT®) Hc Ot Therapeutic Act Ea 15 Min 20 1    64976 (CPT®) Hc Ot Manual Therapy Ea 15 Min      52123 (CPT®) Hc Ot Iontophoresis Ea 15 Min      85034 (CPT®) Hc Ot Elec Stim Ea-Per 15 Min      95948 (CPT®) Hc Ot Ultrasound Ea 15 Min      74800 (CPT®) Hc Ot Self Care/Mgmt/Train Ea 15 Min 5     Total  30 2        Therapy Charges for Today     Code Description Service Date Service Provider Modifiers Qty    60339110855 HC OT THER PROC EA 15 MIN 10/12/2018 Anna Best, OT GO 1    69923296678 HC OT THERAPEUTIC ACT EA 15 MIN 10/12/2018 Anna Best, OT GO 1               Anna Best OT  10/12/2018

## 2018-10-12 NOTE — MBS/VFSS/FEES
Acute Care - Speech Language Pathology   Swallow Re-Evaluation [facility]   Modified Barium Swallow Study (MBS)     Patient Name: Devora Huddleston  : 1945  MRN: [id number]  Today's Date: 10/12/2018  Onset of Illness/Injury or Date of Surgery: 10/01/18     Referring Physician: MD Emmett      Admit Date: 10/1/2018    Visit Dx:     ICD-10-CM ICD-9-CM   1. Acute respiratory failure with hypoxemia (CMS/HCC) J96.01 518.81   2. Impaired functional mobility, balance, gait, and endurance Z74.09 V49.89   3. Oropharyngeal dysphagia R13.12 787.22   4. Impaired mobility and ADLs Z74.09 799.89     Patient Active Problem List   Diagnosis   • Acute respiratory failure (CMS/HCC)   • Metabolic encephalopathy   • Acute hypoxemic respiratory failure requiring intubation and ventilatory support (CMS/HCC)   • Hyponatremia, suspect SIADH   • Hypoglycemia, resolved   • Generalized abdominal pain   • Severe malnutrition (CMS/HCC)   • Hypotension, resolved   • Multiple right rib fractures (ninth, 10th, 11th)   • Anemia   • COPD and active cigarette abuse (CMS/HCC)   • Constipation     Past Medical History:   Diagnosis Date   • Hypertension    • Stroke (CMS/HCC)      History reviewed. No pertinent surgical history.       SWALLOW EVALUATION (last 72 hours)      SLP Adult Swallow Evaluation     Row Name 10/12/18 1410                   Rehab Evaluation    Document Type re-evaluation  -SM        Subjective Information no complaints  -SM        Patient Observations alert;cooperative  -SM           General Information    Current Method of Nutrition pureed;honey-thick liquids  -SM        Plans/Goals Discussed with patient;other (see comments)  -SM        Barriers to Rehab cognitive status  -SM        Patient's Goals for Discharge patient did not state  -SM           Pain Scale: FACES Pre/Post-Treatment    Pain: FACES Scale, Pretreatment 0-->no hurt  -SM        Pain: FACES Scale, Post-Treatment 0-->no hurt  -SM           MBS/VFSS     Utensils Used spoon;cup;straw  -        Consistencies Trialed thin liquids;pureed;regular textures  -           MBS/VFSS Interpretation    Oral Prep Phase impaired oral phase of swallowing  -        Oral Transit Phase impaired  -SM        Oral Residue WFL  -           Oral Preparatory Phase    Oral Preparatory Phase prolonged manipulation  -        Prolonged Manipulation pudding/puree;regular textures;secondary to impaired cognitive status  -           Oral Transit Phase    Impaired Oral Transit Phase increased A-P transit time  -        Increased A-P Transit Time all consistencies tested  -           Initiation of Pharyngeal Swallow    Pharyngeal Phase functional pharyngeal phase of swallowing  -           Clinical Impression    SLP Swallowing Diagnosis mild;oral dysfunction;functional pharyngeal phase  -        Swallow Criteria for Skilled Therapeutic Interventions Met no problems identified which require skilled intervention  -           Recommendations    Therapy Frequency (Swallow) evaluation only  -        SLP Diet Recommendation soft textures;chopped;thin liquids;other (see comments)   nursing can advance/adjust as needed r/t cog status  -        Recommended Precautions and Strategies upright posture during/after eating  -        SLP Rec. for Method of Medication Administration meds whole;with pudding or applesauce   advance to via thins as desires as cog status improves  -          User Key  (r) = Recorded By, (t) = Taken By, (c) = Cosigned By    Initials Name Effective Dates     Sandra Vásquez MS CCC-SLP 06/22/15 -         EDUCATION  The patient has been educated in the following areas:   Dysphagia (Swallowing Impairment) Modified Diet Instruction.    SLP Recommendation and Plan  SLP Swallowing Diagnosis: mild, oral dysfunction, functional pharyngeal phase  SLP Diet Recommendation: soft textures, chopped, thin liquids, other (see comments) (nursing can advance/adjust  as needed r/t cog status)  Recommended Precautions and Strategies: upright posture during/after eating           Swallow Criteria for Skilled Therapeutic Interventions Met: no problems identified which require skilled intervention        Therapy Frequency (Swallow): evaluation only          Plan of Care Reviewed With: patient  Plan of Care Review  Plan of Care Reviewed With: patient             Time Calculation:         Time Calculation- SLP     Row Name 10/12/18 1523             Time Calculation- SLP    SLP Start Time 1410  -SM      SLP Received On 10/12/18  -        User Key  (r) = Recorded By, (t) = Taken By, (c) = Cosigned By    Initials Name Provider Type    Sandra Melo MS CCC-SLP Speech and Language Pathologist          Therapy Charges for Today     Code Description Service Date Service Provider Modifiers Qty    [id number] HC ST MOTION FLUORO EVAL SWALLOW 3 10/12/2018 Sandra Vásquez MS CCC-SLP GN 1               Sandra Vásquez MS CCC-SLP  10/12/2018

## 2018-10-12 NOTE — PLAN OF CARE
Problem: Skin Injury Risk (Adult)  Goal: Skin Health and Integrity  Outcome: Ongoing (interventions implemented as appropriate)      Problem: Infection, Risk/Actual (Adult)  Goal: Infection Prevention/Resolution  Outcome: Ongoing (interventions implemented as appropriate)      Problem: Fall Risk (Adult)  Goal: Absence of Fall  Outcome: Ongoing (interventions implemented as appropriate)      Problem: Patient Care Overview  Goal: Plan of Care Review  Outcome: Ongoing (interventions implemented as appropriate)   10/12/18 0428   Coping/Psychosocial   Plan of Care Reviewed With patient   Plan of Care Review   Progress no change   OTHER   Outcome Summary No acute overnight events. Patient has some complaints of feeling dizzy. NSR on monitor. Room Air. LBM 10/11/18. VSS. Continue to monitor.

## 2018-10-12 NOTE — PROGRESS NOTES
[facility] Medicine Services  PROGRESS NOTE    Patient Name: Devora Huddleston  : 1945  MRN: [id number]    Date of Admission: 10/1/2018  Length of Stay: 11  Primary Care Physician: Evelyn Francisco MD    Subjective   Subjective     CC:  F/U found down unresponsive    HPI:  No changes today.  She wants NG out.  Seems more oriented today.    Review of Systems  Gen-no fevers, no chills  CV-no chest pain, no palpitations  Resp-no cough, no dyspnea  GI-no nausea, no abd pain    Otherwise ROS is negative except as mentioned in the HPI.    Objective   Objective     Vital Signs:   Temp:  [98.3 °F (36.8 °C)-99.7 °F (37.6 °C)] 99.2 °F (37.3 °C)  Heart Rate:  [64-87] 86  Resp:  [18] 18  BP: ()/(48-63) 99/48  Total (NIH Stroke Scale): 4     Physical Exam:  Gen-no acute distress, cachectic, + muscle wasting  HENT-NCAT, mucous membranes moist, NG in.  CVC in right IJ.  CV-RRR, S1 S2 normal, no m/r/g  Resp-CTAB, no wheezes or rales  Abd-soft, NT, ND, +BS  Ext-no edema  Neuro-A&Ox3 today, no focal deficits  Skin-no rashes  Psych-appropriate mood, calm      Results Reviewed:  I have personally reviewed current lab, radiology, and data and agree.      Results from last 7 days  Lab Units 10/12/18  1004 10/11/18  0538 10/10/18  0618   WBC 10*3/mm3 8.21 7.40 8.13   HEMOGLOBIN g/dL 9.1* 9.3* 10.2*   HEMATOCRIT % 28.0* 27.6* 30.3*   PLATELETS 10*3/mm3 378 359 365       Results from last 7 days  Lab Units 10/12/18  1004 10/12/18  0025 10/11/18  1556 10/11/18  1159 10/11/18  0538  10/10/18  0618  10/08/18  0704   SODIUM mmol/L 129* 127* 127*  --  125*  < > 125*  < > 130*   POTASSIUM mmol/L 3.8  --   --   --  3.8  --  3.8  < > 3.8   CHLORIDE mmol/L 97*  --   --   --  94*  --  92*  < > 95*   CO2 mmol/L 23.0  --   --   --  22.0  --  26.0  < > 25.0   BUN mg/dL 16  --   --   --  16  --  17  < > 12   CREATININE mg/dL 0.31*  --   --   --  0.31*  --  0.31*  < > 0.28*   GLUCOSE mg/dL 97  --   --  86 104*  --   89  < > 74   CALCIUM mg/dL 8.3*  --   --   --  7.8*  --  8.0*  < > 8.3*   ALT (SGPT) U/L  --   --   --   --   --   --   --   --  23   AST (SGOT) U/L  --   --   --   --   --   --   --   --  36*   < > = values in this interval not displayed.  Estimated Creatinine Clearance: 46.2 mL/min (A) (by C-G formula based on SCr of 0.31 mg/dL (L)).  No results found for: BNP    Microbiology Results Abnormal     Procedure Component Value - Date/Time    Blood Culture - Blood, [[id number]]  (Normal) Collected:  10/01/18 1745    Lab Status:  Final result Specimen:  Blood from Wrist, Right Updated:  10/06/18 1830     Blood Culture No growth at 5 days    Blood Culture - Blood, [[id number]]  (Normal) Collected:  10/01/18 1715    Lab Status:  Final result Specimen:  Blood from Arm, Right Updated:  10/06/18 1830     Blood Culture No growth at 5 days          Imaging Results (last 24 hours)     Procedure Component Value Units Date/Time    FL Video Swallow With Speech [[id number]] Collected:  10/12/18 1427     Updated:  10/12/18 1428    Narrative:       EXAMINATION: FL VIDEO SWALLOW W SPEECH-     INDICATION: dysphagia; J96.01-Acute respiratory failure with hypoxia;  Z74.09-Other reduced mobility; R13.12-Dysphagia, oropharyngeal phase;  Z74.09-Other reduced mobility     TECHNIQUE: 2 minutes and 12 seconds of fluoroscopic time was used for  this exam. 1 associated image was saved. The patient was evaluated in  the seated lateral position while taking a variety of consistencies of  barium by mouth under the direction of speech pathology.     COMPARISON: NONE     FINDINGS: There was no penetration and no aspiration with any of the  media ingested.          Impression:       Fluoroscopy provided for a modified barium swallow. Please  see speech therapy report for full details and recommendations.               Results for orders placed during the hospital encounter of 10/01/18   Adult Transthoracic Echo Complete W/ Cont if Necessary Per Protocol     Narrative · Left ventricular systolic function is normal.  · Estimated EF appears to be in the range of 61 - 65%  · All left ventricular wall segments contract normally.  · Normal left atrial pressure.  · Right ventricular cavity is mildly dilated  · Right ventricular wall thickness is consistent with mild hypertrophy.  · Right atrial cavity size is mildly dilated. The inferior vena cava is   dilated. IVC inspiratory collapse is absent. A prominent Eustachian valve   is present.  · Moderate tricuspid valve regurgitation is present. Estimated right   ventricular systolic pressure from tricuspid regurgitation is mildly   elevated (35-45 mmHg).          I have reviewed the medications.      clopidogrel 75 mg Oral Daily   docusate sodium 100 mg Nasogastric BID   enoxaparin 30 mg Subcutaneous Nightly   famotidine 20 mg Oral BID   multivitamin 1 tablet Oral Daily   polyethylene glycol 17 g Oral Daily   sennosides-docusate sodium 2 tablet Oral Nightly         Assessment/Plan   Assessment / Plan     Active Hospital Problems    Diagnosis   • **Acute hypoxemic respiratory failure requiring intubation and ventilatory support (CMS/HCC)   • Constipation   • COPD and active cigarette abuse (CMS/HCC)   • Multiple right rib fractures (ninth, 10th, 11th)   • Anemia   • Metabolic encephalopathy   • Hyponatremia, suspect SIADH   • Hypoglycemia, resolved   • Generalized abdominal pain   • Severe malnutrition (CMS/HCC)   • Hypotension, resolved          Brief Hospital Course to date:  Devora Huddleston is a 73 y.o. female with hx of COPD, HTN, and CVA presents after being found down and unresponsive by her son on 10/1/18. Was intubated in the field. Glucose dropped to 39 en route (initially 105). Patient had reportedly complained of increasing weakness and weight loss for several weeks, and had recent evaluation for hyponatremia and seizures. Admitted to [facility] ICU. CT head, CT perfusion, CTA head and neck all negative. Further imaging  revealed 3 right sided rib fractures, presumably from a fall at home. Possible RLL atelectasis vs. infiltrate. She was also hypotensive and requiring pressors. Treated empirically with Vanc and Zosyn however all cultures remain no growth. She was started on enteral feeds due to dysphagia. Patient extubated 10/3. Transferred to floor, hospitalists assumed care on 10/6.      Assessment & Plan:  --Stopped ATBx. Cultures no growth.  --appreciate renal assistance.  Na+ trending up, c/w SIADH vs. Cerebral salt wasting.  Continue fluid restriction  --Supportive care for rib fractures.   - BP remains borderline  --SLP following. Will pull NG and see how she does on her own and can hopefully upgrade diet.  --mental status ok for now, watch on prns  - pull CVC (placed 10/1) if can get PIV  --d/w CM during MDRs.  Working on placement, will be here through the weekend.    DVT Prophylaxis:  Lovenox    CODE STATUS:   Code Status and Medical Interventions:   Ordered at: 10/01/18 1819     Code Status:    CPR     Medical Interventions (Level of Support Prior to Arrest):    Full       Disposition: I expect the patient to be discharged TBD    Electronically signed by William Ibarra MD, 10/12/18, 3:15 PM.

## 2018-10-12 NOTE — PLAN OF CARE
Problem: Patient Care Overview  Goal: Plan of Care Review  Outcome: Ongoing (interventions implemented as appropriate)   10/12/18 1521   Coping/Psychosocial   Plan of Care Reviewed With patient   SLP re-evaluation completed. MBS completed. Pharyngeal dysphagia resolved - no aspiration or significant residue with any consistency. Lengthy oral manipulation of bolus 2' cognitive status. Recommend: Soft/chopped solids and thin liquids. No further SLP needs. Nursing can adjust/advance as needed r/t AMS. Reconsult if any further issues. Please see note for further details and recommendations.

## 2018-10-12 NOTE — PROGRESS NOTES
Continued Stay Note  [facility]     Patient Name: Devora Huddleston  MRN: [id number]  Today's Date: 10/12/2018    Admit Date: 10/1/2018          Discharge Plan     Row Name 10/12/18 4910       Plan    Plan SNF update     Patient/Family in Agreement with Plan yes    Plan Comments CM called the facilities regarding the patient's referrals - [facility] has her on their waiting list, [facility] does not have any beds available at this time, [facility] - this was refaxed and they may have openings earlier next week, and [facility] does not have any beds at this time. CM will continue to follow for placement - Mirtha [phone]               Discharge Codes    No documentation.       Expected Discharge Date and Time     Expected Discharge Date Expected Discharge Time    Oct 6, 2018             Mirtha Laws RN

## 2018-10-12 NOTE — DISCHARGE PLACEMENT REQUEST
"Please see referral attached   Thank you   Short term/ possible transition to LTC     Mirtha Laws [phone]     Devora Huddleston  (73 y.o. Female)     Date of Birth Social Security Number Address Home Phone MRN    1945  [address] [place]  [facility] [address] [phone] [id number]    Bahai Marital Status          None Unknown       Admission Date Admission Type Admitting Provider Attending Provider Department, Room/Bed    10/1/18 Emergency William Ibarra MD Dossett, Lee M, MD [facility] 6B, N631/1    Discharge Date Discharge Disposition Discharge Destination                       Attending Provider:  William Ibarra MD    Allergies:  Not on File    Isolation:  None   Infection:  None   Code Status:  CPR    Ht:  157.5 cm (62.01\")   Wt:  45.4 kg (100 lb)    Admission Cmt:  None   Principal Problem:  Acute hypoxemic respiratory failure requiring intubation and ventilatory support (CMS/[facility]) [J96.01]                 Active Insurance as of 10/1/2018     Primary Coverage     Payor Plan Insurance Group Employer/Plan Group    MEDICARE MEDICARE A & B      Payor Plan Address Payor Plan Phone Number Effective From Effective To    [address] [phone] 4/1/1986     [facility] [id number]       Subscriber Name Subscriber Birth Date Member ID       DEVORA HUDDLESTON 1945 [id number]           Secondary Coverage     Payor Plan Insurance Group Employer/Plan Group    [employer] [place]LL[place]ICAID      Payor Plan Address Payor Plan Phone Number Effective From Effective To    [address] [address] [phone] 10/1/2018     [facility] [id number]       Subscriber Name Subscriber Birth Date Member ID       DEVORA HUDDLESTON 1945 [id number]                 Emergency Contacts      (Rel.) Home Phone Work Phone Mobile Phone    Molina Huddleston (Son) [phone] -- --               History & Physical      Dhruv Crandall MD at 10/1/2018  7:13 PM          Pulmonary/Critical Care " "History and Physical Exam     LOS: 0 days   Patient Care Team:  Provider, No Known as PCP - General    Chief Complaint:    Chief Complaint   Patient presents with   • Loss of Consciousness     Source of history: Chart, attempted to call available phone number from chart and message says that number is unavailable.    Subjective     HPI:     73-year-old female who arrived by [facility] to the emergency department here after being found down and unresponsive by family in her home.  She reportedly was found by her son unresponsive and \"not breathing\".  The patient's blood sugar apparently was 105 initially, but 39 mg/dL en route.  Initially, the patient had an oxygen saturation of 47% and was intubated in the field.  The ER was able to contact the patient's son however he was noted to be a poor historian.  He does report that she has had increased weakness over the last few weeks and states that she was evaluated for a seizure 2 weeks previously and had hyponatremia at that point.  He denied that she has diabetes.  She reportedly has a history of CVA in the past.    CT head done emergency department showed no acute abnormality.  CT perfusion study was negative according to the ER attending physician although I do not see an official report is taking this.  CT angiogram is pending.  The patient was started on empiric antibiotics for sepsis.  She was significantly hypotensive in the ER although responds to fluids before drifting back down to 60s to 70s systolic.    On my evaluation, the patient is intubated and drowsy but is arousable.  She follows commands in all 4 extremities.  She does report abdominal tenderness.    I attempted to call the patient's son however I was unable to contact them as the patient's son's phone number message states that the numbers unavailable.    History taken from: patient    Past Medical History:   Diagnosis Date   • Stroke (CMS/[facility])        History reviewed. No pertinent surgical " history.    History reviewed. No pertinent family history.    Social History     Social History   • Marital status: Unknown     Spouse name: N/A   • Number of children: N/A   • Years of education: N/A     Occupational History   • Not on file.     Social History Main Topics   • Smoking status: Unknown If Ever Smoked   • Smokeless tobacco: Not on file   • Alcohol use Defer   • Drug use: Unknown   • Sexual activity: Defer     Other Topics Concern   • Not on file     Social History Narrative   • No narrative on file       Not on File    PMH/FH/SocH were reviewed by me and updates were made.     Review of Systems:    Review of 14 systems was completed with positives and pertinent negatives noted in the subjective section.  All other systems reviewed and are negative.   Exceptions are noted below:    Unable to obtain secondary to altered mental status, intubation.    Objective     Vital Signs  Temp:  [94 °F (34.4 °C)-96.6 °F (35.9 °C)] 96.6 °F (35.9 °C)  Heart Rate:  [67-86] 71  Resp:  [14-16] 16  BP: ()/(36-80) 115/64  FiO2 (%):  [60 %] 60 %  Body mass index is 14.63 kg/m².  FiO2 (%):  [60 %] 60 %  S RR:  [12] 12  PEEP/CPAP (cm H2O):  [5 cm H20] 5 cm H20    Physical Exam:     General Appearance:    Drowsy, on ventilator, cachectic, in no acute distress   Head:    Normocephalic, without obvious abnormality, atraumatic   Eyes:            Lids and lashes normal, conjunctivae and sclerae normal, no   icterus, no pallor, corneas clear, PERRL   ENMT:   Ears appear intact with no abnormalities noted      No oral lesions, edentulous, no thrush, oral mucosa moist, oral endotracheal tube in place.     Neck:   No adenopathy, supple, trachea midline, no thyromegaly, no   carotid bruit, no JVD       Lungs/resp:     On ventilator, symmetric chest rise, no crepitus, clear to      auscultation bilaterally, no chest wall tenderness                  Heart/CV:    Regular rhythm and normal rate, normal S1 and S2, no            murmur    Abdomen/GI:     Normal bowel sounds, no masses, no organomegaly, soft, moderately distended, mild diffuse tenderness to palpation.         G/U:     Deferred   Extremities/MSK:   No clubbing, cyanosis or edema.  Normal tone.  No deformities.    Pulses:   Pulses palpable and equal bilaterally   Skin:   No bleeding, multiple bruises.  Some erythema of bilateral lower extremities.  Wrinkling of skin lower extremities suggesting recent improvement of chronic edema.     Hem/Lymph:   No cervical or supraclavicular adenopathy.    Neurologic:   Moves all extremities with no obvious focal motor deficit.  Cranial nerves 2 - 12 grossly intact            Psychiatric:  Drowsy, nonagitated.     Results Review:     I reviewed the patient's new clinical results.     Results from last 7 days  Lab Units 10/01/18  1640   SODIUM mmol/L 119*   POTASSIUM mmol/L 3.5   CHLORIDE mmol/L 86*   CO2 mmol/L 23.0   BUN mg/dL 34*   CREATININE mg/dL 1.04   CALCIUM mg/dL 8.6*   ALT (SGPT) U/L 25   AST (SGOT) U/L 39*   GLUCOSE mg/dL 178*       Results from last 7 days  Lab Units 10/01/18  1640   WBC 10*3/mm3 10.47   HEMOGLOBIN g/dL 12.6   HEMATOCRIT % 36.9   PLATELETS 10*3/mm3 317       Results from last 7 days  Lab Units 10/01/18  1700   PH, ARTERIAL pH units 7.454*   PO2 ART mm Hg 179.0*   PCO2, ARTERIAL mm Hg 34.5*   HCO3 ART mmol/L 24.2       I reviewed the patient's new imaging including images and reports.    Medication Review:     sodium chloride 1,000 mL Intravenous Once          Assessment/Plan     Hospital Problem List     * (Principal)Acute respiratory failure (CMS/HCC)    Altered mental state    Acute respiratory failure with hypoxemia (CMS/HCC)    Hyponatremia    Hypoglycemia    Generalized abdominal pain    Weight loss    Maria D coma scale total score 3-8 (CMS/HCC) - on arrival    Hypotension        73-year-old female with a history of hypertension, reported history of prior stroke, recent weight loss and episode of  hyponatremia/seizure was brought to the emergency department after being found down by family at home.  She was intubated in the field for hypoxemic respiratory failure.  Blood sugar en route was 39 mg/dL and was replaced.  She has been persistently hypotensive since arrival.  Pro-calcitonin is low.    Chest x-ray was read as no significant abnormality however I am concerned about a possible AP window density/left upper lobe density (although this may be due to rotation).  Additionally, she seems to have some abdominal tenderness and distention on exam.  Her son reports that she was recently treated with doxycycline for a urinary tract infection.  She has poor food intake and has had persistent weight loss over the past year and a half.    I think there is a chance that the patient had a seizure/post ictal state, possibly triggered by hypoglycemia, hyponatremia or a combination.  I suspect she has an underlying issue causing her poor oral intake and weight loss.  Chest x-ray seems abnormal to me and I think she needs a CT scan of the chest, abdomen, and pelvis to rule out possible malignancy and evaluate for the possibility of mesenteric ischemia or intra-abdominal abscess.    Plan:  1.  Admit to the intensive care unit.  2.  Fluid resuscitation.  3.  May require central line, pressors.  4.  Continue antibiotics for now, repeat pro-calcitonin in a.m.  May be able to de-escalate antibiotics soon.  5.  Follow-up cultures.  6.  Check serum cortisol.  7.  Check CT scan of the chest as well as abdomen/pelvis to evaluate abnormal chest x-ray (rule out left upper lobe/AP window pathology) and further evaluate patient's abdominal pain.  8.  Monitor sodium to avoid overly rapid correction.  9.  Hopefully can be weaned from mechanical ventilation tomorrow.      Dhruv Crandall MD  10/01/18  8:01 PM    60 minutes of critical care provided. This time excludes other billable procedures. Time does include preparation of  "documents, medical consultations, review of old records, and direct bedside care. Patient was at high risk for life-threatening deterioration due to acute respiratory failure, hypotension.       Addendum: The patient's son, Bryn Huddleston, called back and I was able to get some additional history which I added to the history above.  He is currently only available at the patient's home phone number, as his cell phone has been turned off.  He states that he is going to try to come to the hospital tomorrow, but needs to get his phone turned back on so he can use the map function.    Patient's PCP is Eevlyn Francisco [phone].       Electronically signed by Dhruv Crandall MD at 10/1/2018  8:23 PM          Physician Progress Notes (most recent note)      Emily Ngo MD at 10/12/2018  9:43 AM             LOS: 11 days    Patient Care Team:  Evelyn Francisco MD as PCP - General (Family Medicine)      Subjective     Interval History:   No acute events overnight. No new complaints.     Review of Systems:   No CP or SOA    Objective     Vital Sign Min/Max for last 24 hours  Temp  Min: 97.4 °F (36.3 °C)  Max: 99.7 °F (37.6 °C)   BP  Min: 98/50  Max: 120/60   Pulse  Min: 64  Max: 86   Resp  Min: 18  Max: 18   SpO2  Min: 95 %  Max: 95 %   No Data Recorded   No Data Recorded     Flowsheet Rows      First Filed Value   Admission Height  157.5 cm (62\") Documented at 10/01/2018 1700   Admission Weight  36.3 kg (80 lb) Documented at 10/01/2018 1700          I/O this shift:  In: 764 [NG/GT:764]  Out: -   I/O last 3 completed shifts:  In: 910 [P.O.:100; Other:150; NG/GT:660]  Out: -     Physical Exam:     General Appearance:    Alert, cooperative, in no acute distress   Head:    Normocephalic, without obvious abnormality, atraumatic               Neck:   No adenopathy, supple, trachea midline, no thyromegaly, no     carotid bruit, no JVD       Lungs:     Clear to auscultation,respirations regular, even and             "       unlabored    Heart:    Regular rhythm and normal rate, normal S1 and S2, no            murmur, no gallop, no rub, no click   Breast Exam:    Deferred   Abdomen:     Normal bowel sounds, no masses, no organomegaly, soft        non-tender, non-distended, no guarding, no rebound                 tenderness       Extremities:   Moves all extremities well, no edema, no cyanosis, no              redness               Neurologic:   Cranial nerves 2 - 12 grossly intact, sensation intact, DTR        present and equal bilaterally       WBC WBC   Date Value Ref Range Status   10/11/2018 7.40 3.50 - 10.80 10*3/mm3 Final   10/10/2018 8.13 3.50 - 10.80 10*3/mm3 Final      HGB Hemoglobin   Date Value Ref Range Status   10/11/2018 9.3 (L) 11.5 - 15.5 g/dL Final   10/10/2018 10.2 (L) 11.5 - 15.5 g/dL Final      HCT Hematocrit   Date Value Ref Range Status   10/11/2018 27.6 (L) 34.5 - 44.0 % Final   10/10/2018 30.3 (L) 34.5 - 44.0 % Final      Platlets No results found for: LABPLAT   MCV MCV   Date Value Ref Range Status   10/11/2018 88.7 80.0 - 99.0 fL Final   10/10/2018 86.1 80.0 - 99.0 fL Final          Sodium Sodium   Date Value Ref Range Status   10/12/2018 127 (L) 132 - 146 mmol/L Final   10/11/2018 127 (L) 132 - 146 mmol/L Final   10/11/2018 125 (L) 132 - 146 mmol/L Final   10/10/2018 123 (L) 132 - 146 mmol/L Final   10/10/2018 125 (L) 132 - 146 mmol/L Final   10/09/2018 123 (L) 132 - 146 mmol/L Final      Potassium Potassium   Date Value Ref Range Status   10/11/2018 3.8 3.5 - 5.5 mmol/L Final   10/10/2018 3.8 3.5 - 5.5 mmol/L Final      Chloride Chloride   Date Value Ref Range Status   10/11/2018 94 (L) 99 - 109 mmol/L Final   10/10/2018 92 (L) 99 - 109 mmol/L Final      CO2 CO2   Date Value Ref Range Status   10/11/2018 22.0 20.0 - 31.0 mmol/L Final   10/10/2018 26.0 20.0 - 31.0 mmol/L Final      BUN BUN   Date Value Ref Range Status   10/11/2018 16 9 - 23 mg/dL Final   10/10/2018 17 9 - 23 mg/dL Final      Creatinine  Creatinine   Date Value Ref Range Status   10/11/2018 0.31 (L) 0.60 - 1.30 mg/dL Final   10/10/2018 0.31 (L) 0.60 - 1.30 mg/dL Final      Calcium Calcium   Date Value Ref Range Status   10/11/2018 7.8 (L) 8.7 - 10.4 mg/dL Final   10/10/2018 8.0 (L) 8.7 - 10.4 mg/dL Final      PO4 No results found for: CAPO4   Albumin No results found for: ALBUMIN   Magnesium Magnesium   Date Value Ref Range Status   10/10/2018 2.3 1.3 - 2.7 mg/dL Final   10/09/2018 1.4 1.3 - 2.7 mg/dL Final      Uric Acid No results found for: URICACID        Results Review:     I reviewed the patient's new clinical results.      clopidogrel 75 mg Oral Daily   docusate sodium 100 mg Nasogastric BID   enoxaparin 30 mg Subcutaneous Nightly   famotidine 20 mg Oral BID   multivitamin 1 tablet Oral Daily   polyethylene glycol 17 g Oral Daily   sennosides-docusate sodium 2 tablet Oral Nightly          Medication Review:     Assessment/Plan       Acute hypoxemic respiratory failure requiring intubation and ventilatory support (CMS/HCC)    Metabolic encephalopathy    Hyponatremia, suspect SIADH    Hypoglycemia, resolved    Generalized abdominal pain    Weight loss    Hypotension, resolved    Multiple right rib fractures (ninth, 10th, 11th)    Anemia    COPD and active cigarette abuse (CMS/[facility])    Constipation      1- Hyponatremia - At presentation 119. Since than has been improving with 132 reading on 10/6/18. Since than it has been trending down to 123 yesterday. Today Sodium is 127-improving. . At home, was on HCTZ. Serum osm- 267 urine osm 583 and urine sodium 104. Consistent with SIADH vs cerebral salt wasting.      Plan:  - fluid restriction  - Check sodium level q 8 hr         I discussed the patients findings and my recommendations with patient and nursing staff    Emily Ngo MD  10/12/18  9:43 AM        Electronically signed by Emily Ngo MD at 10/12/2018 11:06 AM          Physical Therapy Notes (most recent note)      Lu  Gemma BLEDSOE, PT at 10/9/2018  1:45 PM  Version 1 of 1         Acute Care - Physical Therapy Treatment Note  [facility]     Patient Name: Devora Huddleston  : 1945  MRN: [id number]  Today's Date: 10/9/2018  Onset of Illness/Injury or Date of Surgery: 10/01/18  Date of Referral to PT: 10/04/18  Referring Physician: MD Emmett    Admit Date: 10/1/2018    Visit Dx:    ICD-10-CM ICD-9-CM   1. Acute respiratory failure with hypoxemia (CMS/HCC) J96.01 518.81   2. Impaired functional mobility, balance, gait, and endurance Z74.09 V49.89   3. Oropharyngeal dysphagia R13.12 787.22   4. Impaired mobility and ADLs Z74.09 799.89     Patient Active Problem List   Diagnosis   • Acute respiratory failure (CMS/HCC)   • Metabolic encephalopathy   • Acute hypoxemic respiratory failure requiring intubation and ventilatory support (CMS/HCC)   • Hyponatremia, suspect SIADH   • Hypoglycemia, resolved   • Generalized abdominal pain   • Weight loss   • Hypotension, resolved   • Multiple right rib fractures (ninth, 10th, 11th)   • Anemia   • COPD and active cigarette abuse (CMS/HCC)   • Constipation       Therapy Treatment          Rehabilitation Treatment Summary     Row Name 10/09/18 1323             Treatment Time/Intention    Discipline physical therapist  -VG      Document Type therapy note (daily note)  -VG      Subjective Information no complaints  -VG      Mode of Treatment individual therapy;physical therapy  -VG      Patient/Family Observations In bed, NG tube, IV, tele, lyons cath; no family present.  -VG      Care Plan Review patient/other agree to care plan  -VG      Therapy Frequency (PT Clinical Impression) daily  -VG      Patient Effort adequate  -VG      Existing Precautions/Restrictions fall;other (see comments)   confused/combative  -VG      Recorded by [VG] Gemma Leigh, PT 10/09/18 1342      Row Name 10/09/18 1323             Cognitive Assessment/Intervention- PT/OT    Affect/Mental Status (Cognitive)  confused  -VG      Behavioral Issues (Cognitive) uncooperative;disinhibition;combative/physical outbursts  -VG      Orientation Status (Cognition) oriented to;person  -VG      Follows Commands (Cognition) follows one step commands;75-90% accuracy;verbal cues/prompting required  -VG      Cognitive Function (Cognitive) memory deficit;safety deficit  -VG      Memory Deficit (Cognitive) moderate deficit;working memory  -VG      Safety Deficit (Cognitive) moderate deficit;insight into deficits/self awareness;impulsivity  -VG      Personal Safety Interventions fall prevention program maintained;supervised activity  -VG      Recorded by [VG] Gemma Leigh, PT 10/09/18 1342      Row Name 10/09/18 1323             Bed Mobility Assessment/Treatment    Comment (Bed Mobility) Deferred d/t disorientation, combative, inability to follow commands  -VG      Recorded by [VG] Gemma Leigh, PT 10/09/18 1342      Row Name 10/09/18 1323             Transfer Assessment/Treatment    Comment (Transfers) Deferred d/t disorientation, combative, inability to follow commands  -VG      Recorded by [VG] Gemma Leigh, PT 10/09/18 1342      Row Name 10/09/18 1323             Gait/Stairs Assessment/Training    Comment (Gait/Stairs) Deferred d/t disorientation, combative, inability to follow commands  -VG      Recorded by [VG] Gemma Leigh, PT 10/09/18 1342      Row Name 10/09/18 1323             Therapeutic Exercise    24378 - PT Therapeutic Exercise Minutes 14  -VG      Recorded by [VG] Gemma Leigh, PT 10/09/18 1342      Row Name 10/09/18 1323             Therapeutic Exercise    Lower Extremity (Therapeutic Exercise) heel slides, bilateral;SLR (straight leg raise), bilateral  -VG      Lower Extremity Range of Motion (Therapeutic Exercise) hip abduction/adduction, bilateral;ankle dorsiflexion/plantar flexion, bilateral  -VG      Exercise Type (Therapeutic Exercise) AROM (active range of motion);AAROM (active assistive range of  motion)  -VG      Position (Therapeutic Exercise) supine  -VG      Sets/Reps (Therapeutic Exercise) 15x  -VG      Comment (Therapeutic Exercise) Cues for redirection to task. MICHA for SLR and hip abd/add.  -VG      Recorded by [VG] Gemma Leigh, PT 10/09/18 1342      Row Name 10/09/18 1323             Positioning and Restraints    Pre-Treatment Position in bed  -VG      Post Treatment Position bed  -VG      In Bed supine;call light within reach;encouraged to call for assist;exit alarm on;side rails up x3  -VG      Recorded by [VG] Gemma Leigh, PT 10/09/18 1342      Row Name 10/09/18 1323             Pain Scale: FACES Pre/Post-Treatment    Pain: FACES Scale, Pretreatment 2-->hurts little bit  -VG      Pain: FACES Scale, Post-Treatment 2-->hurts little bit  -VG      Recorded by [VG] Gemma Leigh, PT 10/09/18 1342      Row Name                Wound 10/01/18 1845 Bilateral medial coccyx    Wound - Properties Group Date first assessed: 10/01/18 [SW] Time first assessed: 1845 [SW] Present On Admission : yes [SW] Side: Bilateral [SW] Orientation: medial [SW] Location: coccyx [SW] Additional Comments: shearing [CP] Recorded by:  [CP] Mahsa Almanzar APRN 10/07/18 1148 [SW] Fatmata Villalobos RN 10/01/18 1905    Row Name                Wound 10/07/18 1015 Right heel blisters    Wound - Properties Group Date first assessed: 10/07/18 [CP] Time first assessed: 1015 [CP] Present On Admission : no;picture taken [CP] Side: Right [CP] Location: heel [CP] Type: blisters [CP] Additional Comments: with bruise or DTPI [CP] Recorded by:  [CP] Mahsa Almanzar APRN 10/07/18 1155    Row Name                Wound 10/06/18 1500 Left heel blisters    Wound - Properties Group Date first assessed: 10/06/18 [CP] Time first assessed: 1500 [CP] Present On Admission : no [CP2] Side: Left [CP2] Location: heel [CP2] Type: blisters [CP2] Recorded by:  [CP] Mahsa Almanzar, JEFF 10/07/18 1159 [CP2] Mahsa Almanzar,  APRN 10/07/18 1157    Row Name                Wound 10/06/18 1500 Right elbow pressure injury    Wound - Properties Group Date first assessed: 10/06/18 [CP] Time first assessed: 1500 [CP] Present On Admission : no;picture taken [CP] Side: Right [CP] Location: elbow [CP] Type: pressure injury [CP] Stage, Pressure Injury: unstageable [CP] Recorded by:  [CP] Mahsa Almanzar, APRN 10/07/18 1200    Row Name 10/09/18 1323             Coping    Observed Emotional State combative;agitated  -VG      Verbalized Emotional State frustration  -VG      Recorded by [VG] Gemma Leigh, PT 10/09/18 1342      Row Name 10/09/18 1323             Plan of Care Review    Plan of Care Reviewed With patient  -VG      Recorded by [VG] Gemma Leigh, PT 10/09/18 1342      Row Name 10/09/18 1323             Outcome Summary/Treatment Plan (PT)    Daily Summary of Progress (PT) progress toward functional goals is gradual  -VG      Anticipated Discharge Disposition (PT) skilled nursing facility  -VG      Recorded by [VG] Gemma Leigh, PT 10/09/18 1342        User Key  (r) = Recorded By, (t) = Taken By, (c) = Cosigned By    Initials Name Effective Dates Discipline    CP Mahsa Almanzar APRN 06/08/18 -  Nurse    Fatmata Chaves RN 06/16/16 -  Nurse    Gemma Garcia, PT 05/29/18 -  PT          Wound 10/01/18 1845 Bilateral medial coccyx (Active)   Dressing Appearance open to air 10/8/2018  8:00 PM   Base dry 10/8/2018  8:00 PM   Periwound intact;dry;pink;blanchable 10/8/2018  8:00 PM   Edges irregular 10/8/2018  8:00 PM   Drainage Amount none 10/8/2018  8:00 PM   Care, Wound barrier applied 10/8/2018  4:00 PM   Dressing Care, Wound open to air 10/8/2018  4:00 PM   Periwound Care, Wound barrier ointment applied 10/8/2018  4:00 PM       Wound 10/07/18 1015 Right heel blisters (Active)   Dressing Appearance dry;intact 10/8/2018  8:00 PM   Base dressing in place, unable to visualize 10/8/2018  8:00 PM   Periwound  intact;dry;pink;blanchable 10/8/2018  8:00 PM   Drainage Amount none 10/8/2018  8:00 PM   Care, Wound cleansed with;sterile water 10/8/2018  4:00 PM       Wound 10/06/18 1500 Left heel blisters (Active)   Dressing Appearance intact;dried drainage 10/8/2018  8:00 PM   Base dressing in place, unable to visualize 10/8/2018  8:00 PM       Wound 10/06/18 1500 Right elbow pressure injury (Active)   Dressing Appearance dry;intact 10/8/2018  8:00 PM   Base dressing in place, unable to visualize 10/8/2018  8:00 PM             Physical Therapy Education     Title: PT OT SLP Therapies (Active)     Topic: Physical Therapy (Active)     Point: Mobility training (Active)    Learning Progress Summary     Learner Status Readiness Method Response Comment Documented by    Patient Active Acceptance ED NR   10/07/18 1438     Active Acceptance E NR   10/04/18 1500          Point: Home exercise program (Active)    Learning Progress Summary     Learner Status Readiness Method Response Comment Documented by    Patient Active Acceptance E NR Educated on HEP and benefits of activity. VG 10/09/18 1323     Active Acceptance ED NR   10/07/18 1438     Active Acceptance E NR   10/04/18 1500          Point: Body mechanics (Active)    Learning Progress Summary     Learner Status Readiness Method Response Comment Documented by    Patient Active Acceptance ED NR   10/07/18 1438     Active Acceptance E NR  SIXTO 10/04/18 1500          Point: Precautions (Active)    Learning Progress Summary     Learner Status Readiness Method Response Comment Documented by    Patient Active Acceptance ED NR   10/07/18 1438     Active Acceptance E NR   10/04/18 1500                      User Key     Initials Effective Dates Name Provider Type Discipline     06/19/15 -  Althea Ruth, PT Physical Therapist PT    DM 06/19/15 -  Terri Magaña, PT Physical Therapist PT    VG 05/29/18 -  Gemma Leigh, PT Physical Therapist PT                     PT Recommendation and Plan  Anticipated Discharge Disposition (PT): skilled nursing facility  Therapy Frequency (PT Clinical Impression): daily  Outcome Summary/Treatment Plan (PT)  Daily Summary of Progress (PT): progress toward functional goals is gradual  Anticipated Discharge Disposition (PT): skilled nursing facility  Plan of Care Reviewed With: patient  Progress: no change  Outcome Summary: Deferred bed mobility and transfers d/t cognition, combative, and inability to follow commands. Completed bed level ther ex with cues for redirection to task. Will continue to progress pt as able per POC.          Outcome Measures     Row Name 10/09/18 1323 10/08/18 1500 10/07/18 1408       How much help from another person do you currently need...    Turning from your back to your side while in flat bed without using bedrails? 2  -VG  -- 2  -DM    Moving from lying on back to sitting on the side of a flat bed without bedrails? 2  -VG  -- 2  -DM    Moving to and from a bed to a chair (including a wheelchair)? 2  -VG  -- 2  -DM    Standing up from a chair using your arms (e.g., wheelchair, bedside chair)? 2  -VG  -- 2  -DM    Climbing 3-5 steps with a railing? 1  -VG  -- 1  -DM    To walk in hospital room? 1  -VG  -- 2  -DM    AM-PAC 6 Clicks Score 10  -VG  -- 11  -DM       How much help from another is currently needed...    Putting on and taking off regular lower body clothing?  -- 1  -SD  --    Bathing (including washing, rinsing, and drying)  -- 1  -SD  --    Toileting (which includes using toilet bed pan or urinal)  -- 1  -SD  --    Putting on and taking off regular upper body clothing  -- 2  -SD  --    Taking care of personal grooming (such as brushing teeth)  -- 3  -SD  --    Eating meals  -- 3  -SD  --    Score  -- 11  -SD  --       Functional Assessment    Outcome Measure Options AM-PAC 6 Clicks Basic Mobility (PT)  -VG AM-PAC 6 Clicks Daily Activity (OT)  -SD AM-PAC 6 Clicks Basic Mobility (PT)  -DM       User Key  (r) = Recorded By, (t) = Taken By, (c) = Cosigned By    Initials Name Provider Type    Katerin Luz, OT Occupational Therapist    Terri Alexanedr, PT Physical Therapist    VG Gemma Leigh, PT Physical Therapist           Time Calculation:         PT Charges     Row Name 10/09/18 1323             Time Calculation    Start Time 1323  -VG      PT Received On 10/09/18  -VG      PT Goal Re-Cert Due Date 10/14/18  -VG         Time Calculation- PT    Total Timed Code Minutes- PT 14 minute(s)  -VG         Timed Charges    43998 - PT Therapeutic Exercise Minutes 14  -VG        User Key  (r) = Recorded By, (t) = Taken By, (c) = Cosigned By    Initials Name Provider Type    VG Gemma Leigh, PT Physical Therapist        Therapy Suggested Charges     Code   Minutes Charges    21867 (CPT®) Hc Pt Neuromusc Re Education Ea 15 Min      71167 (CPT®) Hc Pt Ther Proc Ea 15 Min 14 1    21119 (CPT®) Hc Gait Training Ea 15 Min      62724 (CPT®) Hc Pt Therapeutic Act Ea 15 Min      03928 (CPT®) Hc Pt Manual Therapy Ea 15 Min      95812 (CPT®) Hc Pt Iontophoresis Ea 15 Min      58449 (CPT®) Hc Pt Elec Stim Ea-Per 15 Min      25820 (CPT®) Hc Pt Ultrasound Ea 15 Min      44110 (CPT®) Hc Pt Self Care/Mgmt/Train Ea 15 Min      23371 (CPT®) Hc Pt Prosthetic (S) Train Initial Encounter, Each 15 Min      12291 (CPT®) Hc Pt Orthotic(S)/Prosthetic(S) Encounter, Each 15 Min      97364 (CPT®) Hc Orthotic(S) Mgmt/Train Initial Encounter, Each 15min      Total  14 1        Therapy Charges for Today     Code Description Service Date Service Provider Modifiers Qty    12875421491 HC PT THER PROC EA 15 MIN 10/9/2018 Gemma Leigh, PT GP 1          PT G-Codes  Outcome Measure Options: AM-PAC 6 Clicks Basic Mobility (PT)  AM-PAC 6 Clicks Score: 10  Score: 11    Ira Leigh, PT  10/9/2018         Electronically signed by Gemma Leigh, PT at 10/9/2018  1:45 PM          Occupational Therapy Notes (most recent note)       Katerin Shine, OT at 10/9/2018  4:14 PM          Acute Care - Occupational Therapy Treatment Note   [place]     Patient Name: Devora Huddleston  : 1945  MRN: [id number]  Today's Date: 10/9/2018  Onset of Illness/Injury or Date of Surgery: 10/01/18  Date of Referral to OT: 10/04/18  Referring Physician: MD Emmett    Admit Date: 10/1/2018       ICD-10-CM ICD-9-CM   1. Acute respiratory failure with hypoxemia (CMS/HCC) J96.01 518.81   2. Impaired functional mobility, balance, gait, and endurance Z74.09 V49.89   3. Oropharyngeal dysphagia R13.12 787.22   4. Impaired mobility and ADLs Z74.09 799.89     Patient Active Problem List   Diagnosis   • Acute respiratory failure (CMS/HCC)   • Metabolic encephalopathy   • Acute hypoxemic respiratory failure requiring intubation and ventilatory support (CMS/HCC)   • Hyponatremia, suspect SIADH   • Hypoglycemia, resolved   • Generalized abdominal pain   • Weight loss   • Hypotension, resolved   • Multiple right rib fractures (ninth, 10th, 11th)   • Anemia   • COPD and active cigarette abuse (CMS/HCC)   • Constipation     Past Medical History:   Diagnosis Date   • Hypertension    • Stroke (CMS/HCC)      History reviewed. No pertinent surgical history.    Therapy Treatment          Rehabilitation Treatment Summary     Row Name 10/09/18 1544 10/09/18 1323          Treatment Time/Intention    Discipline occupational therapist  -SD physical therapist  -VG     Document Type therapy note (daily note)  -SD therapy note (daily note)  -VG     Subjective Information no complaints  -SD no complaints  -VG     Mode of Treatment occupational therapy  -SD individual therapy;physical therapy  -VG     Patient/Family Observations Pt. in bed, NG tube, IV, lyons cath; no family present.  -SD In bed, NG tube, IV, tele, lyons cath; no family present.  -VG     Care Plan Review patient/other agree to care plan  -SD patient/other agree to care plan  -VG     Therapy Frequency (PT  Clinical Impression)  -- daily  -VG     Total Minutes, Occupational Therapy Treatment 19  -SD  --     Patient Effort adequate  -SD adequate  -VG     Comment pt. very confused, pulling at lines  -SD  --     Existing Precautions/Restrictions  -- fall;other (see comments)   confused/combative  -VG     Recorded by [SD] Katerin Shine, OT 10/09/18 1610 [VG] Gemma Leigh, PT 10/09/18 1342     Row Name 10/09/18 1544             Vital Signs    Pre Patient Position Supine  -SD      Intra Patient Position Sitting  -SD      Post Patient Position Supine  -SD      Recorded by [SD] Katerin Shine, OT 10/09/18 1610      Row Name 10/09/18 1544 10/09/18 1323          Cognitive Assessment/Intervention- PT/OT    Affect/Mental Status (Cognitive) confused  -SD confused  -VG     Behavioral Issues (Cognitive) disinhibition;uncooperative;combative/physical outbursts  -SD uncooperative;disinhibition;combative/physical outbursts  -VG     Orientation Status (Cognition) oriented to;person  -SD oriented to;person  -VG     Follows Commands (Cognition) follows one step commands;0-24% accuracy;verbal cues/prompting required;repetition of directions required  -SD follows one step commands;75-90% accuracy;verbal cues/prompting required  -VG     Cognitive Function (Cognitive) safety deficit;memory deficit;attention deficit  -SD memory deficit;safety deficit  -VG     Memory Deficit (Cognitive)  -- moderate deficit;working memory  -VG     Safety Deficit (Cognitive) severe deficit;ability to follow commands;awareness of need for assistance;impulsivity;insight into deficits/self awareness;judgment;problem solving;safety precautions awareness;safety precautions follow-through/compliance  -SD moderate deficit;insight into deficits/self awareness;impulsivity  -VG     Personal Safety Interventions fall prevention program maintained;supervised activity  -SD fall prevention program maintained;supervised activity  -VG     Recorded by [SD]  Katerin Shine, OT 10/09/18 1610 [VG] Gemma Leigh, PT 10/09/18 1342     Row Name 10/09/18 1544 10/09/18 1323          Bed Mobility Assessment/Treatment    Supine-Sit Jack (Bed Mobility) moderate assist (50% patient effort);maximum assist (25% patient effort)  -SD  --     Sit-Supine Jack (Bed Mobility) moderate assist (50% patient effort);maximum assist (25% patient effort)  -SD  --     Assistive Device (Bed Mobility) draw sheet;head of bed elevated;bed rails  -SD  --     Comment (Bed Mobility) max A x 2 to scoot pt. up in the bed  -SD Deferred d/t disorientation, combative, inability to follow commands  -VG     Recorded by [SD] Katerin Shine, OT 10/09/18 1610 [VG] Gemma Leigh, PT 10/09/18 1342     Row Name 10/09/18 1323             Transfer Assessment/Treatment    Comment (Transfers) Deferred d/t disorientation, combative, inability to follow commands  -VG      Recorded by [VG] Gemma Leigh, PT 10/09/18 1342      Row Name 10/09/18 1323             Gait/Stairs Assessment/Training    Comment (Gait/Stairs) Deferred d/t disorientation, combative, inability to follow commands  -VG      Recorded by [VG] Gemma Leigh, PT 10/09/18 1342      Row Name 10/09/18 1544             ADL Assessment/Intervention    67833 - OT Self Care/Mgmt Minutes 5  -SD      BADL Assessment/Intervention grooming  -SD      Recorded by [SD] Katerin Shine, OT 10/09/18 1610      Row Name 10/09/18 1544             Grooming Assessment/Training    Jack Level (Grooming) hair care, combing/brushing;dependent (less than 25% patient effort)  -SD      Grooming Position supine  -SD      Comment (Grooming) pt. requesting to wash & comb her hair, but throwing brush out of her hand  -SD      Recorded by [SD] Katerin Shine, OT 10/09/18 1610      Row Name 10/09/18 1323             Therapeutic Exercise    56667 - PT Therapeutic Exercise Minutes 14  -VG      Recorded by [VG] Gemma Leigh  PT 10/09/18 1342      Row Name 10/09/18 1323             Therapeutic Exercise    Lower Extremity (Therapeutic Exercise) heel slides, bilateral;SLR (straight leg raise), bilateral  -VG      Lower Extremity Range of Motion (Therapeutic Exercise) hip abduction/adduction, bilateral;ankle dorsiflexion/plantar flexion, bilateral  -VG      Exercise Type (Therapeutic Exercise) AROM (active range of motion);AAROM (active assistive range of motion)  -VG      Position (Therapeutic Exercise) supine  -VG      Sets/Reps (Therapeutic Exercise) 15x  -VG      Comment (Therapeutic Exercise) Cues for redirection to task. MICHA for SLR and hip abd/add.  -VG      Recorded by [VG] Gemma Leigh, PT 10/09/18 1342      Row Name 10/09/18 1544             Static Sitting Balance    Level of [place] (Unsupported Sitting, Static Balance) maximal assist, 25 to 49% patient effort;moderate assist, 50 to 74% patient effort  -SD      Sitting Position (Unsupported Sitting, Static Balance) sitting on edge of bed  -SD      Time Able to Maintain Position (Unsupported Sitting, Static Balance) 45 to 60 seconds  -SD      Comment (Unsupported Sitting, Static Balance) pt. brought to sitting on EOB, then leaning back & saying she wanted to lie down  -SD      Recorded by [SD] Katerin Shine, OT 10/09/18 1610      Row Name 10/09/18 1544 10/09/18 1323          Positioning and Restraints    Pre-Treatment Position in bed  -SD in bed  -VG     Post Treatment Position bed  -SD bed  -VG     In Bed supine;notified nsg;call light within reach;encouraged to call for assist;exit alarm on;side rails up x3  -SD supine;call light within reach;encouraged to call for assist;exit alarm on;side rails up x3  -VG     Recorded by [SD] Katerin Shine, OT 10/09/18 1610 [VG] Gemma Leigh, PT 10/09/18 1342     Row Name 10/09/18 1544 10/09/18 1323          Pain Scale: FACES Pre/Post-Treatment    Pain: FACES Scale, Pretreatment 0-->no hurt  -SD 2-->hurts little  bit  -VG     Pain: FACES Scale, Post-Treatment 0-->no hurt  -SD 2-->hurts little bit  -VG     Recorded by [SD] Katerin Shine, OT 10/09/18 1610 [VG] Gemma Leigh, PT 10/09/18 1342     Row Name                Wound 10/01/18 1845 Bilateral medial coccyx    Wound - Properties Group Date first assessed: 10/01/18 [SW] Time first assessed: 1845 [SW] Present On Admission : yes [SW] Side: Bilateral [SW] Orientation: medial [SW] Location: coccyx [SW] Additional Comments: shearing [CP] Recorded by:  [CP] Mahsa Almanzar APRN 10/07/18 1148 [SW] Fatmata Villalobos RN 10/01/18 1905    Row Name                Wound 10/07/18 1015 Right heel blisters    Wound - Properties Group Date first assessed: 10/07/18 [CP] Time first assessed: 1015 [CP] Present On Admission : no;picture taken [CP] Side: Right [CP] Location: heel [CP] Type: blisters [CP] Additional Comments: with bruise or DTPI [CP] Recorded by:  [CP] Mahsa Almanzar APRN 10/07/18 1155    Row Name                Wound 10/06/18 1500 Left heel blisters    Wound - Properties Group Date first assessed: 10/06/18 [CP] Time first assessed: 1500 [CP] Present On Admission : no [CP2] Side: Left [CP2] Location: heel [CP2] Type: blisters [CP2] Recorded by:  [CP] Mahsa Almanzar APRN 10/07/18 1159 [CP2] Mahsa Almanzar APRN 10/07/18 1157    Row Name                Wound 10/06/18 1500 Right elbow pressure injury    Wound - Properties Group Date first assessed: 10/06/18 [CP] Time first assessed: 1500 [CP] Present On Admission : no;picture taken [CP] Side: Right [CP] Location: elbow [CP] Type: pressure injury [CP] Stage, Pressure Injury: unstageable [CP] Recorded by:  [CP] Mahsa Almanzar APRN 10/07/18 1200    Row Name 10/09/18 1544 10/09/18 1323          Coping    Observed Emotional State agitated;combative;restless  -SD combative;agitated  -VG     Verbalized Emotional State  -- frustration  -VG     Recorded by [SD] Katerin Shine, OT  10/09/18 1610 [VG] Gemma Leigh, PT 10/09/18 1342     Row Name 10/09/18 1544 10/09/18 1323          Plan of Care Review    Plan of Care Reviewed With patient  -SD patient  -VG     Recorded by [SD] Katerin Shine, OT 10/09/18 1610 [VG] Gemma Leigh, PT 10/09/18 1342     Row Name 10/09/18 1544             Outcome Summary/Treatment Plan (OT)    Daily Summary of Progress (OT) progress toward functional goals is gradual  -SD      Barriers to Overall Progress (OT) confusion  -SD      Plan for Continued Treatment (OT) cont OT POC  -SD      Anticipated Discharge Disposition (OT) skilled nursing facility  -SD      Recorded by [SD] Katerin Shine, OT 10/09/18 1610      Row Name 10/09/18 1323             Outcome Summary/Treatment Plan (PT)    Daily Summary of Progress (PT) progress toward functional goals is gradual  -VG      Anticipated Discharge Disposition (PT) skilled nursing facility  -VG      Recorded by [VG] Gemma Leigh, PT 10/09/18 1342        User Key  (r) = Recorded By, (t) = Taken By, (c) = Cosigned By    Initials Name Effective Dates Discipline    SD Katerin Shine, OT 06/08/18 -  OT    Mahsa Matthews APRN 06/08/18 -  Nurse    Fatmata Chaves RN 06/16/16 -  Nurse    Gemma Garcia, PT 05/29/18 -  PT        Wound 10/01/18 1845 Bilateral medial coccyx (Active)   Dressing Appearance open to air 10/8/2018  8:00 PM   Base dry 10/8/2018  8:00 PM   Periwound intact;dry;pink;blanchable 10/8/2018  8:00 PM   Edges irregular 10/8/2018  8:00 PM   Drainage Amount none 10/8/2018  8:00 PM       Wound 10/07/18 1015 Right heel blisters (Active)   Dressing Appearance dry;intact 10/8/2018  8:00 PM   Base dressing in place, unable to visualize 10/8/2018  8:00 PM   Periwound intact;dry;pink;blanchable 10/8/2018  8:00 PM   Drainage Amount none 10/8/2018  8:00 PM       Wound 10/06/18 1500 Left heel blisters (Active)   Dressing Appearance intact;dried drainage 10/8/2018  8:00 PM    Base dressing in place, unable to visualize 10/8/2018  8:00 PM       Wound 10/06/18 1500 Right elbow pressure injury (Active)   Dressing Appearance dry;intact 10/8/2018  8:00 PM   Base dressing in place, unable to visualize 10/8/2018  8:00 PM         Occupational Therapy Education     Title: PT OT SLP Therapies (Active)     Topic: Occupational Therapy (Active)     Point: ADL training (Active)     Description: Instruct learner(s) on proper safety adaptation and remediation techniques during self care or transfers.   Instruct in proper use of assistive devices.   Learning Progress Summary     Learner Status Readiness Method Response Comment Documented by    Patient Active Acceptance E NR Pt educated on appropriate safety precautions, t/f techniques, role of OT, positioning, and benefits of therapy. CL 10/05/18 1009          Point: Precautions (Active)     Description: Instruct learner(s) on prescribed precautions during self-care and functional transfers.   Learning Progress Summary     Learner Status Readiness Method Response Comment Documented by    Patient Active Acceptance E NR Pt educated on appropriate safety precautions, t/f techniques, role of OT, positioning, and benefits of therapy. CL 10/05/18 1009          Point: Body mechanics (Active)     Description: Instruct learner(s) on proper positioning and spine alignment during self-care, functional mobility activities and/or exercises.   Learning Progress Summary     Learner Status Readiness Method Response Comment Documented by    Patient Active Acceptance E NR Pt educated on appropriate safety precautions, t/f techniques, role of OT, positioning, and benefits of therapy. CL 10/05/18 1009                      User Key     Initials Effective Dates Name Provider Type Discipline    CL 04/03/18 -  Helena Thomas OT Occupational Therapist OT                OT Recommendation and Plan  Outcome Summary/Treatment Plan (OT)  Daily Summary of Progress (OT): progress  toward functional goals is gradual  Barriers to Overall Progress (OT): confusion  Plan for Continued Treatment (OT): cont OT POC  Anticipated Discharge Disposition (OT): skilled nursing facility  Daily Summary of Progress (OT): progress toward functional goals is gradual  Plan of Care Review  Plan of Care Reviewed With: patient  Plan of Care Reviewed With: patient  Outcome Summary: Pt. holding eyes closed during most of tx session. Pt.'s son called & she spoke with him on phone, appropriately holding the phone to her ear, but then puling at her lyons, IV lines, NG, & pulling gown off. Therapist attempted to redirect pt. Supine to sit on EOB with mod A, then pt. leaned back requiring max A from therapist to hold pt. upright. Max A x 2 to pull pt. up in bed.        Outcome Measures     Row Name 10/09/18 1544 10/09/18 1323 10/08/18 1500       How much help from another person do you currently need...    Turning from your back to your side while in flat bed without using bedrails?  -- 2  -VG  --    Moving from lying on back to sitting on the side of a flat bed without bedrails?  -- 2  -VG  --    Moving to and from a bed to a chair (including a wheelchair)?  -- 2  -VG  --    Standing up from a chair using your arms (e.g., wheelchair, bedside chair)?  -- 2  -VG  --    Climbing 3-5 steps with a railing?  -- 1  -VG  --    To walk in hospital room?  -- 1  -VG  --    AM-PAC 6 Clicks Score  -- 10  -VG  --       How much help from another is currently needed...    Putting on and taking off regular lower body clothing? 1  -SD  -- 1  -SD    Bathing (including washing, rinsing, and drying) 1  -SD  -- 1  -SD    Toileting (which includes using toilet bed pan or urinal) 1  -SD  -- 1  -SD    Putting on and taking off regular upper body clothing 1  -SD  -- 2  -SD    Taking care of personal grooming (such as brushing teeth) 2  -SD  -- 3  -SD    Eating meals 1  -SD  -- 3  -SD    Score 7  -SD  -- 11  -SD       Functional Assessment     Outcome Measure Options AM-PAC 6 Clicks Daily Activity (OT)  -SD AM-PAC 6 Clicks Basic Mobility (PT)  -VG AM-PAC 6 Clicks Daily Activity (OT)  -SD    Row Name 10/07/18 1408             How much help from another person do you currently need...    Turning from your back to your side while in flat bed without using bedrails? 2  -DM      Moving from lying on back to sitting on the side of a flat bed without bedrails? 2  -DM      Moving to and from a bed to a chair (including a wheelchair)? 2  -DM      Standing up from a chair using your arms (e.g., wheelchair, bedside chair)? 2  -DM      Climbing 3-5 steps with a railing? 1  -DM      To walk in hospital room? 2  -DM      AM-PAC 6 Clicks Score 11  -DM         Functional Assessment    Outcome Measure Options AM-PAC 6 Clicks Basic Mobility (PT)  -DM        User Key  (r) = Recorded By, (t) = Taken By, (c) = Cosigned By    Initials Name Provider Type    Katerin Luz, OT Occupational Therapist    Terri Alexander, PT Physical Therapist    Gemma Garcia, PT Physical Therapist           Time Calculation:         Time Calculation- OT     Row Name 10/09/18 1544             Time Calculation- OT    OT Start Time 1544  -SD      OT Stop Time 1603  -SD      OT Time Calculation (min) 19 min  -SD      OT Received On 10/09/18  -SD      OT Goal Re-Cert Due Date 10/15/18  -SD         Timed Charges    32979 - OT Self Care/Mgmt Minutes 5  -SD        User Key  (r) = Recorded By, (t) = Taken By, (c) = Cosigned By    Initials Name Provider Type    Katerin Luz, OT Occupational Therapist           Therapy Suggested Charges     Code   Minutes Charges    34218 (CPT®) Hc Ot Neuromusc Re Education Ea 15 Min      26086 (CPT®) Hc Ot Ther Proc Ea 15 Min      16454 (CPT®) Hc Ot Therapeutic Act Ea 15 Min      45190 (CPT®) Hc Ot Manual Therapy Ea 15 Min      38391 (CPT®) Hc Ot Iontophoresis Ea 15 Min      79155 (CPT®) Hc Ot Elec Stim Ea-Per 15 Min      02862 (CPT®) Hc  Ot Ultrasound Ea 15 Min      77110 (CPT®)  Ot Self Care/Mgmt/Train Ea 15 Min 5     Total  5         Therapy Charges for Today     Code Description Service Date Service Provider Modifiers Qty    36685570775 HC OT THERAPEUTIC ACT EA 15 MIN 10/8/2018 Katerin Shine OT GO 2    03980218167 HC OT THERAPEUTIC ACT EA 15 MIN 10/9/2018 Katerin Shine, AYANA GO 1               Katerin Shine OT  10/9/2018    Electronically signed by Katerin Shine OT at 10/9/2018  4:14 PM

## 2018-10-12 NOTE — PLAN OF CARE
Problem: Patient Care Overview  Goal: Plan of Care Review   10/12/18 1047   Coping/Psychosocial   Plan of Care Reviewed With patient   Plan of Care Review   Progress improving   OTHER   Outcome Summary Pt mod A supine to sit, mod A stand pivot bed to chair with UE support. Pt setup to wash face and brush hair. Pt with good effort with UE ther ex. Pt limited by confusion and weakness.

## 2018-10-12 NOTE — PROGRESS NOTES
"   LOS: 11 days    Patient Care Team:  Evelyn Francisco MD as PCP - General (Family Medicine)      Subjective     Interval History:   No acute events overnight. No new complaints.     Review of Systems:   No CP or SOA    Objective     Vital Sign Min/Max for last 24 hours  Temp  Min: 97.4 °F (36.3 °C)  Max: 99.7 °F (37.6 °C)   BP  Min: 98/50  Max: 120/60   Pulse  Min: 64  Max: 86   Resp  Min: 18  Max: 18   SpO2  Min: 95 %  Max: 95 %   No Data Recorded   No Data Recorded     Flowsheet Rows      First Filed Value   Admission Height  157.5 cm (62\") Documented at 10/01/2018 1700   Admission Weight  36.3 kg (80 lb) Documented at 10/01/2018 1700          I/O this shift:  In: 764 [NG/GT:764]  Out: -   I/O last 3 completed shifts:  In: 910 [P.O.:100; Other:150; NG/GT:660]  Out: -     Physical Exam:     General Appearance:    Alert, cooperative, in no acute distress   Head:    Normocephalic, without obvious abnormality, atraumatic               Neck:   No adenopathy, supple, trachea midline, no thyromegaly, no     carotid bruit, no JVD       Lungs:     Clear to auscultation,respirations regular, even and                   unlabored    Heart:    Regular rhythm and normal rate, normal S1 and S2, no            murmur, no gallop, no rub, no click   Breast Exam:    Deferred   Abdomen:     Normal bowel sounds, no masses, no organomegaly, soft        non-tender, non-distended, no guarding, no rebound                 tenderness       Extremities:   Moves all extremities well, no edema, no cyanosis, no              redness               Neurologic:   Cranial nerves 2 - 12 grossly intact, sensation intact, DTR        present and equal bilaterally       WBC WBC   Date Value Ref Range Status   10/11/2018 7.40 3.50 - 10.80 10*3/mm3 Final   10/10/2018 8.13 3.50 - 10.80 10*3/mm3 Final      HGB Hemoglobin   Date Value Ref Range Status   10/11/2018 9.3 (L) 11.5 - 15.5 g/dL Final   10/10/2018 10.2 (L) 11.5 - 15.5 g/dL Final      HCT " Hematocrit   Date Value Ref Range Status   10/11/2018 27.6 (L) 34.5 - 44.0 % Final   10/10/2018 30.3 (L) 34.5 - 44.0 % Final      Platlets No results found for: LABPLAT   MCV MCV   Date Value Ref Range Status   10/11/2018 88.7 80.0 - 99.0 fL Final   10/10/2018 86.1 80.0 - 99.0 fL Final          Sodium Sodium   Date Value Ref Range Status   10/12/2018 127 (L) 132 - 146 mmol/L Final   10/11/2018 127 (L) 132 - 146 mmol/L Final   10/11/2018 125 (L) 132 - 146 mmol/L Final   10/10/2018 123 (L) 132 - 146 mmol/L Final   10/10/2018 125 (L) 132 - 146 mmol/L Final   10/09/2018 123 (L) 132 - 146 mmol/L Final      Potassium Potassium   Date Value Ref Range Status   10/11/2018 3.8 3.5 - 5.5 mmol/L Final   10/10/2018 3.8 3.5 - 5.5 mmol/L Final      Chloride Chloride   Date Value Ref Range Status   10/11/2018 94 (L) 99 - 109 mmol/L Final   10/10/2018 92 (L) 99 - 109 mmol/L Final      CO2 CO2   Date Value Ref Range Status   10/11/2018 22.0 20.0 - 31.0 mmol/L Final   10/10/2018 26.0 20.0 - 31.0 mmol/L Final      BUN BUN   Date Value Ref Range Status   10/11/2018 16 9 - 23 mg/dL Final   10/10/2018 17 9 - 23 mg/dL Final      Creatinine Creatinine   Date Value Ref Range Status   10/11/2018 0.31 (L) 0.60 - 1.30 mg/dL Final   10/10/2018 0.31 (L) 0.60 - 1.30 mg/dL Final      Calcium Calcium   Date Value Ref Range Status   10/11/2018 7.8 (L) 8.7 - 10.4 mg/dL Final   10/10/2018 8.0 (L) 8.7 - 10.4 mg/dL Final      PO4 No results found for: CAPO4   Albumin No results found for: ALBUMIN   Magnesium Magnesium   Date Value Ref Range Status   10/10/2018 2.3 1.3 - 2.7 mg/dL Final   10/09/2018 1.4 1.3 - 2.7 mg/dL Final      Uric Acid No results found for: URICACID        Results Review:     I reviewed the patient's new clinical results.      clopidogrel 75 mg Oral Daily   docusate sodium 100 mg Nasogastric BID   enoxaparin 30 mg Subcutaneous Nightly   famotidine 20 mg Oral BID   multivitamin 1 tablet Oral Daily   polyethylene glycol 17 g Oral Daily    sennosides-docusate sodium 2 tablet Oral Nightly          Medication Review:     Assessment/Plan       Acute hypoxemic respiratory failure requiring intubation and ventilatory support (CMS/[facility])    Metabolic encephalopathy    Hyponatremia, suspect SIADH    Hypoglycemia, resolved    Generalized abdominal pain    Weight loss    Hypotension, resolved    Multiple right rib fractures (ninth, 10th, 11th)    Anemia    COPD and active cigarette abuse (CMS/[facility])    Constipation      1- Hyponatremia - At presentation 119. Since than has been improving with 132 reading on 10/6/18. Since than it has been trending down to 123 yesterday. Today Sodium is 127-improving. . At home, was on HCTZ. Serum osm- 267 urine osm 583 and urine sodium 104. Consistent with SIADH vs cerebral salt wasting.      Plan:  - fluid restriction  - Check sodium level q 8 hr         I discussed the patients findings and my recommendations with patient and nursing staff    Emily Ngo MD  10/12/18  9:43 AM

## 2018-10-12 NOTE — PROGRESS NOTES
Adult Nutrition  Assessment/PES    Patient Name:  Devora Huddleston  YOB: 1945  MRN: [id number]  Admit Date:  10/1/2018    Assessment Date:  10/12/2018              Reason for Assessment     Row Name 10/12/18 1633          Reason for Assessment    Reason For Assessment follow-up protocol   25 mins                 Labs/Tests/Procedures/Meds     Row Name 10/12/18 1634          Diagnostic Tests/Procedures    Diagnostic Test/Procedure Reviewed reviewed   noted results of SLP eval/MBS today and recs for chopped foods with thin liquids                 Nutrition Prescription Ordered     Row Name 10/12/18 1637 10/12/18 1635       Nutrition Prescription PO    Current PO Diet  -- Soft Texture    Texture  -- Chopped    Fluid Consistency  -- Thin    Supplement  -- Boost Pudding (Ensure Pudding)    Supplement Frequency  -- Daily       Nutrition Prescription EN    Enteral Route --   pt has been receiving supplemental TF; However, per nsg staff feeding tube will be removed today per Dr. Ibarra's direction/orders.  --            Evaluation of Received Nutrient/Fluid Intake     Row Name 10/12/18 1637          PO Evaluation    Number of Meals 5     % PO Intake 25             Problem/Interventions:        Problem 1     Row Name 10/12/18 1636          Nutrition Diagnoses Problem 1    Problem 1 Inadequate Nutrient Intake     Etiology (related to) MNT for Treatment/Condition     Percent (%) intake recorded 25 %     Over number of meals 5                     Intervention Goal     Row Name 10/12/18 1636          Intervention Goal    PO Increase intake     Transition TF to PO             Nutrition Intervention     Row Name 10/12/18 1638          Nutrition Intervention    RD/Tech Action --   will also add Boost plus liquid supplement 2x/d; pt agreeable. RD  Empasized with pt the need for increasing po intake, since TF will be d/c'd.                Education/Evaluation     Row Name 10/12/18 1636          Monitor/Evaluation     Monitor Per protocol         Electronically signed by:  Ashley Chaney MS,RD,LD  10/12/18 4:38 PM

## 2018-10-13 LAB
SODIUM BLD-SCNC: 127 MMOL/L (ref 132–146)
SODIUM BLD-SCNC: 127 MMOL/L (ref 132–146)

## 2018-10-13 PROCEDURE — 84295 ASSAY OF SERUM SODIUM: CPT | Performed by: INTERNAL MEDICINE

## 2018-10-13 PROCEDURE — 25010000002 LORAZEPAM PER 2 MG: Performed by: INTERNAL MEDICINE

## 2018-10-13 PROCEDURE — 99232 SBSQ HOSP IP/OBS MODERATE 35: CPT | Performed by: INTERNAL MEDICINE

## 2018-10-13 PROCEDURE — 25010000002 ENOXAPARIN PER 10 MG: Performed by: INTERNAL MEDICINE

## 2018-10-13 PROCEDURE — 97110 THERAPEUTIC EXERCISES: CPT

## 2018-10-13 RX ADMIN — ENOXAPARIN SODIUM 30 MG: 30 INJECTION SUBCUTANEOUS at 22:33

## 2018-10-13 RX ADMIN — FAMOTIDINE 20 MG: 20 TABLET ORAL at 22:33

## 2018-10-13 RX ADMIN — FAMOTIDINE 20 MG: 20 TABLET ORAL at 08:33

## 2018-10-13 RX ADMIN — LORAZEPAM 0.5 MG: 2 INJECTION INTRAMUSCULAR; INTRAVENOUS at 00:15

## 2018-10-13 RX ADMIN — CLOPIDOGREL BISULFATE 75 MG: 75 TABLET ORAL at 08:33

## 2018-10-13 RX ADMIN — Medication 1 TABLET: at 08:33

## 2018-10-13 NOTE — PLAN OF CARE
Problem: Patient Care Overview  Goal: Plan of Care Review  Outcome: Ongoing (interventions implemented as appropriate)    Goal: Individualization and Mutuality  Outcome: Ongoing (interventions implemented as appropriate)    Goal: Discharge Needs Assessment  Outcome: Ongoing (interventions implemented as appropriate)      Problem: Infection, Risk/Actual (Adult)  Goal: Infection Prevention/Resolution  Outcome: Ongoing (interventions implemented as appropriate)      Problem: Fall Risk (Adult)  Goal: Absence of Fall  Outcome: Ongoing (interventions implemented as appropriate)      Problem: Patient Care Overview  Goal: Plan of Care Review  Outcome: Ongoing (interventions implemented as appropriate)    Goal: Individualization and Mutuality  Outcome: Ongoing (interventions implemented as appropriate)    Goal: Discharge Needs Assessment  Outcome: Ongoing (interventions implemented as appropriate)    Goal: Interprofessional Rounds/Family Conf  Outcome: Ongoing (interventions implemented as appropriate)

## 2018-10-13 NOTE — THERAPY TREATMENT NOTE
Acute Care - Physical Therapy Treatment Note  [facility]     Patient Name: Devora Huddleston  : 1945  MRN: [id number]  Today's Date: 10/13/2018  Onset of Illness/Injury or Date of Surgery: 10/01/18  Date of Referral to PT: 10/04/18  Referring Physician: MD Emmett    Admit Date: 10/1/2018    Visit Dx:    ICD-10-CM ICD-9-CM   1. Acute respiratory failure with hypoxemia (CMS/HCC) J96.01 518.81   2. Impaired functional mobility, balance, gait, and endurance Z74.09 V49.89   3. Oropharyngeal dysphagia R13.12 787.22   4. Impaired mobility and ADLs Z74.09 799.89     Patient Active Problem List   Diagnosis   • Acute respiratory failure (CMS/HCC)   • Metabolic encephalopathy   • Acute hypoxemic respiratory failure requiring intubation and ventilatory support (CMS/HCC)   • Hyponatremia, suspect SIADH   • Hypoglycemia, resolved   • Generalized abdominal pain   • Severe malnutrition (CMS/HCC)   • Hypotension, resolved   • Multiple right rib fractures (ninth, 10th, 11th)   • Anemia   • COPD and active cigarette abuse (CMS/HCC)   • Constipation       Therapy Treatment          Rehabilitation Treatment Summary     Row Name 10/13/18 1148             Treatment Time/Intention    Discipline physical therapist  -VG      Document Type therapy note (daily note)  -VG      Subjective Information no complaints  -VG      Mode of Treatment individual therapy;physical therapy  -VG      Patient/Family Observations No family present.  -VG      Care Plan Review patient/other agree to care plan  -VG      Therapy Frequency (PT Clinical Impression) daily  -VG      Patient Effort adequate  -VG      Existing Precautions/Restrictions fall;other (see comments)   confused, h/o combativeness  -VG      Recorded by [VG] Gemma Leigh, PT 10/13/18 1207      Row Name 10/13/18 1148             Cognitive Assessment/Intervention- PT/OT    Affect/Mental Status (Cognitive) confused  -VG      Behavioral Issues (Cognitive) uncooperative  -VG       Orientation Status (Cognition) oriented to;person  -VG      Follows Commands (Cognition) follows one step commands;0-24% accuracy  -VG      Cognitive Function (Cognitive) safety deficit;memory deficit;attention deficit  -VG      Attention Deficit (Cognitive) moderate deficit;requires cues/redirection to task  -VG      Memory Deficit (Cognitive) moderate deficit;working memory  -VG      Safety Deficit (Cognitive) moderate deficit;insight into deficits/self awareness  -VG      Personal Safety Interventions fall prevention program maintained;gait belt;nonskid shoes/slippers when out of bed;supervised activity  -VG      Recorded by [VG] Gemma Leigh, PT 10/13/18 1207      Row Name 10/13/18 1148             Bed Mobility Assessment/Treatment    Comment (Bed Mobility) Pt refused EOB  -VG      Recorded by [VG] Gemma Leigh, PT 10/13/18 1207      Row Name 10/13/18 1148             Transfer Assessment/Treatment    Comment (Transfers) Defer transfer d/t B wounds on heels  -VG      Recorded by [VG] Gemma Leigh, PT 10/13/18 1207      Row Name 10/13/18 1148             Gait/Stairs Assessment/Training    Comment (Gait/Stairs) Defer gait d/t B wounds on heels  -VG      Recorded by [VG] Gemma Leigh, PT 10/13/18 1207      Row Name 10/13/18 1148             Therapeutic Exercise    43080 - PT Therapeutic Exercise Minutes 14  -VG      Recorded by [VG] Gemma Leigh, PT 10/13/18 1207      Row Name 10/13/18 1148             Therapeutic Exercise    Lower Extremity (Therapeutic Exercise) heel slides, bilateral;SLR (straight leg raise), bilateral;SAQ (short arc quad), bilateral;quad sets, bilateral  -VG      Lower Extremity Range of Motion (Therapeutic Exercise) hip abduction/adduction, bilateral;ankle dorsiflexion/plantar flexion, bilateral  -VG      Exercise Type (Therapeutic Exercise) AROM (active range of motion);AAROM (active assistive range of motion)  -VG      Position (Therapeutic Exercise) supine  -VG       Sets/Reps (Therapeutic Exercise) 15x  -VG      Comment (Therapeutic Exercise) Cues for technique. Pt does not follow commands, constant cues for redirection.  -VG      Recorded by [VG] Gemma Leigh, PT 10/13/18 1207      Row Name 10/13/18 1148             Positioning and Restraints    Pre-Treatment Position in bed  -VG      Post Treatment Position bed  -VG      In Bed supine;call light within reach;encouraged to call for assist;exit alarm on;side rails up x3;heels elevated  -VG      Recorded by [VG] Gemma Leigh, PT 10/13/18 1207      Row Name 10/13/18 1148             Pain Scale: FACES Pre/Post-Treatment    Pain: FACES Scale, Pretreatment 0-->no hurt  -VG      Pain: FACES Scale, Post-Treatment 0-->no hurt  -VG      Recorded by [VG] Gemma Leigh, PT 10/13/18 1207      Row Name                Wound 10/01/18 1845 Bilateral medial coccyx    Wound - Properties Group Date first assessed: 10/01/18 [SW] Time first assessed: 1845 [SW] Present On Admission : yes [SW] Side: Bilateral [SW] Orientation: medial [SW] Location: coccyx [SW] Additional Comments: shearing [CP] Recorded by:  [CP] Mahsa Almanzar APRN 10/07/18 1148 [SW] Fatmata Villalobos RN 10/01/18 1905    Row Name                Wound 10/07/18 1015 Right heel blisters    Wound - Properties Group Date first assessed: 10/07/18 [CP] Time first assessed: 1015 [CP] Present On Admission : no;picture taken [CP] Side: Right [CP] Location: heel [CP] Type: blisters [CP] Additional Comments: with bruise or DTPI [CP] Recorded by:  [CP] Mahsa Almanzar APRN 10/07/18 1155    Row Name                Wound 10/06/18 1500 Left heel blisters    Wound - Properties Group Date first assessed: 10/06/18 [CP] Time first assessed: 1500 [CP] Present On Admission : no [CP2] Side: Left [CP2] Location: heel [CP2] Type: blisters [CP2] Recorded by:  [CP] Mahsa Almanzar APRN 10/07/18 1159 [CP2] Mahsa Almanzar, JEFF 10/07/18 1157    Row Name                Wound  10/06/18 1500 Right elbow pressure injury    Wound - Properties Group Date first assessed: 10/06/18 [CP] Time first assessed: 1500 [CP] Present On Admission : no;picture taken [CP] Side: Right [CP] Location: elbow [CP] Type: pressure injury [CP] Stage, Pressure Injury: unstageable [CP] Recorded by:  [CP] Mahsa Almanzar, APRN 10/07/18 1200    Row Name 10/13/18 1148             Coping    Observed Emotional State uncooperative  -VG      Verbalized Emotional State frustration  -VG      Recorded by [VG] Gemma Leigh, PT 10/13/18 1207      Row Name 10/13/18 1148             Plan of Care Review    Plan of Care Reviewed With patient  -VG      Recorded by [VG] Gemma Leigh, PT 10/13/18 1207      Row Name 10/13/18 1148             Outcome Summary/Treatment Plan (PT)    Daily Summary of Progress (PT) progress toward functional goals is gradual  -VG      Anticipated Discharge Disposition (PT) skilled nursing facility  -VG      Recorded by [VG] Gemma Leigh, PT 10/13/18 1207        User Key  (r) = Recorded By, (t) = Taken By, (c) = Cosigned By    Initials Name Effective Dates Discipline    CP Mahsa Almanzar APRN 06/08/18 -  Nurse    Fatmata Chaves RN 06/16/16 -  Nurse    Gemma Garcia, PT 05/29/18 -  PT          Wound 10/01/18 1845 Bilateral medial coccyx (Active)   Dressing Appearance dry;intact 10/13/2018  8:00 AM   Base dry 10/13/2018  8:00 AM   Periwound intact;dry;pink;blanchable 10/13/2018  8:00 AM   Drainage Amount none 10/13/2018  8:00 AM   Care, Wound cleansed with;soap and water 10/13/2018  8:00 AM   Dressing Care, Wound dressing changed 10/13/2018  8:00 AM   Periwound Care, Wound barrier ointment applied 10/13/2018  8:00 AM       Wound 10/07/18 1015 Right heel blisters (Active)   Dressing Appearance dry;intact 10/13/2018  8:00 AM   Base dressing in place, unable to visualize 10/13/2018  8:00 AM   Periwound intact;dry;pink;blanchable 10/13/2018  8:00 AM   Drainage Amount none  10/13/2018  8:00 AM   Care, Wound cleansed with;sterile normal saline 10/13/2018  8:00 AM   Dressing Care, Wound petroleum-based;foam;low-adherent 10/13/2018  8:00 AM       Wound 10/06/18 1500 Left heel blisters (Active)   Dressing Appearance dry;intact 10/13/2018  8:00 AM   Base dressing in place, unable to visualize 10/12/2018  8:38 PM   Drainage Characteristics/Odor serosanguineous 10/13/2018  8:00 AM   Drainage Amount scant 10/13/2018  8:00 AM   Care, Wound cleansed with;sterile normal saline 10/13/2018  8:00 AM   Dressing Care, Wound petroleum-based;foam;low-adherent 10/13/2018  8:00 AM       Wound 10/06/18 1500 Right elbow pressure injury (Active)   Dressing Appearance dry;intact 10/13/2018  8:00 AM   Base dressing in place, unable to visualize 10/13/2018  8:00 AM   Periwound intact;dry;pink;blanchable 10/12/2018  4:00 PM   Drainage Amount none 10/13/2018  8:00 AM   Care, Wound cleansed with;sterile normal saline 10/13/2018  8:00 AM   Dressing Care, Wound foam;low-adherent 10/13/2018  8:00 AM             Physical Therapy Education     Title: PT OT SLP Therapies (Active)     Topic: Physical Therapy (Active)     Point: Mobility training (Active)    Learning Progress Summary     Learner Status Readiness Method Response Comment Documented by    Patient Active Acceptance E,D NR  DM 10/07/18 1438     Active Acceptance E NR  SIXTO 10/04/18 1500          Point: Home exercise program (Active)    Learning Progress Summary     Learner Status Readiness Method Response Comment Documented by    Patient Active Nonacceptance E NR Educated on HEP, constant cues for redirection and technique. Reinforcement needed. VG 10/13/18 1148     Active Acceptance E NR Educated on HEP and benefits of activity. VG 10/09/18 1323     Active Acceptance E,D NR  DM 10/07/18 1438     Active Acceptance E NR  SIXTO 10/04/18 1500          Point: Body mechanics (Active)    Learning Progress Summary     Learner Status Readiness Method Response Comment  Documented by    Patient Active Acceptance ED NR  DM 10/07/18 1438     Active Acceptance E NR  SIXTO 10/04/18 1500          Point: Precautions (Active)    Learning Progress Summary     Learner Status Readiness Method Response Comment Documented by    Patient Active Acceptance ED NR  DM 10/07/18 1438     Active Acceptance E NR  SIXTO 10/04/18 1500                      User Key     Initials Effective Dates Name Provider Type Discipline    SIXTO 06/19/15 -  Althea Ruth, PT Physical Therapist PT    DM 06/19/15 -  Terri Magaña, PT Physical Therapist PT    VG 05/29/18 -  Gemma Leigh, PT Physical Therapist PT                    PT Recommendation and Plan  Anticipated Discharge Disposition (PT): skilled nursing facility  Therapy Frequency (PT Clinical Impression): daily  Outcome Summary/Treatment Plan (PT)  Daily Summary of Progress (PT): progress toward functional goals is gradual  Anticipated Discharge Disposition (PT): skilled nursing facility  Plan of Care Reviewed With: patient  Progress: no change  Outcome Summary: Pt refused EOB this date. Deferred transfer d/t B heel wounds. Would benefit from B heel offloading boots now that pt is more mobile. Continues to struggle with following commands. Participated in bed level ther ex with constant cues for redirection to task. Will continue to progress pt as able per POC.          Outcome Measures     Row Name 10/13/18 1148 10/12/18 1022          How much help from another person do you currently need...    Turning from your back to your side while in flat bed without using bedrails? 3  -VG  --     Moving from lying on back to sitting on the side of a flat bed without bedrails? 2  -VG  --     Moving to and from a bed to a chair (including a wheelchair)? 2  -VG  --     Standing up from a chair using your arms (e.g., wheelchair, bedside chair)? 1  -VG  --     Climbing 3-5 steps with a railing? 1  -VG  --     To walk in hospital room? 1  -VG  --     AM-PAC 6 Clicks  Score 10  -VG  --        How much help from another is currently needed...    Putting on and taking off regular lower body clothing?  -- 1  -AC     Bathing (including washing, rinsing, and drying)  -- 2  -AC     Toileting (which includes using toilet bed pan or urinal)  -- 1  -AC     Putting on and taking off regular upper body clothing  -- 2  -AC     Taking care of personal grooming (such as brushing teeth)  -- 3  -AC     Eating meals  -- 3  -AC     Score  -- 12  -AC       User Key  (r) = Recorded By, (t) = Taken By, (c) = Cosigned By    Initials Name Provider Type    AC Anna Best, OT Occupational Therapist    VG Gemma Leigh, PT Physical Therapist           Time Calculation:         PT Charges     Row Name 10/13/18 1148             Time Calculation    Start Time 1148  -VG      PT Received On 10/13/18  -VG      PT Goal Re-Cert Due Date 10/14/18  -VG         Time Calculation- PT    Total Timed Code Minutes- PT 14 minute(s)  -VG         Timed Charges    90588 - PT Therapeutic Exercise Minutes 14  -VG        User Key  (r) = Recorded By, (t) = Taken By, (c) = Cosigned By    Initials Name Provider Type    VG Gemma Leigh, PT Physical Therapist        Therapy Suggested Charges     Code   Minutes Charges    61935 (CPT®) Hc Pt Neuromusc Re Education Ea 15 Min      55653 (CPT®) Hc Pt Ther Proc Ea 15 Min 14 1    34541 (CPT®) Hc Gait Training Ea 15 Min      38650 (CPT®) Hc Pt Therapeutic Act Ea 15 Min      38548 (CPT®) Hc Pt Manual Therapy Ea 15 Min      26037 (CPT®) Hc Pt Iontophoresis Ea 15 Min      32202 (CPT®) Hc Pt Elec Stim Ea-Per 15 Min      36379 (CPT®) Hc Pt Ultrasound Ea 15 Min      06170 (CPT®) Hc Pt Self Care/Mgmt/Train Ea 15 Min      36609 (CPT®) Hc Pt Prosthetic (S) Train Initial Encounter, Each 15 Min      15566 (CPT®) Hc Pt Orthotic(S)/Prosthetic(S) Encounter, Each 15 Min      96776 (CPT®) Hc Orthotic(S) Mgmt/Train Initial Encounter, Each 15min      Total  14 1        Therapy Charges for Today      Code Description Service Date Service Provider Modifiers Qty    76687823233 HC PT THER PROC EA 15 MIN 10/13/2018 Gemma Leigh, PT GP 1          PT G-Codes  Outcome Measure Options: AM-PAC 6 Clicks Daily Activity (OT)  AM-PAC 6 Clicks Score: 10  Score: 12    Ira Leigh, PT  10/13/2018

## 2018-10-13 NOTE — PLAN OF CARE
Problem: Patient Care Overview  Goal: Plan of Care Review   10/13/18 1148   Coping/Psychosocial   Plan of Care Reviewed With patient   Plan of Care Review   Progress no change   OTHER   Outcome Summary Pt refused EOB this date. Deferred transfer d/t B heel wounds. Would benefit from B heel offloading boots now that pt is more mobile. Continues to struggle with following commands. Participated in bed level ther ex with constant cues for redirection to task. Will continue to progress pt as able per POC.

## 2018-10-13 NOTE — NURSING NOTE
Patient on fluid restriction of 1000 ml per day that was changed to 800 ml today. Dietary was unaware of restrictions. Fluid restrictions notified to Dietary and fixed in orders so they could see order in kitchen.

## 2018-10-13 NOTE — PLAN OF CARE
Problem: Skin Injury Risk (Adult)  Goal: Skin Health and Integrity  Outcome: Ongoing (interventions implemented as appropriate)      Problem: Infection, Risk/Actual (Adult)  Goal: Infection Prevention/Resolution  Outcome: Ongoing (interventions implemented as appropriate)      Problem: Fall Risk (Adult)  Goal: Absence of Fall  Outcome: Ongoing (interventions implemented as appropriate)      Problem: Patient Care Overview  Goal: Plan of Care Review  Outcome: Ongoing (interventions implemented as appropriate)   10/13/18 0311   Coping/Psychosocial   Plan of Care Reviewed With patient   Plan of Care Review   Progress no change   OTHER   Outcome Summary Pt anxious tonight, restless. Ativan given. IJ removed, NSR, Room Air. Continue to monitor.

## 2018-10-13 NOTE — PROGRESS NOTES
[facility] Medicine Services  PROGRESS NOTE    Patient Name: Devora Huddleston  : 1945  MRN: [id number]    Date of Admission: 10/1/2018  Length of Stay: 12  Primary Care Physician: Evelyn Francisco MD    Subjective   Subjective     CC:  F/U found down unresponsive    HPI:  NG out and cleared for advanced diet.  Not eating much though.  Confused    Review of Systems   Unable to perform ROS: Dementia         Objective   Objective     Vital Signs:   Temp:  [97.7 °F (36.5 °C)-99.3 °F (37.4 °C)] 99.3 °F (37.4 °C)  Heart Rate:  [] 75  Resp:  [16-18] 18  BP: (114-148)/(64-82) 122/72  Total (NIH Stroke Scale): 4     Physical Exam:  Gen-no acute distress, cachectic, + muscle wasting, sitting up in bed  HENT-NCAT, mucous membranes moist, NG and CVC now removed  CV-RRR, S1 S2 normal, no m/r/g  Resp-CTAB, no wheezes or rales  Abd-soft, NT, ND, +BS  Ext-no edema  Neuro-A&Ox2, more confused today, no focal deficits  Skin-no rashes  Psych-appropriate mood, calm      Results Reviewed:  I have personally reviewed current lab, radiology, and data and agree.      Results from last 7 days  Lab Units 10/12/18  1004 10/11/18  0538 10/10/18  0618   WBC 10*3/mm3 8.21 7.40 8.13   HEMOGLOBIN g/dL 9.1* 9.3* 10.2*   HEMATOCRIT % 28.0* 27.6* 30.3*   PLATELETS 10*3/mm3 378 359 365       Results from last 7 days  Lab Units 10/13/18  0803 10/13/18  0041 10/12/18  1547 10/12/18  1004  10/11/18  1159 10/11/18  0538  10/10/18  0618  10/08/18  0704   SODIUM mmol/L 127* 127* 128* 129*  < >  --  125*  < > 125*  < > 130*   POTASSIUM mmol/L  --   --   --  3.8  --   --  3.8  --  3.8  < > 3.8   CHLORIDE mmol/L  --   --   --  97*  --   --  94*  --  92*  < > 95*   CO2 mmol/L  --   --   --  23.0  --   --  22.0  --  26.0  < > 25.0   BUN mg/dL  --   --   --  16  --   --  16  --  17  < > 12   CREATININE mg/dL  --   --   --  0.31*  --   --  0.31*  --  0.31*  < > 0.28*   GLUCOSE mg/dL  --   --   --  97  --  86 104*  --  89   < > 74   CALCIUM mg/dL  --   --   --  8.3*  --   --  7.8*  --  8.0*  < > 8.3*   ALT (SGPT) U/L  --   --   --   --   --   --   --   --   --   --  23   AST (SGOT) U/L  --   --   --   --   --   --   --   --   --   --  36*   < > = values in this interval not displayed.  Estimated Creatinine Clearance: 46.2 mL/min (A) (by C-G formula based on SCr of 0.31 mg/dL (L)).  No results found for: BNP    Microbiology Results Abnormal     Procedure Component Value - Date/Time    Blood Culture - Blood, [[id number]]  (Normal) Collected:  10/01/18 1745    Lab Status:  Final result Specimen:  Blood from Wrist, Right Updated:  10/06/18 1830     Blood Culture No growth at 5 days    Blood Culture - Blood, [[id number]]  (Normal) Collected:  10/01/18 1715    Lab Status:  Final result Specimen:  Blood from Arm, Right Updated:  10/06/18 1830     Blood Culture No growth at 5 days          Imaging Results (last 24 hours)     ** No results found for the last 24 hours. **        Results for orders placed during the hospital encounter of 10/01/18   Adult Transthoracic Echo Complete W/ Cont if Necessary Per Protocol    Narrative · Left ventricular systolic function is normal.  · Estimated EF appears to be in the range of 61 - 65%  · All left ventricular wall segments contract normally.  · Normal left atrial pressure.  · Right ventricular cavity is mildly dilated  · Right ventricular wall thickness is consistent with mild hypertrophy.  · Right atrial cavity size is mildly dilated. The inferior vena cava is   dilated. IVC inspiratory collapse is absent. A prominent Eustachian valve   is present.  · Moderate tricuspid valve regurgitation is present. Estimated right   ventricular systolic pressure from tricuspid regurgitation is mildly   elevated (35-45 mmHg).          I have reviewed the medications.      clopidogrel 75 mg Oral Daily   docusate sodium 100 mg Nasogastric BID   enoxaparin 30 mg Subcutaneous Nightly   famotidine 20 mg Oral BID    multivitamin 1 tablet Oral Daily   polyethylene glycol 17 g Oral Daily   sennosides-docusate sodium 2 tablet Oral Nightly         Assessment/Plan   Assessment / Plan     Active Hospital Problems    Diagnosis   • **Acute hypoxemic respiratory failure requiring intubation and ventilatory support (CMS/HCC)   • Constipation   • COPD and active cigarette abuse (CMS/HCC)   • Multiple right rib fractures (ninth, 10th, 11th)   • Anemia   • Metabolic encephalopathy   • Hyponatremia, suspect SIADH   • Hypoglycemia, resolved   • Generalized abdominal pain   • Severe malnutrition (CMS/HCC)   • Hypotension, resolved          Brief Hospital Course to date:  Devora Huddleston is a 73 y.o. female with hx of COPD, HTN, and CVA presents after being found down and unresponsive by her son on 10/1/18. Was intubated in the field. Glucose dropped to 39 en route (initially 105). Patient had reportedly complained of increasing weakness and weight loss for several weeks, and had recent evaluation for hyponatremia and seizures. Admitted to [facility] ICU. CT head, CT perfusion, CTA head and neck all negative. Further imaging revealed 3 right sided rib fractures, presumably from a fall at home. Possible RLL atelectasis vs. infiltrate. She was also hypotensive and requiring pressors. Treated empirically with Vanc and Zosyn however all cultures remain no growth. She was started on enteral feeds due to dysphagia. Patient extubated 10/3. Transferred to floor, hospitalists assumed care on 10/6.      Assessment & Plan:  --Stopped ATBx. Cultures no growth.  --appreciate renal assistance.  Na+ trending up and stable, c/w SIADH vs. Cerebral salt wasting.  Continue fluid restriction   --Supportive care for rib fractures.   - BP remains borderline  - cleared for advanced diet by SLP, will need to watch intake closely.  --mental status waxes/wanes, watch on prns  --d/w CM during MDRs yesterday.  Working on placement, will be here through the weekend.    DVT  Prophylaxis:  Lovenox    CODE STATUS:   Code Status and Medical Interventions:   Ordered at: 10/01/18 1819     Code Status:    CPR     Medical Interventions (Level of Support Prior to Arrest):    Full       Disposition: I expect the patient to be discharged TBD    Electronically signed by William Ibarra MD, 10/13/18, 5:35 PM.

## 2018-10-13 NOTE — PROGRESS NOTES
"   LOS: 12 days    Patient Care Team:  Evelyn Francisco MD as PCP - General (Family Medicine)    Subjective     No new events    Objective     Vital Signs:  Blood pressure 114/70, pulse 103, temperature 97.7 °F (36.5 °C), temperature source Oral, resp. rate 18, height 157.5 cm (62.01\"), weight 45.4 kg (100 lb), SpO2 96 %.    Flowsheet Rows      First Filed Value   Admission Height  157.5 cm (62\") Documented at 10/01/2018 1700   Admission Weight  36.3 kg (80 lb) Documented at 10/01/2018 1700          10/12 0701 - 10/13 0700  In: 1724 [P.O.:510]  Out: -     Physical Exam:    General Appearance: NAD  Psych:   awake alert   CV: ,  No edema  Lungs: respirations regular and unlabored  Abdomen: not distended  :  No lyons, no palp bladder  Skin: No rash, Warm and dry      Labs:    Results from last 7 days  Lab Units 10/12/18  1004 10/11/18  0538 10/10/18  0618   WBC 10*3/mm3 8.21 7.40 8.13   HEMOGLOBIN g/dL 9.1* 9.3* 10.2*   PLATELETS 10*3/mm3 378 359 365       Results from last 7 days  Lab Units 10/13/18  0803 10/13/18  0041 10/12/18  1547 10/12/18  1004  10/11/18  0538  10/10/18  0618 10/09/18  2034 10/09/18  0547 10/08/18  0704  10/07/18  0628   SODIUM mmol/L 127* 127* 128* 129*  < > 125*  < > 125* 123* 127* 130*  < >  --    POTASSIUM mmol/L  --   --   --  3.8  --  3.8  --  3.8  --  4.1 3.8  < >  --    CHLORIDE mmol/L  --   --   --  97*  --  94*  --  92*  --  92* 95*  < >  --    CO2 mmol/L  --   --   --  23.0  --  22.0  --  26.0  --  27.0 25.0  < >  --    BUN mg/dL  --   --   --  16  --  16  --  17  --  15 12  < >  --    CREATININE mg/dL  --   --   --  0.31*  --  0.31*  --  0.31*  --  0.27* 0.28*  < >  --    CALCIUM mg/dL  --   --   --  8.3*  --  7.8*  --  8.0*  --  8.5* 8.3*  < >  --    PHOSPHORUS mg/dL  --   --   --   --   --   --   --   --   --   --  2.9  2.9  --  2.4   MAGNESIUM mg/dL  --   --   --   --   --   --   --  2.3 1.4 1.6 1.7  1.7  --  1.7   ALBUMIN g/dL  --   --   --   --   --   --   --   --   --   -- "  3.40  --   --    < > = values in this interval not displayed.    Results from last 7 days  Lab Units 10/08/18  0704   ALK PHOS U/L 107*   BILIRUBIN mg/dL 0.6   ALT (SGPT) U/L 23   AST (SGOT) U/L 36*               Estimated Creatinine Clearance: 46.2 mL/min (A) (by C-G formula based on SCr of 0.31 mg/dL (L)).         A/P:    Hyponatremia:  Back to 127 range. At presentation 119. improvied to 132 on 10/6/18.  Trended back down to 123.  At home, was on HCTZ. Serum osm- 267 urine osm 583 and urine sodium 104. Consistent with SIADH vs cerebral salt wasting.  Cont FW restriction and Na monitoring.          Kofi Gandhi MD  10/13/18  11:15 AM

## 2018-10-14 LAB
ANION GAP SERPL CALCULATED.3IONS-SCNC: 7 MMOL/L (ref 3–11)
BUN BLD-MCNC: 12 MG/DL (ref 9–23)
BUN/CREAT SERPL: 42.9 (ref 7–25)
CALCIUM SPEC-SCNC: 8.5 MG/DL (ref 8.7–10.4)
CHLORIDE SERPL-SCNC: 97 MMOL/L (ref 99–109)
CO2 SERPL-SCNC: 23 MMOL/L (ref 20–31)
CREAT BLD-MCNC: 0.28 MG/DL (ref 0.6–1.3)
GFR SERPL CREATININE-BSD FRML MDRD: >150 ML/MIN/1.73
GLUCOSE BLD-MCNC: 78 MG/DL (ref 70–100)
POTASSIUM BLD-SCNC: 4.5 MMOL/L (ref 3.5–5.5)
SODIUM BLD-SCNC: 127 MMOL/L (ref 132–146)

## 2018-10-14 PROCEDURE — 25010000002 ENOXAPARIN PER 10 MG: Performed by: INTERNAL MEDICINE

## 2018-10-14 PROCEDURE — 25010000002 LORAZEPAM PER 2 MG: Performed by: INTERNAL MEDICINE

## 2018-10-14 PROCEDURE — 80048 BASIC METABOLIC PNL TOTAL CA: CPT | Performed by: INTERNAL MEDICINE

## 2018-10-14 PROCEDURE — 99231 SBSQ HOSP IP/OBS SF/LOW 25: CPT | Performed by: NURSE PRACTITIONER

## 2018-10-14 RX ADMIN — LORAZEPAM 0.5 MG: 2 INJECTION INTRAMUSCULAR; INTRAVENOUS at 07:23

## 2018-10-14 RX ADMIN — ENOXAPARIN SODIUM 30 MG: 30 INJECTION SUBCUTANEOUS at 21:36

## 2018-10-14 RX ADMIN — Medication 1 TABLET: at 08:28

## 2018-10-14 RX ADMIN — FAMOTIDINE 20 MG: 20 TABLET ORAL at 21:36

## 2018-10-14 RX ADMIN — FAMOTIDINE 20 MG: 20 TABLET ORAL at 08:28

## 2018-10-14 RX ADMIN — CLOPIDOGREL BISULFATE 75 MG: 75 TABLET ORAL at 08:28

## 2018-10-14 NOTE — PLAN OF CARE
Problem: Skin Injury Risk (Adult)  Goal: Skin Health and Integrity  Outcome: Ongoing (interventions implemented as appropriate)      Problem: Infection, Risk/Actual (Adult)  Goal: Infection Prevention/Resolution  Outcome: Ongoing (interventions implemented as appropriate)      Problem: Fall Risk (Adult)  Goal: Absence of Fall  Outcome: Ongoing (interventions implemented as appropriate)      Problem: Patient Care Overview  Goal: Plan of Care Review  Outcome: Ongoing (interventions implemented as appropriate)   10/14/18 0540   Plan of Care Review   Progress no change   OTHER   Outcome Summary No acute overnight events. VSS. Room air. NSR. Continue to monitor.

## 2018-10-14 NOTE — PROGRESS NOTES
[facility] Medicine Services  PROGRESS NOTE    Patient Name: Devora Huddleston  : 1945  MRN: [id number]    Date of Admission: 10/1/2018  Length of Stay: 13  Primary Care Physician: Evelyn Francisco MD    Subjective   Subjective     CC:  F/U found down unresponsive    HPI:  Resting with eyes closed, no family in room. Confused. No new changes.     Review of Systems   Unable to perform ROS: Dementia         Objective   Objective     Vital Signs:   Temp:  [97.8 °F (36.6 °C)-99.3 °F (37.4 °C)] 98.1 °F (36.7 °C)  Heart Rate:  [69-90] 69  Resp:  [16-18] 16  BP: (122-148)/(72-82) 137/74  Total (NIH Stroke Scale): 4     Physical Exam:  Gen-no acute distress, cachectic, + muscle wasting, sitting up in bed  HENT-NCAT, mucous membranes moist  CV-RRR, S1 S2 normal, no m/r/g  Resp-Basilar rhonchi (improved with cough) , no wheezes or rales  Abd-soft, NT, ND, +BS  Ext-no edema  Neuro-A&Ox2, no focal deficits, follows all commands, PIKE   Skin-no rashes  Psych-appropriate mood, calm      Results Reviewed:  I have personally reviewed current lab, radiology, and data and agree.      Results from last 7 days  Lab Units 10/12/18  1004 10/11/18  0538 10/10/18  0618   WBC 10*3/mm3 8.21 7.40 8.13   HEMOGLOBIN g/dL 9.1* 9.3* 10.2*   HEMATOCRIT % 28.0* 27.6* 30.3*   PLATELETS 10*3/mm3 378 359 365       Results from last 7 days  Lab Units 10/13/18  0803 10/13/18  0041 10/12/18  1547 10/12/18  1004  10/11/18  1159 10/11/18  0538  10/10/18  0618  10/08/18  0704   SODIUM mmol/L 127* 127* 128* 129*  < >  --  125*  < > 125*  < > 130*   POTASSIUM mmol/L  --   --   --  3.8  --   --  3.8  --  3.8  < > 3.8   CHLORIDE mmol/L  --   --   --  97*  --   --  94*  --  92*  < > 95*   CO2 mmol/L  --   --   --  23.0  --   --  22.0  --  26.0  < > 25.0   BUN mg/dL  --   --   --  16  --   --  16  --  17  < > 12   CREATININE mg/dL  --   --   --  0.31*  --   --  0.31*  --  0.31*  < > 0.28*   GLUCOSE mg/dL  --   --   --  97  --   86 104*  --  89  < > 74   CALCIUM mg/dL  --   --   --  8.3*  --   --  7.8*  --  8.0*  < > 8.3*   ALT (SGPT) U/L  --   --   --   --   --   --   --   --   --   --  23   AST (SGOT) U/L  --   --   --   --   --   --   --   --   --   --  36*   < > = values in this interval not displayed.  Estimated Creatinine Clearance: 46.2 mL/min (A) (by C-G formula based on SCr of 0.31 mg/dL (L)).  No results found for: BNP    Microbiology Results Abnormal     Procedure Component Value - Date/Time    Blood Culture - Blood, [[id number]]  (Normal) Collected:  10/01/18 1745    Lab Status:  Final result Specimen:  Blood from Wrist, Right Updated:  10/06/18 1830     Blood Culture No growth at 5 days    Blood Culture - Blood, [[id number]]  (Normal) Collected:  10/01/18 1715    Lab Status:  Final result Specimen:  Blood from Arm, Right Updated:  10/06/18 1830     Blood Culture No growth at 5 days          Imaging Results (last 24 hours)     ** No results found for the last 24 hours. **        Results for orders placed during the hospital encounter of 10/01/18   Adult Transthoracic Echo Complete W/ Cont if Necessary Per Protocol    Narrative · Left ventricular systolic function is normal.  · Estimated EF appears to be in the range of 61 - 65%  · All left ventricular wall segments contract normally.  · Normal left atrial pressure.  · Right ventricular cavity is mildly dilated  · Right ventricular wall thickness is consistent with mild hypertrophy.  · Right atrial cavity size is mildly dilated. The inferior vena cava is   dilated. IVC inspiratory collapse is absent. A prominent Eustachian valve   is present.  · Moderate tricuspid valve regurgitation is present. Estimated right   ventricular systolic pressure from tricuspid regurgitation is mildly   elevated (35-45 mmHg).          I have reviewed the medications.      clopidogrel 75 mg Oral Daily   docusate sodium 100 mg Nasogastric BID   enoxaparin 30 mg Subcutaneous Nightly   famotidine 20 mg  Oral BID   multivitamin 1 tablet Oral Daily   polyethylene glycol 17 g Oral Daily   sennosides-docusate sodium 2 tablet Oral Nightly         Assessment/Plan   Assessment / Plan     Active Hospital Problems    Diagnosis   • **Acute hypoxemic respiratory failure requiring intubation and ventilatory support (CMS/HCC)   • Constipation   • COPD and active cigarette abuse (CMS/HCC)   • Multiple right rib fractures (ninth, 10th, 11th)   • Anemia   • Metabolic encephalopathy   • Hyponatremia, suspect SIADH   • Hypoglycemia, resolved   • Generalized abdominal pain   • Severe malnutrition (CMS/[facility])   • Hypotension, resolved          Brief Hospital Course to date:  Devora Huddleston is a 73 y.o. female with hx of COPD, HTN, and CVA presents after being found down and unresponsive by her son on 10/1/18. Was intubated in the field. Glucose dropped to 39 en route (initially 105). Patient had reportedly complained of increasing weakness and weight loss for several weeks, and had recent evaluation for hyponatremia and seizures. Admitted to [facility] ICU. CT head, CT perfusion, CTA head and neck all negative. Further imaging revealed 3 right sided rib fractures, presumably from a fall at home. Possible RLL atelectasis vs. infiltrate. She was also hypotensive and requiring pressors. Treated empirically with Vanc and Zosyn however all cultures remain no growth. She was started on enteral feeds due to dysphagia. Patient extubated 10/3. Transferred to floor, hospitalists assumed care on 10/6.      Assessment & Plan:  --Stopped ATBx. Cultures no growth.  --appreciate renal assistance.  Na+ trending up and stable, c/w SIADH vs. Cerebral salt wasting.  Continue fluid restriction   --Supportive care for rib fractures.   - BP remains borderline  - cleared for advanced diet by SLP, will need to watch intake closely.  --mental status waxes/wanes, watch on prns  --d/w CM during MDRs yesterday.  Working on placement, will be here through the  weekend.    DVT Prophylaxis:  Lovenox    CODE STATUS:   Code Status and Medical Interventions:   Ordered at: 10/01/18 1819     Code Status:    CPR     Medical Interventions (Level of Support Prior to Arrest):    Full       Disposition: I expect the patient to be discharged TBD    Electronically signed by JEFF William, 10/14/18, 10:58 AM.

## 2018-10-14 NOTE — PROGRESS NOTES
"   LOS: 13 days    Patient Care Team:  Evelyn Francisco MD as PCP - General (Family Medicine)    Subjective     No new events    Objective     Vital Signs:  Blood pressure 137/74, pulse 69, temperature 98.1 °F (36.7 °C), temperature source Axillary, resp. rate 16, height 157.5 cm (62.01\"), weight 45.4 kg (100 lb), SpO2 96 %.    Flowsheet Rows      First Filed Value   Admission Height  157.5 cm (62\") Documented at 10/01/2018 1700   Admission Weight  36.3 kg (80 lb) Documented at 10/01/2018 1700          10/13 0701 - 10/14 0700  In: 700 [P.O.:700]  Out: -     Physical Exam:    General Appearance: NAD  Psych:   awake alert   CV: ,  No edema  Lungs: respirations regular and unlabored  Abdomen: not distended  :  No lyons, no palp bladder  Skin: No rash, Warm and dry      Labs:    Results from last 7 days  Lab Units 10/12/18  1004 10/11/18  0538 10/10/18  0618   WBC 10*3/mm3 8.21 7.40 8.13   HEMOGLOBIN g/dL 9.1* 9.3* 10.2*   PLATELETS 10*3/mm3 378 359 365       Results from last 7 days  Lab Units 10/13/18  0803 10/13/18  0041 10/12/18  1547 10/12/18  1004  10/11/18  0538  10/10/18  0618 10/09/18  2034 10/09/18  0547 10/08/18  0704   SODIUM mmol/L 127* 127* 128* 129*  < > 125*  < > 125* 123* 127* 130*   POTASSIUM mmol/L  --   --   --  3.8  --  3.8  --  3.8  --  4.1 3.8   CHLORIDE mmol/L  --   --   --  97*  --  94*  --  92*  --  92* 95*   CO2 mmol/L  --   --   --  23.0  --  22.0  --  26.0  --  27.0 25.0   BUN mg/dL  --   --   --  16  --  16  --  17  --  15 12   CREATININE mg/dL  --   --   --  0.31*  --  0.31*  --  0.31*  --  0.27* 0.28*   CALCIUM mg/dL  --   --   --  8.3*  --  7.8*  --  8.0*  --  8.5* 8.3*   PHOSPHORUS mg/dL  --   --   --   --   --   --   --   --   --   --  2.9  2.9   MAGNESIUM mg/dL  --   --   --   --   --   --   --  2.3 1.4 1.6 1.7  1.7   ALBUMIN g/dL  --   --   --   --   --   --   --   --   --   --  3.40   < > = values in this interval not displayed.    Results from last 7 days  Lab Units " 10/08/18  0704   ALK PHOS U/L 107*   BILIRUBIN mg/dL 0.6   ALT (SGPT) U/L 23   AST (SGOT) U/L 36*               Estimated Creatinine Clearance: 46.2 mL/min (A) (by C-G formula based on SCr of 0.31 mg/dL (L)).         A/P:    Hyponatremia:  Back to 127 range. At presentation 119. improved to 132 on 10/6/18.  Trended back down to 123.  At home, was on HCTZ. Serum osm- 267 urine osm 583 and urine sodium 104. Consistent with SIADH vs cerebral salt wasting.  Cont FW restriction and Na monitoring.          Kofi Gandhi MD  10/14/18  11:49 AM

## 2018-10-14 NOTE — PLAN OF CARE
Problem: Skin Injury Risk (Adult)  Goal: Skin Health and Integrity  Outcome: Ongoing (interventions implemented as appropriate)      Problem: Infection, Risk/Actual (Adult)  Goal: Infection Prevention/Resolution  Outcome: Ongoing (interventions implemented as appropriate)      Problem: Fall Risk (Adult)  Goal: Absence of Fall  Outcome: Ongoing (interventions implemented as appropriate)      Problem: Patient Care Overview  Goal: Plan of Care Review  Outcome: Ongoing (interventions implemented as appropriate)    Goal: Individualization and Mutuality  Outcome: Ongoing (interventions implemented as appropriate)    Goal: Discharge Needs Assessment  Outcome: Ongoing (interventions implemented as appropriate)    Goal: Interprofessional Rounds/Family Conf  Outcome: Ongoing (interventions implemented as appropriate)

## 2018-10-15 LAB
ALBUMIN SERPL-MCNC: 3.39 G/DL (ref 3.2–4.8)
ALP SERPL-CCNC: 102 U/L (ref 25–100)
ALT SERPL W P-5'-P-CCNC: 31 U/L (ref 7–40)
ANION GAP SERPL CALCULATED.3IONS-SCNC: 4 MMOL/L (ref 3–11)
ANION GAP SERPL CALCULATED.3IONS-SCNC: 7 MMOL/L (ref 3–11)
ANION GAP SERPL CALCULATED.3IONS-SCNC: 7 MMOL/L (ref 3–11)
AST SERPL-CCNC: 25 U/L (ref 0–33)
BILIRUB SERPL-MCNC: 0.4 MG/DL (ref 0.3–1.2)
BUN BLD-MCNC: 12 MG/DL (ref 9–23)
BUN BLD-MCNC: 14 MG/DL (ref 9–23)
BUN BLD-MCNC: 16 MG/DL (ref 9–23)
BUN/CREAT SERPL: 34.8 (ref 7–25)
BUN/CREAT SERPL: 43.8 (ref 7–25)
CALCIUM SPEC-SCNC: 8.4 MG/DL (ref 8.7–10.4)
CALCIUM SPEC-SCNC: 8.5 MG/DL (ref 8.7–10.4)
CALCIUM SPEC-SCNC: 8.6 MG/DL (ref 8.7–10.4)
CHLORIDE SERPL-SCNC: 93 MMOL/L (ref 99–109)
CHLORIDE SERPL-SCNC: 94 MMOL/L (ref 99–109)
CHLORIDE SERPL-SCNC: 95 MMOL/L (ref 99–109)
CHOLEST SERPL-MCNC: 109 MG/DL (ref 0–200)
CO2 SERPL-SCNC: 24 MMOL/L (ref 20–31)
CO2 SERPL-SCNC: 25 MMOL/L (ref 20–31)
CO2 SERPL-SCNC: 26 MMOL/L (ref 20–31)
CREAT BLD-MCNC: 0.32 MG/DL (ref 0.6–1.3)
CREAT BLD-MCNC: 0.32 MG/DL (ref 0.6–1.3)
CREAT BLD-MCNC: 0.46 MG/DL (ref 0.6–1.3)
CRP SERPL-MCNC: 2.6 MG/DL (ref 0–1)
GFR SERPL CREATININE-BSD FRML MDRD: 134 ML/MIN/1.73
GFR SERPL CREATININE-BSD FRML MDRD: >150 ML/MIN/1.73
GLUCOSE BLD-MCNC: 110 MG/DL (ref 70–100)
GLUCOSE BLD-MCNC: 110 MG/DL (ref 70–100)
GLUCOSE BLD-MCNC: 88 MG/DL (ref 70–100)
MAGNESIUM SERPL-MCNC: 1.5 MG/DL (ref 1.3–2.7)
PHOSPHATE SERPL-MCNC: 3.4 MG/DL (ref 2.4–5.1)
POTASSIUM BLD-SCNC: 3.8 MMOL/L (ref 3.5–5.5)
POTASSIUM BLD-SCNC: 4 MMOL/L (ref 3.5–5.5)
POTASSIUM BLD-SCNC: 4 MMOL/L (ref 3.5–5.5)
PREALB SERPL-MCNC: 11.2 MG/DL (ref 10–40)
PROT SERPL-MCNC: 5.3 G/DL (ref 5.7–8.2)
SODIUM BLD-SCNC: 124 MMOL/L (ref 132–146)
SODIUM BLD-SCNC: 125 MMOL/L (ref 132–146)
SODIUM BLD-SCNC: 126 MMOL/L (ref 132–146)
TRIGL SERPL-MCNC: 34 MG/DL (ref 0–150)

## 2018-10-15 PROCEDURE — 83735 ASSAY OF MAGNESIUM: CPT

## 2018-10-15 PROCEDURE — 84134 ASSAY OF PREALBUMIN: CPT

## 2018-10-15 PROCEDURE — 97110 THERAPEUTIC EXERCISES: CPT

## 2018-10-15 PROCEDURE — 80053 COMPREHEN METABOLIC PANEL: CPT

## 2018-10-15 PROCEDURE — 25010000002 MAGNESIUM SULFATE 2 GM/50ML SOLUTION: Performed by: NURSE PRACTITIONER

## 2018-10-15 PROCEDURE — 97535 SELF CARE MNGMENT TRAINING: CPT | Performed by: OCCUPATIONAL THERAPIST

## 2018-10-15 PROCEDURE — 97116 GAIT TRAINING THERAPY: CPT

## 2018-10-15 PROCEDURE — 82465 ASSAY BLD/SERUM CHOLESTEROL: CPT

## 2018-10-15 PROCEDURE — 99232 SBSQ HOSP IP/OBS MODERATE 35: CPT | Performed by: INTERNAL MEDICINE

## 2018-10-15 PROCEDURE — 84100 ASSAY OF PHOSPHORUS: CPT

## 2018-10-15 PROCEDURE — 84478 ASSAY OF TRIGLYCERIDES: CPT

## 2018-10-15 PROCEDURE — 86140 C-REACTIVE PROTEIN: CPT

## 2018-10-15 PROCEDURE — 25010000002 ENOXAPARIN PER 10 MG: Performed by: INTERNAL MEDICINE

## 2018-10-15 RX ORDER — TOLVAPTAN 15 MG/1
15 TABLET ORAL DAILY
Status: DISCONTINUED | OUTPATIENT
Start: 2018-10-15 | End: 2018-10-17

## 2018-10-15 RX ADMIN — CLOPIDOGREL BISULFATE 75 MG: 75 TABLET ORAL at 07:52

## 2018-10-15 RX ADMIN — FAMOTIDINE 20 MG: 20 TABLET ORAL at 07:53

## 2018-10-15 RX ADMIN — MAGNESIUM SULFATE HEPTAHYDRATE 2 G: 40 INJECTION, SOLUTION INTRAVENOUS at 10:34

## 2018-10-15 RX ADMIN — FAMOTIDINE 20 MG: 20 TABLET ORAL at 21:04

## 2018-10-15 RX ADMIN — MAGNESIUM SULFATE HEPTAHYDRATE 2 G: 40 INJECTION, SOLUTION INTRAVENOUS at 12:13

## 2018-10-15 RX ADMIN — ENOXAPARIN SODIUM 30 MG: 30 INJECTION SUBCUTANEOUS at 21:03

## 2018-10-15 RX ADMIN — MAGNESIUM SULFATE HEPTAHYDRATE 2 G: 40 INJECTION, SOLUTION INTRAVENOUS at 11:30

## 2018-10-15 RX ADMIN — MAGNESIUM SULFATE HEPTAHYDRATE 2 G: 40 INJECTION, SOLUTION INTRAVENOUS at 14:18

## 2018-10-15 RX ADMIN — DOCUSATE SODIUM 100 MG: 50 LIQUID ORAL at 07:53

## 2018-10-15 RX ADMIN — Medication 1 TABLET: at 07:52

## 2018-10-15 RX ADMIN — TOLVAPTAN 15 MG: 15 TABLET ORAL at 12:47

## 2018-10-15 NOTE — PLAN OF CARE
Problem: Patient Care Overview  Goal: Plan of Care Review  Outcome: Ongoing (interventions implemented as appropriate)   10/15/18 4562   Coping/Psychosocial   Plan of Care Reviewed With patient   Plan of Care Review   Progress improving   OTHER   Outcome Summary Pt. demonstrates gradual improvement w/ mobility. Pt. able to transfer sit to stand w/ RW and modA and ambulate 2ft w/ RW and mod A, following closely w/ chair. Recommend cont IPPT per POC.

## 2018-10-15 NOTE — DISCHARGE PLACEMENT REQUEST
"Referral for placement   Rehab with possible long term care     Thank you   Mirtha [phone]     Devora Huddleston  (73 y.o. Female)     Date of Birth Social Security Number Address Home Phone MRN    1945  [address] [place]  [facility] [address] [phone] [id number]    [employer] Marital Status          None Unknown       Admission Date Admission Type Admitting Provider Attending Provider Department, Room/Bed    10/1/18 Emergency William Ibarra MD Dossett, Lee M, MD [facility] 6B, N631/1    Discharge Date Discharge Disposition Discharge Destination                       Attending Provider:  William Ibarra MD    Allergies:  Not on File    Isolation:  None   Infection:  None   Code Status:  CPR    Ht:  157.5 cm (62.01\")   Wt:  43.3 kg (95 lb 8 oz)    Admission Cmt:  None   Principal Problem:  Acute hypoxemic respiratory failure requiring intubation and ventilatory support (CMS/[facility]) [J96.01]                 Active Insurance as of 10/1/2018     Primary Coverage     Payor Plan Insurance Group Employer/Plan Group    MEDICARE MEDICARE A & B      Payor Plan Address Payor Plan Phone Number Effective From Effective To    [address] [phone] 4/1/1986     [facility] [id number]       Subscriber Name Subscriber Birth Date Member ID       DEVORA HUDDLESTON 1945 [id number]           Secondary Coverage     Payor Plan Insurance Group Employer/Plan Group    [employer] [place]ARE MEDICAID      Payor Plan Address Payor Plan Phone Number Effective From Effective To    [address] [address] [phone] 10/1/2018     [facility] [id number]       Subscriber Name Subscriber Birth Date Member ID       DEVORA HUDDLESTON 1945 [id number]                 Emergency Contacts      (Rel.) Home Phone Work Phone Mobile Phone    Molina Huddleston (Son) [phone] -- --               History & Physical      Dhruv Crandall MD at 10/1/2018  7:13 PM          Pulmonary/Critical Care History and Physical " "Exam     LOS: 0 days   Patient Care Team:  Provider, No Known as PCP - General    Chief Complaint:    Chief Complaint   Patient presents with   • Loss of Consciousness     Source of history: Chart, attempted to call available phone number from chart and message says that number is unavailable.    Subjective     HPI:     73-year-old female who arrived by [facility] to the emergency department here after being found down and unresponsive by family in her home.  She reportedly was found by her son unresponsive and \"not breathing\".  The patient's blood sugar apparently was 105 initially, but 39 mg/dL en route.  Initially, the patient had an oxygen saturation of 47% and was intubated in the field.  The ER was able to contact the patient's son however he was noted to be a poor historian.  He does report that she has had increased weakness over the last few weeks and states that she was evaluated for a seizure 2 weeks previously and had hyponatremia at that point.  He denied that she has diabetes.  She reportedly has a history of CVA in the past.    CT head done emergency department showed no acute abnormality.  CT perfusion study was negative according to the ER attending physician although I do not see an official report is taking this.  CT angiogram is pending.  The patient was started on empiric antibiotics for sepsis.  She was significantly hypotensive in the ER although responds to fluids before drifting back down to 60s to 70s systolic.    On my evaluation, the patient is intubated and drowsy but is arousable.  She follows commands in all 4 extremities.  She does report abdominal tenderness.    I attempted to call the patient's son however I was unable to contact them as the patient's son's phone number message states that the numbers unavailable.    History taken from: patient    Past Medical History:   Diagnosis Date   • Stroke (CMS/HCC)        History reviewed. No pertinent surgical history.    History reviewed. " No pertinent family history.    Social History     Social History   • Marital status: Unknown     Spouse name: N/A   • Number of children: N/A   • Years of education: N/A     Occupational History   • Not on file.     Social History Main Topics   • Smoking status: Unknown If Ever Smoked   • Smokeless tobacco: Not on file   • Alcohol use Defer   • Drug use: Unknown   • Sexual activity: Defer     Other Topics Concern   • Not on file     Social History Narrative   • No narrative on file       Not on File    PMH/FH/SocH were reviewed by me and updates were made.     Review of Systems:    Review of 14 systems was completed with positives and pertinent negatives noted in the subjective section.  All other systems reviewed and are negative.   Exceptions are noted below:    Unable to obtain secondary to altered mental status, intubation.    Objective     Vital Signs  Temp:  [94 °F (34.4 °C)-96.6 °F (35.9 °C)] 96.6 °F (35.9 °C)  Heart Rate:  [67-86] 71  Resp:  [14-16] 16  BP: ()/(36-80) 115/64  FiO2 (%):  [60 %] 60 %  Body mass index is 14.63 kg/m².  FiO2 (%):  [60 %] 60 %  S RR:  [12] 12  PEEP/CPAP (cm H2O):  [5 cm H20] 5 cm H20    Physical Exam:     General Appearance:    Drowsy, on ventilator, cachectic, in no acute distress   Head:    Normocephalic, without obvious abnormality, atraumatic   Eyes:            Lids and lashes normal, conjunctivae and sclerae normal, no   icterus, no pallor, corneas clear, PERRL   ENMT:   Ears appear intact with no abnormalities noted      No oral lesions, edentulous, no thrush, oral mucosa moist, oral endotracheal tube in place.     Neck:   No adenopathy, supple, trachea midline, no thyromegaly, no   carotid bruit, no JVD       Lungs/resp:     On ventilator, symmetric chest rise, no crepitus, clear to      auscultation bilaterally, no chest wall tenderness                  Heart/CV:    Regular rhythm and normal rate, normal S1 and S2, no            murmur   Abdomen/GI:     Normal bowel  sounds, no masses, no organomegaly, soft, moderately distended, mild diffuse tenderness to palpation.         G/U:     Deferred   Extremities/MSK:   No clubbing, cyanosis or edema.  Normal tone.  No deformities.    Pulses:   Pulses palpable and equal bilaterally   Skin:   No bleeding, multiple bruises.  Some erythema of bilateral lower extremities.  Wrinkling of skin lower extremities suggesting recent improvement of chronic edema.     Hem/Lymph:   No cervical or supraclavicular adenopathy.    Neurologic:   Moves all extremities with no obvious focal motor deficit.  Cranial nerves 2 - 12 grossly intact            Psychiatric:  Drowsy, nonagitated.     Results Review:     I reviewed the patient's new clinical results.     Results from last 7 days  Lab Units 10/01/18  1640   SODIUM mmol/L 119*   POTASSIUM mmol/L 3.5   CHLORIDE mmol/L 86*   CO2 mmol/L 23.0   BUN mg/dL 34*   CREATININE mg/dL 1.04   CALCIUM mg/dL 8.6*   ALT (SGPT) U/L 25   AST (SGOT) U/L 39*   GLUCOSE mg/dL 178*       Results from last 7 days  Lab Units 10/01/18  1640   WBC 10*3/mm3 10.47   HEMOGLOBIN g/dL 12.6   HEMATOCRIT % 36.9   PLATELETS 10*3/mm3 317       Results from last 7 days  Lab Units 10/01/18  1700   PH, ARTERIAL pH units 7.454*   PO2 ART mm Hg 179.0*   PCO2, ARTERIAL mm Hg 34.5*   HCO3 ART mmol/L 24.2       I reviewed the patient's new imaging including images and reports.    Medication Review:     sodium chloride 1,000 mL Intravenous Once          Assessment/Plan     Hospital Problem List     * (Principal)Acute respiratory failure (CMS/HCC)    Altered mental state    Acute respiratory failure with hypoxemia (CMS/HCC)    Hyponatremia    Hypoglycemia    Generalized abdominal pain    Weight loss    [place] coma scale total score 3-8 (CMS/HCC) - on arrival    Hypotension        73-year-old female with a history of hypertension, reported history of prior stroke, recent weight loss and episode of hyponatremia/seizure was brought to the emergency  department after being found down by family at home.  She was intubated in the field for hypoxemic respiratory failure.  Blood sugar en route was 39 mg/dL and was replaced.  She has been persistently hypotensive since arrival.  Pro-calcitonin is low.    Chest x-ray was read as no significant abnormality however I am concerned about a possible AP window density/left upper lobe density (although this may be due to rotation).  Additionally, she seems to have some abdominal tenderness and distention on exam.  Her son reports that she was recently treated with doxycycline for a urinary tract infection.  She has poor food intake and has had persistent weight loss over the past year and a half.    I think there is a chance that the patient had a seizure/post ictal state, possibly triggered by hypoglycemia, hyponatremia or a combination.  I suspect she has an underlying issue causing her poor oral intake and weight loss.  Chest x-ray seems abnormal to me and I think she needs a CT scan of the chest, abdomen, and pelvis to rule out possible malignancy and evaluate for the possibility of mesenteric ischemia or intra-abdominal abscess.    Plan:  1.  Admit to the intensive care unit.  2.  Fluid resuscitation.  3.  May require central line, pressors.  4.  Continue antibiotics for now, repeat pro-calcitonin in a.m.  May be able to de-escalate antibiotics soon.  5.  Follow-up cultures.  6.  Check serum cortisol.  7.  Check CT scan of the chest as well as abdomen/pelvis to evaluate abnormal chest x-ray (rule out left upper lobe/AP window pathology) and further evaluate patient's abdominal pain.  8.  Monitor sodium to avoid overly rapid correction.  9.  Hopefully can be weaned from mechanical ventilation tomorrow.      Dhruv Crandall MD  10/01/18  8:01 PM    60 minutes of critical care provided. This time excludes other billable procedures. Time does include preparation of documents, medical consultations, review of old records,  "and direct bedside care. Patient was at high risk for life-threatening deterioration due to acute respiratory failure, hypotension.       Addendum: The patient's son, Bryn Huddleston, called back and I was able to get some additional history which I added to the history above.  He is currently only available at the patient's home phone number, as his cell phone has been turned off.  He states that he is going to try to come to the hospital tomorrow, but needs to get his phone turned back on so he can use the map function.    Patient's PCP is Evelyn Francisco [phone].       Electronically signed by Dhruv Crandall MD at 10/1/2018  8:23 PM          Physician Progress Notes (most recent note)      Sage Nunes MD at 10/15/2018 11:28 AM             LOS: 14 days    Patient Care Team:  Evelyn Francisco MD as PCP - General (Family Medicine)    Reason For Visit:  F/U HYPONATREMIA.  Subjective           Review of Systems:    Pulm: No soa   CV:  No CP      Objective       clopidogrel 75 mg Oral Daily   docusate sodium 100 mg Nasogastric BID   enoxaparin 30 mg Subcutaneous Nightly   famotidine 20 mg Oral BID   multivitamin 1 tablet Oral Daily   polyethylene glycol 17 g Oral Daily   sennosides-docusate sodium 2 tablet Oral Nightly   tolvaptan 15 mg Oral Daily            Vital Signs:  Blood pressure 129/63, pulse 58, temperature 98.1 °F (36.7 °C), temperature source Oral, resp. rate 16, height 157.5 cm (62.01\"), weight 43.3 kg (95 lb 8 oz), SpO2 96 %.    Flowsheet Rows      First Filed Value   Admission Height  157.5 cm (62\") Documented at 10/01/2018 1700   Admission Weight  36.3 kg (80 lb) Documented at 10/01/2018 1700          10/14 0701 - 10/15 0700  In: 120 [P.O.:120]  Out: -     Physical Exam:    General Appearance: NAD, alert and cooperative, Ox3  Eyes: PER, conjunctivae and sclerae normal, no icterus  Lungs: respirations regular and unlabored, no crepitus, clear to auscultation  Heart/CV: regular " rhythm & normal rate, no murmur, no gallop, no rub and no edema  Abdomen: not distended, soft, non-tender, no masses,  bowel sounds present  Skin: No rash, Warm and dry    Radiology:            Labs:    Results from last 7 days  Lab Units 10/12/18  1004 10/11/18  0538 10/10/18  0618   WBC 10*3/mm3 8.21 7.40 8.13   HEMOGLOBIN g/dL 9.1* 9.3* 10.2*   HEMATOCRIT % 28.0* 27.6* 30.3*   PLATELETS 10*3/mm3 378 359 365       Results from last 7 days  Lab Units 10/15/18  0851 10/14/18  1205 10/13/18  0803 10/13/18  0041  10/12/18  1004  10/11/18  0538  10/10/18  0618 10/09/18  2034 10/09/18  0547   SODIUM mmol/L 124* 127* 127* 127*  < > 129*  < > 125*  < > 125* 123* 127*   POTASSIUM mmol/L 4.0 4.5  --   --   --  3.8  --  3.8  --  3.8  --  4.1   CHLORIDE mmol/L 95* 97*  --   --   --  97*  --  94*  --  92*  --  92*   CO2 mmol/L 25.0 23.0  --   --   --  23.0  --  22.0  --  26.0  --  27.0   BUN mg/dL 12 12  --   --   --  16  --  16  --  17  --  15   CREATININE mg/dL 0.32* 0.28*  --   --   --  0.31*  --  0.31*  --  0.31*  --  0.27*   CALCIUM mg/dL 8.5* 8.5*  --   --   --  8.3*  --  7.8*  --  8.0*  --  8.5*   PHOSPHORUS mg/dL 3.4  --   --   --   --   --   --   --   --   --   --   --    MAGNESIUM mg/dL 1.5  --   --   --   --   --   --   --   --  2.3 1.4 1.6   ALBUMIN g/dL 3.39  --   --   --   --   --   --   --   --   --   --   --    < > = values in this interval not displayed.    Results from last 7 days  Lab Units 10/15/18  0851   GLUCOSE mg/dL 88         Results from last 7 days  Lab Units 10/15/18  0851   ALK PHOS U/L 102*   BILIRUBIN mg/dL 0.4   ALT (SGPT) U/L 31   AST (SGOT) U/L 25                 Estimated Creatinine Clearance: 44.1 mL/min (A) (by C-G formula based on SCr of 0.32 mg/dL (L)).      Assessment       Acute hypoxemic respiratory failure requiring intubation and ventilatory support (CMS/[facility])    Metabolic encephalopathy    Hyponatremia, suspect SIADH    Hypoglycemia, resolved    Generalized abdominal pain    Severe  malnutrition (CMS/HCC)    Hypotension, resolved    Multiple right rib fractures (ninth, 10th, 11th)    Anemia    COPD and active cigarette abuse (CMS/HCC)    Constipation            Impression: HYPONATREMIA IS WORSE. START TOLVAPTAN.            Recommendations: AS ABOVE      Sage Nunes MD  10/15/18  11:28 AM          Electronically signed by Sage Nunes MD at 10/15/2018 11:29 AM          Physical Therapy Notes (most recent note)      Gemma Leigh, PT at 10/13/2018 12:09 PM  Version 1 of 1         Acute Care - Physical Therapy Treatment Note  [facility]     Patient Name: Devora Huddleston  : 1945  MRN: [id number]  Today's Date: 10/13/2018  Onset of Illness/Injury or Date of Surgery: 10/01/18  Date of Referral to PT: 10/04/18  Referring Physician: MD Emmett    Admit Date: 10/1/2018    Visit Dx:    ICD-10-CM ICD-9-CM   1. Acute respiratory failure with hypoxemia (CMS/HCC) J96.01 518.81   2. Impaired functional mobility, balance, gait, and endurance Z74.09 V49.89   3. Oropharyngeal dysphagia R13.12 787.22   4. Impaired mobility and ADLs Z74.09 799.89     Patient Active Problem List   Diagnosis   • Acute respiratory failure (CMS/HCC)   • Metabolic encephalopathy   • Acute hypoxemic respiratory failure requiring intubation and ventilatory support (CMS/HCC)   • Hyponatremia, suspect SIADH   • Hypoglycemia, resolved   • Generalized abdominal pain   • Severe malnutrition (CMS/HCC)   • Hypotension, resolved   • Multiple right rib fractures (ninth, 10th, 11th)   • Anemia   • COPD and active cigarette abuse (CMS/HCC)   • Constipation       Therapy Treatment          Rehabilitation Treatment Summary     Row Name 10/13/18 1148             Treatment Time/Intention    Discipline physical therapist  -VG      Document Type therapy note (daily note)  -VG      Subjective Information no complaints  -VG      Mode of Treatment individual therapy;physical therapy  -VG      Patient/Family  Observations No family present.  -VG      Care Plan Review patient/other agree to care plan  -VG      Therapy Frequency (PT Clinical Impression) daily  -VG      Patient Effort adequate  -VG      Existing Precautions/Restrictions fall;other (see comments)   confused, h/o combativeness  -VG      Recorded by [VG] Gemma eLigh, PT 10/13/18 1207      Row Name 10/13/18 1148             Cognitive Assessment/Intervention- PT/OT    Affect/Mental Status (Cognitive) confused  -VG      Behavioral Issues (Cognitive) uncooperative  -VG      Orientation Status (Cognition) oriented to;person  -VG      Follows Commands (Cognition) follows one step commands;0-24% accuracy  -VG      Cognitive Function (Cognitive) safety deficit;memory deficit;attention deficit  -VG      Attention Deficit (Cognitive) moderate deficit;requires cues/redirection to task  -VG      Memory Deficit (Cognitive) moderate deficit;working memory  -VG      Safety Deficit (Cognitive) moderate deficit;insight into deficits/self awareness  -VG      Personal Safety Interventions fall prevention program maintained;gait belt;nonskid shoes/slippers when out of bed;supervised activity  -VG      Recorded by [VG] Gemma Leigh, PT 10/13/18 1207      Row Name 10/13/18 1148             Bed Mobility Assessment/Treatment    Comment (Bed Mobility) Pt refused EOB  -VG      Recorded by [VG] Gemma Leigh, PT 10/13/18 1207      Row Name 10/13/18 1148             Transfer Assessment/Treatment    Comment (Transfers) Defer transfer d/t B wounds on heels  -VG      Recorded by [VG] Gemma Leigh, PT 10/13/18 1207      Row Name 10/13/18 1148             Gait/Stairs Assessment/Training    Comment (Gait/Stairs) Defer gait d/t B wounds on heels  -VG      Recorded by [VG] Gemma Leigh, PT 10/13/18 1207      Row Name 10/13/18 1148             Therapeutic Exercise    39999 - PT Therapeutic Exercise Minutes 14  -VG      Recorded by [VG] Gemma Leigh, PT 10/13/18 1207       Row Name 10/13/18 1148             Therapeutic Exercise    Lower Extremity (Therapeutic Exercise) heel slides, bilateral;SLR (straight leg raise), bilateral;SAQ (short arc quad), bilateral;quad sets, bilateral  -VG      Lower Extremity Range of Motion (Therapeutic Exercise) hip abduction/adduction, bilateral;ankle dorsiflexion/plantar flexion, bilateral  -VG      Exercise Type (Therapeutic Exercise) AROM (active range of motion);AAROM (active assistive range of motion)  -VG      Position (Therapeutic Exercise) supine  -VG      Sets/Reps (Therapeutic Exercise) 15x  -VG      Comment (Therapeutic Exercise) Cues for technique. Pt does not follow commands, constant cues for redirection.  -VG      Recorded by [VG] Gemma Leigh, PT 10/13/18 1207      Row Name 10/13/18 1148             Positioning and Restraints    Pre-Treatment Position in bed  -VG      Post Treatment Position bed  -VG      In Bed supine;call light within reach;encouraged to call for assist;exit alarm on;side rails up x3;heels elevated  -VG      Recorded by [VG] Gemma Leigh, PT 10/13/18 1207      Row Name 10/13/18 1148             Pain Scale: FACES Pre/Post-Treatment    Pain: FACES Scale, Pretreatment 0-->no hurt  -VG      Pain: FACES Scale, Post-Treatment 0-->no hurt  -VG      Recorded by [VG] Gemma Leigh, PT 10/13/18 1207      Row Name                Wound 10/01/18 1845 Bilateral medial coccyx    Wound - Properties Group Date first assessed: 10/01/18 [SW] Time first assessed: 1845 [SW] Present On Admission : yes [SW] Side: Bilateral [SW] Orientation: medial [SW] Location: coccyx [SW] Additional Comments: shearing [CP] Recorded by:  [CP] Mahsa Almanzar APRN 10/07/18 1148 [SW] Famtata Villalobos RN 10/01/18 1905    Row Name                Wound 10/07/18 1015 Right heel blisters    Wound - Properties Group Date first assessed: 10/07/18 [CP] Time first assessed: 1015 [CP] Present On Admission : no;picture taken [CP] Side: Right [CP]  Location: heel [CP] Type: blisters [CP] Additional Comments: with bruise or DTPI [CP] Recorded by:  [CP] Mahsa Almanzar, APRN 10/07/18 1155    Row Name                Wound 10/06/18 1500 Left heel blisters    Wound - Properties Group Date first assessed: 10/06/18 [CP] Time first assessed: 1500 [CP] Present On Admission : no [CP2] Side: Left [CP2] Location: heel [CP2] Type: blisters [CP2] Recorded by:  [CP] Mahsa Almanzar, APRN 10/07/18 1159 [CP2] Mahsa Almanzar, APRN 10/07/18 1157    Row Name                Wound 10/06/18 1500 Right elbow pressure injury    Wound - Properties Group Date first assessed: 10/06/18 [CP] Time first assessed: 1500 [CP] Present On Admission : no;picture taken [CP] Side: Right [CP] Location: elbow [CP] Type: pressure injury [CP] Stage, Pressure Injury: unstageable [CP] Recorded by:  [CP] Mahsa Almanzar, APRN 10/07/18 1200    Row Name 10/13/18 1148             Coping    Observed Emotional State uncooperative  -VG      Verbalized Emotional State frustration  -VG      Recorded by [VG] Gemma Leigh, PT 10/13/18 1207      Row Name 10/13/18 1148             Plan of Care Review    Plan of Care Reviewed With patient  -VG      Recorded by [VG] Gemma Leigh, PT 10/13/18 1207      Row Name 10/13/18 1148             Outcome Summary/Treatment Plan (PT)    Daily Summary of Progress (PT) progress toward functional goals is gradual  -VG      Anticipated Discharge Disposition (PT) skilled nursing facility  -VG      Recorded by [VG] Gemma Leigh, PT 10/13/18 1207        User Key  (r) = Recorded By, (t) = Taken By, (c) = Cosigned By    Initials Name Effective Dates Discipline    CP Mahsa Almanzar, APRN 06/08/18 -  Nurse    Fatmata Chaves RN 06/16/16 -  Nurse    Gemma Garcia, PT 05/29/18 -  PT          Wound 10/01/18 1845 Bilateral medial coccyx (Active)   Dressing Appearance dry;intact 10/13/2018  8:00 AM   Base dry 10/13/2018  8:00 AM   Periwound  intact;dry;pink;blanchable 10/13/2018  8:00 AM   Drainage Amount none 10/13/2018  8:00 AM   Care, Wound cleansed with;soap and water 10/13/2018  8:00 AM   Dressing Care, Wound dressing changed 10/13/2018  8:00 AM   Periwound Care, Wound barrier ointment applied 10/13/2018  8:00 AM       Wound 10/07/18 1015 Right heel blisters (Active)   Dressing Appearance dry;intact 10/13/2018  8:00 AM   Base dressing in place, unable to visualize 10/13/2018  8:00 AM   Periwound intact;dry;pink;blanchable 10/13/2018  8:00 AM   Drainage Amount none 10/13/2018  8:00 AM   Care, Wound cleansed with;sterile normal saline 10/13/2018  8:00 AM   Dressing Care, Wound petroleum-based;foam;low-adherent 10/13/2018  8:00 AM       Wound 10/06/18 1500 Left heel blisters (Active)   Dressing Appearance dry;intact 10/13/2018  8:00 AM   Base dressing in place, unable to visualize 10/12/2018  8:38 PM   Drainage Characteristics/Odor serosanguineous 10/13/2018  8:00 AM   Drainage Amount scant 10/13/2018  8:00 AM   Care, Wound cleansed with;sterile normal saline 10/13/2018  8:00 AM   Dressing Care, Wound petroleum-based;foam;low-adherent 10/13/2018  8:00 AM       Wound 10/06/18 1500 Right elbow pressure injury (Active)   Dressing Appearance dry;intact 10/13/2018  8:00 AM   Base dressing in place, unable to visualize 10/13/2018  8:00 AM   Periwound intact;dry;pink;blanchable 10/12/2018  4:00 PM   Drainage Amount none 10/13/2018  8:00 AM   Care, Wound cleansed with;sterile normal saline 10/13/2018  8:00 AM   Dressing Care, Wound foam;low-adherent 10/13/2018  8:00 AM             Physical Therapy Education     Title: PT OT SLP Therapies (Active)     Topic: Physical Therapy (Active)     Point: Mobility training (Active)    Learning Progress Summary     Learner Status Readiness Method Response Comment Documented by    Patient Active Acceptance E,D NR  DM 10/07/18 1438     Active Acceptance E NR  ISXTO 10/04/18 1500          Point: Home exercise program (Active)     Learning Progress Summary     Learner Status Readiness Method Response Comment Documented by    Patient Active Nonacceptance E NR Educated on HEP, constant cues for redirection and technique. Reinforcement needed. VG 10/13/18 1148     Active Acceptance E NR Educated on HEP and benefits of activity. VG 10/09/18 1323     Active Acceptance E,D NR  DM 10/07/18 1438     Active Acceptance E NR  SIXTO 10/04/18 1500          Point: Body mechanics (Active)    Learning Progress Summary     Learner Status Readiness Method Response Comment Documented by    Patient Active Acceptance E,D NR  DM 10/07/18 1438     Active Acceptance E NR  SIXTO 10/04/18 1500          Point: Precautions (Active)    Learning Progress Summary     Learner Status Readiness Method Response Comment Documented by    Patient Active Acceptance E,D NR  DM 10/07/18 1438     Active Acceptance E NR   10/04/18 1500                      User Key     Initials Effective Dates Name Provider Type Discipline     06/19/15 -  Althea Ruth, PT Physical Therapist PT     06/19/15 -  Terri Magaña, PT Physical Therapist PT     05/29/18 -  Gemma Leigh, PT Physical Therapist PT                    PT Recommendation and Plan  Anticipated Discharge Disposition (PT): skilled nursing facility  Therapy Frequency (PT Clinical Impression): daily  Outcome Summary/Treatment Plan (PT)  Daily Summary of Progress (PT): progress toward functional goals is gradual  Anticipated Discharge Disposition (PT): skilled nursing facility  Plan of Care Reviewed With: patient  Progress: no change  Outcome Summary: Pt refused EOB this date. Deferred transfer d/t B heel wounds. Would benefit from B heel offloading boots now that pt is more mobile. Continues to struggle with following commands. Participated in bed level ther ex with constant cues for redirection to task. Will continue to progress pt as able per POC.          Outcome Measures     Row Name 10/13/18 1148 10/12/18 1022           How much help from another person do you currently need...    Turning from your back to your side while in flat bed without using bedrails? 3  -VG  --     Moving from lying on back to sitting on the side of a flat bed without bedrails? 2  -VG  --     Moving to and from a bed to a chair (including a wheelchair)? 2  -VG  --     Standing up from a chair using your arms (e.g., wheelchair, bedside chair)? 1  -VG  --     Climbing 3-5 steps with a railing? 1  -VG  --     To walk in hospital room? 1  -VG  --     AM-PAC 6 Clicks Score 10  -VG  --        How much help from another is currently needed...    Putting on and taking off regular lower body clothing?  -- 1  -AC     Bathing (including washing, rinsing, and drying)  -- 2  -AC     Toileting (which includes using toilet bed pan or urinal)  -- 1  -AC     Putting on and taking off regular upper body clothing  -- 2  -AC     Taking care of personal grooming (such as brushing teeth)  -- 3  -AC     Eating meals  -- 3  -AC     Score  -- 12  -AC       User Key  (r) = Recorded By, (t) = Taken By, (c) = Cosigned By    Initials Name Provider Type    AC Anna Best, OT Occupational Therapist    VG Gemma Leigh, PT Physical Therapist           Time Calculation:         PT Charges     Row Name 10/13/18 1148             Time Calculation    Start Time 1148  -VG      PT Received On 10/13/18  -VG      PT Goal Re-Cert Due Date 10/14/18  -VG         Time Calculation- PT    Total Timed Code Minutes- PT 14 minute(s)  -VG         Timed Charges    54044 - PT Therapeutic Exercise Minutes 14  -VG        User Key  (r) = Recorded By, (t) = Taken By, (c) = Cosigned By    Initials Name Provider Type    Gemma Garcia, PT Physical Therapist        Therapy Suggested Charges     Code   Minutes Charges    56595 (CPT®) Hc Pt Neuromusc Re Education Ea 15 Min      69083 (CPT®) Hc Pt Ther Proc Ea 15 Min 14 1    70817 (CPT®) Hc Gait Training Ea 15 Min      36822 (CPT®) Hc Pt Therapeutic Act Ea  15 Min      41435 (CPT®) Hc Pt Manual Therapy Ea 15 Min      94768 (CPT®) Hc Pt Iontophoresis Ea 15 Min      71442 (CPT®) Hc Pt Elec Stim Ea-Per 15 Min      65331 (CPT®) Hc Pt Ultrasound Ea 15 Min      45210 (CPT®) Hc Pt Self Care/Mgmt/Train Ea 15 Min      04199 (CPT®) Hc Pt Prosthetic (S) Train Initial Encounter, Each 15 Min      14216 (CPT®) Hc Pt Orthotic(S)/Prosthetic(S) Encounter, Each 15 Min      52132 (CPT®) Hc Orthotic(S) Mgmt/Train Initial Encounter, Each 15min      Total  14 1        Therapy Charges for Today     Code Description Service Date Service Provider Modifiers Qty    11803812064 HC PT THER PROC EA 15 MIN 10/13/2018 Gemma Leigh, PT GP 1          PT G-Codes  Outcome Measure Options: AM-PAC 6 Clicks Daily Activity (OT)  AM-PAC 6 Clicks Score: 10  Score: 12    Ira Leigh, PT  10/13/2018         Electronically signed by Gemma Leigh, PT at 10/13/2018 12:10 PM          Occupational Therapy Notes (most recent note)      Katerin Shine, OT at 10/15/2018 11:29 AM          Acute Care - Occupational Therapy Treatment Note  [facility]     Patient Name: Devora Huddleston  : 1945  MRN: [id number]  Today's Date: 10/15/2018  Onset of Illness/Injury or Date of Surgery: 10/01/18  Date of Referral to OT: 10/04/18  Referring Physician: MD Emmett    Admit Date: 10/1/2018       ICD-10-CM ICD-9-CM   1. Acute respiratory failure with hypoxemia (CMS/HCC) J96.01 518.81   2. Impaired functional mobility, balance, gait, and endurance Z74.09 V49.89   3. Oropharyngeal dysphagia R13.12 787.22   4. Impaired mobility and ADLs Z74.09 799.89     Patient Active Problem List   Diagnosis   • Acute respiratory failure (CMS/HCC)   • Metabolic encephalopathy   • Acute hypoxemic respiratory failure requiring intubation and ventilatory support (CMS/HCC)   • Hyponatremia, suspect SIADH   • Hypoglycemia, resolved   • Generalized abdominal pain   • Severe malnutrition (CMS/HCC)   • Hypotension, resolved   •  Multiple right rib fractures (ninth, 10th, 11th)   • Anemia   • COPD and active cigarette abuse (CMS/HCC)   • Constipation     Past Medical History:   Diagnosis Date   • Hypertension    • Stroke (CMS/HCC)      History reviewed. No pertinent surgical history.    Therapy Treatment          Rehabilitation Treatment Summary     Row Name 10/15/18 1100             Treatment Time/Intention    Discipline occupational therapist  -SD      Document Type therapy note (daily note)  -SD      Subjective Information no complaints  -SD      Mode of Treatment occupational therapy  -SD      Patient/Family Observations Pt. in bed, no family present.  -SD      Care Plan Review care plan/treatment goals reviewed;risks/benefits reviewed;current/potential barriers reviewed;patient/other agree to care plan  -SD      Total Minutes, Occupational Therapy Treatment 18  -SD      Patient Effort good  -SD      Recorded by [SD] Katerin Shine OT 10/15/18 1125      Row Name 10/15/18 1100             Vital Signs    Pre Patient Position Supine  -SD      Intra Patient Position Sitting  -SD      Post Patient Position Sitting  -SD      Recorded by [SD] Katerin Shine OT 10/15/18 1125      Row Name 10/15/18 1100             Cognitive Assessment/Intervention- PT/OT    Affect/Mental Status (Cognitive) confused  -SD      Orientation Status (Cognition) oriented to;person  -SD      Follows Commands (Cognition) follows one step commands;50-74% accuracy;verbal cues/prompting required;repetition of directions required  -SD      Cognitive Function (Cognitive) safety deficit;attention deficit  -SD      Safety Deficit (Cognitive) moderate deficit;awareness of need for assistance;insight into deficits/self awareness;problem solving;judgment;safety precautions awareness;safety precautions follow-through/compliance  -SD      Personal Safety Interventions fall prevention program maintained;gait belt;nonskid shoes/slippers when out of bed;supervised  activity  -SD      Recorded by [SD] Katerin Shine OT 10/15/18 1125      Row Name 10/15/18 1100             Bed Mobility Assessment/Treatment    Rolling Left [place] (Bed Mobility) minimum assist (75% patient effort)  -SD      Scooting/Bridging [place] (Bed Mobility) moderate assist (50% patient effort)  -SD      Supine-Sit [place] (Bed Mobility) moderate assist (50% patient effort)  -SD      Assistive Device (Bed Mobility) bed rails;draw sheet;head of bed elevated  -SD      Recorded by [SD] Katerin Shine OT 10/15/18 1125      Row Name 10/15/18 1100             Bed-Chair Transfer    Bed-Chair [place] (Transfers) maximum assist (25% patient effort)  -SD      Recorded by [SD] Katerin Shine OT 10/15/18 1125      Row Name 10/15/18 1100             Stand Pivot/Stand Step Transfer    Stand Pivot/Stand Step [place] maximum assist (25% patient effort);other (see comments)   pt. not putting her feet down on floor despite v/c's  -SD      Recorded by [SD] Katerin Shine OT 10/15/18 1125      Row Name 10/15/18 1100             ADL Assessment/Intervention    55029 - OT Self Care/Mgmt Minutes 12  -SD      BADL Assessment/Intervention feeding  -SD      Recorded by [SD] Katerin Shine OT 10/15/18 1125      Row Name 10/15/18 1100             Grooming Assessment/Training    [place] Level (Grooming) hair care, combing/brushing;set up  -SD      Grooming Position edge of bed sitting  -SD      Recorded by [SD] Katerin Shine OT 10/15/18 1125      Row Name 10/15/18 1100             Self-Feeding Assessment/Training    [place] Level (Feeding) liquids to mouth;finger foods;scoop food and bring to mouth;set up  -SD      Self-Feeding Assess/Train, Spillage Amount minimal  -SD      Position (Self-Feeding) supported sitting  -SD      Recorded by [SD] Katerin Shine OT 10/15/18 1125      Row Name 10/15/18 1100             Therapeutic Exercise     67847 - OT Therapeutic Activity Minutes 6  -SD      Recorded by [SD] Katerin Shine OT 10/15/18 1125      Row Name 10/15/18 1100             Static Sitting Balance    Level of [place] (Unsupported Sitting, Static Balance) standby assist  -SD      Sitting Position (Unsupported Sitting, Static Balance) sitting on edge of bed  -SD      Time Able to Maintain Position (Unsupported Sitting, Static Balance) more than 5 minutes  -SD      Recorded by [SD] Katerin Shine OT 10/15/18 1125      Row Name 10/15/18 1100             Positioning and Restraints    Pre-Treatment Position in bed  -SD      Post Treatment Position chair  -SD      In Chair reclined;call light within reach;encouraged to call for assist;exit alarm on;legs elevated;heels elevated  -SD      Recorded by [SD] Katerin Shine OT 10/15/18 1125      Row Name 10/15/18 1100             Pain Scale: FACES Pre/Post-Treatment    Pain: FACES Scale, Pretreatment 0-->no hurt  -SD      Pain: FACES Scale, Post-Treatment 0-->no hurt  -SD      Recorded by [SD] Katerin Shine OT 10/15/18 1125      Row Name                Wound 10/01/18 1845 Bilateral medial coccyx    Wound - Properties Group Date first assessed: 10/01/18 [SW] Time first assessed: 1845 [SW] Present On Admission : yes [SW] Side: Bilateral [SW] Orientation: medial [SW] Location: coccyx [SW] Additional Comments: shearing [CP] Recorded by:  [CP] Mahsa Almanzar APRN 10/07/18 1148 [SW] Fatmata Villalobos RN 10/01/18 1905    Row Name                Wound 10/07/18 1015 Right heel blisters    Wound - Properties Group Date first assessed: 10/07/18 [CP] Time first assessed: 1015 [CP] Present On Admission : no;picture taken [CP] Side: Right [CP] Location: heel [CP] Type: blisters [CP] Additional Comments: with bruise or DTPI [CP] Recorded by:  [CP] Mahsa Almanzar APRN 10/07/18 1155    Row Name                Wound 10/06/18 1500 Left heel blisters    Wound - Properties  Group Date first assessed: 10/06/18 [CP] Time first assessed: 1500 [CP] Present On Admission : no [CP2] Side: Left [CP2] Location: heel [CP2] Type: blisters [CP2] Recorded by:  [CP] Mahsa Almanzar, JEFF 10/07/18 1159 [CP2] Mahsa Almanzar APRN 10/07/18 1157    Row Name                Wound 10/06/18 1500 Right elbow pressure injury    Wound - Properties Group Date first assessed: 10/06/18 [CP] Time first assessed: 1500 [CP] Present On Admission : no;picture taken [CP] Side: Right [CP] Location: elbow [CP] Type: pressure injury [CP] Stage, Pressure Injury: unstageable [CP] Recorded by:  [CP] Mahsa Almanzar APRN 10/07/18 1200    Row Name 10/15/18 1100             Plan of Care Review    Plan of Care Reviewed With patient  -SD      Recorded by [SD] Katerin Shine OT 10/15/18 1125      Row Name 10/15/18 1100             Outcome Summary/Treatment Plan (OT)    Daily Summary of Progress (OT) progress toward functional goals is gradual  -SD      Barriers to Overall Progress (OT) confusion  -SD      Plan for Continued Treatment (OT) cont OT POC  -SD      Anticipated Discharge Disposition (OT) skilled nursing facility  -SD      Recorded by [SD] Katerin Shine, AYANA 10/15/18 1125        User Key  (r) = Recorded By, (t) = Taken By, (c) = Cosigned By    Initials Name Effective Dates Discipline    SD Katerin Shine OT 06/08/18 -  OT    CP Mahsa Almanzar APRN 06/08/18 -  Nurse    Fatmata Chaves RN 06/16/16 -  Nurse        Wound 10/01/18 1845 Bilateral medial coccyx (Active)   Dressing Appearance dry;intact 10/15/2018  8:00 AM   Closure None 10/15/2018  8:00 AM   Base dry 10/15/2018  8:00 AM   Periwound intact;dry;pink;blanchable 10/15/2018  8:00 AM   Drainage Amount none 10/15/2018  8:00 AM   Dressing Care, Wound dressing changed 10/14/2018  9:36 PM   Periwound Care, Wound barrier ointment applied 10/14/2018  9:36 PM       Wound 10/07/18 1015 Right heel blisters (Active)    Dressing Appearance dry;intact 10/15/2018  8:00 AM   Closure None 10/15/2018  8:00 AM   Base pink 10/15/2018  8:00 AM   Periwound intact;dry;pink;blanchable 10/15/2018  8:00 AM   Drainage Amount none 10/15/2018  8:00 AM   Care, Wound cleansed with;sterile normal saline 10/14/2018  9:36 PM   Dressing Care, Wound petroleum-based;low-adherent 10/14/2018  9:36 PM       Wound 10/06/18 1500 Left heel blisters (Active)   Dressing Appearance dry;intact 10/15/2018  8:00 AM   Closure None 10/15/2018  8:00 AM   Base red/granulating 10/15/2018  8:00 AM   Periwound pink;blanchable 10/15/2018  8:00 AM   Drainage Characteristics/Odor serosanguineous 10/15/2018  8:00 AM   Drainage Amount scant 10/15/2018  8:00 AM   Care, Wound cleansed with;sterile normal saline 10/14/2018  9:36 PM   Dressing Care, Wound petroleum-based;low-adherent 10/14/2018  9:36 PM       Wound 10/06/18 1500 Right elbow pressure injury (Active)   Dressing Appearance dry;intact 10/15/2018  8:00 AM   Closure None 10/15/2018  8:00 AM   Base pink 10/15/2018  8:00 AM   Drainage Amount none 10/15/2018  8:00 AM   Care, Wound cleansed with;sterile normal saline 10/14/2018  9:36 PM   Dressing Care, Wound low-adherent 10/14/2018  9:36 PM             OT Rehab Goals     Row Name 10/15/18 1100             Transfer Goal 1 (OT)    Progress/Outcome (Transfer Goal 1, OT) goal ongoing  -SD         Dressing Goal 1 (OT)    Progress/Outcome (Dressing Goal 1, OT) goal ongoing  -SD         Toileting Goal 1 (OT)    Progress/Outcome (Toileting Goal 1, OT) goal ongoing  -SD         Strength Goal 1 (OT)    Progress/Outcome (Strength Goal 1, OT) goal ongoing  -SD        User Key  (r) = Recorded By, (t) = Taken By, (c) = Cosigned By    Initials Name Provider Type Discipline    Katerin Luz OT Occupational Therapist OT        Occupational Therapy Education     Title: PT OT SLP Therapies (Active)     Topic: Occupational Therapy (Active)     Point: ADL training (Done)      Description: Instruct learner(s) on proper safety adaptation and remediation techniques during self care or transfers.   Instruct in proper use of assistive devices.   Learning Progress Summary     Learner Status Readiness Method Response Comment Documented by    Patient Done Acceptance E VU,NR sequencing with bed mobility, safety with transfer,  UE HEP AC 10/12/18 1445     Active Acceptance E NR Pt educated on appropriate safety precautions, t/f techniques, role of OT, positioning, and benefits of therapy. CL 10/05/18 1009          Point: Precautions (Active)     Description: Instruct learner(s) on prescribed precautions during self-care and functional transfers.   Learning Progress Summary     Learner Status Readiness Method Response Comment Documented by    Patient Active Acceptance E NR Pt educated on appropriate safety precautions, t/f techniques, role of OT, positioning, and benefits of therapy. CL 10/05/18 1009          Point: Body mechanics (Active)     Description: Instruct learner(s) on proper positioning and spine alignment during self-care, functional mobility activities and/or exercises.   Learning Progress Summary     Learner Status Readiness Method Response Comment Documented by    Patient Active Acceptance E NR Pt educated on appropriate safety precautions, t/f techniques, role of OT, positioning, and benefits of therapy. CL 10/05/18 1009                      User Key     Initials Effective Dates Name Provider Type Discipline    AC 06/23/15 -  Anna Best OT Occupational Therapist OT    CL 04/03/18 -  Helena Thomas OT Occupational Therapist OT                OT Recommendation and Plan  Outcome Summary/Treatment Plan (OT)  Daily Summary of Progress (OT): progress toward functional goals is gradual  Barriers to Overall Progress (OT): confusion  Plan for Continued Treatment (OT): cont OT POC  Anticipated Discharge Disposition (OT): skilled nursing facility  Daily Summary of Progress (OT): progress  toward functional goals is gradual  Plan of Care Review  Plan of Care Reviewed With: patient  Plan of Care Reviewed With: patient  Outcome Summary: Pt. supine to sit on EOB with mod A. Pt. sat EOB with SBA & completed grooming skills. Pt. feeding herself. Pt. holding food in her mouth & chewing it repeatedly despite cue to swallow. Pt. t/f EOB to chair with max A. Pt. not putting her feet on the floor despite v/c's, stating her socks are too big.         Outcome Measures     Row Name 10/15/18 1100 10/13/18 1148          How much help from another person do you currently need...    Turning from your back to your side while in flat bed without using bedrails?  -- 3  -VG     Moving from lying on back to sitting on the side of a flat bed without bedrails?  -- 2  -VG     Moving to and from a bed to a chair (including a wheelchair)?  -- 2  -VG     Standing up from a chair using your arms (e.g., wheelchair, bedside chair)?  -- 1  -VG     Climbing 3-5 steps with a railing?  -- 1  -VG     To walk in hospital room?  -- 1  -VG     AM-PAC 6 Clicks Score  -- 10  -VG        How much help from another is currently needed...    Putting on and taking off regular lower body clothing? 2  -SD  --     Bathing (including washing, rinsing, and drying) 2  -SD  --     Toileting (which includes using toilet bed pan or urinal) 2  -SD  --     Putting on and taking off regular upper body clothing 2  -SD  --     Taking care of personal grooming (such as brushing teeth) 3  -SD  --     Eating meals 3  -SD  --     Score 14  -SD  --        Functional Assessment    Outcome Measure Options AM-PAC 6 Clicks Daily Activity (OT)  -SD  --       User Key  (r) = Recorded By, (t) = Taken By, (c) = Cosigned By    Initials Name Provider Type    Katerin Luz, OT Occupational Therapist    Gemma Garcia, PT Physical Therapist           Time Calculation:         Time Calculation- OT     Row Name 10/15/18 1100             Time Calculation- OT     OT Start Time 1100  -SD      OT Stop Time 1118  -SD      OT Time Calculation (min) 18 min  -SD      OT Received On 10/15/18  -SD      OT Goal Re-Cert Due Date 10/25/18  -SD         Timed Charges    05974 - OT Therapeutic Activity Minutes 6  -SD      95803 - OT Self Care/Mgmt Minutes 12  -SD        User Key  (r) = Recorded By, (t) = Taken By, (c) = Cosigned By    Initials Name Provider Type    Katerin Luz, AYANA Occupational Therapist           Therapy Suggested Charges     Code   Minutes Charges    73964 (CPT®) Hc Ot Neuromusc Re Education Ea 15 Min      60283 (CPT®) Hc Ot Ther Proc Ea 15 Min      61499 (CPT®) Hc Ot Therapeutic Act Ea 15 Min 6     47774 (CPT®) Hc Ot Manual Therapy Ea 15 Min      46035 (CPT®) Hc Ot Iontophoresis Ea 15 Min      91828 (CPT®) Hc Ot Elec Stim Ea-Per 15 Min      53015 (CPT®) Hc Ot Ultrasound Ea 15 Min      21929 (CPT®) Hc Ot Self Care/Mgmt/Train Ea 15 Min 12 1    Total  18 1        Therapy Charges for Today     Code Description Service Date Service Provider Modifiers Qty    17013800743 HC OT SELF CARE/MGMT/TRAIN EA 15 MIN 10/15/2018 Katerin Shine, AYANA GO 1               Katerin Shine OT  10/15/2018    Electronically signed by Katerin Shine OT at 10/15/2018 11:29 AM

## 2018-10-15 NOTE — THERAPY TREATMENT NOTE
Acute Care - Occupational Therapy Treatment Note  [facility]     Patient Name: Devora Huddleston  : 1945  MRN: [id number]  Today's Date: 10/15/2018  Onset of Illness/Injury or Date of Surgery: 10/01/18  Date of Referral to OT: 10/04/18  Referring Physician: MD Emmett    Admit Date: 10/1/2018       ICD-10-CM ICD-9-CM   1. Acute respiratory failure with hypoxemia (CMS/HCC) J96.01 518.81   2. Impaired functional mobility, balance, gait, and endurance Z74.09 V49.89   3. Oropharyngeal dysphagia R13.12 787.22   4. Impaired mobility and ADLs Z74.09 799.89     Patient Active Problem List   Diagnosis   • Acute respiratory failure (CMS/HCC)   • Metabolic encephalopathy   • Acute hypoxemic respiratory failure requiring intubation and ventilatory support (CMS/HCC)   • Hyponatremia, suspect SIADH   • Hypoglycemia, resolved   • Generalized abdominal pain   • Severe malnutrition (CMS/HCC)   • Hypotension, resolved   • Multiple right rib fractures (ninth, 10th, 11th)   • Anemia   • COPD and active cigarette abuse (CMS/HCC)   • Constipation     Past Medical History:   Diagnosis Date   • Hypertension    • Stroke (CMS/HCC)      History reviewed. No pertinent surgical history.    Therapy Treatment          Rehabilitation Treatment Summary     Row Name 10/15/18 1100             Treatment Time/Intention    Discipline occupational therapist  -SD      Document Type therapy note (daily note)  -SD      Subjective Information no complaints  -SD      Mode of Treatment occupational therapy  -SD      Patient/Family Observations Pt. in bed, no family present.  -SD      Care Plan Review care plan/treatment goals reviewed;risks/benefits reviewed;current/potential barriers reviewed;patient/other agree to care plan  -SD      Total Minutes, Occupational Therapy Treatment 18  -SD      Patient Effort good  -SD      Recorded by [SD] Katerin Shine OT 10/15/18 1125      Row Name 10/15/18 1100             Vital Signs    Pre Patient  Position Supine  -SD      Intra Patient Position Sitting  -SD      Post Patient Position Sitting  -SD      Recorded by [SD] Katerin Shine OT 10/15/18 1125      Row Name 10/15/18 1100             Cognitive Assessment/Intervention- PT/OT    Affect/Mental Status (Cognitive) confused  -SD      Orientation Status (Cognition) oriented to;person  -SD      Follows Commands (Cognition) follows one step commands;50-74% accuracy;verbal cues/prompting required;repetition of directions required  -SD      Cognitive Function (Cognitive) safety deficit;attention deficit  -SD      Safety Deficit (Cognitive) moderate deficit;awareness of need for assistance;insight into deficits/self awareness;problem solving;judgment;safety precautions awareness;safety precautions follow-through/compliance  -SD      Personal Safety Interventions fall prevention program maintained;gait belt;nonskid shoes/slippers when out of bed;supervised activity  -SD      Recorded by [SD] Katerin Shine OT 10/15/18 1125      Row Name 10/15/18 1100             Bed Mobility Assessment/Treatment    Rolling Left [place] (Bed Mobility) minimum assist (75% patient effort)  -SD      Scooting/Bridging [place] (Bed Mobility) moderate assist (50% patient effort)  -SD      Supine-Sit [place] (Bed Mobility) moderate assist (50% patient effort)  -SD      Assistive Device (Bed Mobility) bed rails;draw sheet;head of bed elevated  -SD      Recorded by [SD] Katerin Shine OT 10/15/18 1125      Row Name 10/15/18 1100             Bed-Chair Transfer    Bed-Chair [place] (Transfers) maximum assist (25% patient effort)  -SD      Recorded by [SD] Katerin Shine OT 10/15/18 1125      Row Name 10/15/18 1100             Stand Pivot/Stand Step Transfer    Stand Pivot/Stand Step [place] maximum assist (25% patient effort);other (see comments)   pt. not putting her feet down on floor despite v/c's  -SD      Recorded by [SD] Fátima  Katerin Brown  10/15/18 1125      Row Name 10/15/18 1100             ADL Assessment/Intervention    22305 - OT Self Care/Mgmt Minutes 12  -SD      BADL Assessment/Intervention feeding  -SD      Recorded by [SD] Katerin Shine OT 10/15/18 1125      Row Name 10/15/18 1100             Grooming Assessment/Training    Cook Level (Grooming) hair care, combing/brushing;set up  -SD      Grooming Position edge of bed sitting  -SD      Recorded by [SD] Katerin Shine OT 10/15/18 1125      Row Name 10/15/18 1100             Self-Feeding Assessment/Training    Cook Level (Feeding) liquids to mouth;finger foods;scoop food and bring to mouth;set up  -SD      Self-Feeding Assess/Train, Spillage Amount minimal  -SD      Position (Self-Feeding) supported sitting  -SD      Recorded by [SD] Katerin Shine OT 10/15/18 1125      Row Name 10/15/18 1100             Therapeutic Exercise    88357 - OT Therapeutic Activity Minutes 6  -SD      Recorded by [SD] Katerin Shine OT 10/15/18 1125      Row Name 10/15/18 1100             Static Sitting Balance    Level of [place] (Unsupported Sitting, Static Balance) standby assist  -SD      Sitting Position (Unsupported Sitting, Static Balance) sitting on edge of bed  -SD      Time Able to Maintain Position (Unsupported Sitting, Static Balance) more than 5 minutes  -SD      Recorded by [SD] Katerin Shine OT 10/15/18 1125      Row Name 10/15/18 1100             Positioning and Restraints    Pre-Treatment Position in bed  -SD      Post Treatment Position chair  -SD      In Chair reclined;call light within reach;encouraged to call for assist;exit alarm on;legs elevated;heels elevated  -SD      Recorded by [SD] Katerin Shine OT 10/15/18 1125      Row Name 10/15/18 1100             Pain Scale: FACES Pre/Post-Treatment    Pain: FACES Scale, Pretreatment 0-->no hurt  -SD      Pain: FACES Scale, Post-Treatment 0-->no hurt   -SD      Recorded by [SD] Katerin Shine OT 10/15/18 1125      Row Name                Wound 10/01/18 1845 Bilateral medial coccyx    Wound - Properties Group Date first assessed: 10/01/18 [SW] Time first assessed: 1845 [SW] Present On Admission : yes [SW] Side: Bilateral [SW] Orientation: medial [SW] Location: coccyx [SW] Additional Comments: shearing [CP] Recorded by:  [CP] Mahsa Almanzar APRN 10/07/18 1148 [SW] Fatmata Villalobos RN 10/01/18 1905    Row Name                Wound 10/07/18 1015 Right heel blisters    Wound - Properties Group Date first assessed: 10/07/18 [CP] Time first assessed: 1015 [CP] Present On Admission : no;picture taken [CP] Side: Right [CP] Location: heel [CP] Type: blisters [CP] Additional Comments: with bruise or DTPI [CP] Recorded by:  [CP] Mahsa Almanzar APRN 10/07/18 1155    Row Name                Wound 10/06/18 1500 Left heel blisters    Wound - Properties Group Date first assessed: 10/06/18 [CP] Time first assessed: 1500 [CP] Present On Admission : no [CP2] Side: Left [CP2] Location: heel [CP2] Type: blisters [CP2] Recorded by:  [CP] Mahsa Almanzar APRN 10/07/18 1159 [CP2] Mahsa Almanzar APRN 10/07/18 1157    Row Name                Wound 10/06/18 1500 Right elbow pressure injury    Wound - Properties Group Date first assessed: 10/06/18 [CP] Time first assessed: 1500 [CP] Present On Admission : no;picture taken [CP] Side: Right [CP] Location: elbow [CP] Type: pressure injury [CP] Stage, Pressure Injury: unstageable [CP] Recorded by:  [CP] Mahsa Almanzar APRN 10/07/18 1200    Row Name 10/15/18 1100             Plan of Care Review    Plan of Care Reviewed With patient  -SD      Recorded by [SD] Katerin Shine OT 10/15/18 1125      Row Name 10/15/18 1100             Outcome Summary/Treatment Plan (OT)    Daily Summary of Progress (OT) progress toward functional goals is gradual  -SD      Barriers to Overall Progress (OT) confusion   -SD      Plan for Continued Treatment (OT) cont OT POC  -SD      Anticipated Discharge Disposition (OT) skilled nursing facility  -SD      Recorded by [SD] Katerin Shine, OT 10/15/18 1125        User Key  (r) = Recorded By, (t) = Taken By, (c) = Cosigned By    Initials Name Effective Dates Discipline    SD Katerin Shine, OT 06/08/18 -  OT    Mahsa Matthews APRN 06/08/18 -  Nurse    Fatmata Chaves RN 06/16/16 -  Nurse        Wound 10/01/18 1845 Bilateral medial coccyx (Active)   Dressing Appearance dry;intact 10/15/2018  8:00 AM   Closure None 10/15/2018  8:00 AM   Base dry 10/15/2018  8:00 AM   Periwound intact;dry;pink;blanchable 10/15/2018  8:00 AM   Drainage Amount none 10/15/2018  8:00 AM   Dressing Care, Wound dressing changed 10/14/2018  9:36 PM   Periwound Care, Wound barrier ointment applied 10/14/2018  9:36 PM       Wound 10/07/18 1015 Right heel blisters (Active)   Dressing Appearance dry;intact 10/15/2018  8:00 AM   Closure None 10/15/2018  8:00 AM   Base pink 10/15/2018  8:00 AM   Periwound intact;dry;pink;blanchable 10/15/2018  8:00 AM   Drainage Amount none 10/15/2018  8:00 AM   Care, Wound cleansed with;sterile normal saline 10/14/2018  9:36 PM   Dressing Care, Wound petroleum-based;low-adherent 10/14/2018  9:36 PM       Wound 10/06/18 1500 Left heel blisters (Active)   Dressing Appearance dry;intact 10/15/2018  8:00 AM   Closure None 10/15/2018  8:00 AM   Base red/granulating 10/15/2018  8:00 AM   Periwound pink;blanchable 10/15/2018  8:00 AM   Drainage Characteristics/Odor serosanguineous 10/15/2018  8:00 AM   Drainage Amount scant 10/15/2018  8:00 AM   Care, Wound cleansed with;sterile normal saline 10/14/2018  9:36 PM   Dressing Care, Wound petroleum-based;low-adherent 10/14/2018  9:36 PM       Wound 10/06/18 1500 Right elbow pressure injury (Active)   Dressing Appearance dry;intact 10/15/2018  8:00 AM   Closure None 10/15/2018  8:00 AM   Base pink 10/15/2018   8:00 AM   Drainage Amount none 10/15/2018  8:00 AM   Care, Wound cleansed with;sterile normal saline 10/14/2018  9:36 PM   Dressing Care, Wound low-adherent 10/14/2018  9:36 PM             OT Rehab Goals     Row Name 10/15/18 1100             Transfer Goal 1 (OT)    Progress/Outcome (Transfer Goal 1, OT) goal ongoing  -SD         Dressing Goal 1 (OT)    Progress/Outcome (Dressing Goal 1, OT) goal ongoing  -SD         Toileting Goal 1 (OT)    Progress/Outcome (Toileting Goal 1, OT) goal ongoing  -SD         Strength Goal 1 (OT)    Progress/Outcome (Strength Goal 1, OT) goal ongoing  -SD        User Key  (r) = Recorded By, (t) = Taken By, (c) = Cosigned By    Initials Name Provider Type Discipline    Katerin Luz, OT Occupational Therapist OT        Occupational Therapy Education     Title: PT OT SLP Therapies (Active)     Topic: Occupational Therapy (Active)     Point: ADL training (Done)     Description: Instruct learner(s) on proper safety adaptation and remediation techniques during self care or transfers.   Instruct in proper use of assistive devices.   Learning Progress Summary     Learner Status Readiness Method Response Comment Documented by    Patient Done Acceptance E VU,NR sequencing with bed mobility, safety with transfer,  UE HEP AC 10/12/18 1445     Active Acceptance E NR Pt educated on appropriate safety precautions, t/f techniques, role of OT, positioning, and benefits of therapy. CL 10/05/18 1009          Point: Precautions (Active)     Description: Instruct learner(s) on prescribed precautions during self-care and functional transfers.   Learning Progress Summary     Learner Status Readiness Method Response Comment Documented by    Patient Active Acceptance E NR Pt educated on appropriate safety precautions, t/f techniques, role of OT, positioning, and benefits of therapy. CL 10/05/18 1009          Point: Body mechanics (Active)     Description: Instruct learner(s) on proper positioning  and spine alignment during self-care, functional mobility activities and/or exercises.   Learning Progress Summary     Learner Status Readiness Method Response Comment Documented by    Patient Active Acceptance E NR Pt educated on appropriate safety precautions, t/f techniques, role of OT, positioning, and benefits of therapy. CL 10/05/18 1009                      User Key     Initials Effective Dates Name Provider Type Discipline    AC 06/23/15 -  Anna Best, OT Occupational Therapist OT    CL 04/03/18 -  Helena Thomas, AYANA Occupational Therapist OT                OT Recommendation and Plan  Outcome Summary/Treatment Plan (OT)  Daily Summary of Progress (OT): progress toward functional goals is gradual  Barriers to Overall Progress (OT): confusion  Plan for Continued Treatment (OT): cont OT POC  Anticipated Discharge Disposition (OT): skilled nursing facility  Daily Summary of Progress (OT): progress toward functional goals is gradual  Plan of Care Review  Plan of Care Reviewed With: patient  Plan of Care Reviewed With: patient  Outcome Summary: Pt. supine to sit on EOB with mod A. Pt. sat EOB with SBA & completed grooming skills. Pt. feeding herself. Pt. holding food in her mouth & chewing it repeatedly despite cue to swallow. Pt. t/f EOB to chair with max A. Pt. not putting her feet on the floor despite v/c's, stating her socks are too big.         Outcome Measures     Row Name 10/15/18 1100 10/13/18 1148          How much help from another person do you currently need...    Turning from your back to your side while in flat bed without using bedrails?  -- 3  -VG     Moving from lying on back to sitting on the side of a flat bed without bedrails?  -- 2  -VG     Moving to and from a bed to a chair (including a wheelchair)?  -- 2  -VG     Standing up from a chair using your arms (e.g., wheelchair, bedside chair)?  -- 1  -VG     Climbing 3-5 steps with a railing?  -- 1  -VG     To walk in hospital room?  -- 1  -VG      AM-PAC 6 Clicks Score  -- 10  -VG        How much help from another is currently needed...    Putting on and taking off regular lower body clothing? 2  -SD  --     Bathing (including washing, rinsing, and drying) 2  -SD  --     Toileting (which includes using toilet bed pan or urinal) 2  -SD  --     Putting on and taking off regular upper body clothing 2  -SD  --     Taking care of personal grooming (such as brushing teeth) 3  -SD  --     Eating meals 3  -SD  --     Score 14  -SD  --        Functional Assessment    Outcome Measure Options AM-PAC 6 Clicks Daily Activity (OT)  -SD  --       User Key  (r) = Recorded By, (t) = Taken By, (c) = Cosigned By    Initials Name Provider Type    Katerin Luz, OT Occupational Therapist    Gemma Garcia, PT Physical Therapist           Time Calculation:         Time Calculation- OT     Row Name 10/15/18 1100             Time Calculation- OT    OT Start Time 1100  -SD      OT Stop Time 1118  -SD      OT Time Calculation (min) 18 min  -SD      OT Received On 10/15/18  -SD      OT Goal Re-Cert Due Date 10/25/18  -SD         Timed Charges    10211 - OT Therapeutic Activity Minutes 6  -SD      92137 - OT Self Care/Mgmt Minutes 12  -SD        User Key  (r) = Recorded By, (t) = Taken By, (c) = Cosigned By    Initials Name Provider Type    Katerin Luz, OT Occupational Therapist           Therapy Suggested Charges     Code   Minutes Charges    84156 (CPT®) Hc Ot Neuromusc Re Education Ea 15 Min      70301 (CPT®) Hc Ot Ther Proc Ea 15 Min      63274 (CPT®) Hc Ot Therapeutic Act Ea 15 Min 6     56858 (CPT®) Hc Ot Manual Therapy Ea 15 Min      56292 (CPT®) Hc Ot Iontophoresis Ea 15 Min      34208 (CPT®) Hc Ot Elec Stim Ea-Per 15 Min      09245 (CPT®) Hc Ot Ultrasound Ea 15 Min      70461 (CPT®) Hc Ot Self Care/Mgmt/Train Ea 15 Min 12 1    Total  18 1        Therapy Charges for Today     Code Description Service Date Service Provider Modifiers Qty     [id number] HC OT SELF CARE/MGMT/TRAIN EA 15 MIN 10/15/2018 Katerin Shine, OT GO 1               Katerin Shine, OT  10/15/2018

## 2018-10-15 NOTE — PROGRESS NOTES
"   LOS: 14 days    Patient Care Team:  Evelyn Francisco MD as PCP - General (Family Medicine)    Reason For Visit:  F/U HYPONATREMIA.  Subjective           Review of Systems:    Pulm: No soa   CV:  No CP      Objective       clopidogrel 75 mg Oral Daily   docusate sodium 100 mg Nasogastric BID   enoxaparin 30 mg Subcutaneous Nightly   famotidine 20 mg Oral BID   multivitamin 1 tablet Oral Daily   polyethylene glycol 17 g Oral Daily   sennosides-docusate sodium 2 tablet Oral Nightly   tolvaptan 15 mg Oral Daily            Vital Signs:  Blood pressure 129/63, pulse 58, temperature 98.1 °F (36.7 °C), temperature source Oral, resp. rate 16, height 157.5 cm (62.01\"), weight 43.3 kg (95 lb 8 oz), SpO2 96 %.    Flowsheet Rows      First Filed Value   Admission Height  157.5 cm (62\") Documented at 10/01/2018 1700   Admission Weight  36.3 kg (80 lb) Documented at 10/01/2018 1700          10/14 0701 - 10/15 0700  In: 120 [P.O.:120]  Out: -     Physical Exam:    General Appearance: NAD, alert and cooperative, Ox3  Eyes: PER, conjunctivae and sclerae normal, no icterus  Lungs: respirations regular and unlabored, no crepitus, clear to auscultation  Heart/CV: regular rhythm & normal rate, no murmur, no gallop, no rub and no edema  Abdomen: not distended, soft, non-tender, no masses,  bowel sounds present  Skin: No rash, Warm and dry    Radiology:            Labs:    Results from last 7 days  Lab Units 10/12/18  1004 10/11/18  0538 10/10/18  0618   WBC 10*3/mm3 8.21 7.40 8.13   HEMOGLOBIN g/dL 9.1* 9.3* 10.2*   HEMATOCRIT % 28.0* 27.6* 30.3*   PLATELETS 10*3/mm3 378 359 365       Results from last 7 days  Lab Units 10/15/18  0851 10/14/18  1205 10/13/18  0803 10/13/18  0041  10/12/18  1004  10/11/18  0538  10/10/18  0618 10/09/18  2034 10/09/18  0547   SODIUM mmol/L 124* 127* 127* 127*  < > 129*  < > 125*  < > 125* 123* 127*   POTASSIUM mmol/L 4.0 4.5  --   --   --  3.8  --  3.8  --  3.8  --  4.1   CHLORIDE mmol/L 95* 97*  --   " --   --  97*  --  94*  --  92*  --  92*   CO2 mmol/L 25.0 23.0  --   --   --  23.0  --  22.0  --  26.0  --  27.0   BUN mg/dL 12 12  --   --   --  16  --  16  --  17  --  15   CREATININE mg/dL 0.32* 0.28*  --   --   --  0.31*  --  0.31*  --  0.31*  --  0.27*   CALCIUM mg/dL 8.5* 8.5*  --   --   --  8.3*  --  7.8*  --  8.0*  --  8.5*   PHOSPHORUS mg/dL 3.4  --   --   --   --   --   --   --   --   --   --   --    MAGNESIUM mg/dL 1.5  --   --   --   --   --   --   --   --  2.3 1.4 1.6   ALBUMIN g/dL 3.39  --   --   --   --   --   --   --   --   --   --   --    < > = values in this interval not displayed.    Results from last 7 days  Lab Units 10/15/18  0851   GLUCOSE mg/dL 88         Results from last 7 days  Lab Units 10/15/18  0851   ALK PHOS U/L 102*   BILIRUBIN mg/dL 0.4   ALT (SGPT) U/L 31   AST (SGOT) U/L 25                 Estimated Creatinine Clearance: 44.1 mL/min (A) (by C-G formula based on SCr of 0.32 mg/dL (L)).      Assessment       Acute hypoxemic respiratory failure requiring intubation and ventilatory support (CMS/HCC)    Metabolic encephalopathy    Hyponatremia, suspect SIADH    Hypoglycemia, resolved    Generalized abdominal pain    Severe malnutrition (CMS/HCC)    Hypotension, resolved    Multiple right rib fractures (ninth, 10th, 11th)    Anemia    COPD and active cigarette abuse (CMS/HCC)    Constipation            Impression: HYPONATREMIA IS WORSE. START TOLVAPTAN.            Recommendations: AS ABOVE      Sage Nunes MD  10/15/18  11:28 AM

## 2018-10-15 NOTE — THERAPY PROGRESS REPORT/RE-CERT
Acute Care - Physical Therapy Progress Note  [facility]     Patient Name: Devora Huddleston  : 1945  MRN: [id number]  Today's Date: 10/15/2018  Onset of Illness/Injury or Date of Surgery: 10/01/18  Date of Referral to PT: 10/04/18  Referring Physician: MD Emmett    Admit Date: 10/1/2018    Visit Dx:    ICD-10-CM ICD-9-CM   1. Acute respiratory failure with hypoxemia (CMS/HCC) J96.01 518.81   2. Impaired functional mobility, balance, gait, and endurance Z74.09 V49.89   3. Oropharyngeal dysphagia R13.12 787.22   4. Impaired mobility and ADLs Z74.09 799.89     Patient Active Problem List   Diagnosis   • Acute respiratory failure (CMS/HCC)   • Metabolic encephalopathy   • Acute hypoxemic respiratory failure requiring intubation and ventilatory support (CMS/HCC)   • Hyponatremia, suspect SIADH   • Hypoglycemia, resolved   • Generalized abdominal pain   • Severe malnutrition (CMS/HCC)   • Hypotension, resolved   • Multiple right rib fractures (ninth, 10th, 11th)   • Anemia   • COPD and active cigarette abuse (CMS/HCC)   • Constipation       Therapy Treatment          Rehabilitation Treatment Summary     Row Name 10/15/18 1430 10/15/18 1100          Treatment Time/Intention    Discipline physical therapist  -MB occupational therapist  -SD     Document Type progress note/recertification  -MB therapy note (daily note)  -SD     Subjective Information no complaints  -MB no complaints  -SD     Mode of Treatment physical therapy  -MB occupational therapy  -SD     Patient/Family Observations Pt. reclined in chair, on RA, IV heplocked.  RN consent for PT.   -MB2 Pt. in bed, no family present.  -SD     Care Plan Review care plan/treatment goals reviewed;risks/benefits reviewed;current/potential barriers reviewed;patient/other agree to care plan  -MB2 care plan/treatment goals reviewed;risks/benefits reviewed;current/potential barriers reviewed;patient/other agree to care plan  -SD     Therapy Frequency (PT Clinical  Impression) daily  -MB2  --     Total Minutes, Occupational Therapy Treatment  -- 18  -SD     Patient Effort good  -MB2 good  -SD     Existing Precautions/Restrictions fall  -MB2  --     Recorded by [MB] Rosina Bautista, PT 10/15/18 1520  [MB2] Rosina Bautista, PT 10/15/18 1522 [SD] Katerin Shine, OT 10/15/18 1125     Row Name 10/15/18 1430 10/15/18 1100          Vital Signs    Pre Systolic BP Rehab 102  -MB  --     Pre Treatment Diastolic BP 62  -MB  --     Pre Patient Position Sitting  -MB Supine  -SD     Intra Patient Position Standing  -MB Sitting  -SD     Post Patient Position Sitting  -MB Sitting  -SD     Recorded by [MB] Rosina Bautista, PT 10/15/18 1535 [SD] Katerin Shine, OT 10/15/18 1125     Row Name 10/15/18 1430 10/15/18 1100          Cognitive Assessment/Intervention- PT/OT    Affect/Mental Status (Cognitive) confused  -MB confused  -SD     Orientation Status (Cognition) oriented to;person  -MB oriented to;person  -SD     Follows Commands (Cognition) follows one step commands;50-74% accuracy;physical/tactile prompts required;repetition of directions required;verbal cues/prompting required  -MB follows one step commands;50-74% accuracy;verbal cues/prompting required;repetition of directions required  -SD     Cognitive Function (Cognitive) attention deficit;safety deficit  -MB safety deficit;attention deficit  -SD     Safety Deficit (Cognitive) moderate deficit;awareness of need for assistance;insight into deficits/self awareness;safety precautions awareness  -MB moderate deficit;awareness of need for assistance;insight into deficits/self awareness;problem solving;judgment;safety precautions awareness;safety precautions follow-through/compliance  -SD     Personal Safety Interventions fall prevention program maintained;gait belt;muscle strengthening facilitated;nonskid shoes/slippers when out of bed  -MB fall prevention program maintained;gait belt;nonskid shoes/slippers when  out of bed;supervised activity  -SD     Recorded by [MB] Rosina Bautista, PT 10/15/18 1535 [SD] Katerin Shine, OT 10/15/18 1125     Row Name 10/15/18 1430 10/15/18 1100          Bed Mobility Assessment/Treatment    Rolling Left [place] (Bed Mobility) minimum assist (75% patient effort);verbal cues;nonverbal cues (demo/gesture)  -MB minimum assist (75% patient effort)  -SD     Rolling Right [place] (Bed Mobility) minimum assist (75% patient effort);verbal cues  -MB  --     Scooting/Bridging [place] (Bed Mobility)  -- moderate assist (50% patient effort)  -SD     Supine-Sit [place] (Bed Mobility)  -- moderate assist (50% patient effort)  -SD     Assistive Device (Bed Mobility)  -- bed rails;draw sheet;head of bed elevated  -SD     Comment (Bed Mobility) Pt. incontinent.  Pt. reclined to near supine, rolled L/R for dependent hygiene and complete linen change.  -MB  --     Recorded by [MB] Rosina Bautista, PT 10/15/18 1541 [SD] Katerin Shine, OT 10/15/18 1125     Row Name 10/15/18 1430             Transfer Assessment/Treatment    Transfer Assessment/Treatment sit-stand transfer;stand-sit transfer  -MB      Comment (Transfers) Pt. transferred sit to stand from chair w/ max VCs/tactile cues for safe hand placement and sequencing.  Pt. demo. difficulty w/ anterior weight shift, requiring assist to shift weight over HOSSEIN.    -MB      Recorded by [MB] Rosina Bautista, PT 10/15/18 1535      Row Name 10/15/18 1100             Bed-Chair Transfer    Bed-Chair [place] (Transfers) maximum assist (25% patient effort)  -SD      Recorded by [SD] Katerin Shine, OT 10/15/18 1125      Row Name 10/15/18 1430             Sit-Stand Transfer    Sit-Stand [place] (Transfers) moderate assist (50% patient effort);verbal cues;nonverbal cues (demo/gesture)  -MB      Assistive Device (Sit-Stand Transfers) walker, front-wheeled  -MB      Recorded by [MB] Rosina Bautista, PT  10/15/18 1535      Row Name 10/15/18 1430             Stand-Sit Transfer    Stand-Sit [place] (Transfers) moderate assist (50% patient effort);verbal cues;nonverbal cues (demo/gesture)  -MB      Assistive Device (Stand-Sit Transfers) walker, front-wheeled  -MB      Recorded by [MB] Rosina Bautista, PT 10/15/18 1535      Row Name 10/15/18 1100             Stand Pivot/Stand Step Transfer    Stand Pivot/Stand Step [place] maximum assist (25% patient effort);other (see comments)   pt. not putting her feet down on floor despite v/c's  -SD      Recorded by [SD] Katerin Shine, OT 10/15/18 1125      Row Name 10/15/18 1430             Gait/Stairs Assessment/Training    [place] Level (Gait) moderate assist (50% patient effort);verbal cues;nonverbal cues (demo/gesture)  -MB      Assistive Device (Gait) walker, front-wheeled  -MB      Distance in Feet (Gait) 2  -MB      Pattern (Gait) step-to  -MB      Deviations/Abnormal Patterns (Gait) ataxic;base of support, narrow  -MB      Bilateral Gait Deviations forward flexed posture  -MB      Comment (Gait/Stairs) Pt. demonstrated step to gait pattern w/ increased forward flexion; VCs /tactile cues for upright posture and increased HOSSEIN.  Pt. required assist to manage RW; followed closely w/ chair.  -MB      Recorded by [MB] Rosina Bautista, PT 10/15/18 1535      Row Name 10/15/18 1100             ADL Assessment/Intervention    90870 - OT Self Care/Mgmt Minutes 12  -SD      BADL Assessment/Intervention feeding  -SD      Recorded by [SD] Katerin Shine, OT 10/15/18 1125      Row Name 10/15/18 1100             Grooming Assessment/Training    [place] Level (Grooming) hair care, combing/brushing;set up  -SD      Grooming Position edge of bed sitting  -SD      Recorded by [SD] Katerin Shine OT 10/15/18 1125      Row Name 10/15/18 1100             Self-Feeding Assessment/Training    [place] Level (Feeding) liquids to mouth;finger  foods;scoop food and bring to mouth;set up  -SD      Self-Feeding Assess/Train, Spillage Amount minimal  -SD      Position (Self-Feeding) supported sitting  -SD      Recorded by [SD] Katerin Shine, OT 10/15/18 1125      Row Name 10/15/18 1100             Therapeutic Exercise    11385 - OT Therapeutic Activity Minutes 6  -SD      Recorded by [SD] Katerin Shine OT 10/15/18 1125      Row Name 10/15/18 1430             Lower Extremity Seated Therapeutic Exercise    Performed, Seated Lower Extremity (Therapeutic Exercise) hip flexion/extension;hip abduction/adduction;ankle dorsiflexion/plantarflexion;LAQ (long arc quad), knee extension   glut sets  -MB      Exercise Type, Seated Lower Extremity (Therapeutic Exercise) AROM (active range of motion);AAROM (active assistive range of motion)  -MB      Sets/Reps Detail, Seated Lower Extremity (Therapeutic Exercise) 1/10, BLEs  -MB      Recorded by [MB] Rosina Bautista, PT 10/15/18 1535      Row Name 10/15/18 1100             Static Sitting Balance    Level of [place] (Unsupported Sitting, Static Balance) standby assist  -SD      Sitting Position (Unsupported Sitting, Static Balance) sitting on edge of bed  -SD      Time Able to Maintain Position (Unsupported Sitting, Static Balance) more than 5 minutes  -SD      Recorded by [SD] Katerin Shine, OT 10/15/18 1125      Row Name 10/15/18 1430             Static Standing Balance    Level of [place] (Supported Standing, Static Balance) moderate assist, 50 to 74% patient effort  -MB      Time Able to Maintain Position (Supported Standing, Static Balance) 1 to 2 minutes  -MB      Assistive Device Utilized (Supported Standing, Static Balance) rolling walker  -MB      Recorded by [MB] Rosina Bautista, PT 10/15/18 1535      Row Name 10/15/18 1430             Balance Interventions    Training Strategies (Balance) --   lateral/A-P weight shifting, upright posture  -MB      Recorded by [MB]  Rosina Bautista, PT 10/15/18 1535      Row Name 10/15/18 1430 10/15/18 1100          Positioning and Restraints    Pre-Treatment Position sitting in chair/recliner  -MB in bed  -SD     Post Treatment Position chair  -MB chair  -SD     In Chair notified nsg;reclined;encouraged to call for assist;call light within reach;exit alarm on;legs elevated;heels elevated;waffle cushion  -MB reclined;call light within reach;encouraged to call for assist;exit alarm on;legs elevated;heels elevated  -SD     Recorded by [MB] Rosina Bautista, PT 10/15/18 1535 [SD] Katerin Shine, OT 10/15/18 1125     Row Name 10/15/18 1430             Pain Assessment    Additional Documentation Pain Scale: Numbers Pre/Post-Treatment (Group)  -MB      Recorded by [MB] Rosina Bautista, PT 10/15/18 1535      Row Name 10/15/18 1430             Pain Scale: Numbers Pre/Post-Treatment    Pain Scale: Numbers, Pretreatment 0/10 - no pain  -MB      Pain Scale: Numbers, Post-Treatment 0/10 - no pain  -MB      Recorded by [MB] Rosina Bautista, PT 10/15/18 1535      Row Name 10/15/18 1430 10/15/18 1100          Pain Scale: FACES Pre/Post-Treatment    Pain: FACES Scale, Pretreatment 0-->no hurt  -MB 0-->no hurt  -SD     Pain: FACES Scale, Post-Treatment 0-->no hurt  -MB 0-->no hurt  -SD     Recorded by [MB] Rosina Bautista, PT 10/15/18 1535 [SD] Katerin Shine, OT 10/15/18 1125     Row Name                Wound 10/01/18 1845 Bilateral medial coccyx    Wound - Properties Group Date first assessed: 10/01/18 [SW] Time first assessed: 1845 [SW] Present On Admission : yes [SW] Side: Bilateral [SW] Orientation: medial [SW] Location: coccyx [SW] Additional Comments: shearing [CP] Recorded by:  [CP] Mahsa Almanzar APRN 10/07/18 1148 [SW] Fatmata Villalobos RN 10/01/18 1905    Row Name                Wound 10/07/18 1015 Right heel blisters    Wound - Properties Group Date first assessed: 10/07/18 [CP] Time first assessed: 1015  [CP] Present On Admission : no;picture taken [CP] Side: Right [CP] Location: heel [CP] Type: blisters [CP] Additional Comments: with bruise or DTPI [CP] Recorded by:  [CP] Mahsa Almanzar, APRN 10/07/18 1155    Row Name                Wound 10/06/18 1500 Left heel blisters    Wound - Properties Group Date first assessed: 10/06/18 [CP] Time first assessed: 1500 [CP] Present On Admission : no [CP2] Side: Left [CP2] Location: heel [CP2] Type: blisters [CP2] Recorded by:  [CP] Mahsa Almanzar, APRN 10/07/18 1159 [CP2] Mahsa Almanzar, APRN 10/07/18 1157    Row Name                Wound 10/06/18 1500 Right elbow pressure injury    Wound - Properties Group Date first assessed: 10/06/18 [CP] Time first assessed: 1500 [CP] Present On Admission : no;picture taken [CP] Side: Right [CP] Location: elbow [CP] Type: pressure injury [CP] Stage, Pressure Injury: unstageable [CP] Recorded by:  [CP] Masha Almanzar, APRN 10/07/18 1200    Row Name 10/15/18 1430 10/15/18 1100          Plan of Care Review    Plan of Care Reviewed With patient  -MB patient  -SD     Recorded by [MB] Rosina Bautista, PT 10/15/18 1535 [SD] Katerin Shine, OT 10/15/18 1125     Row Name 10/15/18 1100             Outcome Summary/Treatment Plan (OT)    Daily Summary of Progress (OT) progress toward functional goals is gradual  -SD      Barriers to Overall Progress (OT) confusion  -SD      Plan for Continued Treatment (OT) cont OT POC  -SD      Anticipated Discharge Disposition (OT) skilled nursing facility  -SD      Recorded by [SD] Katerin Shine, OT 10/15/18 1125      Row Name 10/15/18 1430             Outcome Summary/Treatment Plan (PT)    Daily Summary of Progress (PT) progress toward functional goals as expected  -MB      Anticipated Discharge Disposition (PT) skilled nursing facility  -MB      Recorded by [MB] Rosina Bautista, PT 10/15/18 1535        User Key  (r) = Recorded By, (t) = Taken By, (c) = Cosigned By     Initials Name Effective Dates Discipline    Katerin Luz, OT 06/08/18 -  OT    Mahsa Matthews, APRN 06/08/18 -  Nurse    Rosina Terrell, PT 03/14/16 -  PT    Fatmata Chaves RN 06/16/16 -  Nurse          Wound 10/01/18 1845 Bilateral medial coccyx (Active)   Dressing Appearance dry;intact 10/15/2018  8:00 AM   Closure None 10/15/2018  8:00 AM   Base dry 10/15/2018  8:00 AM   Periwound intact;dry;pink;blanchable 10/15/2018  8:00 AM   Drainage Amount none 10/15/2018  8:00 AM   Dressing Care, Wound dressing changed 10/14/2018  9:36 PM   Periwound Care, Wound barrier ointment applied 10/14/2018  9:36 PM       Wound 10/07/18 1015 Right heel blisters (Active)   Dressing Appearance dry;intact 10/15/2018  8:00 AM   Closure None 10/15/2018  8:00 AM   Base pink 10/15/2018  8:00 AM   Periwound intact;dry;pink;blanchable 10/15/2018  8:00 AM   Drainage Amount none 10/15/2018  8:00 AM   Care, Wound cleansed with;sterile normal saline 10/14/2018  9:36 PM   Dressing Care, Wound dressing changed 10/15/2018  2:00 PM       Wound 10/06/18 1500 Left heel blisters (Active)   Dressing Appearance dry;intact 10/15/2018  8:00 AM   Closure None 10/15/2018  8:00 AM   Base red/granulating 10/15/2018  8:00 AM   Periwound pink;blanchable 10/15/2018  8:00 AM   Drainage Characteristics/Odor serosanguineous 10/15/2018  8:00 AM   Drainage Amount scant 10/15/2018  8:00 AM   Care, Wound cleansed with;sterile normal saline 10/14/2018  9:36 PM   Dressing Care, Wound dressing changed 10/15/2018  2:00 PM       Wound 10/06/18 1500 Right elbow pressure injury (Active)   Dressing Appearance dry;intact 10/15/2018  8:00 AM   Closure None 10/15/2018  8:00 AM   Base pink 10/15/2018  8:00 AM   Drainage Amount none 10/15/2018  8:00 AM   Care, Wound cleansed with;sterile normal saline 10/14/2018  9:36 PM   Dressing Care, Wound dressing changed 10/15/2018  2:00 PM               PT Rehab Goals     Row Name 10/15/18 1725              Bed Mobility Goal 1 (PT)    Activity/Assistive Device (Bed Mobility Goal 1, PT) sit to supine/supine to sit  -MB      [place] Level/Cues Needed (Bed Mobility Goal 1, PT) independent  -MB      Time Frame (Bed Mobility Goal 1, PT) long term goal (LTG);10 days  -MB      Progress/Outcomes (Bed Mobility Goal 1, PT) goal ongoing  -MB         Transfer Goal 1 (PT)    Activity/Assistive Device (Transfer Goal 1, PT) sit-to-stand/stand-to-sit  -MB      [place] Level/Cues Needed (Transfer Goal 1, PT) independent  -MB      Time Frame (Transfer Goal 1, PT) long term goal (LTG);10 days  -MB      Progress/Outcome (Transfer Goal 1, PT) goal ongoing  -MB         Gait Training Goal 1 (PT)    Activity/Assistive Device (Gait Training Goal 1, PT) gait (walking locomotion);assistive device use;walker, rolling  -MB      [place] Level (Gait Training Goal 1, PT) minimum assist (75% or more patient effort)  -MB      Distance (Gait Goal 1, PT) 100  -MB      Time Frame (Gait Training Goal 1, PT) long term goal (LTG);10 days  -MB      Progress/Outcome (Gait Training Goal 1, PT) goal ongoing  -MB         Patient Education Goal (PT)    Activity (Patient Education Goal, PT) HEP  -MB      [place]/Cues/Accuracy (Memory Goal 2, PT) verbalizes understanding  -MB      Time Frame (Patient Education Goal, PT) long term goal (LTG);10 days  -MB      Progress/Outcome (Patient Education Goal, PT) goal ongoing  -MB        User Key  (r) = Recorded By, (t) = Taken By, (c) = Cosigned By    Initials Name Provider Type Discipline    Rosina Terrell, PT Physical Therapist PT          Physical Therapy Education     Title: PT OT SLP Therapies (Active)     Topic: Physical Therapy (Active)     Point: Mobility training (Active)    Learning Progress Summary     Learner Status Readiness Method Response Comment Documented by    Patient Active Acceptance E,D NR  DM 10/07/18 2014     Active Acceptance E NR  SIXTO 10/04/18 1500          Point: Home  exercise program (Active)    Learning Progress Summary     Learner Status Readiness Method Response Comment Documented by    Patient Active Nonacceptance E NR Educated on HEP, constant cues for redirection and technique. Reinforcement needed. VG 10/13/18 1148     Active Acceptance E NR Educated on HEP and benefits of activity. VG 10/09/18 1323     Active Acceptance E,D NR  DM 10/07/18 1438     Active Acceptance E NR  SIXTO 10/04/18 1500          Point: Body mechanics (Active)    Learning Progress Summary     Learner Status Readiness Method Response Comment Documented by    Patient Active Acceptance E,D NR  DM 10/07/18 1438     Active Acceptance E NR  SIXTO 10/04/18 1500          Point: Precautions (Active)    Learning Progress Summary     Learner Status Readiness Method Response Comment Documented by    Patient Active Acceptance E,D NR   10/07/18 1438     Active Acceptance E NR   10/04/18 1500                      User Key     Initials Effective Dates Name Provider Type Discipline     06/19/15 -  Althea Ruth, PT Physical Therapist PT     06/19/15 -  Terri Magaña, PT Physical Therapist PT     05/29/18 -  Gemma Leigh, PT Physical Therapist PT                    PT Recommendation and Plan  Anticipated Discharge Disposition (PT): skilled nursing facility  Therapy Frequency (PT Clinical Impression): daily  Outcome Summary/Treatment Plan (PT)  Daily Summary of Progress (PT): progress toward functional goals as expected  Anticipated Discharge Disposition (PT): skilled nursing facility  Plan of Care Reviewed With: patient  Progress: improving  Outcome Summary: Pt. demonstrates gradual improvement w/ mobility.  Pt. able to transfer sit to stand w/ RW and modA and ambulate 2ft w/ RW and mod A, following closely w/ chair.  Recommend cont IPPT per POC.          Outcome Measures     Row Name 10/15/18 1430 10/15/18 1100 10/13/18 1148       How much help from another person do you currently need...    Turning  from your back to your side while in flat bed without using bedrails? 3  -MB  -- 3  -VG    Moving from lying on back to sitting on the side of a flat bed without bedrails? 2  -MB  -- 2  -VG    Moving to and from a bed to a chair (including a wheelchair)? 2  -MB  -- 2  -VG    Standing up from a chair using your arms (e.g., wheelchair, bedside chair)? 2  -MB  -- 1  -VG    Climbing 3-5 steps with a railing? 1  -MB  -- 1  -VG    To walk in hospital room? 2  -MB  -- 1  -VG    AM-PAC 6 Clicks Score 12  -MB  -- 10  -VG       How much help from another is currently needed...    Putting on and taking off regular lower body clothing?  -- 2  -SD  --    Bathing (including washing, rinsing, and drying)  -- 2  -SD  --    Toileting (which includes using toilet bed pan or urinal)  -- 2  -SD  --    Putting on and taking off regular upper body clothing  -- 2  -SD  --    Taking care of personal grooming (such as brushing teeth)  -- 3  -SD  --    Eating meals  -- 3  -SD  --    Score  -- 14  -SD  --       Functional Assessment    Outcome Measure Options AM-PAC 6 Clicks Basic Mobility (PT)  -MB AM-[facility] 6 Clicks Daily Activity (OT)  -SD  --      User Key  (r) = Recorded By, (t) = Taken By, (c) = Cosigned By    Initials Name Provider Type    Katerin Luz, OT Occupational Therapist    Rosina Terrell, PT Physical Therapist    Gemma Garcia, PT Physical Therapist           Time Calculation:         PT Charges     Row Name 10/15/18 1430             Time Calculation    Start Time 1430  -MB      PT Received On 10/15/18  -MB      PT Goal Re-Cert Due Date 10/25/18  -MB         Time Calculation- PT    Total Timed Code Minutes- PT 38 minute(s)  -MB         Timed Charges    53096 - PT Therapeutic Exercise Minutes 24  -MB      63115 - Gait Training Minutes  14  -MB        User Key  (r) = Recorded By, (t) = Taken By, (c) = Cosigned By    Initials Name Provider Type    Rosina Terrell, PT Physical Therapist         Therapy Suggested Charges     Code   Minutes Charges    77028 (CPT®) Hc Pt Neuromusc Re Education Ea 15 Min      73133 (CPT®) Hc Pt Ther Proc Ea 15 Min 24 2    28212 (CPT®) Hc Gait Training Ea 15 Min 14 1    36243 (CPT®) Hc Pt Therapeutic Act Ea 15 Min      14828 (CPT®) Hc Pt Manual Therapy Ea 15 Min      62116 (CPT®) Hc Pt Iontophoresis Ea 15 Min      33942 (CPT®) Hc Pt Elec Stim Ea-Per 15 Min      12088 (CPT®) Hc Pt Ultrasound Ea 15 Min      57746 (CPT®) Hc Pt Self Care/Mgmt/Train Ea 15 Min      33808 (CPT®) Hc Pt Prosthetic (S) Train Initial Encounter, Each 15 Min      62895 (CPT®) Hc Pt Orthotic(S)/Prosthetic(S) Encounter, Each 15 Min      04326 (CPT®) Hc Orthotic(S) Mgmt/Train Initial Encounter, Each 15min      Total  38 3        Therapy Charges for Today     Code Description Service Date Service Provider Modifiers Qty    77997556106 HC PT THER PROC EA 15 MIN 10/15/2018 Rosina Bautista, PT GP 2    44328004436 HC GAIT TRAINING EA 15 MIN 10/15/2018 Rosina Bautista, PT GP 1          PT G-Codes  Outcome Measure Options: AM-PAC 6 Clicks Basic Mobility (PT)  AM-PAC 6 Clicks Score: 12  Score: 14    Rosina Bautista, PT  10/15/2018

## 2018-10-15 NOTE — PROGRESS NOTES
Continued Stay Note  [facility]     Patient Name: Devora Huddleston  MRN: [id number]  Today's Date: 10/15/2018    Admit Date: 10/1/2018          Discharge Plan     Row Name 10/15/18 1405       Plan    Plan SNF update     Patient/Family in Agreement with Plan yes    Plan Comments Spoke with patient's son regarding SNF referrals - there are no beds open at the [facility] we have checked with so far, son was willing to have referral faxed to [facility] - This has been faxed [phone] and CM will follow up - Mirtha [phone]               Discharge Codes    No documentation.       Expected Discharge Date and Time     Expected Discharge Date Expected Discharge Time    Oct 6, 2018             Mirtha Laws RN

## 2018-10-15 NOTE — PLAN OF CARE
Problem: Patient Care Overview  Goal: Plan of Care Review  Outcome: Ongoing (interventions implemented as appropriate)   10/15/18 0245   Coping/Psychosocial   Plan of Care Reviewed With patient   Plan of Care Review   Progress no change   OTHER   Outcome Summary Pt appears to have slept for several hours this shift, awake now and continues to be disoriented to situation. Pt knows she is in the hospital but does not always remember she is in [place] [facility] but calm, cooperative and pleasant. VSS, maintaining in normal sinus rhythm on room air.. Waiting for SNF bed.

## 2018-10-15 NOTE — PROGRESS NOTES
[facility] Medicine Services  PROGRESS NOTE    Patient Name: Devora Huddleston  : 1945  MRN: [id number]    Date of Admission: 10/1/2018  Length of Stay: 14  Primary Care Physician: Evelyn Francisco MD    Subjective   Subjective     CC:  F/U found down unresponsive    HPI:  Awake and sitting up in chair.  She voices no complaints.  Seems more oriented today.  RN notes she is starting to eat a little more.    Review of Systems   Unable to perform ROS: Dementia         Objective   Objective     Vital Signs:   Temp:  [97.6 °F (36.4 °C)-98.9 °F (37.2 °C)] 98.9 °F (37.2 °C)  Heart Rate:  [58-92] 65  Resp:  [16] 16  BP: (102-129)/(56-75) 103/56  Total (NIH Stroke Scale): 4     Physical Exam:  Gen- no acute distress, cachectic, + muscle wasting, sitting up in chair picking at some food  HENT-NCAT, mucous membranes moist  CV-RRR, S1 S2 normal, no m/r/g  Resp - lungs clear , no wheezes or rales  Abd-soft, NT, ND, +BS  Ext-no edema  Neuro-A&Ox2, no focal deficits, follows all commands, PIKE   Skin-no rashes  Psych-appropriate mood, calm      Results Reviewed:  I have personally reviewed current lab, radiology, and data and agree.      Results from last 7 days  Lab Units 10/12/18  1004 10/11/18  0538 10/10/18  0618   WBC 10*3/mm3 8.21 7.40 8.13   HEMOGLOBIN g/dL 9.1* 9.3* 10.2*   HEMATOCRIT % 28.0* 27.6* 30.3*   PLATELETS 10*3/mm3 378 359 365       Results from last 7 days  Lab Units 10/15/18  1150 10/15/18  0851 10/14/18  1205   SODIUM mmol/L 125* 124* 127*   POTASSIUM mmol/L 4.0 4.0 4.5   CHLORIDE mmol/L 94* 95* 97*   CO2 mmol/L 24.0 25.0 23.0   BUN mg/dL 14 12 12   CREATININE mg/dL 0.32* 0.32* 0.28*   GLUCOSE mg/dL 110* 88 78   CALCIUM mg/dL 8.6* 8.5* 8.5*   ALT (SGPT) U/L  --  31  --    AST (SGOT) U/L  --  25  --      Estimated Creatinine Clearance: 44.1 mL/min (A) (by C-G formula based on SCr of 0.32 mg/dL (L)).  No results found for: BNP    Microbiology Results Abnormal     Procedure  Component Value - Date/Time    Blood Culture - Blood, [[id number]]  (Normal) Collected:  10/01/18 1745    Lab Status:  Final result Specimen:  Blood from Wrist, Right Updated:  10/06/18 1830     Blood Culture No growth at 5 days    Blood Culture - Blood, [[id number]]  (Normal) Collected:  10/01/18 1715    Lab Status:  Final result Specimen:  Blood from Arm, Right Updated:  10/06/18 1830     Blood Culture No growth at 5 days          Imaging Results (last 24 hours)     ** No results found for the last 24 hours. **        Results for orders placed during the hospital encounter of 10/01/18   Adult Transthoracic Echo Complete W/ Cont if Necessary Per Protocol    Narrative · Left ventricular systolic function is normal.  · Estimated EF appears to be in the range of 61 - 65%  · All left ventricular wall segments contract normally.  · Normal left atrial pressure.  · Right ventricular cavity is mildly dilated  · Right ventricular wall thickness is consistent with mild hypertrophy.  · Right atrial cavity size is mildly dilated. The inferior vena cava is   dilated. IVC inspiratory collapse is absent. A prominent Eustachian valve   is present.  · Moderate tricuspid valve regurgitation is present. Estimated right   ventricular systolic pressure from tricuspid regurgitation is mildly   elevated (35-45 mmHg).          I have reviewed the medications.      clopidogrel 75 mg Oral Daily   docusate sodium 100 mg Nasogastric BID   enoxaparin 30 mg Subcutaneous Nightly   famotidine 20 mg Oral BID   multivitamin 1 tablet Oral Daily   polyethylene glycol 17 g Oral Daily   sennosides-docusate sodium 2 tablet Oral Nightly   tolvaptan 15 mg Oral Daily         Assessment/Plan   Assessment / Plan     Active Hospital Problems    Diagnosis   • **Acute hypoxemic respiratory failure requiring intubation and ventilatory support (CMS/[facility])   • Constipation   • COPD and active cigarette abuse (CMS/[facility])   • Multiple right rib fractures (ninth, 10th,  11th)   • Anemia   • Metabolic encephalopathy   • Hyponatremia, suspect SIADH   • Hypoglycemia, resolved   • Generalized abdominal pain   • Severe malnutrition (CMS/HCC)   • Hypotension, resolved          Brief Hospital Course to date:  Devora Huddleston is a 73 y.o. female with hx of COPD, HTN, and CVA presents after being found down and unresponsive by her son on 10/1/18. Was intubated in the field. Glucose dropped to 39 en route (initially 105). Patient had reportedly complained of increasing weakness and weight loss for several weeks, and had recent evaluation for hyponatremia and seizures. Admitted to [facility] ICU. CT head, CT perfusion, CTA head and neck all negative. Further imaging revealed 3 right sided rib fractures, presumably from a fall at home. Possible RLL atelectasis vs. infiltrate. She was also hypotensive and requiring pressors. Treated empirically with Vanc and Zosyn however all cultures remain no growth. She was started on enteral feeds due to dysphagia. Patient extubated 10/3. Transferred to floor, hospitalists assumed care on 10/6.      Assessment & Plan:  --Stopped ATBx. Cultures no growth.  --appreciate renal assistance.  Na+ trend back down today.  Renal starting tolvaptan today  --Supportive care for rib fractures.   - BP is improved overall  - cleared for advanced diet by SLP, intake starting to  some  --mental status waxes/wanes, watch on prns  --d/w CM during MDRs today.  Working on placement.  Not yet ready d/t iniation of tolvaptan today, do not think it will be long term drug.    DVT Prophylaxis:  Lovenox    CODE STATUS:   Code Status and Medical Interventions:   Ordered at: 10/01/18 1819     Code Status:    CPR     Medical Interventions (Level of Support Prior to Arrest):    Full       Disposition: I expect the patient to be discharged TBD    Electronically signed by William Ibarra MD, 10/15/18, 5:23 PM.

## 2018-10-15 NOTE — PROGRESS NOTES
Clinical Nutrition   Reason For Visit: Follow-up protocol    Patient Name: Devora Huddleston  YOB: 1945  MRN: [id number]  Date of Encounter: 10/15/18 5:21 PM  Admission date: 10/1/2018            Nutrition Assessment     Admission Problem List:  Acute hypoxemic respiratory failure requiring intubation and ventilatory support (CMS/HCC)                    PMH: She  has a past medical history of Hypertension and Stroke (CMS/[facility]).   PSxH: She  has no past surgical history on file.        Reported/Observed/Food/Nutrition Related History     Pt states her appetite is getting better. Taking some of the supplements      Anthropometrics      10/15/2018 43.319 kg 95 lb 8 oz Bed scale   10/11/2018 45.36 kg 100 lb Bed scale   10/10/2018 44.815 kg 98 lb 12.8 oz Bed scale   10/9/2018 42.23 kg 93 lb 1.6 oz Bed scale   10/8/2018 45.45 kg 100 lb 3.2 oz Bed scale   10/4/2018 42.6 kg 93 lb 14.7 oz Bed scale   10/2/2018 (No Data)  (No Data)  -   10/2/2018 36.288 kg 80 lb -   10/1/2018 36.288 kg 80 lb Estimated         Labs reviewed   Labs reviewed: Yes  Medications reviewed   Medications reviewed: Yes    Current Nutrition Prescription   PO: Diet Soft Texture; Chopped; Thin; Cardiac  Oral Nutrition Supplement:  Boost pudding daily  Boost plus 2x/day    Evaluation of Received Nutrient/Fluid Intake:  4 Days:55% of 9 meals       Nutrition Diagnosis     Problem Inadequate oral intake   Etiology Modified diet    Signs/Symptoms 55% of 9 meals         Intervention   Intervention: Follow treatment progress, Care plan reviewed, Advise alternate selection, Encourage intake      Goal:   General: Nutrition support treatment  PO: Tolerate PO, Increase intake        Monitoring/Evaluation:       Monitoring/Evaluation: Per protocol, I&O, PO intake, Supplement intake, POC/GOC  Will Continue to follow per protocol  Ruthy Silva RD  Time Spent: 25 min

## 2018-10-15 NOTE — PLAN OF CARE
Problem: Patient Care Overview  Goal: Plan of Care Review  Outcome: Ongoing (interventions implemented as appropriate)   10/15/18 1125   Coping/Psychosocial   Plan of Care Reviewed With patient   Coping/Psychosocial   Patient Agreement with Plan of Care agrees   Plan of Care Review   Progress improving   OTHER   Outcome Summary Pt. supine to sit on EOB with mod A. Pt. sat EOB with SBA & completed grooming skills. Pt. feeding herself. Pt. holding food in her mouth & chewing it repeatedly despite cue to swallow. Pt. t/f EOB to chair with max A. Pt. not putting her feet on the floor despite v/c's, stating her socks are too big.

## 2018-10-16 LAB
ANION GAP SERPL CALCULATED.3IONS-SCNC: 5 MMOL/L (ref 3–11)
ANION GAP SERPL CALCULATED.3IONS-SCNC: 7 MMOL/L (ref 3–11)
BUN BLD-MCNC: 15 MG/DL (ref 9–23)
BUN BLD-MCNC: 17 MG/DL (ref 9–23)
BUN/CREAT SERPL: 37 (ref 7–25)
BUN/CREAT SERPL: 39.5 (ref 7–25)
CALCIUM SPEC-SCNC: 8.6 MG/DL (ref 8.7–10.4)
CALCIUM SPEC-SCNC: 8.6 MG/DL (ref 8.7–10.4)
CHLORIDE SERPL-SCNC: 97 MMOL/L (ref 99–109)
CHLORIDE SERPL-SCNC: 97 MMOL/L (ref 99–109)
CO2 SERPL-SCNC: 27 MMOL/L (ref 20–31)
CO2 SERPL-SCNC: 28 MMOL/L (ref 20–31)
CREAT BLD-MCNC: 0.38 MG/DL (ref 0.6–1.3)
CREAT BLD-MCNC: 0.46 MG/DL (ref 0.6–1.3)
GFR SERPL CREATININE-BSD FRML MDRD: 134 ML/MIN/1.73
GFR SERPL CREATININE-BSD FRML MDRD: >150 ML/MIN/1.73
GLUCOSE BLD-MCNC: 82 MG/DL (ref 70–100)
GLUCOSE BLD-MCNC: 99 MG/DL (ref 70–100)
MAGNESIUM SERPL-MCNC: 1.9 MG/DL (ref 1.3–2.7)
POTASSIUM BLD-SCNC: 3.7 MMOL/L (ref 3.5–5.5)
POTASSIUM BLD-SCNC: 4.5 MMOL/L (ref 3.5–5.5)
SODIUM BLD-SCNC: 130 MMOL/L (ref 132–146)
SODIUM BLD-SCNC: 131 MMOL/L (ref 132–146)

## 2018-10-16 PROCEDURE — 83735 ASSAY OF MAGNESIUM: CPT | Performed by: INTERNAL MEDICINE

## 2018-10-16 PROCEDURE — 80048 BASIC METABOLIC PNL TOTAL CA: CPT | Performed by: INTERNAL MEDICINE

## 2018-10-16 PROCEDURE — 25010000002 ENOXAPARIN PER 10 MG: Performed by: INTERNAL MEDICINE

## 2018-10-16 PROCEDURE — 99233 SBSQ HOSP IP/OBS HIGH 50: CPT | Performed by: INTERNAL MEDICINE

## 2018-10-16 PROCEDURE — 25010000002 LORAZEPAM PER 2 MG: Performed by: INTERNAL MEDICINE

## 2018-10-16 RX ADMIN — CLOPIDOGREL BISULFATE 75 MG: 75 TABLET ORAL at 09:53

## 2018-10-16 RX ADMIN — LORAZEPAM 0.5 MG: 2 INJECTION INTRAMUSCULAR; INTRAVENOUS at 21:28

## 2018-10-16 RX ADMIN — DOCUSATE SODIUM 100 MG: 50 LIQUID ORAL at 21:28

## 2018-10-16 RX ADMIN — LORAZEPAM 0.5 MG: 2 INJECTION INTRAMUSCULAR; INTRAVENOUS at 02:22

## 2018-10-16 RX ADMIN — LORAZEPAM 0.5 MG: 2 INJECTION INTRAMUSCULAR; INTRAVENOUS at 14:32

## 2018-10-16 RX ADMIN — SENNOSIDES AND DOCUSATE SODIUM 2 TABLET: 8.6; 5 TABLET ORAL at 21:28

## 2018-10-16 RX ADMIN — ENOXAPARIN SODIUM 30 MG: 30 INJECTION SUBCUTANEOUS at 21:28

## 2018-10-16 RX ADMIN — Medication 1 TABLET: at 09:52

## 2018-10-16 RX ADMIN — FAMOTIDINE 20 MG: 20 TABLET ORAL at 09:52

## 2018-10-16 RX ADMIN — MAGNESIUM SULFATE HEPTAHYDRATE 4 G: 40 INJECTION, SOLUTION INTRAVENOUS at 14:32

## 2018-10-16 RX ADMIN — TOLVAPTAN 15 MG: 15 TABLET ORAL at 09:53

## 2018-10-16 RX ADMIN — FAMOTIDINE 20 MG: 20 TABLET ORAL at 21:28

## 2018-10-16 NOTE — PROGRESS NOTES
[facility] Medicine Services  PROGRESS NOTE    Patient Name: Devora Huddleston  : 1945  MRN: [id number]    Date of Admission: 10/1/2018  Length of Stay: 15  Primary Care Physician: Evelyn Francisco MD    Subjective   Subjective     CC:  F/U found down unresponsive    HPI:  A little more confused today.  RN reports some ok PO intake.  No other new issues.    Review of Systems   Unable to perform ROS: Dementia         Objective   Objective     Vital Signs:   Temp:  [98.3 °F (36.8 °C)-99.8 °F (37.7 °C)] 98.5 °F (36.9 °C)  Heart Rate:  [69-78] 78  Resp:  [16-18] 18  BP: (109-132)/(54-73) 131/73  Total (NIH Stroke Scale): 4     Physical Exam:  Gen- no acute distress, cachectic, + muscle wasting, sitting up in chaird  HENT-NCAT, mucous membranes moist  CV-RRR, S1 S2 normal, no m/r/g  Resp - lungs clear , no wheezes or rales  Abd-soft, NT, ND, +BS  Ext-no edema  Neuro-A&Ox1, no focal deficits, follows all commands, PIKE   Skin-no rashes  Psych-appropriate mood, calm      Results Reviewed:  I have personally reviewed current lab, radiology, and data and agree.      Results from last 7 days  Lab Units 10/12/18  1004 10/11/18  0538 10/10/18  0618   WBC 10*3/mm3 8.21 7.40 8.13   HEMOGLOBIN g/dL 9.1* 9.3* 10.2*   HEMATOCRIT % 28.0* 27.6* 30.3*   PLATELETS 10*3/mm3 378 359 365       Results from last 7 days  Lab Units 10/16/18  0535 10/15/18  2332 10/15/18  1817  10/15/18  0851   SODIUM mmol/L 130* 131* 126*  < > 124*   POTASSIUM mmol/L 3.7 4.5 3.8  < > 4.0   CHLORIDE mmol/L 97* 97* 93*  < > 95*   CO2 mmol/L 28.0 27.0 26.0  < > 25.0   BUN mg/dL 15 17 16  < > 12   CREATININE mg/dL 0.38* 0.46* 0.46*  < > 0.32*   GLUCOSE mg/dL 82 99 110*  < > 88   CALCIUM mg/dL 8.6* 8.6* 8.4*  < > 8.5*   ALT (SGPT) U/L  --   --   --   --  31   AST (SGOT) U/L  --   --   --   --  25   < > = values in this interval not displayed.  Estimated Creatinine Clearance: 41.4 mL/min (A) (by C-G formula based on SCr of 0.38  mg/dL (L)).  No results found for: BNP    Microbiology Results Abnormal     Procedure Component Value - Date/Time    Blood Culture - Blood, [[id number]]  (Normal) Collected:  10/01/18 1745    Lab Status:  Final result Specimen:  Blood from Wrist, Right Updated:  10/06/18 1830     Blood Culture No growth at 5 days    Blood Culture - Blood, [[id number]]  (Normal) Collected:  10/01/18 1715    Lab Status:  Final result Specimen:  Blood from Arm, Right Updated:  10/06/18 1830     Blood Culture No growth at 5 days          Imaging Results (last 24 hours)     ** No results found for the last 24 hours. **        Results for orders placed during the hospital encounter of 10/01/18   Adult Transthoracic Echo Complete W/ Cont if Necessary Per Protocol    Narrative · Left ventricular systolic function is normal.  · Estimated EF appears to be in the range of 61 - 65%  · All left ventricular wall segments contract normally.  · Normal left atrial pressure.  · Right ventricular cavity is mildly dilated  · Right ventricular wall thickness is consistent with mild hypertrophy.  · Right atrial cavity size is mildly dilated. The inferior vena cava is   dilated. IVC inspiratory collapse is absent. A prominent Eustachian valve   is present.  · Moderate tricuspid valve regurgitation is present. Estimated right   ventricular systolic pressure from tricuspid regurgitation is mildly   elevated (35-45 mmHg).          I have reviewed the medications.      clopidogrel 75 mg Oral Daily   docusate sodium 100 mg Nasogastric BID   enoxaparin 30 mg Subcutaneous Nightly   famotidine 20 mg Oral BID   multivitamin 1 tablet Oral Daily   polyethylene glycol 17 g Oral Daily   sennosides-docusate sodium 2 tablet Oral Nightly   tolvaptan 15 mg Oral Daily         Assessment/Plan   Assessment / Plan     Active Hospital Problems    Diagnosis   • **Acute hypoxemic respiratory failure requiring intubation and ventilatory support (CMS/[facility])   • Constipation   • COPD  and active cigarette abuse (CMS/HCC)   • Multiple right rib fractures (ninth, 10th, 11th)   • Anemia   • Metabolic encephalopathy   • Hyponatremia, suspect SIADH   • Hypoglycemia, resolved   • Generalized abdominal pain   • Severe malnutrition (CMS/HCC)   • Hypotension, resolved          Brief Hospital Course to date:  Devora Huddleston is a 73 y.o. female with hx of COPD, HTN, and CVA presents after being found down and unresponsive by her son on 10/1/18. Was intubated in the field. Glucose dropped to 39 en route (initially 105). Patient had reportedly complained of increasing weakness and weight loss for several weeks, and had recent evaluation for hyponatremia and seizures. [facility] to [facility]. CT head, CT perfusion, CTA head and neck all negative. Further imaging revealed 3 right sided rib fractures, presumably from a fall at home. Possible RLL atelectasis vs. infiltrate. She was also hypotensive and requiring pressors. Treated empirically with Vanc and Zosyn however all cultures remain no growth. She was started on enteral feeds due to dysphagia. Patient extubated 10/3. Transferred to floor, hospitalists assumed care on 10/6.      Assessment & Plan:  --Stopped ATBx. Cultures no growth.  --appreciate renal assistance.  Received tolvaptan yesterday and today, some improvement with Na+.  Discussed with Dr. Nunes - tolvaptan will be cost prohibitive at [facility], so will plan H20 restricting only and close f/u  --Supportive care for rib fractures.   - BP is improved overall  - PO intake comes and goes  --mental status waxes/wanes, watch on prns  --d/w CM - can go to [facility] tomorrow if ready.    DVT Prophylaxis:  Lovenox    CODE STATUS:   Code Status and Medical Interventions:   Ordered at: 10/01/18 1819     Code Status:    CPR     Medical Interventions (Level of Support Prior to Arrest):    Full       Disposition: I expect the patient to be discharged TBD    Electronically signed by William Ibarra MD,  10/16/18, 5:28 PM.

## 2018-10-16 NOTE — PLAN OF CARE
Problem: Patient Care Overview  Goal: Plan of Care Review  Outcome: Ongoing (interventions implemented as appropriate)   10/16/18 6601   Coping/Psychosocial   Plan of Care Reviewed With patient   Plan of Care Review   Progress improving   OTHER   Outcome Summary WOC nurse follow-up for BLE and BUE skin abrasion and RUQ wound. At this time RUQ presents with small 0.4x0.4x0.1cm 100% dry sloughing. Continue with honey daily for wound care. Continue with current POC for upper and lower extremities. When areas reepithelialize discontinue use of xeroform and use prophylactic foam. On MARLA mattress. Please contact WOC nurse if needs arise. Thanks

## 2018-10-16 NOTE — PROGRESS NOTES
"   LOS: 15 days    Patient Care Team:  Evelyn Francisco MD as PCP - General (Family Medicine)    Reason For Visit:  F/U HYPONATREMIA.  Subjective           Review of Systems:    Pulm: No soa   CV:  No CP      Objective       clopidogrel 75 mg Oral Daily   docusate sodium 100 mg Nasogastric BID   enoxaparin 30 mg Subcutaneous Nightly   famotidine 20 mg Oral BID   multivitamin 1 tablet Oral Daily   polyethylene glycol 17 g Oral Daily   sennosides-docusate sodium 2 tablet Oral Nightly   tolvaptan 15 mg Oral Daily            Vital Signs:  Blood pressure 132/63, pulse 69, temperature 98.3 °F (36.8 °C), temperature source Oral, resp. rate 18, height 157.5 cm (62.01\"), weight 40.7 kg (89 lb 12.8 oz), SpO2 96 %.    Flowsheet Rows      First Filed Value   Admission Height  157.5 cm (62\") Documented at 10/01/2018 1700   Admission Weight  36.3 kg (80 lb) Documented at 10/01/2018 1700          10/15 0701 - 10/16 0700  In: 360 [P.O.:360]  Out: -     Physical Exam:    General Appearance: NAD, alert and cooperative, Ox3  Eyes: PER, conjunctivae and sclerae normal, no icterus  Lungs: respirations regular and unlabored, no crepitus, clear to auscultation  Heart/CV: regular rhythm & normal rate, no murmur, no gallop, no rub and no edema  Abdomen: not distended, soft, non-tender, no masses,  bowel sounds present  Skin: No rash, Warm and dry    Radiology:            Labs:    Results from last 7 days  Lab Units 10/12/18  1004 10/11/18  0538 10/10/18  0618   WBC 10*3/mm3 8.21 7.40 8.13   HEMOGLOBIN g/dL 9.1* 9.3* 10.2*   HEMATOCRIT % 28.0* 27.6* 30.3*   PLATELETS 10*3/mm3 378 359 365       Results from last 7 days  Lab Units 10/16/18  0535 10/15/18  2332 10/15/18  1817 10/15/18  1150 10/15/18  0851  10/10/18  0618 10/09/18  2034   SODIUM mmol/L 130* 131* 126* 125* 124*  < > 125* 123*   POTASSIUM mmol/L 3.7 4.5 3.8 4.0 4.0  < > 3.8  --    CHLORIDE mmol/L 97* 97* 93* 94* 95*  < > 92*  --    CO2 mmol/L 28.0 27.0 26.0 24.0 25.0  < > 26.0  " --    BUN mg/dL 15 17 16 14 12  < > 17  --    CREATININE mg/dL 0.38* 0.46* 0.46* 0.32* 0.32*  < > 0.31*  --    CALCIUM mg/dL 8.6* 8.6* 8.4* 8.6* 8.5*  < > 8.0*  --    PHOSPHORUS mg/dL  --   --   --   --  3.4  --   --   --    MAGNESIUM mg/dL 1.9  --   --   --  1.5  --  2.3 1.4   ALBUMIN g/dL  --   --   --   --  3.39  --   --   --    < > = values in this interval not displayed.    Results from last 7 days  Lab Units 10/16/18  0535   GLUCOSE mg/dL 82         Results from last 7 days  Lab Units 10/15/18  0851   ALK PHOS U/L 102*   BILIRUBIN mg/dL 0.4   ALT (SGPT) U/L 31   AST (SGOT) U/L 25                 Estimated Creatinine Clearance: 41.4 mL/min (A) (by C-G formula based on SCr of 0.38 mg/dL (L)).      Assessment       Acute hypoxemic respiratory failure requiring intubation and ventilatory support (CMS/HCC)    Metabolic encephalopathy    Hyponatremia, suspect SIADH    Hypoglycemia, resolved    Generalized abdominal pain    Severe malnutrition (CMS/HCC)    Hypotension, resolved    Multiple right rib fractures (ninth, 10th, 11th)    Anemia    COPD and active cigarette abuse (CMS/HCC)    Constipation            Impression: HYPONATREMIA IMPROVING ON TOLVAPTAN.             Recommendations: PRESENT TOLVAPTAN.      Sage Nunes MD  10/16/18  10:25 AM

## 2018-10-16 NOTE — PLAN OF CARE
Problem: Skin Injury Risk (Adult)  Goal: Identify Related Risk Factors and Signs and Symptoms  Outcome: Ongoing (interventions implemented as appropriate)      Problem: Fall Risk (Adult)  Goal: Identify Related Risk Factors and Signs and Symptoms  Outcome: Ongoing (interventions implemented as appropriate)      Problem: Patient Care Overview  Goal: Plan of Care Review  Outcome: Ongoing (interventions implemented as appropriate)   10/16/18 6110   OTHER   Outcome Summary Pt did not sleep throughout the night, even with PRN ativan given. Pt became agitated but was easily redirected. Continuing q6 BMPs to monitor sodium levels. Currently 131. VSS. Will continue to monitor.

## 2018-10-16 NOTE — PROGRESS NOTES
Continued Stay Note  [facility]     Patient Name: Devora Huddleston  MRN: [id number]  Today's Date: 10/16/2018    Admit Date: 10/1/2018          Discharge Plan     Row Name 10/16/18 1136       Plan    Plan To [facility] tomorrow     Patient/Family in Agreement with Plan yes    Plan Comments Patient has been accepted to [facility]. I have spoke with her son who plans to transport her. The facility can not accept her being on Samsca because of the extreme cost - I have notified Dr Nunes of this and he stated she would need to be on an 800 ML fluid restriction if she is not discharged on the medication. Dr Ibarra notified of this as well. I will notify the facility that she will not be coming on the Samsca. CM following - [phone]               Discharge Codes    No documentation.       Expected Discharge Date and Time     Expected Discharge Date Expected Discharge Time    Oct 6, 2018             Mirhta Laws RN

## 2018-10-16 NOTE — DISCHARGE PLACEMENT REQUEST
"Updated PT notes with wound care note     Thank you!   Mirtha   [phone]     Devora Huddleston  (73 y.o. Female)     Date of Birth Social Security Number Address Home Phone MRN    1945  [address] [facility] [address] [phone] [id number]    Sabianism Marital Status          None Unknown       Admission Date Admission Type Admitting Provider Attending Provider Department, Room/Bed    10/1/18 Emergency William Ibarra MD Dossett, Lee M, MD [facility] 6B, N631/1    Discharge Date Discharge Disposition Discharge Destination                       Attending Provider:  William Ibarra MD    Allergies:  Not on File    Isolation:  None   Infection:  None   Code Status:  CPR    Ht:  157.5 cm (62.01\")   Wt:  40.7 kg (89 lb 12.8 oz)    Admission Cmt:  None   Principal Problem:  Acute hypoxemic respiratory failure requiring intubation and ventilatory support (CMS/[facility]) [J96.01]                 Active Insurance as of 10/1/2018     Primary Coverage     Payor Plan Insurance Group Employer/Plan Group    MEDICARE MEDICARE A & B      Payor Plan Address Payor Plan Phone Number Effective From Effective To    [address] [phone] 4/1/1986     [facility] [id number]       Subscriber Name Subscriber Birth Date Member ID       DEVORA HUDDLESTON 1945 [id number]           Secondary Coverage     Payor Plan Insurance Group Employer/Plan Group    [employer] [place]ARE [place]ICAID      Payor Plan Address Payor Plan Phone Number Effective From Effective To    [address] [address] [phone] 10/1/2018     [facility] [id number]       Subscriber Name Subscriber Birth Date Member ID       DEVORA HUDDLESTON 1945 [id number]                 Emergency Contacts      (Rel.) Home Phone Work Phone Mobile Phone    Molina Huddleston (Son) [phone] -- --        Ruben Guzmán RN Registered Nurse Signed Wound Care  Plan of Care Date of Service: 10/8/2018  9:09 AM         Problem: Patient Care " Overview  Goal: Plan of Care Review  Outcome: Ongoing (interventions implemented as appropriate)    10/08/18 0750   Coping/Psychosocial   Plan of Care Reviewed With patient   Plan of Care Review   Progress no change   OTHER   Outcome Summary WOC nurse follow-up for possible PI to left heel and right elbow. Assessment performed. Left heel presents with friction blister willed with serous fluid. Blister drained and applied Xeroform and foam. Right heel presents with roofed, small friction blister. Will leave as is for now. Right elbow presents with abrasion/skin tear. Xeroform in place with foam dressing. Continue with current POC and daily dressing changes. WOC nurse will continue to follow. Please contact WOC nurse if needs arise. Thanks                          Physician Progress Notes (most recent note)      William Ibarra MD at 10/15/2018  5:21 PM              [facility] Medicine Services  PROGRESS NOTE    Patient Name: Devora Huddleston  : 1945  MRN: [id number]    Date of Admission: 10/1/2018  Length of Stay: 14  Primary Care Physician: Evelyn Francisco MD    Subjective   Subjective     CC:  F/U found down unresponsive    HPI:  Awake and sitting up in chair.  She voices no complaints.  Seems more oriented today.  RN notes she is starting to eat a little more.    Review of Systems   Unable to perform ROS: Dementia         Objective   Objective     Vital Signs:   Temp:  [97.6 °F (36.4 °C)-98.9 °F (37.2 °C)] 98.9 °F (37.2 °C)  Heart Rate:  [58-92] 65  Resp:  [16] 16  BP: (102-129)/(56-75) 103/56  Total (NIH Stroke Scale): 4     Physical Exam:  Gen- no acute distress, cachectic, + muscle wasting, sitting up in chair picking at some food  HENT-NCAT, mucous membranes moist  CV-RRR, S1 S2 normal, no m/r/g  Resp - lungs clear , no wheezes or rales  Abd-soft, NT, ND, +BS  Ext-no edema  Neuro-A&Ox2, no focal deficits, follows all commands, PIKE   Skin-no rashes  Psych-appropriate mood,  calm      Results Reviewed:  I have personally reviewed current lab, radiology, and data and agree.      Results from last 7 days  Lab Units 10/12/18  1004 10/11/18  0538 10/10/18  0618   WBC 10*3/mm3 8.21 7.40 8.13   HEMOGLOBIN g/dL 9.1* 9.3* 10.2*   HEMATOCRIT % 28.0* 27.6* 30.3*   PLATELETS 10*3/mm3 378 359 365       Results from last 7 days  Lab Units 10/15/18  1150 10/15/18  0851 10/14/18  1205   SODIUM mmol/L 125* 124* 127*   POTASSIUM mmol/L 4.0 4.0 4.5   CHLORIDE mmol/L 94* 95* 97*   CO2 mmol/L 24.0 25.0 23.0   BUN mg/dL 14 12 12   CREATININE mg/dL 0.32* 0.32* 0.28*   GLUCOSE mg/dL 110* 88 78   CALCIUM mg/dL 8.6* 8.5* 8.5*   ALT (SGPT) U/L  --  31  --    AST (SGOT) U/L  --  25  --      Estimated Creatinine Clearance: 44.1 mL/min (A) (by C-G formula based on SCr of 0.32 mg/dL (L)).  No results found for: BNP    Microbiology Results Abnormal     Procedure Component Value - Date/Time    Blood Culture - Blood, [[id number]]  (Normal) Collected:  10/01/18 1745    Lab Status:  Final result Specimen:  Blood from Wrist, Right Updated:  10/06/18 1830     Blood Culture No growth at 5 days    Blood Culture - Blood, [[id number]]  (Normal) Collected:  10/01/18 1715    Lab Status:  Final result Specimen:  Blood from Arm, Right Updated:  10/06/18 1830     Blood Culture No growth at 5 days          Imaging Results (last 24 hours)     ** No results found for the last 24 hours. **        Results for orders placed during the hospital encounter of 10/01/18   Adult Transthoracic Echo Complete W/ Cont if Necessary Per Protocol    Narrative · Left ventricular systolic function is normal.  · Estimated EF appears to be in the range of 61 - 65%  · All left ventricular wall segments contract normally.  · Normal left atrial pressure.  · Right ventricular cavity is mildly dilated  · Right ventricular wall thickness is consistent with mild hypertrophy.  · Right atrial cavity size is mildly dilated. The inferior vena cava is   dilated. IVC  inspiratory collapse is absent. A prominent Eustachian valve   is present.  · Moderate tricuspid valve regurgitation is present. Estimated right   ventricular systolic pressure from tricuspid regurgitation is mildly   elevated (35-45 mmHg).          I have reviewed the medications.      clopidogrel 75 mg Oral Daily   docusate sodium 100 mg Nasogastric BID   enoxaparin 30 mg Subcutaneous Nightly   famotidine 20 mg Oral BID   multivitamin 1 tablet Oral Daily   polyethylene glycol 17 g Oral Daily   sennosides-docusate sodium 2 tablet Oral Nightly   tolvaptan 15 mg Oral Daily         Assessment/Plan   Assessment / Plan     Active Hospital Problems    Diagnosis   • **Acute hypoxemic respiratory failure requiring intubation and ventilatory support (CMS/HCC)   • Constipation   • COPD and active cigarette abuse (CMS/HCC)   • Multiple right rib fractures (ninth, 10th, 11th)   • Anemia   • Metabolic encephalopathy   • Hyponatremia, suspect SIADH   • Hypoglycemia, resolved   • Generalized abdominal pain   • Severe malnutrition (CMS/HCC)   • Hypotension, resolved          Brief Hospital Course to date:  Devora Huddleston is a 73 y.o. female with hx of COPD, HTN, and CVA presents after being found down and unresponsive by her son on 10/1/18. Was intubated in the field. Glucose dropped to 39 en route (initially 105). Patient had reportedly complained of increasing weakness and weight loss for several weeks, and had recent evaluation for hyponatremia and seizures. Admitted to [facility] ICU. CT head, CT perfusion, CTA head and neck all negative. Further imaging revealed 3 right sided rib fractures, presumably from a fall at home. Possible RLL atelectasis vs. infiltrate. She was also hypotensive and requiring pressors. Treated empirically with Vanc and Zosyn however all cultures remain no growth. She was started on enteral feeds due to dysphagia. Patient extubated 10/3. Transferred to floor, hospitalists assumed care on  10/6.      Assessment & Plan:  --Stopped ATBx. Cultures no growth.  --appreciate renal assistance.  Na+ trend back down today.  Renal starting tolvaptan today  --Supportive care for rib fractures.   - BP is improved overall  - cleared for advanced diet by SLP, intake starting to  some  --mental status waxes/wanes, watch on prns  --d/w CM during MDRs today.  Working on placement.  Not yet ready d/t iniation of tolvaptan today, do not think it will be long term drug.    DVT Prophylaxis:  Lovenox    CODE STATUS:   Code Status and Medical Interventions:   Ordered at: 10/01/18 1819     Code Status:    CPR     Medical Interventions (Level of Support Prior to Arrest):    Full       Disposition: I expect the patient to be discharged TBD    Electronically signed by William Ibarra MD, 10/15/18, 5:23 PM.        Electronically signed by William Ibarra MD at 10/15/2018  5:26 PM          Physical Therapy Notes (most recent note)      Rosina Bautista PT at 10/15/2018  3:37 PM  Version 1 of 1         Problem: Patient Care Overview  Goal: Plan of Care Review  Outcome: Ongoing (interventions implemented as appropriate)   10/15/18 1535   Coping/Psychosocial   Plan of Care Reviewed With patient   Plan of Care Review   Progress improving   OTHER   Outcome Summary Pt. demonstrates gradual improvement w/ mobility. Pt. able to transfer sit to stand w/ RW and modA and ambulate 2ft w/ RW and mod A, following closely w/ chair. Recommend cont IPPT per POC.           Electronically signed by Rosina Bautista PT at 10/15/2018  3:37 PM          Occupational Therapy Notes (most recent note)      Katerin Shine, OT at 10/15/2018 11:29 AM          Acute Care - Occupational Therapy Treatment Note  [facility]     Patient Name: Devora Huddleston  : 1945  MRN: [id number]  Today's Date: 10/15/2018  Onset of Illness/Injury or Date of Surgery: 10/01/18  Date of Referral to OT: 10/04/18  Referring Physician: Emmett  MD    Admit Date: 10/1/2018       ICD-10-CM ICD-9-CM   1. Acute respiratory failure with hypoxemia (CMS/HCC) J96.01 518.81   2. Impaired functional mobility, balance, gait, and endurance Z74.09 V49.89   3. Oropharyngeal dysphagia R13.12 787.22   4. Impaired mobility and ADLs Z74.09 799.89     Patient Active Problem List   Diagnosis   • Acute respiratory failure (CMS/[facility])   • Metabolic encephalopathy   • Acute hypoxemic respiratory failure requiring intubation and ventilatory support (CMS/[facility])   • Hyponatremia, suspect SIADH   • Hypoglycemia, resolved   • Generalized abdominal pain   • Severe malnutrition (CMS/[facility])   • Hypotension, resolved   • Multiple right rib fractures (ninth, 10th, 11th)   • Anemia   • COPD and active cigarette abuse (CMS/[facility])   • Constipation     Past Medical History:   Diagnosis Date   • Hypertension    • Stroke (CMS/[facility])      History reviewed. No pertinent surgical history.    Therapy Treatment          Rehabilitation Treatment Summary     Row Name 10/15/18 1100             Treatment Time/Intention    Discipline occupational therapist  -SD      Document Type therapy note (daily note)  -SD      Subjective Information no complaints  -SD      Mode of Treatment occupational therapy  -SD      Patient/Family Observations Pt. in bed, no family present.  -SD      Care Plan Review care plan/treatment goals reviewed;risks/benefits reviewed;current/potential barriers reviewed;patient/other agree to care plan  -SD      Total Minutes, Occupational Therapy Treatment 18  -SD      Patient Effort good  -SD      Recorded by [SD] Katerin Shine OT 10/15/18 1125      Row Name 10/15/18 1100             Vital Signs    Pre Patient Position Supine  -SD      Intra Patient Position Sitting  -SD      Post Patient Position Sitting  -SD      Recorded by [SD] Katerin Shine OT 10/15/18 1125      Row Name 10/15/18 1100             Cognitive Assessment/Intervention- PT/OT    Affect/Mental Status  (Cognitive) confused  -SD      Orientation Status (Cognition) oriented to;person  -SD      Follows Commands (Cognition) follows one step commands;50-74% accuracy;verbal cues/prompting required;repetition of directions required  -SD      Cognitive Function (Cognitive) safety deficit;attention deficit  -SD      Safety Deficit (Cognitive) moderate deficit;awareness of need for assistance;insight into deficits/self awareness;problem solving;judgment;safety precautions awareness;safety precautions follow-through/compliance  -SD      Personal Safety Interventions fall prevention program maintained;gait belt;nonskid shoes/slippers when out of bed;supervised activity  -SD      Recorded by [SD] Katerin Shine OT 10/15/18 1125      Row Name 10/15/18 1100             Bed Mobility Assessment/Treatment    Rolling Left [place] (Bed Mobility) minimum assist (75% patient effort)  -SD      Scooting/Bridging [place] (Bed Mobility) moderate assist (50% patient effort)  -SD      Supine-Sit [place] (Bed Mobility) moderate assist (50% patient effort)  -SD      Assistive Device (Bed Mobility) bed rails;draw sheet;head of bed elevated  -SD      Recorded by [SD] Katerin Shine OT 10/15/18 1125      Row Name 10/15/18 1100             Bed-Chair Transfer    Bed-Chair [place] (Transfers) maximum assist (25% patient effort)  -SD      Recorded by [SD] Katerin Shine OT 10/15/18 1125      Row Name 10/15/18 1100             Stand Pivot/Stand Step Transfer    Stand Pivot/Stand Step [place] maximum assist (25% patient effort);other (see comments)   pt. not putting her feet down on floor despite v/c's  -SD      Recorded by [SD] Katerin Shine OT 10/15/18 1125      Row Name 10/15/18 1100             ADL Assessment/Intervention    29133 - OT Self Care/Mgmt Minutes 12  -SD      BADL Assessment/Intervention feeding  -SD      Recorded by [SD] Katerin Shine OT 10/15/18 1125      Row Name  10/15/18 1100             Grooming Assessment/Training    [place] Level (Grooming) hair care, combing/brushing;set up  -SD      Grooming Position edge of bed sitting  -SD      Recorded by [SD] Katerin Shine OT 10/15/18 1125      Row Name 10/15/18 1100             Self-Feeding Assessment/Training    [place] Level (Feeding) liquids to mouth;finger foods;scoop food and bring to mouth;set up  -SD      Self-Feeding Assess/Train, Spillage Amount minimal  -SD      Position (Self-Feeding) supported sitting  -SD      Recorded by [SD] Katerin Shine OT 10/15/18 1125      Row Name 10/15/18 1100             Therapeutic Exercise    98678 - OT Therapeutic Activity Minutes 6  -SD      Recorded by [SD] Katerin Shine OT 10/15/18 1125      Row Name 10/15/18 1100             Static Sitting Balance    Level of [place] (Unsupported Sitting, Static Balance) standby assist  -SD      Sitting Position (Unsupported Sitting, Static Balance) sitting on edge of bed  -SD      Time Able to Maintain Position (Unsupported Sitting, Static Balance) more than 5 minutes  -SD      Recorded by [SD] Katerin Shine OT 10/15/18 1125      Row Name 10/15/18 1100             Positioning and Restraints    Pre-Treatment Position in bed  -SD      Post Treatment Position chair  -SD      In Chair reclined;call light within reach;encouraged to call for assist;exit alarm on;legs elevated;heels elevated  -SD      Recorded by [SD] Katerin Shine OT 10/15/18 1125      Row Name 10/15/18 1100             Pain Scale: FACES Pre/Post-Treatment    Pain: FACES Scale, Pretreatment 0-->no hurt  -SD      Pain: FACES Scale, Post-Treatment 0-->no hurt  -SD      Recorded by [SD] Katerin Shine OT 10/15/18 1125      Row Name                Wound 10/01/18 1845 Bilateral medial coccyx    Wound - Properties Group Date first assessed: 10/01/18 [SW] Time first assessed: 1845 [SW] Present On Admission : yes [SW] Side:  Bilateral [SW] Orientation: medial [SW] Location: coccyx [SW] Additional Comments: shearing [CP] Recorded by:  [CP] Mahsa Almanzar APRN 10/07/18 1148 [SW] Fatmata Villalobos RN 10/01/18 1905    Row Name                Wound 10/07/18 1015 Right heel blisters    Wound - Properties Group Date first assessed: 10/07/18 [CP] Time first assessed: 1015 [CP] Present On Admission : no;picture taken [CP] Side: Right [CP] Location: heel [CP] Type: blisters [CP] Additional Comments: with bruise or DTPI [CP] Recorded by:  [CP] Mahsa Almanzar APRN 10/07/18 1155    Row Name                Wound 10/06/18 1500 Left heel blisters    Wound - Properties Group Date first assessed: 10/06/18 [CP] Time first assessed: 1500 [CP] Present On Admission : no [CP2] Side: Left [CP2] Location: heel [CP2] Type: blisters [CP2] Recorded by:  [CP] Mahsa Almanzar APRN 10/07/18 1159 [CP2] Mahsa Almanzar, APRBISI 10/07/18 1157    Row Name                Wound 10/06/18 1500 Right elbow pressure injury    Wound - Properties Group Date first assessed: 10/06/18 [CP] Time first assessed: 1500 [CP] Present On Admission : no;picture taken [CP] Side: Right [CP] Location: elbow [CP] Type: pressure injury [CP] Stage, Pressure Injury: unstageable [CP] Recorded by:  [CP] Mahsa Almanzar APRN 10/07/18 1200    Row Name 10/15/18 1100             Plan of Care Review    Plan of Care Reviewed With patient  -SD      Recorded by [SD] Katerin Shine OT 10/15/18 1125      Row Name 10/15/18 1100             Outcome Summary/Treatment Plan (OT)    Daily Summary of Progress (OT) progress toward functional goals is gradual  -SD      Barriers to Overall Progress (OT) confusion  -SD      Plan for Continued Treatment (OT) cont OT POC  -SD      Anticipated Discharge Disposition (OT) skilled nursing facility  -SD      Recorded by [SD] Katerin Shine OT 10/15/18 1125        User Key  (r) = Recorded By, (t) = Taken By, (c) = Cosigned By     Initials Name Effective Dates Discipline    Katerin Luz, OT 06/08/18 -  OT    Mahsa Matthews APRN 06/08/18 -  Nurse    Fatmata Chaves RN 06/16/16 -  Nurse        Wound 10/01/18 1845 Bilateral medial coccyx (Active)   Dressing Appearance dry;intact 10/15/2018  8:00 AM   Closure None 10/15/2018  8:00 AM   Base dry 10/15/2018  8:00 AM   Periwound intact;dry;pink;blanchable 10/15/2018  8:00 AM   Drainage Amount none 10/15/2018  8:00 AM   Dressing Care, Wound dressing changed 10/14/2018  9:36 PM   Periwound Care, Wound barrier ointment applied 10/14/2018  9:36 PM       Wound 10/07/18 1015 Right heel blisters (Active)   Dressing Appearance dry;intact 10/15/2018  8:00 AM   Closure None 10/15/2018  8:00 AM   Base pink 10/15/2018  8:00 AM   Periwound intact;dry;pink;blanchable 10/15/2018  8:00 AM   Drainage Amount none 10/15/2018  8:00 AM   Care, Wound cleansed with;sterile normal saline 10/14/2018  9:36 PM   Dressing Care, Wound petroleum-based;low-adherent 10/14/2018  9:36 PM       Wound 10/06/18 1500 Left heel blisters (Active)   Dressing Appearance dry;intact 10/15/2018  8:00 AM   Closure None 10/15/2018  8:00 AM   Base red/granulating 10/15/2018  8:00 AM   Periwound pink;blanchable 10/15/2018  8:00 AM   Drainage Characteristics/Odor serosanguineous 10/15/2018  8:00 AM   Drainage Amount scant 10/15/2018  8:00 AM   Care, Wound cleansed with;sterile normal saline 10/14/2018  9:36 PM   Dressing Care, Wound petroleum-based;low-adherent 10/14/2018  9:36 PM       Wound 10/06/18 1500 Right elbow pressure injury (Active)   Dressing Appearance dry;intact 10/15/2018  8:00 AM   Closure None 10/15/2018  8:00 AM   Base pink 10/15/2018  8:00 AM   Drainage Amount none 10/15/2018  8:00 AM   Care, Wound cleansed with;sterile normal saline 10/14/2018  9:36 PM   Dressing Care, Wound low-adherent 10/14/2018  9:36 PM             OT Rehab Goals     Row Name 10/15/18 1100             Transfer Goal 1 (OT)     Progress/Outcome (Transfer Goal 1, OT) goal ongoing  -SD         Dressing Goal 1 (OT)    Progress/Outcome (Dressing Goal 1, OT) goal ongoing  -SD         Toileting Goal 1 (OT)    Progress/Outcome (Toileting Goal 1, OT) goal ongoing  -SD         Strength Goal 1 (OT)    Progress/Outcome (Strength Goal 1, OT) goal ongoing  -SD        User Key  (r) = Recorded By, (t) = Taken By, (c) = Cosigned By    Initials Name Provider Type Discipline    Katerin Luz, OT Occupational Therapist OT        Occupational Therapy Education     Title: PT OT SLP Therapies (Active)     Topic: Occupational Therapy (Active)     Point: ADL training (Done)     Description: Instruct learner(s) on proper safety adaptation and remediation techniques during self care or transfers.   Instruct in proper use of assistive devices.   Learning Progress Summary     Learner Status Readiness Method Response Comment Documented by    Patient Done Acceptance E VU,NR sequencing with bed mobility, safety with transfer,  UE HEP AC 10/12/18 1445     Active Acceptance E NR Pt educated on appropriate safety precautions, t/f techniques, role of OT, positioning, and benefits of therapy. CL 10/05/18 1009          Point: Precautions (Active)     Description: Instruct learner(s) on prescribed precautions during self-care and functional transfers.   Learning Progress Summary     Learner Status Readiness Method Response Comment Documented by    Patient Active Acceptance E NR Pt educated on appropriate safety precautions, t/f techniques, role of OT, positioning, and benefits of therapy. CL 10/05/18 1009          Point: Body mechanics (Active)     Description: Instruct learner(s) on proper positioning and spine alignment during self-care, functional mobility activities and/or exercises.   Learning Progress Summary     Learner Status Readiness Method Response Comment Documented by    Patient Active Acceptance E NR Pt educated on appropriate safety precautions, t/f  techniques, role of OT, positioning, and benefits of therapy. CL 10/05/18 1009                      User Key     Initials Effective Dates Name Provider Type Discipline     06/23/15 -  Anna Best, OT Occupational Therapist OT    CL 04/03/18 -  Helena Thomas, AYANA Occupational Therapist OT                OT Recommendation and Plan  Outcome Summary/Treatment Plan (OT)  Daily Summary of Progress (OT): progress toward functional goals is gradual  Barriers to Overall Progress (OT): confusion  Plan for Continued Treatment (OT): cont OT POC  Anticipated Discharge Disposition (OT): skilled nursing facility  Daily Summary of Progress (OT): progress toward functional goals is gradual  Plan of Care Review  Plan of Care Reviewed With: patient  Plan of Care Reviewed With: patient  Outcome Summary: Pt. supine to sit on EOB with mod A. Pt. sat EOB with SBA & completed grooming skills. Pt. feeding herself. Pt. holding food in her mouth & chewing it repeatedly despite cue to swallow. Pt. t/f EOB to chair with max A. Pt. not putting her feet on the floor despite v/c's, stating her socks are too big.         Outcome Measures     Row Name 10/15/18 1100 10/13/18 1148          How much help from another person do you currently need...    Turning from your back to your side while in flat bed without using bedrails?  -- 3  -VG     Moving from lying on back to sitting on the side of a flat bed without bedrails?  -- 2  -VG     Moving to and from a bed to a chair (including a wheelchair)?  -- 2  -VG     Standing up from a chair using your arms (e.g., wheelchair, bedside chair)?  -- 1  -VG     Climbing 3-5 steps with a railing?  -- 1  -VG     To walk in hospital room?  -- 1  -VG     AM-PAC 6 Clicks Score  -- 10  -VG        How much help from another is currently needed...    Putting on and taking off regular lower body clothing? 2  -SD  --     Bathing (including washing, rinsing, and drying) 2  -SD  --     Toileting (which includes using  toilet bed pan or urinal) 2  -SD  --     Putting on and taking off regular upper body clothing 2  -SD  --     Taking care of personal grooming (such as brushing teeth) 3  -SD  --     Eating meals 3  -SD  --     Score 14  -SD  --        Functional Assessment    Outcome Measure Options AM-PAC 6 Clicks Daily Activity (OT)  -SD  --       User Key  (r) = Recorded By, (t) = Taken By, (c) = Cosigned By    Initials Name Provider Type    Katerin Luz OT Occupational Therapist    Gemma Garcia, PT Physical Therapist           Time Calculation:         Time Calculation- OT     Row Name 10/15/18 1100             Time Calculation- OT    OT Start Time 1100  -SD      OT Stop Time 1118  -SD      OT Time Calculation (min) 18 min  -SD      OT Received On 10/15/18  -SD      OT Goal Re-Cert Due Date 10/25/18  -SD         Timed Charges    07051 - OT Therapeutic Activity Minutes 6  -SD      34359 - OT Self Care/Mgmt Minutes 12  -SD        User Key  (r) = Recorded By, (t) = Taken By, (c) = Cosigned By    Initials Name Provider Type    Katerin Luz, OT Occupational Therapist           Therapy Suggested Charges     Code   Minutes Charges    68280 (CPT®) Hc Ot Neuromusc Re Education Ea 15 Min      63672 (CPT®) Hc Ot Ther Proc Ea 15 Min      33982 (CPT®) Hc Ot Therapeutic Act Ea 15 Min 6     50633 (CPT®) Hc Ot Manual Therapy Ea 15 Min      65937 (CPT®) Hc Ot Iontophoresis Ea 15 Min      62626 (CPT®) Hc Ot Elec Stim Ea-Per 15 Min      23432 (CPT®) Hc Ot Ultrasound Ea 15 Min      53720 (CPT®) Hc Ot Self Care/Mgmt/Train Ea 15 Min 12 1    Total  18 1        Therapy Charges for Today     Code Description Service Date Service Provider Modifiers Qty    80047734294 HC OT SELF CARE/MGMT/TRAIN EA 15 MIN 10/15/2018 Katerin Shine OT GO 1               Katerin Shine OT  10/15/2018    Electronically signed by Katerin Shine OT at 10/15/2018 11:29 AM

## 2018-10-17 LAB
ALBUMIN SERPL-MCNC: 3.74 G/DL (ref 3.2–4.8)
ANION GAP SERPL CALCULATED.3IONS-SCNC: 5 MMOL/L (ref 3–11)
BUN BLD-MCNC: 14 MG/DL (ref 9–23)
BUN/CREAT SERPL: 31.8 (ref 7–25)
CALCIUM SPEC-SCNC: 9 MG/DL (ref 8.7–10.4)
CHLORIDE SERPL-SCNC: 96 MMOL/L (ref 99–109)
CO2 SERPL-SCNC: 27 MMOL/L (ref 20–31)
CREAT BLD-MCNC: 0.44 MG/DL (ref 0.6–1.3)
GFR SERPL CREATININE-BSD FRML MDRD: 141 ML/MIN/1.73
GLUCOSE BLD-MCNC: 101 MG/DL (ref 70–100)
MAGNESIUM SERPL-MCNC: 1.6 MG/DL (ref 1.3–2.7)
PHOSPHATE SERPL-MCNC: 3.1 MG/DL (ref 2.4–5.1)
POTASSIUM BLD-SCNC: 4.1 MMOL/L (ref 3.5–5.5)
SODIUM BLD-SCNC: 128 MMOL/L (ref 132–146)

## 2018-10-17 PROCEDURE — 97110 THERAPEUTIC EXERCISES: CPT

## 2018-10-17 PROCEDURE — 97116 GAIT TRAINING THERAPY: CPT

## 2018-10-17 PROCEDURE — 25010000002 ENOXAPARIN PER 10 MG: Performed by: INTERNAL MEDICINE

## 2018-10-17 PROCEDURE — 80069 RENAL FUNCTION PANEL: CPT | Performed by: INTERNAL MEDICINE

## 2018-10-17 PROCEDURE — 25010000002 LORAZEPAM PER 2 MG: Performed by: INTERNAL MEDICINE

## 2018-10-17 PROCEDURE — 83735 ASSAY OF MAGNESIUM: CPT | Performed by: NURSE PRACTITIONER

## 2018-10-17 PROCEDURE — 99232 SBSQ HOSP IP/OBS MODERATE 35: CPT | Performed by: NURSE PRACTITIONER

## 2018-10-17 RX ORDER — DEMECLOCYCLINE HYDROCHLORIDE 150 MG/1
150 TABLET, FILM COATED ORAL EVERY 12 HOURS SCHEDULED
Status: DISCONTINUED | OUTPATIENT
Start: 2018-10-17 | End: 2018-10-19 | Stop reason: HOSPADM

## 2018-10-17 RX ORDER — MAGNESIUM SULFATE HEPTAHYDRATE 40 MG/ML
4 INJECTION, SOLUTION INTRAVENOUS ONCE
Status: DISCONTINUED | OUTPATIENT
Start: 2018-10-17 | End: 2018-10-19 | Stop reason: HOSPADM

## 2018-10-17 RX ADMIN — DOCUSATE SODIUM 100 MG: 50 LIQUID ORAL at 09:43

## 2018-10-17 RX ADMIN — SENNOSIDES AND DOCUSATE SODIUM 2 TABLET: 8.6; 5 TABLET ORAL at 22:30

## 2018-10-17 RX ADMIN — DEMECLOCYCLINE HYDROCHLORIDE 150 MG: 150 TABLET ORAL at 22:32

## 2018-10-17 RX ADMIN — Medication 1 TABLET: at 09:43

## 2018-10-17 RX ADMIN — CLOPIDOGREL BISULFATE 75 MG: 75 TABLET ORAL at 09:43

## 2018-10-17 RX ADMIN — POLYETHYLENE GLYCOL 3350 17 G: 17 POWDER, FOR SOLUTION ORAL at 09:43

## 2018-10-17 RX ADMIN — LORAZEPAM 0.5 MG: 2 INJECTION INTRAMUSCULAR; INTRAVENOUS at 06:04

## 2018-10-17 RX ADMIN — FAMOTIDINE 20 MG: 20 TABLET ORAL at 22:30

## 2018-10-17 RX ADMIN — DOCUSATE SODIUM 100 MG: 50 LIQUID ORAL at 22:30

## 2018-10-17 RX ADMIN — LORAZEPAM 0.5 MG: 2 INJECTION INTRAMUSCULAR; INTRAVENOUS at 22:30

## 2018-10-17 RX ADMIN — FAMOTIDINE 20 MG: 20 TABLET ORAL at 09:43

## 2018-10-17 RX ADMIN — DEMECLOCYCLINE HYDROCHLORIDE 150 MG: 150 TABLET ORAL at 14:29

## 2018-10-17 RX ADMIN — ENOXAPARIN SODIUM 30 MG: 30 INJECTION SUBCUTANEOUS at 22:30

## 2018-10-17 RX ADMIN — MAGNESIUM SULFATE HEPTAHYDRATE 4 G: 40 INJECTION, SOLUTION INTRAVENOUS at 14:30

## 2018-10-17 NOTE — PLAN OF CARE
Problem: Patient Care Overview  Goal: Plan of Care Review   10/17/18 1400   Coping/Psychosocial   Plan of Care Reviewed With patient   Plan of Care Review   Progress improving   OTHER   Outcome Summary Pt ambulated approx 5 ft during 2 stand pivot transfers bed to BSC to recliner with MODA and RW. Distance limited by weakness and impaired balance. Will continue to progress pt as able per POC.

## 2018-10-17 NOTE — DISCHARGE PLACEMENT REQUEST
"Medication being added in place of the SAMSCA will be demeclocycline     Plan to check sodium in AM to be sure the new medication will hold her NA levels     Thank you   Mirtha   [phone]     Devora Huddleston  (73 y.o. Female)     Date of Birth Social Security Number Address Home Phone MRN    1945  [address] [place]  [facility] [address] [phone] [id number]    Congregational Marital Status          None Unknown       Admission Date Admission Type Admitting Provider Attending Provider Department, Room/Bed    10/1/18 Emergency Angie Montes MD Brown, Hannah, MD [facility] 6B, N631/1    Discharge Date Discharge Disposition Discharge Destination                       Attending Provider:  Angie Montes MD    Allergies:  Not on File    Isolation:  None   Infection:  None   Code Status:  CPR    Ht:  157.5 cm (62.01\")   Wt:  44.6 kg (98 lb 4.8 oz)    Admission Cmt:  None   Principal Problem:  Acute hypoxemic respiratory failure requiring intubation and ventilatory support (CMS/[facility]) [J96.01]                 Active Insurance as of 10/1/2018     Primary Coverage     Payor Plan Insurance Group Employer/Plan Group    MEDICARE MEDICARE A & B      Payor Plan Address Payor Plan Phone Number Effective From Effective To    [address] [phone] 4/1/1986     [facility] [address]       Subscriber Name Subscriber Birth Date Member ID       DEVORA HUDDLESTON 1945 [id number]           Secondary Coverage     Payor Plan Insurance Group Employer/Plan Group    [employer] [place] WELL[place]ARE MEDICAID      Payor Plan Address Payor Plan Phone Number Effective From Effective To    [address] [address] [phone] 10/1/2018     [facility] [id number]       Subscriber Name Subscriber Birth Date Member ID       DEVORA HUDDLESTON 1945 [id number]                 Emergency Contacts      (Rel.) Home Phone Work Phone Mobile Phone    Molina Huddleston (Son) [phone] -- --               Physician Progress Notes " (most recent note)      Nayana Abdi APRN at 10/17/2018 11:20 AM              [facility] Medicine Services  PROGRESS NOTE    Patient Name: Devora Huddleston  : 1945  MRN: [id number]    Date of Admission: 10/1/2018  Length of Stay: 16  Primary Care Physician: Evelyn Francisco MD    Subjective   Subjective     CC:  F/U found down unresponsive    HPI:    Resting comfortably in bed this morning, on the telephone with her family.  No overnight issues.  Asked me to write down the name of the nursing facility she is going to on three different post it notes.      Review of Systems   Unable to perform ROS: Dementia         Objective   Objective     Vital Signs:   Temp:  [98 °F (36.7 °C)-98.2 °F (36.8 °C)] 98 °F (36.7 °C)  Heart Rate:  [66-71] 71  Resp:  [16-18] 18  BP: ()/(48-79) 98/48  Total (NIH Stroke Scale): 4     Physical Exam:  Gen- no acute distress, cachectic, + muscle wasting, resting in bed  HENT-NCAT, mucous membranes moist  CV-RRR, S1 S2 normal, no m/r/g  Resp - lungs clear , no wheezes or rales, unlabored breathing on room air  Abd-soft, NT, ND, +BS  Ext-no edema  Neuro-A&Ox1, no focal deficits, follows all commands, PIKE   Skin-no rashes  Psych-appropriate mood, calm      Results Reviewed:  I have personally reviewed current lab, radiology, and data and agree.      Results from last 7 days  Lab Units 10/12/18  1004 10/11/18  0538   WBC 10*3/mm3 8.21 7.40   HEMOGLOBIN g/dL 9.1* 9.3*   HEMATOCRIT % 28.0* 27.6*   PLATELETS 10*3/mm3 378 359       Results from last 7 days  Lab Units 10/17/18  0729 10/16/18  0535 10/15/18  2332  10/15/18  0851   SODIUM mmol/L 128* 130* 131*  < > 124*   POTASSIUM mmol/L 4.1 3.7 4.5  < > 4.0   CHLORIDE mmol/L 96* 97* 97*  < > 95*   CO2 mmol/L 27.0 28.0 27.0  < > 25.0   BUN mg/dL 14 15 17  < > 12   CREATININE mg/dL 0.44* 0.38* 0.46*  < > 0.32*   GLUCOSE mg/dL 101* 82 99  < > 88   CALCIUM mg/dL 9.0 8.6* 8.6*  < > 8.5*   ALT (SGPT) U/L  --   --   --    --  31   AST (SGOT) U/L  --   --   --   --  25   < > = values in this interval not displayed.  Estimated Creatinine Clearance: 45.4 mL/min (A) (by C-G formula based on SCr of 0.44 mg/dL (L)).  No results found for: BNP    Microbiology Results Abnormal     Procedure Component Value - Date/Time    Blood Culture - Blood, [[id number]]  (Normal) Collected:  10/01/18 1745    Lab Status:  Final result Specimen:  Blood from Wrist, Right Updated:  10/06/18 1830     Blood Culture No growth at 5 days    Blood Culture - Blood, [[id number]]  (Normal) Collected:  10/01/18 1715    Lab Status:  Final result Specimen:  Blood from Arm, Right Updated:  10/06/18 1830     Blood Culture No growth at 5 days          Imaging Results (last 24 hours)     ** No results found for the last 24 hours. **        Results for orders placed during the hospital encounter of 10/01/18   Adult Transthoracic Echo Complete W/ Cont if Necessary Per Protocol    Narrative · Left ventricular systolic function is normal.  · Estimated EF appears to be in the range of 61 - 65%  · All left ventricular wall segments contract normally.  · Normal left atrial pressure.  · Right ventricular cavity is mildly dilated  · Right ventricular wall thickness is consistent with mild hypertrophy.  · Right atrial cavity size is mildly dilated. The inferior vena cava is   dilated. IVC inspiratory collapse is absent. A prominent Eustachian valve   is present.  · Moderate tricuspid valve regurgitation is present. Estimated right   ventricular systolic pressure from tricuspid regurgitation is mildly   elevated (35-45 mmHg).          I have reviewed the medications.      clopidogrel 75 mg Oral Daily   demeclocycline 150 mg Oral Q12H   docusate sodium 100 mg Nasogastric BID   enoxaparin 30 mg Subcutaneous Nightly   famotidine 20 mg Oral BID   multivitamin 1 tablet Oral Daily   polyethylene glycol 17 g Oral Daily   sennosides-docusate sodium 2 tablet Oral Nightly         Assessment/Plan    Assessment / Plan     Active Hospital Problems    Diagnosis   • **Acute hypoxemic respiratory failure requiring intubation and ventilatory support (CMS/[facility])   • Constipation   • COPD and active cigarette abuse (CMS/[facility])   • Multiple right rib fractures (ninth, 10th, 11th)   • Anemia   • Metabolic encephalopathy   • Hyponatremia, suspect SIADH   • Hypoglycemia, resolved   • Generalized abdominal pain   • Severe malnutrition (CMS/[facility])   • Hypotension, resolved          Brief Hospital Course to date:  Devora Huddleston is a 73 y.o. female with hx of COPD, HTN, and CVA presents after being found down and unresponsive by her son on 10/1/18. Was intubated in the field. Glucose dropped to 39 en route (initially 105). Patient had reportedly complained of increasing weakness and weight loss for several weeks, and had recent evaluation for hyponatremia and seizures. Admitted to [facility] ICU. CT head, CT perfusion, CTA head and neck all negative. Further imaging revealed 3 right sided rib fractures, presumably from a fall at home. Possible RLL atelectasis vs. infiltrate. She was also hypotensive and requiring pressors. Treated empirically with Vanc and Zosyn however all cultures remain no growth. She was started on enteral feeds due to dysphagia. Patient extubated 10/3. Transferred to floor, hospitalists assumed care on 10/6.      Assessment & Plan:  --Stopped ATBx. Cultures no growth.  --appreciate renal assistance.  Received tolvaptan for two days, minimal improvement with Na+. Tolvaptan is cost prohibitive at the nursing facility.  This was discussed with Dr. Nunes.  He has switched her to demeclocycline and an 800 ml/day fluid restriction.    --Supportive care for rib fractures.   - BP is improved overall  - PO intake comes and goes  --mental status waxes/wanes, watch on prns      DVT Prophylaxis:  Lovenox    CODE STATUS:   Code Status and Medical Interventions:   Ordered at: 10/01/18 0690     Code Status:    [facility]     Medical  Interventions (Level of Support Prior to Arrest):    Full       Disposition: I expect the patient to be discharged to [facility] tomorrow if sodium stable.     Electronically signed by JEFF Butcher, 10/17/18, 12:21 PM.        Electronically signed by Nayana Abdi APRN at 10/17/2018 12:22 PM

## 2018-10-17 NOTE — PLAN OF CARE
Problem: Skin Injury Risk (Adult)  Goal: Identify Related Risk Factors and Signs and Symptoms  Outcome: Ongoing (interventions implemented as appropriate)      Problem: Fall Risk (Adult)  Goal: Identify Related Risk Factors and Signs and Symptoms  Outcome: Ongoing (interventions implemented as appropriate)      Problem: Patient Care Overview  Goal: Plan of Care Review  Outcome: Ongoing (interventions implemented as appropriate)

## 2018-10-17 NOTE — PROGRESS NOTES
"   LOS: 16 days    Patient Care Team:  Evelyn Francisco MD as PCP - General (Family Medicine)    Reason For Visit:  F/U HYPONATREMIA.  Subjective           Review of Systems:    Pulm: No soa   CV. NO CP      Objective       clopidogrel 75 mg Oral Daily   demeclocycline 150 mg Oral Q12H   docusate sodium 100 mg Nasogastric BID   enoxaparin 30 mg Subcutaneous Nightly   famotidine 20 mg Oral BID   multivitamin 1 tablet Oral Daily   polyethylene glycol 17 g Oral Daily   sennosides-docusate sodium 2 tablet Oral Nightly            Vital Signs:  Blood pressure 98/48, pulse 71, temperature 98 °F (36.7 °C), temperature source Oral, resp. rate 18, height 157.5 cm (62.01\"), weight 44.6 kg (98 lb 4.8 oz), SpO2 100 %.    Flowsheet Rows      First Filed Value   Admission Height  157.5 cm (62\") Documented at 10/01/2018 1700   Admission Weight  36.3 kg (80 lb) Documented at 10/01/2018 1700          10/16 0701 - 10/17 0700  In: 360 [P.O.:360]  Out: 525 [Urine:525]    Physical Exam:    General Appearance: NAD, alert and cooperative, Ox3  Eyes: PER, conjunctivae and sclerae normal, no icterus  Lungs: respirations regular and unlabored, no crepitus, clear to auscultation  Heart/CV: regular rhythm & normal rate, no murmur, no gallop, no rub and no edema  Abdomen: not distended, soft, non-tender, no masses,  bowel sounds present  Skin: No rash, Warm and dry    Radiology:            Labs:    Results from last 7 days  Lab Units 10/12/18  1004 10/11/18  0538   WBC 10*3/mm3 8.21 7.40   HEMOGLOBIN g/dL 9.1* 9.3*   HEMATOCRIT % 28.0* 27.6*   PLATELETS 10*3/mm3 378 359       Results from last 7 days  Lab Units 10/17/18  0729 10/16/18  0535 10/15/18  2332 10/15/18  1817  10/15/18  0851   SODIUM mmol/L 128* 130* 131* 126*  < > 124*   POTASSIUM mmol/L 4.1 3.7 4.5 3.8  < > 4.0   CHLORIDE mmol/L 96* 97* 97* 93*  < > 95*   CO2 mmol/L 27.0 28.0 27.0 26.0  < > 25.0   BUN mg/dL 14 15 17 16  < > 12   CREATININE mg/dL 0.44* 0.38* 0.46* 0.46*  < > 0.32* "   CALCIUM mg/dL 9.0 8.6* 8.6* 8.4*  < > 8.5*   PHOSPHORUS mg/dL 3.1  --   --   --   --  3.4   MAGNESIUM mg/dL 1.6 1.9  --   --   --  1.5   ALBUMIN g/dL 3.74  --   --   --   --  3.39   < > = values in this interval not displayed.    Results from last 7 days  Lab Units 10/17/18  0729   GLUCOSE mg/dL 101*         Results from last 7 days  Lab Units 10/15/18  0851   ALK PHOS U/L 102*   BILIRUBIN mg/dL 0.4   ALT (SGPT) U/L 31   AST (SGOT) U/L 25                 Estimated Creatinine Clearance: 45.4 mL/min (A) (by C-G formula based on SCr of 0.44 mg/dL (L)).      Assessment       Acute hypoxemic respiratory failure requiring intubation and ventilatory support (CMS/HCC)    Metabolic encephalopathy    Hyponatremia, suspect SIADH    Hypoglycemia, resolved    Generalized abdominal pain    Severe malnutrition (CMS/HCC)    Hypotension, resolved    Multiple right rib fractures (ninth, 10th, 11th)    Anemia    COPD and active cigarette abuse (CMS/HCC)    Constipation            Impression: HYPONATREMIA A LITTLE WORSE. SIADH. TALKED WITH CASE MANAGEMENT AND DR BATRES. SHE CANNOT GET TOLVAPTAN AT THE NURSING HOME. WILL TRY DEMECLOCYCLINE AND FLUID RESTRICTION.            Recommendations:       Sage Nunes MD  10/17/18  10:57 AM

## 2018-10-17 NOTE — PROGRESS NOTES
Continued Stay Note  [facility]     Patient Name: Devora Huddleston  MRN: [id number]  Today's Date: 10/17/2018    Admit Date: 10/1/2018          Discharge Plan     Row Name 10/17/18 1408       Plan    Plan To [facility] tomorrow     Patient/Family in Agreement with Plan yes    Plan Comments Plan is for patient to go to [facility] and re[facility] tomorrow as long as her sodium levels are maintained with changing her samsca to demeclocycline. I have faxed the most recent note and medication info to [facility] so they can be sure they have the new medication. Patient will need EMS transport due to her confusion and rib fractures - CM will arrange this and notify the son. CM following - mirtha [phone]               Discharge Codes    No documentation.       Expected Discharge Date and Time     Expected Discharge Date Expected Discharge Time    Oct 6, 2018             Mirtha Laws RN

## 2018-10-17 NOTE — PROGRESS NOTES
[facility] Medicine Services  PROGRESS NOTE    Patient Name: Devora Huddleston  : 1945  MRN: [id number]    Date of Admission: 10/1/2018  Length of Stay: 16  Primary Care Physician: Evelyn Francisco MD    Subjective   Subjective     CC:  F/U found down unresponsive    HPI:    Resting comfortably in bed this morning, on the telephone with her family.  No overnight issues.  Asked me to write down the name of the nursing facility she is going to on three different post it notes.      Review of Systems   Unable to perform ROS: Dementia         Objective   Objective     Vital Signs:   Temp:  [98 °F (36.7 °C)-98.2 °F (36.8 °C)] 98 °F (36.7 °C)  Heart Rate:  [66-71] 71  Resp:  [16-18] 18  BP: ()/(48-79) 98/48  Total (NIH Stroke Scale): 4     Physical Exam:  Gen- no acute distress, cachectic, + muscle wasting, resting in bed  HENT-NCAT, mucous membranes moist  CV-RRR, S1 S2 normal, no m/r/g  Resp - lungs clear , no wheezes or rales, unlabored breathing on room air  Abd-soft, NT, ND, +BS  Ext-no edema  Neuro-A&Ox1, no focal deficits, follows all commands, PIKE   Skin-no rashes  Psych-appropriate mood, calm      Results Reviewed:  I have personally reviewed current lab, radiology, and data and agree.      Results from last 7 days  Lab Units 10/12/18  1004 10/11/18  0538   WBC 10*3/mm3 8.21 7.40   HEMOGLOBIN g/dL 9.1* 9.3*   HEMATOCRIT % 28.0* 27.6*   PLATELETS 10*3/mm3 378 359       Results from last 7 days  Lab Units 10/17/18  0729 10/16/18  0535 10/15/18  2332  10/15/18  0851   SODIUM mmol/L 128* 130* 131*  < > 124*   POTASSIUM mmol/L 4.1 3.7 4.5  < > 4.0   CHLORIDE mmol/L 96* 97* 97*  < > 95*   CO2 mmol/L 27.0 28.0 27.0  < > 25.0   BUN mg/dL 14 15 17  < > 12   CREATININE mg/dL 0.44* 0.38* 0.46*  < > 0.32*   GLUCOSE mg/dL 101* 82 99  < > 88   CALCIUM mg/dL 9.0 8.6* 8.6*  < > 8.5*   ALT (SGPT) U/L  --   --   --   --  31   AST (SGOT) U/L  --   --   --   --  25   < > = values in this  interval not displayed.  Estimated Creatinine Clearance: 45.4 mL/min (A) (by C-G formula based on SCr of 0.44 mg/dL (L)).  No results found for: BNP    Microbiology Results Abnormal     Procedure Component Value - Date/Time    Blood Culture - Blood, [[id number]]  (Normal) Collected:  10/01/18 1745    Lab Status:  Final result Specimen:  Blood from Wrist, Right Updated:  10/06/18 1830     Blood Culture No growth at 5 days    Blood Culture - Blood, [[id number]]  (Normal) Collected:  10/01/18 1715    Lab Status:  Final result Specimen:  Blood from Arm, Right Updated:  10/06/18 1830     Blood Culture No growth at 5 days          Imaging Results (last 24 hours)     ** No results found for the last 24 hours. **        Results for orders placed during the hospital encounter of 10/01/18   Adult Transthoracic Echo Complete W/ Cont if Necessary Per Protocol    Narrative · Left ventricular systolic function is normal.  · Estimated EF appears to be in the range of 61 - 65%  · All left ventricular wall segments contract normally.  · Normal left atrial pressure.  · Right ventricular cavity is mildly dilated  · Right ventricular wall thickness is consistent with mild hypertrophy.  · Right atrial cavity size is mildly dilated. The inferior vena cava is   dilated. IVC inspiratory collapse is absent. A prominent Eustachian valve   is present.  · Moderate tricuspid valve regurgitation is present. Estimated right   ventricular systolic pressure from tricuspid regurgitation is mildly   elevated (35-45 mmHg).          I have reviewed the medications.      clopidogrel 75 mg Oral Daily   demeclocycline 150 mg Oral Q12H   docusate sodium 100 mg Nasogastric BID   enoxaparin 30 mg Subcutaneous Nightly   famotidine 20 mg Oral BID   multivitamin 1 tablet Oral Daily   polyethylene glycol 17 g Oral Daily   sennosides-docusate sodium 2 tablet Oral Nightly         Assessment/Plan   Assessment / Plan     Active Hospital Problems    Diagnosis   •  **Acute hypoxemic respiratory failure requiring intubation and ventilatory support (CMS/[facility])   • Constipation   • COPD and active cigarette abuse (CMS/[facility])   • Multiple right rib fractures (ninth, 10th, 11th)   • Anemia   • Metabolic encephalopathy   • Hyponatremia, suspect SIADH   • Hypoglycemia, resolved   • Generalized abdominal pain   • Severe malnutrition (CMS/[facility])   • Hypotension, resolved          Brief Hospital Course to date:  Devora Huddleston is a 73 y.o. female with hx of COPD, HTN, and CVA presents after being found down and unresponsive by her son on 10/1/18. Was intubated in the field. Glucose dropped to 39 en route (initially 105). Patient had reportedly complained of increasing weakness and weight loss for several weeks, and had recent evaluation for hyponatremia and seizures. Admitted to [facility] ICU. CT head, CT perfusion, CTA head and neck all negative. Further imaging revealed 3 right sided rib fractures, presumably from a fall at home. Possible RLL atelectasis vs. infiltrate. She was also hypotensive and requiring pressors. Treated empirically with Vanc and Zosyn however all cultures remain no growth. She was started on enteral feeds due to dysphagia. Patient extubated 10/3. Transferred to floor, hospitalists assumed care on 10/6.      Assessment & Plan:  --Stopped ATBx. Cultures no growth.  --appreciate renal assistance.  Received tolvaptan for two days, minimal improvement with Na+. Tolvaptan is cost prohibitive at the nursing facility.  This was discussed with Dr. Nunes.  He has switched her to demeclocycline and an 800 ml/day fluid restriction.    --Supportive care for rib fractures.   - BP is improved overall  - PO intake comes and goes  --mental status waxes/wanes, watch on prns      DVT Prophylaxis:  Lovenox    CODE STATUS:   Code Status and Medical Interventions:   Ordered at: 10/01/18 1819     Code Status:    CPR     Medical Interventions (Level of Support Prior to Arrest):    Full        Disposition: I expect the patient to be discharged to [facility] tomorrow if sodium stable.     Electronically signed by JEFF Butcher, 10/17/18, 12:21 PM.

## 2018-10-17 NOTE — THERAPY TREATMENT NOTE
Acute Care - Physical Therapy Treatment Note  [facility]     Patient Name: Devora Huddleston  : 1945  MRN: [id number]  Today's Date: 10/17/2018  Onset of Illness/Injury or Date of Surgery: 10/01/18  Date of Referral to PT: 10/04/18  Referring Physician: MD Emmett    Admit Date: 10/1/2018    Visit Dx:    ICD-10-CM ICD-9-CM   1. Acute respiratory failure with hypoxemia (CMS/HCC) J96.01 518.81   2. Impaired functional mobility, balance, gait, and endurance Z74.09 V49.89   3. Oropharyngeal dysphagia R13.12 787.22   4. Impaired mobility and ADLs Z74.09 799.89     Patient Active Problem List   Diagnosis   • Acute respiratory failure (CMS/HCC)   • Metabolic encephalopathy   • Acute hypoxemic respiratory failure requiring intubation and ventilatory support (CMS/HCC)   • Hyponatremia, suspect SIADH   • Hypoglycemia, resolved   • Generalized abdominal pain   • Severe malnutrition (CMS/HCC)   • Hypotension, resolved   • Multiple right rib fractures (ninth, 10th, 11th)   • Anemia   • COPD and active cigarette abuse (CMS/HCC)   • Constipation       Therapy Treatment          Rehabilitation Treatment Summary     Row Name 10/17/18 1400             Treatment Time/Intention    Discipline physical therapist  -VG      Document Type therapy note (daily note)  -VG      Subjective Information no complaints  -VG      Mode of Treatment individual therapy;physical therapy  -VG      Patient/Family Observations In bed, RA, tele, bed check; no family present.  -VG      Care Plan Review patient/other agree to care plan  -VG      Therapy Frequency (PT Clinical Impression) daily  -VG      Patient Effort good  -VG      Existing Precautions/Restrictions fall  -VG      Recorded by [VG] Gemma Leigh, PT 10/17/18 1440      Row Name 10/17/18 1400             Cognitive Assessment/Intervention- PT/OT    Affect/Mental Status (Cognitive) confused  -VG      Orientation Status (Cognition) oriented to;person  -VG      Follows Commands  (Cognition) follows one step commands;50-74% accuracy;physical/tactile prompts required;repetition of directions required;verbal cues/prompting required  -VG      Cognitive Function (Cognitive) safety deficit  -VG      Safety Deficit (Cognitive) moderate deficit;insight into deficits/self awareness  -VG      Personal Safety Interventions fall prevention program maintained;gait belt;nonskid shoes/slippers when out of bed;supervised activity  -VG      Recorded by [VG] Gemma Leigh, PT 10/17/18 1440      Row Name 10/17/18 1400             Bed Mobility Assessment/Treatment    Bed Mobility Assessment/Treatment supine-sit  -VG      Supine-Sit [place] (Bed Mobility) supervision;verbal cues  -VG      Bed Mobility, Safety Issues decreased use of arms for pushing/pulling;decreased use of legs for bridging/pushing;impaired trunk control for bed mobility  -VG      Assistive Device (Bed Mobility) bed rails;head of bed elevated  -VG      Comment (Bed Mobility) Inc time/effort d/t weakness. Cues for sequencing.  -VG      Recorded by [VG] Gemma Leigh, PT 10/17/18 1440      Row Name 10/17/18 1400             Transfer Assessment/Treatment    Transfer Assessment/Treatment toilet transfer;stand pivot/stand step transfer  -VG      Comment (Transfers) 2 Stand pivot transfers bed to BSC to recliner. Cues for sequencing, hand placement, and AD management. Dependent for perineal hygeiene.  -VG      Recorded by [VG] Gemma Leigh, PT 10/17/18 1440      Row Name 10/17/18 1400             Stand Pivot/Stand Step Transfer    Stand Pivot/Stand Step [place] moderate assist (50% patient effort);verbal cues  -VG      Assistive Device (Stand Pivot Stand Step Transfer) walker, front-wheeled  -VG      Recorded by [VG] Gemma Leigh, PT 10/17/18 1440      Row Name 10/17/18 1400             Toilet Transfer    Type (Toilet Transfer) stand pivot/stand step  -VG      [place] Level (Toilet Transfer) moderate assist (50% patient  effort);verbal cues  -VG      Assistive Device (Toilet Transfer) commode, bedside without drop arms;walker, front-wheeled  -VG      Recorded by [VG] Gemma Leigh, PT 10/17/18 1440      Row Name 10/17/18 1400             Gait/Stairs Assessment/Training    79151 - Gait Training Minutes  20  -VG      Gait/Stairs Assessment/Training gait/ambulation independence;gait/ambulation assistive device  -VG      [place] Level (Gait) moderate assist (50% patient effort);verbal cues;nonverbal cues (demo/gesture)  -VG      Assistive Device (Gait) walker, front-wheeled  -VG      Distance in Feet (Gait) 5  -VG      Pattern (Gait) step-to  -VG      Deviations/Abnormal Patterns (Gait) ataxic;base of support, narrow  -VG      Bilateral Gait Deviations forward flexed posture  -VG      Comment (Gait/Stairs) amb approx 5 ft during stand step transfer from bed to BSC to recliner. Distance limited by impaired balance and strength. Cues for upright posture and AD management.  -VG      Recorded by [VG] Gemma Leigh, PT 10/17/18 1440      Row Name 10/17/18 1400             Therapeutic Exercise    56083 - PT Therapeutic Exercise Minutes 10  -VG      Recorded by [VG] Gemma Leigh, PT 10/17/18 1440      Row Name 10/17/18 1400             Therapeutic Exercise    Lower Extremity (Therapeutic Exercise) LAQ (long arc quad), bilateral;marching while seated  -VG      Lower Extremity Range of Motion (Therapeutic Exercise) hip abduction/adduction, bilateral;ankle dorsiflexion/plantar flexion, bilateral  -VG      Exercise Type (Therapeutic Exercise) AROM (active range of motion)  -VG      Position (Therapeutic Exercise) seated  -VG      Sets/Reps (Therapeutic Exercise) 10x  -VG      Comment (Therapeutic Exercise) Cues for sequencing and attention to task.  -VG      Recorded by [VG] Gemma Leigh, PT 10/17/18 1440      Row Name 10/17/18 1400             Positioning and Restraints    Pre-Treatment Position in bed  -VG      Post Treatment  Position chair  -VG      In Chair reclined;call light within reach;encouraged to call for assist;exit alarm on;waffle cushion;legs elevated;heels elevated  -VG      Recorded by [VG] Gemma Leigh, PT 10/17/18 1440      Row Name 10/17/18 1400             Pain Scale: Numbers Pre/Post-Treatment    Pain Scale: Numbers, Pretreatment 0/10 - no pain  -VG      Pain Scale: Numbers, Post-Treatment 0/10 - no pain  -VG      Recorded by [VG] Gemma Leigh, PT 10/17/18 1440      Row Name                Wound 10/01/18 1845 Bilateral medial coccyx    Wound - Properties Group Date first assessed: 10/01/18 [SW] Time first assessed: 1845 [SW] Present On Admission : yes [SW] Side: Bilateral [SW] Orientation: medial [SW] Location: coccyx [SW] Additional Comments: shearing [CP] Recorded by:  [CP] Mahsa Almanzar APRN 10/07/18 1148 [SW] Fatmata Villalobos RN 10/01/18 1905    Row Name                Wound 10/07/18 1015 Right heel blisters    Wound - Properties Group Date first assessed: 10/07/18 [CP] Time first assessed: 1015 [CP] Present On Admission : no;picture taken [CP] Side: Right [CP] Location: heel [CP] Type: blisters [CP] Additional Comments: with bruise or DTPI [CP] Recorded by:  [CP] Mahsa Almanzar APRN 10/07/18 1155    Row Name                Wound 10/06/18 1500 Left heel blisters    Wound - Properties Group Date first assessed: 10/06/18 [CP] Time first assessed: 1500 [CP] Present On Admission : no [CP2] Side: Left [CP2] Location: heel [CP2] Type: blisters [CP2] Recorded by:  [CP] Mahsa Almanzar APRN 10/07/18 1159 [CP2] Mahsa Almanzar APRN 10/07/18 1157    Row Name                Wound 10/06/18 1500 Right elbow pressure injury    Wound - Properties Group Date first assessed: 10/06/18 [CP] Time first assessed: 1500 [CP] Present On Admission : no;picture taken [CP] Side: Right [CP] Location: elbow [CP] Type: pressure injury [CP] Stage, Pressure Injury: unstageable [CP] Recorded by:  [CP] Jenelle  JEFF Rodriguez 10/07/18 1200    Row Name                Wound 10/15/18 1900 Right upper abdomen skin tear    Wound - Properties Group Date first assessed: 10/15/18 [HM] Time first assessed: 1900 [HM] Side: Right [HM] Orientation: upper [HM] Location: abdomen [HM] Type: skin tear [HM] Recorded by:  [HM] Angie Angel, RN 10/15/18 2312    Row Name 10/17/18 1400             Coping    Observed Emotional State accepting  -VG      Verbalized Emotional State acceptance  -VG      Recorded by [VG] Gemma Leigh, PT 10/17/18 1440      Row Name 10/17/18 1400             Plan of Care Review    Plan of Care Reviewed With patient  -VG      Recorded by [VG] Gemma Leigh, PT 10/17/18 1440      Row Name 10/17/18 1400             Outcome Summary/Treatment Plan (PT)    Daily Summary of Progress (PT) progress towards functional goals is fair  -VG      Anticipated Discharge Disposition (PT) skilled nursing facility  -VG      Recorded by [VG] Gemma Leigh, PT 10/17/18 1440        User Key  (r) = Recorded By, (t) = Taken By, (c) = Cosigned By    Initials Name Effective Dates Discipline    CP Mahsa Almanzar APRN 06/08/18 -  Nurse    Fatmata Chaves RN 06/16/16 -  Nurse    Angie Rosenberg RN 07/03/17 -  Nurse    Gemma Garcia, PT 05/29/18 -  PT          Wound 10/01/18 1845 Bilateral medial coccyx (Active)   Dressing Appearance dry;intact 10/17/2018  8:00 AM   Care, Wound cleansed with;sterile normal saline 10/17/2018  8:00 AM   Dressing Care, Wound low-adherent 10/17/2018  8:00 AM   Periwound Care, Wound barrier ointment applied 10/17/2018  8:00 AM       Wound 10/07/18 1015 Right heel blisters (Active)   Dressing Appearance dry;intact 10/17/2018  8:00 AM   Closure None 10/16/2018  8:00 PM   Base dressing in place, unable to visualize 10/16/2018  8:00 PM   Care, Wound cleansed with;sterile normal saline 10/17/2018  8:00 AM   Dressing Care, Wound petroleum-based;low-adherent 10/17/2018  8:00 AM        Wound 10/06/18 1500 Left heel blisters (Active)   Dressing Appearance dry;intact 10/17/2018  8:00 AM   Base dressing in place, unable to visualize 10/16/2018  8:00 PM   Care, Wound cleansed with;sterile normal saline 10/17/2018  8:00 AM   Dressing Care, Wound dressing changed;low-adherent;petroleum-based 10/17/2018  8:00 AM       Wound 10/06/18 1500 Right elbow pressure injury (Active)   Dressing Appearance dry;intact 10/17/2018  8:00 AM   Base dressing in place, unable to visualize 10/16/2018  8:00 PM   Dressing Care, Wound foam;low-adherent 10/16/2018  8:00 PM       Wound 10/15/18 1900 Right upper abdomen skin tear (Active)   Dressing Appearance dry;intact 10/17/2018  8:00 AM   Base dressing in place, unable to visualize 10/16/2018  8:00 PM   Dressing Care, Wound foam;low-adherent 10/16/2018  8:00 PM             Physical Therapy Education     Title: PT OT SLP Therapies (Active)     Topic: Physical Therapy (Active)     Point: Mobility training (Active)    Learning Progress Summary     Learner Status Readiness Method Response Comment Documented by    Patient Active Acceptance E NR Educated on HEP and correct mechanics for safe functional mobility. Reinforcement needed d/t cognitive status.  10/17/18 1400     Active Acceptance E,D NR  DM 10/07/18 1438     Active Acceptance E NR  SIXTO 10/04/18 1500          Point: Home exercise program (Active)    Learning Progress Summary     Learner Status Readiness Method Response Comment Documented by    Patient Active Acceptance E NR Educated on HEP and correct mechanics for safe functional mobility. Reinforcement needed d/t cognitive status.  10/17/18 1400     Active Nonacceptance E NR Educated on HEP, constant cues for redirection and technique. Reinforcement needed.  10/13/18 1148     Active Acceptance E NR Educated on HEP and benefits of activity.  10/09/18 1323     Active Acceptance E,D NR  DM 10/07/18 1438     Active Acceptance E NR  SIXTO 10/04/18 1500          Point:  Body mechanics (Active)    Learning Progress Summary     Learner Status Readiness Method Response Comment Documented by    Patient Active Acceptance E NR Educated on HEP and correct mechanics for safe functional mobility. Reinforcement needed d/t cognitive status. VG 10/17/18 1400     Active Acceptance E,D NR  DM 10/07/18 1438     Active Acceptance E NR  SIXTO 10/04/18 1500          Point: Precautions (Active)    Learning Progress Summary     Learner Status Readiness Method Response Comment Documented by    Patient Active Acceptance E NR Educated on HEP and correct mechanics for safe functional mobility. Reinforcement needed d/t cognitive status. VG 10/17/18 1400     Active Acceptance E,D NR  DM 10/07/18 1438     Active Acceptance E NR  SIXTO 10/04/18 1500                      User Key     Initials Effective Dates Name Provider Type Discipline     06/19/15 -  Althea Ruth, PT Physical Therapist PT     06/19/15 -  Terri Magaña, PT Physical Therapist PT     05/29/18 -  Gemma Leigh, PT Physical Therapist PT                    PT Recommendation and Plan  Anticipated Discharge Disposition (PT): skilled nursing facility  Therapy Frequency (PT Clinical Impression): daily  Outcome Summary/Treatment Plan (PT)  Daily Summary of Progress (PT): progress towards functional goals is fair  Anticipated Discharge Disposition (PT): skilled nursing facility  Plan of Care Reviewed With: patient  Progress: improving  Outcome Summary: Pt ambulated approx 5 ft during 2 stand pivot transfers bed to C to recliner with MODA and RW. Distance limited by weakness and impaired balance. Will continue to progress pt as able per POC.          Outcome Measures     Row Name 10/17/18 1400 10/15/18 1430 10/15/18 1100       How much help from another person do you currently need...    Turning from your back to your side while in flat bed without using bedrails? 4  -VG 3  -MB  --    Moving from lying on back to sitting on the side of a  flat bed without bedrails? 4  -VG 2  -MB  --    Moving to and from a bed to a chair (including a wheelchair)? 2  -VG 2  -MB  --    Standing up from a chair using your arms (e.g., wheelchair, bedside chair)? 2  -VG 2  -MB  --    Climbing 3-5 steps with a railing? 1  -VG 1  -MB  --    To walk in hospital room? 2  -VG 2  -MB  --    AM-PAC 6 Clicks Score 15  -VG 12  -MB  --       How much help from another is currently needed...    Putting on and taking off regular lower body clothing?  --  -- 2  -SD    Bathing (including washing, rinsing, and drying)  --  -- 2  -SD    Toileting (which includes using toilet bed pan or urinal)  --  -- 2  -SD    Putting on and taking off regular upper body clothing  --  -- 2  -SD    Taking care of personal grooming (such as brushing teeth)  --  -- 3  -SD    Eating meals  --  -- 3  -SD    Score  --  -- 14  -SD       Functional Assessment    Outcome Measure Options AM-PAC 6 Clicks Basic Mobility (PT)  -VG AM-PAC 6 Clicks Basic Mobility (PT)  -MB AM-PAC 6 Clicks Daily Activity (OT)  -SD      User Key  (r) = Recorded By, (t) = Taken By, (c) = Cosigned By    Initials Name Provider Type    Katerin Luz, OT Occupational Therapist    Rosina Terrell, PT Physical Therapist     Gemma Leigh, PT Physical Therapist           Time Calculation:         PT Charges     Row Name 10/17/18 1400             Time Calculation    Start Time 1400  -VG      PT Received On 10/17/18  -      PT Goal Re-Cert Due Date 10/25/18  -         Time Calculation- PT    Total Timed Code Minutes- PT 30 minute(s)  -VG         Timed Charges    06926 - PT Therapeutic Exercise Minutes 10  -VG      64286 - Gait Training Minutes  20  -VG        User Key  (r) = Recorded By, (t) = Taken By, (c) = Cosigned By    Initials Name Provider Type    VG Gemma Leigh, PT Physical Therapist        Therapy Suggested Charges     Code   Minutes Charges    53297 (CPT®) Hc Pt Neuromusc Re Education Ea 15 Min       58610 (CPT®) Hc Pt Ther Proc Ea 15 Min 10 1    10830 (CPT®) Hc Gait Training Ea 15 Min 20 1    02991 (CPT®) Hc Pt Therapeutic Act Ea 15 Min      84650 (CPT®) Hc Pt Manual Therapy Ea 15 Min      75301 (CPT®) Hc Pt Iontophoresis Ea 15 Min      51693 (CPT®) Hc Pt Elec Stim Ea-Per 15 Min      44309 (CPT®) Hc Pt Ultrasound Ea 15 Min      45019 (CPT®) Hc Pt Self Care/Mgmt/Train Ea 15 Min      71753 (CPT®) Hc Pt Prosthetic (S) Train Initial Encounter, Each 15 Min      84243 (CPT®) Hc Pt Orthotic(S)/Prosthetic(S) Encounter, Each 15 Min      42924 (CPT®) Hc Orthotic(S) Mgmt/Train Initial Encounter, Each 15min      Total  30 2        Therapy Charges for Today     Code Description Service Date Service Provider Modifiers Qty    12454725669 HC PT THER PROC EA 15 MIN 10/17/2018 Gemma Leihg, PT GP 1    20957633250 HC GAIT TRAINING EA 15 MIN 10/17/2018 Gemma Leigh, PT GP 1          PT G-Codes  Outcome Measure Options: AM-PAC 6 Clicks Basic Mobility (PT)  AM-PAC 6 Clicks Score: 15  Score: 14    Ira Leigh PT  10/17/2018

## 2018-10-18 PROBLEM — I95.9 HYPOTENSION: Status: RESOLVED | Noted: 2018-10-01 | Resolved: 2018-10-18

## 2018-10-18 PROBLEM — E16.2 HYPOGLYCEMIA: Status: RESOLVED | Noted: 2018-10-01 | Resolved: 2018-10-18

## 2018-10-18 PROBLEM — J96.01 ACUTE RESPIRATORY FAILURE WITH HYPOXEMIA (HCC): Status: RESOLVED | Noted: 2018-10-01 | Resolved: 2018-10-18

## 2018-10-18 PROBLEM — G93.41 METABOLIC ENCEPHALOPATHY: Status: RESOLVED | Noted: 2018-10-01 | Resolved: 2018-10-18

## 2018-10-18 PROBLEM — K59.00 CONSTIPATION: Status: RESOLVED | Noted: 2018-10-06 | Resolved: 2018-10-18

## 2018-10-18 LAB
ANION GAP SERPL CALCULATED.3IONS-SCNC: 6 MMOL/L (ref 3–11)
BUN BLD-MCNC: 18 MG/DL (ref 9–23)
BUN/CREAT SERPL: 42.9 (ref 7–25)
CALCIUM SPEC-SCNC: 9.1 MG/DL (ref 8.7–10.4)
CHLORIDE SERPL-SCNC: 95 MMOL/L (ref 99–109)
CO2 SERPL-SCNC: 26 MMOL/L (ref 20–31)
CREAT BLD-MCNC: 0.42 MG/DL (ref 0.6–1.3)
GFR SERPL CREATININE-BSD FRML MDRD: 149 ML/MIN/1.73
GLUCOSE BLD-MCNC: 77 MG/DL (ref 70–100)
MAGNESIUM SERPL-MCNC: 1.8 MG/DL (ref 1.3–2.7)
POTASSIUM BLD-SCNC: 4.5 MMOL/L (ref 3.5–5.5)
SODIUM BLD-SCNC: 127 MMOL/L (ref 132–146)

## 2018-10-18 PROCEDURE — 83735 ASSAY OF MAGNESIUM: CPT

## 2018-10-18 PROCEDURE — 25010000002 LORAZEPAM PER 2 MG: Performed by: INTERNAL MEDICINE

## 2018-10-18 PROCEDURE — 99233 SBSQ HOSP IP/OBS HIGH 50: CPT | Performed by: NURSE PRACTITIONER

## 2018-10-18 PROCEDURE — 97535 SELF CARE MNGMENT TRAINING: CPT | Performed by: OCCUPATIONAL THERAPIST

## 2018-10-18 PROCEDURE — 80048 BASIC METABOLIC PNL TOTAL CA: CPT | Performed by: INTERNAL MEDICINE

## 2018-10-18 PROCEDURE — 97530 THERAPEUTIC ACTIVITIES: CPT | Performed by: OCCUPATIONAL THERAPIST

## 2018-10-18 PROCEDURE — 25010000002 ENOXAPARIN PER 10 MG: Performed by: INTERNAL MEDICINE

## 2018-10-18 RX ORDER — SODIUM CHLORIDE 1000 MG
1 TABLET, SOLUBLE MISCELLANEOUS 2 TIMES DAILY WITH MEALS
Status: DISCONTINUED | OUTPATIENT
Start: 2018-10-18 | End: 2018-10-19 | Stop reason: HOSPADM

## 2018-10-18 RX ADMIN — FAMOTIDINE 20 MG: 20 TABLET ORAL at 20:33

## 2018-10-18 RX ADMIN — DOCUSATE SODIUM 100 MG: 50 LIQUID ORAL at 08:56

## 2018-10-18 RX ADMIN — ENOXAPARIN SODIUM 30 MG: 30 INJECTION SUBCUTANEOUS at 20:33

## 2018-10-18 RX ADMIN — DOCUSATE SODIUM 100 MG: 50 LIQUID ORAL at 20:33

## 2018-10-18 RX ADMIN — DEMECLOCYCLINE HYDROCHLORIDE 150 MG: 150 TABLET ORAL at 08:56

## 2018-10-18 RX ADMIN — SENNOSIDES AND DOCUSATE SODIUM 2 TABLET: 8.6; 5 TABLET ORAL at 20:33

## 2018-10-18 RX ADMIN — POLYETHYLENE GLYCOL 3350 17 G: 17 POWDER, FOR SOLUTION ORAL at 08:56

## 2018-10-18 RX ADMIN — CLOPIDOGREL BISULFATE 75 MG: 75 TABLET ORAL at 08:56

## 2018-10-18 RX ADMIN — Medication 1 G: at 17:08

## 2018-10-18 RX ADMIN — Medication 1 TABLET: at 08:56

## 2018-10-18 RX ADMIN — LORAZEPAM 0.5 MG: 2 INJECTION INTRAMUSCULAR; INTRAVENOUS at 17:07

## 2018-10-18 RX ADMIN — DEMECLOCYCLINE HYDROCHLORIDE 150 MG: 150 TABLET ORAL at 20:33

## 2018-10-18 RX ADMIN — FAMOTIDINE 20 MG: 20 TABLET ORAL at 08:56

## 2018-10-18 RX ADMIN — LORAZEPAM 0.5 MG: 2 INJECTION INTRAMUSCULAR; INTRAVENOUS at 23:14

## 2018-10-18 NOTE — THERAPY TREATMENT NOTE
Acute Care - Occupational Therapy Treatment Note  [facility]     Patient Name: Devora Huddleston  : 1945  MRN: [id number]  Today's Date: 10/18/2018  Onset of Illness/Injury or Date of Surgery: 10/01/18  Date of Referral to OT: 10/04/18  Referring Physician: MD Emmett    Admit Date: 10/1/2018       ICD-10-CM ICD-9-CM   1. Acute respiratory failure with hypoxemia (CMS/HCC) J96.01 518.81   2. Impaired functional mobility, balance, gait, and endurance Z74.09 V49.89   3. Oropharyngeal dysphagia R13.12 787.22   4. Impaired mobility and ADLs Z74.09 799.89     Patient Active Problem List   Diagnosis   • Acute respiratory failure (CMS/HCC)   • Hyponatremia, suspect SIADH   • Generalized abdominal pain   • Severe malnutrition (CMS/HCC)   • Multiple right rib fractures (ninth, 10th, 11th)   • Anemia   • COPD and active cigarette abuse (CMS/HCC)     Past Medical History:   Diagnosis Date   • Hypertension    • Stroke (CMS/HCC)      History reviewed. No pertinent surgical history.    Therapy Treatment          Rehabilitation Treatment Summary     Row Name 10/18/18 1355             Treatment Time/Intention    Discipline occupational therapist  -SD      Document Type therapy note (daily note)  -SD      Subjective Information no complaints  -SD      Mode of Treatment occupational therapy  -SD      Patient/Family Observations pt. in bed, no family present  -SD      Care Plan Review care plan/treatment goals reviewed;risks/benefits reviewed;current/potential barriers reviewed;patient/other agree to care plan  -SD      Total Minutes, Occupational Therapy Treatment 26  -SD      Patient Effort good  -SD      Patient Response to Treatment fatigued  -SD      Recorded by [SD] Katerin Shine OT 10/18/18 1437      Row Name 10/18/18 8280             Vital Signs    Pre Patient Position Supine  -SD      Intra Patient Position Standing  -SD      Post Patient Position Sitting  -SD      Recorded by [SD] Katerin Shine,  OT 10/18/18 1437      Row Name 10/18/18 1355             Cognitive Assessment/Intervention- PT/OT    Affect/Mental Status (Cognitive) confused  -SD      Orientation Status (Cognition) oriented to;person;situation;verbal cues/prompts needed for orientation  -SD      Follows Commands (Cognition) follows one step commands;75-90% accuracy;verbal cues/prompting required;repetition of directions required  -SD      Cognitive Function (Cognitive) safety deficit  -SD      Safety Deficit (Cognitive) moderate deficit;awareness of need for assistance;insight into deficits/self awareness;safety precautions awareness;safety precautions follow-through/compliance  -SD      Personal Safety Interventions fall prevention program maintained;gait belt;nonskid shoes/slippers when out of bed;supervised activity  -SD      Recorded by [SD] Katerin Shine OT 10/18/18 1437      Row Name 10/18/18 1355             Safety Issues, Functional Mobility    Safety Issues Affecting Function (Mobility) ability to follow commands;awareness of need for assistance;insight into deficits/self awareness;safety precaution awareness;safety precautions follow-through/compliance  -SD      Impairments Affecting Function (Mobility) balance;cognition;endurance/activity tolerance;strength  -SD      Recorded by [SD] Katerin Shine OT 10/18/18 1437      Row Name 10/18/18 1355             Bed Mobility Assessment/Treatment    Rolling Left [place] (Bed Mobility) contact guard  -SD      Scooting/Bridging [place] (Bed Mobility) contact guard  -SD      Supine-Sit [place] (Bed Mobility) minimum assist (75% patient effort)  -SD      Assistive Device (Bed Mobility) bed rails  -SD      Recorded by [SD] Katerin Shine OT 10/18/18 1437      Row Name 10/18/18 1355             Functional Mobility    Functional Mobility- Ind. Level minimum assist (75% patient effort)  -SD      Functional Mobility- Device rolling walker  -SD      Functional  Mobility-Distance (Feet) 50  -SD      Functional Mobility- Safety Issues balance decreased during turns;step length decreased  -SD      Recorded by [SD] Katerin Shine, OT 10/18/18 1437      Row Name 10/18/18 1355             Bed-Chair Transfer    Bed-Chair [place] (Transfers) minimum assist (75% patient effort)  -SD      Recorded by [SD] Katerin Shine OT 10/18/18 1437      Row Name 10/18/18 1355             Sit-Stand Transfer    Sit-Stand [place] (Transfers) minimum assist (75% patient effort)  -SD      Assistive Device (Sit-Stand Transfers) walker, front-wheeled  -SD      Recorded by [SD] Katerin Shine OT 10/18/18 1437      Row Name 10/18/18 1355             Stand-Sit Transfer    Stand-Sit [place] (Transfers) minimum assist (75% patient effort)  -SD      Assistive Device (Stand-Sit Transfers) walker, front-wheeled  -SD      Recorded by [SD] Katerin Shine OT 10/18/18 1437      Row Name 10/18/18 1355             Stand Pivot/Stand Step Transfer    Stand Pivot/Stand Step [place] minimum assist (75% patient effort)  -SD      Recorded by [SD] Katerin Shine  10/18/18 1437      Row Name 10/18/18 1355             ADL Assessment/Intervention    51191 - OT Self Care/Mgmt Minutes 16  -SD      BADL Assessment/Intervention bathing;upper body dressing;lower body dressing;grooming;toileting  -SD      Recorded by [SD] Katerin Shine OT 10/18/18 1437      Row Name 10/18/18 1355             Bathing Assessment/Intervention    Bathing [place] Level moderate assist (50% patient effort);lower body;minimum assist (75% patient effort);upper body;maximum assist (25% patient effort);perineal area  -SD      Bathing Position edge of bed sitting;supported standing  -SD      Recorded by [SD] Katerin Shine OT 10/18/18 1437      Row Name 10/18/18 1355             Upper Body Dressing Assessment/Training    Upper Body Dressing [place] Level  doff;don;minimum assist (75% patient effort)   hospital gown  -SD      Upper Body Dressing Position edge of bed sitting  -SD      Recorded by [SD] Katerin Shine OT 10/18/18 1437      Row Name 10/18/18 1355             Lower Body Dressing Assessment/Training    Lower Body Dressing [place] Level doff;socks;moderate assist (50% patient effort);don;shoes/slippers;maximum assist (25% patient effort)  -SD      Lower Body Dressing Position edge of bed sitting  -SD      Recorded by [SD] Katerin Shine OT 10/18/18 1437      Row Name 10/18/18 1355             Grooming Assessment/Training    [place] Level (Grooming) hair care, combing/brushing;wash face, hands;set up  -SD      Grooming Position edge of bed sitting  -SD      Recorded by [SD] Katerin Shine OT 10/18/18 1437      Row Name 10/18/18 1355             Therapeutic Exercise    32571 - OT Therapeutic Activity Minutes 10  -SD      Recorded by [SD] Katerin Shine OT 10/18/18 1437      Row Name 10/18/18 1355             Static Sitting Balance    Level of [place] (Unsupported Sitting, Static Balance) supervision  -SD      Sitting Position (Unsupported Sitting, Static Balance) sitting on edge of bed  -SD      Time Able to Maintain Position (Unsupported Sitting, Static Balance) more than 5 minutes  -SD      Recorded by [SD] Katerin Shine OT 10/18/18 1437      Row Name 10/18/18 1355             Static Standing Balance    Level of [place] (Supported Standing, Static Balance) minimal assist, 75% patient effort  -SD      Time Able to Maintain Position (Supported Standing, Static Balance) 3 to 4 minutes  -SD      Assistive Device Utilized (Supported Standing, Static Balance) rolling walker  -SD      Recorded by [SD] Katerin Shine OT 10/18/18 1437      Row Name 10/18/18 1350             Positioning and Restraints    Pre-Treatment Position in bed  -SD      Post Treatment Position chair  -SD      In Chair  reclined;notified nsg;call light within reach;encouraged to call for assist;exit alarm on;legs elevated  -SD      Recorded by [SD] Katerin Shine, AYANA 10/18/18 1437      Row Name 10/18/18 1355             Pain Scale: FACES Pre/Post-Treatment    Pain: FACES Scale, Pretreatment 0-->no hurt  -SD      Pain: FACES Scale, Post-Treatment 2-->hurts little bit  -SD      Recorded by [SD] Katerin Shine, AYANA 10/18/18 1437      Row Name                Wound 10/01/18 1845 Bilateral medial coccyx    Wound - Properties Group Date first assessed: 10/01/18 [SW] Time first assessed: 1845 [SW] Present On Admission : yes [SW] Side: Bilateral [SW] Orientation: medial [SW] Location: coccyx [SW] Additional Comments: shearing [CP] Recorded by:  [CP] Mahsa Almanzar APRN 10/07/18 1148 [SW] Fatmata Villalobos RN 10/01/18 1905    Row Name                Wound 10/07/18 1015 Right heel blisters    Wound - Properties Group Date first assessed: 10/07/18 [CP] Time first assessed: 1015 [CP] Present On Admission : no;picture taken [CP] Side: Right [CP] Location: heel [CP] Type: blisters [CP] Additional Comments: with bruise or DTPI [CP] Recorded by:  [CP] Mahsa Almanzar APRN 10/07/18 1155    Row Name                Wound 10/06/18 1500 Left heel blisters    Wound - Properties Group Date first assessed: 10/06/18 [CP] Time first assessed: 1500 [CP] Present On Admission : no [CP2] Side: Left [CP2] Location: heel [CP2] Type: blisters [CP2] Recorded by:  [CP] Mahsa Almanzar APRN 10/07/18 1159 [CP2] Mahsa Almanzar APRN 10/07/18 1157    Row Name                Wound 10/06/18 1500 Right elbow pressure injury    Wound - Properties Group Date first assessed: 10/06/18 [CP] Time first assessed: 1500 [CP] Present On Admission : no;picture taken [CP] Side: Right [CP] Location: elbow [CP] Type: pressure injury [CP] Stage, Pressure Injury: unstageable [CP] Recorded by:  [CP] Mahsa Almanzar APRN 10/07/18 1200    Row  Name                Wound 10/15/18 1900 Right upper abdomen skin tear    Wound - Properties Group Date first assessed: 10/15/18 [HM] Time first assessed: 1900 [HM] Side: Right [HM] Orientation: upper [HM] Location: abdomen [HM] Type: skin tear [HM] Recorded by:  [HM] Angie Angel RN 10/15/18 2312    Row Name 10/18/18 1355             Coping    Observed Emotional State accepting;calm;cooperative  -SD      Recorded by [SD] Katerin Shine OT 10/18/18 1437      Row Name 10/18/18 1355             Plan of Care Review    Plan of Care Reviewed With patient  -SD      Recorded by [SD] Katerin Shine OT 10/18/18 1437      Row Name 10/18/18 1358             Outcome Summary/Treatment Plan (OT)    Daily Summary of Progress (OT) progress toward functional goals is good  -SD      Barriers to Overall Progress (OT) confusion  -SD      Plan for Continued Treatment (OT) cont OT POC  -SD      Anticipated Discharge Disposition (OT) skilled nursing facility  -SD      Recorded by [SD] Katerin Shine OT 10/18/18 1438        User Key  (r) = Recorded By, (t) = Taken By, (c) = Cosigned By    Initials Name Effective Dates Discipline    SD Katerin Shine OT 06/08/18 -  OT    Mahsa Matthews APRN 06/08/18 -  Nurse    Fatmata Chaves RN 06/16/16 -  Nurse    HM Angie Angel RN 07/03/17 -  Nurse        Wound 10/01/18 1845 Bilateral medial coccyx (Active)   Dressing Appearance dry;intact 10/18/2018  8:00 AM   Closure None 10/18/2018  8:00 AM   Base dry 10/18/2018  8:00 AM   Periwound intact;dry;pink;blanchable 10/18/2018  8:00 AM   Edges irregular 10/18/2018  8:00 AM   Drainage Amount none 10/18/2018  8:00 AM   Dressing Care, Wound foam;low-adherent 10/18/2018  8:00 AM       Wound 10/07/18 1015 Right heel blisters (Active)   Dressing Appearance dry;intact 10/18/2018  8:00 AM   Closure None 10/18/2018  8:00 AM   Base dressing in place, unable to visualize 10/18/2018  8:00 AM   Periwound  intact;dry;pink;blanchable 10/18/2018  8:00 AM   Edges irregular 10/18/2018  8:00 AM   Drainage Amount none 10/18/2018  8:00 AM       Wound 10/06/18 1500 Left heel blisters (Active)   Dressing Appearance dry;intact 10/18/2018  8:00 AM   Closure None 10/18/2018  8:00 AM   Base dressing in place, unable to visualize 10/18/2018  8:00 AM   Periwound pink;blanchable 10/18/2018  8:00 AM   Edges irregular 10/18/2018  8:00 AM       Wound 10/06/18 1500 Right elbow pressure injury (Active)   Dressing Appearance dry;intact 10/18/2018  8:00 AM   Closure None 10/18/2018  8:00 AM   Base dressing in place, unable to visualize 10/18/2018  8:00 AM   Periwound intact;dry;pink;blanchable 10/18/2018  8:00 AM   Edges irregular 10/18/2018  8:00 AM   Drainage Amount none 10/18/2018  8:00 AM   Care, Wound cleansed with;sterile normal saline 10/17/2018  4:08 PM   Dressing Care, Wound calcium alginate;low-adherent 10/17/2018  4:08 PM       Wound 10/15/18 1900 Right upper abdomen skin tear (Active)   Dressing Appearance dry;intact 10/18/2018  8:00 AM   Base dressing in place, unable to visualize 10/18/2018  8:00 AM   Periwound redness 10/18/2018  8:00 AM   Care, Wound cleansed with;sterile normal saline 10/17/2018  4:08 PM   Dressing Care, Wound low-adherent;foam 10/18/2018  8:00 AM         Occupational Therapy Education     Title: PT OT SLP Therapies (Active)     Topic: Occupational Therapy (Active)     Point: ADL training (Done)     Description: Instruct learner(s) on proper safety adaptation and remediation techniques during self care or transfers.   Instruct in proper use of assistive devices.   Learning Progress Summary     Learner Status Readiness Method Response Comment Documented by    Patient Done Acceptance E VU,NR sequencing with bed mobility, safety with transfer,  UE HEP AC 10/12/18 0197     Active Acceptance E NR Pt educated on appropriate safety precautions, t/f techniques, role of OT, positioning, and benefits of therapy. CL  10/05/18 1009          Point: Precautions (Active)     Description: Instruct learner(s) on prescribed precautions during self-care and functional transfers.   Learning Progress Summary     Learner Status Readiness Method Response Comment Documented by    Patient Active Acceptance E NR Pt educated on appropriate safety precautions, t/f techniques, role of OT, positioning, and benefits of therapy. CL 10/05/18 1009          Point: Body mechanics (Active)     Description: Instruct learner(s) on proper positioning and spine alignment during self-care, functional mobility activities and/or exercises.   Learning Progress Summary     Learner Status Readiness Method Response Comment Documented by    Patient Active Acceptance E NR Pt educated on appropriate safety precautions, t/f techniques, role of OT, positioning, and benefits of therapy. CL 10/05/18 1009                      User Key     Initials Effective Dates Name Provider Type Discipline    AC 06/23/15 -  Anna Best, OT Occupational Therapist OT    CL 04/03/18 -  Helena Thomas OT Occupational Therapist OT                OT Recommendation and Plan  Outcome Summary/Treatment Plan (OT)  Daily Summary of Progress (OT): progress toward functional goals is good  Barriers to Overall Progress (OT): confusion  Plan for Continued Treatment (OT): cont OT POC  Anticipated Discharge Disposition (OT): skilled nursing facility  Daily Summary of Progress (OT): progress toward functional goals is good  Plan of Care Review  Plan of Care Reviewed With: patient  Plan of Care Reviewed With: patient  Outcome Summary: Pt. supine to sit on EOB with CGA. Pt. sat EOB with supervision. Min-mod A with bathing while sitting EOB. Min A for EOB to Chair t/f. Pt. sit to stand with CGA using rolling wx, and ambulated 50 feet in hallway with rolling wx. T/f'ed back to chair with min A.        Outcome Measures     Row Name 10/18/18 3888 10/17/18 1400          How much help from another person do  you currently need...    Turning from your back to your side while in flat bed without using bedrails?  -- 4  -VG     Moving from lying on back to sitting on the side of a flat bed without bedrails?  -- 4  -VG     Moving to and from a bed to a chair (including a wheelchair)?  -- 2  -VG     Standing up from a chair using your arms (e.g., wheelchair, bedside chair)?  -- 2  -VG     Climbing 3-5 steps with a railing?  -- 1  -VG     To walk in hospital room?  -- 2  -VG     AM-PAC 6 Clicks Score  -- 15  -VG        How much help from another is currently needed...    Putting on and taking off regular lower body clothing? 2  -SD  --     Bathing (including washing, rinsing, and drying) 2  -SD  --     Toileting (which includes using toilet bed pan or urinal) 2  -SD  --     Putting on and taking off regular upper body clothing 3  -SD  --     Taking care of personal grooming (such as brushing teeth) 3  -SD  --     Eating meals 3  -SD  --     Score 15  -SD  --        Functional Assessment    Outcome Measure Options AM-PAC 6 Clicks Daily Activity (OT)  -SD AM-PAC 6 Clicks Basic Mobility (PT)  -VG       User Key  (r) = Recorded By, (t) = Taken By, (c) = Cosigned By    Initials Name Provider Type    Katerin Luz, OT Occupational Therapist    Gemma Garcia, PT Physical Therapist           Time Calculation:         Time Calculation- OT     Row Name 10/18/18 1355             Time Calculation- OT    OT Start Time 1355  -SD      OT Stop Time 1421  -SD      OT Time Calculation (min) 26 min  -SD      OT Received On 10/18/18  -SD      OT Goal Re-Cert Due Date 10/25/18  -SD         Timed Charges    20573 - OT Therapeutic Activity Minutes 10  -SD      54408 - OT Self Care/Mgmt Minutes 16  -SD        User Key  (r) = Recorded By, (t) = Taken By, (c) = Cosigned By    Initials Name Provider Type    Katerin Luz, OT Occupational Therapist           Therapy Suggested Charges     Code   Minutes Charges    25542  (CPT®) Hc Ot Neuromusc Re Education Ea 15 Min      15305 (CPT®) Hc Ot Ther Proc Ea 15 Min      09190 (CPT®) Hc Ot Therapeutic Act Ea 15 Min 10 1    57136 (CPT®) Hc Ot Manual Therapy Ea 15 Min      93036 (CPT®) Hc Ot Iontophoresis Ea 15 Min      16624 (CPT®) Hc Ot Elec Stim Ea-Per 15 Min      36704 (CPT®) Hc Ot Ultrasound Ea 15 Min      41318 (CPT®) Hc Ot Self Care/Mgmt/Train Ea 15 Min 16 1    Total  26 2        Therapy Charges for Today     Code Description Service Date Service Provider Modifiers Qty    87191637932 HC OT THERAPEUTIC ACT EA 15 MIN 10/18/2018 Katerin Shine, OT GO 1    04747995726 HC OT SELF CARE/MGMT/TRAIN EA 15 MIN 10/18/2018 Katerin Shine, OT GO 1               Katerin Shine OT  10/18/2018

## 2018-10-18 NOTE — PLAN OF CARE
Problem: Patient Care Overview  Goal: Plan of Care Review  Outcome: Ongoing (interventions implemented as appropriate)   10/18/18 4487   Coping/Psychosocial   Plan of Care Reviewed With patient   Coping/Psychosocial   Patient Agreement with Plan of Care agrees   Plan of Care Review   Progress improving   OTHER   Outcome Summary Pt. supine to sit on EOB with CGA. Pt. sat EOB with supervision. Min-mod A with bathing while sitting EOB. Min A for EOB to Chair t/f. Pt. sit to stand with CGA using rolling wx, and ambulated 50 feet in hallway with rolling wx. T/f'ed back to chair with min A.

## 2018-10-18 NOTE — PLAN OF CARE
Problem: Skin Injury Risk (Adult)  Goal: Identify Related Risk Factors and Signs and Symptoms  Outcome: Ongoing (interventions implemented as appropriate)      Problem: Patient Care Overview  Goal: Plan of Care Review  Outcome: Ongoing (interventions implemented as appropriate)      Problem: Fall Risk (Adult)  Goal: Identify Related Risk Factors and Signs and Symptoms  Outcome: Ongoing (interventions implemented as appropriate)

## 2018-10-18 NOTE — PLAN OF CARE
Problem: Skin Injury Risk (Adult)  Goal: Identify Related Risk Factors and Signs and Symptoms  Outcome: Ongoing (interventions implemented as appropriate)    Goal: Skin Health and Integrity  Outcome: Ongoing (interventions implemented as appropriate)      Problem: Patient Care Overview  Goal: Plan of Care Review  Outcome: Ongoing (interventions implemented as appropriate)   10/18/18 4019   OTHER   Outcome Summary Pt very anxious to go to rehab. Unable to go today per nephrology due to sodium level. VS stable, pt up in chair. Dsg to BL heels, R elbow and abd changed. Pt seeks out staff frequently, but is cooperative. Ativan given once for anxiety. Will continue to monitor.     Goal: Individualization and Mutuality  Outcome: Ongoing (interventions implemented as appropriate)    Goal: Discharge Needs Assessment  Outcome: Ongoing (interventions implemented as appropriate)    Goal: Interprofessional Rounds/Family Conf  Outcome: Ongoing (interventions implemented as appropriate)      Problem: Infection, Risk/Actual (Adult)  Goal: Infection Prevention/Resolution  Outcome: Ongoing (interventions implemented as appropriate)      Problem: Fall Risk (Adult)  Goal: Identify Related Risk Factors and Signs and Symptoms  Outcome: Ongoing (interventions implemented as appropriate)    Goal: Absence of Fall  Outcome: Ongoing (interventions implemented as appropriate)

## 2018-10-18 NOTE — PROGRESS NOTES
Continued Stay Note  [facility]     Patient Name: Devora Huddleston  MRN: [id number]  Today's Date: 10/18/2018    Admit Date: 10/1/2018          Discharge Plan     Row Name 10/18/18 1050       Plan    Plan To [facility] and rehab today     Patient/Family in Agreement with Plan yes    Plan Comments Spoke with Noble the admissions specialist at [facility], they can accept the patient on demeclocyline. EMR is arranged for 1PM today. I have called her son Molina and notified him of the plan. CM following - mirtha [phone]              Discharge Codes    No documentation.       Expected Discharge Date and Time     Expected Discharge Date Expected Discharge Time    Oct 6, 2018             Mirtha Laws RN

## 2018-10-18 NOTE — PROGRESS NOTES
[facility] Medicine Services  PROGRESS NOTE    Patient Name: Devora Huddleston  : 1945  MRN: [id number]    Date of Admission: 10/1/2018  Length of Stay: 17  Primary Care Physician: Evelyn Francisco MD    Subjective   Subjective     CC:  F/U found down unresponsive    HPI:  No issues overnight  Slight drop in Na today     Review of Systems   Unable to perform ROS: Dementia         Objective   Objective     Vital Signs:   Temp:  [97.9 °F (36.6 °C)-98.6 °F (37 °C)] 98.6 °F (37 °C)  Heart Rate:  [67-73] 67  Resp:  [16-20] 16  BP: (104-117)/(52-69) 117/56  Total (NIH Stroke Scale): 3     Physical Exam:  Constitutional: No acute distress, awake, alert  HENT: NCAT, mucous membranes moist  Respiratory: Clear to auscultation bilaterally, respiratory effort normal   Cardiovascular: RRR, no murmurs, rubs, or gallops, palpable pedal pulses bilaterally  Gastrointestinal: Positive bowel sounds, soft, nontender, nondistended  Musculoskeletal: No bilateral ankle edema  Psychiatric: Appropriate affect, cooperative  Neurologic: Oriented x 2, PIKE, speech clear  Skin: No rashes        Results Reviewed:  I have personally reviewed current lab, radiology, and data and agree.      Results from last 7 days  Lab Units 10/12/18  1004   WBC 10*3/mm3 8.21   HEMOGLOBIN g/dL 9.1*   HEMATOCRIT % 28.0*   PLATELETS 10*3/mm3 378       Results from last 7 days  Lab Units 10/18/18  0614 10/17/18  0729 10/16/18  0535  10/15/18  0851   SODIUM mmol/L 127* 128* 130*  < > 124*   POTASSIUM mmol/L 4.5 4.1 3.7  < > 4.0   CHLORIDE mmol/L 95* 96* 97*  < > 95*   CO2 mmol/L 26.0 27.0 28.0  < > 25.0   BUN mg/dL 18 14 15  < > 12   CREATININE mg/dL 0.42* 0.44* 0.38*  < > 0.32*   GLUCOSE mg/dL 77 101* 82  < > 88   CALCIUM mg/dL 9.1 9.0 8.6*  < > 8.5*   ALT (SGPT) U/L  --   --   --   --  31   AST (SGOT) U/L  --   --   --   --  25   < > = values in this interval not displayed.  Estimated Creatinine Clearance: 45.4 mL/min (A) (by C-G  formula based on SCr of 0.42 mg/dL (L)).      Microbiology Results Abnormal     Procedure Component Value - Date/Time    Blood Culture - Blood, [[id number]]  (Normal) Collected:  10/01/18 1745    Lab Status:  Final result Specimen:  Blood from Wrist, Right Updated:  10/06/18 1830     Blood Culture No growth at 5 days    Blood Culture - Blood, [[id number]]  (Normal) Collected:  10/01/18 1715    Lab Status:  Final result Specimen:  Blood from Arm, Right Updated:  10/06/18 1830     Blood Culture No growth at 5 days          Imaging Results (last 24 hours)     ** No results found for the last 24 hours. **        Results for orders placed during the hospital encounter of 10/01/18   Adult Transthoracic Echo Complete W/ Cont if Necessary Per Protocol    Narrative · Left ventricular systolic function is normal.  · Estimated EF appears to be in the range of 61 - 65%  · All left ventricular wall segments contract normally.  · Normal left atrial pressure.  · Right ventricular cavity is mildly dilated  · Right ventricular wall thickness is consistent with mild hypertrophy.  · Right atrial cavity size is mildly dilated. The inferior vena cava is   dilated. IVC inspiratory collapse is absent. A prominent Eustachian valve   is present.  · Moderate tricuspid valve regurgitation is present. Estimated right   ventricular systolic pressure from tricuspid regurgitation is mildly   elevated (35-45 mmHg).          I have reviewed the medications.      clopidogrel 75 mg Oral Daily   demeclocycline 150 mg Oral Q12H   docusate sodium 100 mg Nasogastric BID   enoxaparin 30 mg Subcutaneous Nightly   famotidine 20 mg Oral BID   magnesium sulfate 4 g Intravenous Once   multivitamin 1 tablet Oral Daily   polyethylene glycol 17 g Oral Daily   sennosides-docusate sodium 2 tablet Oral Nightly   sodium chloride 1 g Oral BID With Meals         Assessment/Plan   Assessment / Plan     Active Hospital Problems    Diagnosis   • COPD and active cigarette  abuse (CMS/HCC)   • Multiple right rib fractures (ninth, 10th, 11th)   • Anemia   • Hyponatremia, suspect SIADH   • Generalized abdominal pain   • Severe malnutrition (CMS/HCC)          Brief Hospital Course to date:  Devora Huddleston is a 73 y.o. female with hx of COPD, HTN, and CVA presents after being found down and unresponsive by her son on 10/1/18. Was intubated in the field. Glucose dropped to 39 en route (initially 105). Patient had reportedly complained of increasing weakness and weight loss for several weeks, and had recent evaluation for hyponatremia and seizures. Admitted to [facility] ICU. CT head, CT perfusion, CTA head and neck all negative. Further imaging revealed 3 right sided rib fractures, presumably from a fall at home. Possible RLL atelectasis vs. infiltrate. She was also hypotensive and requiring pressors. Treated empirically with Vanc and Zosyn however all cultures remain no growth. She was started on enteral feeds due to dysphagia. Patient extubated 10/3. Transferred to floor, hospitalists assumed care on 10/6.      Assessment & Plan:  --Stopped ATBx. Cultures no growth.  --appreciate renal assistance.  Received tolvaptan for two days, minimal improvement with Na+. Tolvaptan is cost prohibitive at the nursing facility.  This was discussed with Dr. Nunes.  He has switched her to demeclocycline and an 800 ml/day fluid restriction.    --Na tabs added today  --Supportive care for rib fractures.   --BP is improved overall  --PO intake comes and goes  --mental status waxes/wanes, watch on prns      DVT Prophylaxis:  Lovenox    CODE STATUS:   Code Status and Medical Interventions:   Ordered at: 10/01/18 1819     Code Status:    CPR     Medical Interventions (Level of Support Prior to Arrest):    Full       Disposition: I expect the patient to be discharged to [facility], hopefully tomorrow    Electronically signed by JEFF Hodgson, 10/18/18, 2:00 PM.

## 2018-10-19 VITALS
HEART RATE: 81 BPM | BODY MASS INDEX: 15.53 KG/M2 | HEIGHT: 62 IN | WEIGHT: 84.4 LBS | OXYGEN SATURATION: 97 % | SYSTOLIC BLOOD PRESSURE: 94 MMHG | TEMPERATURE: 98.3 F | RESPIRATION RATE: 18 BRPM | DIASTOLIC BLOOD PRESSURE: 55 MMHG

## 2018-10-19 LAB
ANION GAP SERPL CALCULATED.3IONS-SCNC: 8 MMOL/L (ref 3–11)
BUN BLD-MCNC: 18 MG/DL (ref 9–23)
BUN/CREAT SERPL: 40.9 (ref 7–25)
CALCIUM SPEC-SCNC: 9.1 MG/DL (ref 8.7–10.4)
CHLORIDE SERPL-SCNC: 99 MMOL/L (ref 99–109)
CO2 SERPL-SCNC: 25 MMOL/L (ref 20–31)
CREAT BLD-MCNC: 0.44 MG/DL (ref 0.6–1.3)
GFR SERPL CREATININE-BSD FRML MDRD: 141 ML/MIN/1.73
GLUCOSE BLD-MCNC: 74 MG/DL (ref 70–100)
POTASSIUM BLD-SCNC: 4.4 MMOL/L (ref 3.5–5.5)
SODIUM BLD-SCNC: 132 MMOL/L (ref 132–146)

## 2018-10-19 PROCEDURE — 99239 HOSP IP/OBS DSCHRG MGMT >30: CPT | Performed by: INTERNAL MEDICINE

## 2018-10-19 PROCEDURE — 80048 BASIC METABOLIC PNL TOTAL CA: CPT | Performed by: INTERNAL MEDICINE

## 2018-10-19 RX ORDER — DIPHENOXYLATE HYDROCHLORIDE AND ATROPINE SULFATE 2.5; .025 MG/1; MG/1
1 TABLET ORAL DAILY
Start: 2018-10-19

## 2018-10-19 RX ORDER — SODIUM CHLORIDE 1000 MG
1 TABLET, SOLUBLE MISCELLANEOUS 2 TIMES DAILY WITH MEALS
Start: 2018-10-19

## 2018-10-19 RX ORDER — SENNA AND DOCUSATE SODIUM 50; 8.6 MG/1; MG/1
2 TABLET, FILM COATED ORAL NIGHTLY
Start: 2018-10-19

## 2018-10-19 RX ORDER — DEMECLOCYCLINE HYDROCHLORIDE 150 MG/1
150 TABLET, FILM COATED ORAL EVERY 12 HOURS SCHEDULED
Start: 2018-10-19 | End: 2018-11-17

## 2018-10-19 RX ORDER — FAMOTIDINE 20 MG/1
20 TABLET, FILM COATED ORAL 2 TIMES DAILY
Start: 2018-10-19

## 2018-10-19 RX ORDER — CLONAZEPAM 0.5 MG/1
0.5 TABLET ORAL 3 TIMES DAILY PRN
Qty: 9 TABLET | Refills: 0 | Status: SHIPPED | OUTPATIENT
Start: 2018-10-19

## 2018-10-19 RX ORDER — BISACODYL 10 MG
10 SUPPOSITORY, RECTAL RECTAL ONCE AS NEEDED
Start: 2018-10-19

## 2018-10-19 RX ADMIN — FAMOTIDINE 20 MG: 20 TABLET ORAL at 08:37

## 2018-10-19 RX ADMIN — DEMECLOCYCLINE HYDROCHLORIDE 150 MG: 150 TABLET ORAL at 08:37

## 2018-10-19 RX ADMIN — DOCUSATE SODIUM 100 MG: 50 LIQUID ORAL at 08:37

## 2018-10-19 RX ADMIN — POLYETHYLENE GLYCOL 3350 17 G: 17 POWDER, FOR SOLUTION ORAL at 08:37

## 2018-10-19 RX ADMIN — Medication 1 TABLET: at 08:37

## 2018-10-19 RX ADMIN — CLOPIDOGREL BISULFATE 75 MG: 75 TABLET ORAL at 08:37

## 2018-10-19 RX ADMIN — Medication 1 G: at 08:37

## 2018-10-19 NOTE — DISCHARGE PLACEMENT REQUEST
"Discharge Summary attached   Ambulance scheduled for 2PM today   Thank you!!    Mirtha [phone]    Devora Huddleston  (73 y.o. Female)     Date of Birth Social Security Number Address Home Phone MRN    1945  [place]  [facility] [address] [phone] [id number]    [place] Marital Status          None Unknown       Admission Date Admission Type Admitting Provider Attending Provider Department, Room/Bed    10/1/18 Emergency Angie Montes MD Brown, Hannah, MD [facility] 6B, N631/1    Discharge Date Discharge Disposition Discharge Destination         Rehab Facility or Unit (DC - External)              Attending Provider:  Angie Montes MD    Allergies:  Not on File    Isolation:  None   Infection:  None   Code Status:  CPR    Ht:  157.5 cm (62.01\")   Wt:  38.3 kg (84 lb 6.4 oz)    Admission Cmt:  None   Principal Problem:  Hyponatremia, suspect SIADH [E87.1]                 Active Insurance as of 10/1/2018     Primary Coverage     Payor Plan Insurance Group Employer/Plan Group    MEDICARE MEDICARE A & B      Payor Plan Address Payor Plan Phone Number Effective From Effective To    [address] [phone] 4/1/1986     [facility] [id number]       Subscriber Name Subscriber Birth Date Member ID       DEVORA HUDDLESTON 1945 [id number]           Secondary Coverage     Payor Plan Insurance Group Employer/Plan Group    [employer] [place]ICAID      Payor Plan Address Payor Plan Phone Number Effective From Effective To    [address] [address] [phone] 10/1/2018     [facility] [id number]       Subscriber Name Subscriber Birth Date Member ID       DEVORA HUDDLESTON 1945 [id number]                 Emergency Contacts      (Rel.) Home Phone Work Phone Mobile Phone    FlaquitoMolina (Son) [phone] -- --                 Discharge Summary      Angie Montes MD at 10/19/2018 10:45 AM              [facility] Medicine " Services  DISCHARGE SUMMARY    Patient Name: Devora Huddleston  : 1945  MRN: [id number]    Date of Admission: 10/1/2018  Date of Discharge:  10/19/2018  Primary Care Physician: Evelyn Francisco MD    Consults     Date and Time Order Name Status Description    10/10/2018 1400 Inpatient Nephrology Consult Completed         Hospital Course     Presenting Problem:   Acute respiratory failure with hypoxemia (CMS/HCC) [J96.01]    Active Hospital Problems    Diagnosis Date Noted   • **Hyponatremia, suspect SIADH [E87.1] 10/01/2018   • COPD and active cigarette abuse (CMS/HCC) [J44.9] 10/03/2018   • Multiple right rib fractures (ninth, 10th, 11th) [S22.49XA] 10/02/2018   • Anemia [D64.9] 10/02/2018   • Generalized abdominal pain [R10.84] 10/01/2018   • Severe malnutrition (CMS/[facility]) [E43] 10/01/2018      Resolved Hospital Problems    Diagnosis Date Noted Date Resolved   • Constipation [K59.00] 10/06/2018 10/18/2018   • Metabolic encephalopathy [G93.41] 10/01/2018 10/18/2018   • Acute hypoxemic respiratory failure requiring intubation and ventilatory support (CMS/HCC) [J96.01] 10/01/2018 10/18/2018   • Hypoglycemia, resolved [E16.2] 10/01/2018 10/18/2018   • Hypotension, resolved [I95.9] 10/01/2018 10/18/2018          Hospital Course:  Devora Huddleston is a 73 y.o. female with history of COPD, HTN, and CVA presents after being found down and unresponsive by her son on 10/1/18. Was intubated in the field. Glucose dropped to 39 en route (initially 105). Patient had reportedly complained of increasing weakness and weight loss for several weeks, and had recent evaluation for hyponatremia and seizures. Admitted to [facility] ICU. CT head, CT perfusion, CTA head and neck all negative. Further imaging revealed 3 right sided rib fractures, presumably from a fall at home. Possible RLL atelectasis vs. infiltrate. She was also hypotensive and requiring pressors. Treated empirically with Vanc and Zosyn however all cultures remain no  growth. She was started on enteral feeds due to dysphagia. Patient extubated 10/3. Transferred to floor, hospitalists assumed care on 10/6.     She was found to have hyponatremia presumed due to SIADH.  Her home HCTZ was discontinued.  She was treated with tolvaptan with good response initially, however continuation after discharge was cost prohibitive so she was transitioned to demeclocycline plus salt tablets and 800cc daily fluid restriction with normalization of her sodium.  She will need close monitoring of her sodium with weekly BMP and may need adjustment in salt tablets or fluid restriction based on results.       Discharge Follow Up Recommendations for labs/diagnostics:  Follow up with PCP within 1 week of rehab discharge.  Avoid medications known to cause or exacerbate SIADH.    Day of Discharge     HPI:  Complains of restless legs and anxiety.  Wants her boost.    Review of Systems  Gen- No fevers, chills  CV- No chest pain, palpitations  Resp- No cough, dyspnea  GI- No N/V/D, abd pain    Otherwise ROS is negative except as mentioned in the HPI.    Vital Signs:   Temp:  [97.6 °F (36.4 °C)-99.2 °F (37.3 °C)] 99.2 °F (37.3 °C)  Heart Rate:  [68-80] 68  Resp:  [18] 18  BP: ()/(46-59) 100/49     Physical Exam:  Constitutional: No acute distress, awake, alert, sitting up in chair, cachectic  HENT: NCAT, mucous membranes moist  Respiratory: Clear to auscultation bilaterally, respiratory effort normal   Cardiovascular: RRR, no murmurs, rubs, or gallops  Gastrointestinal: Positive bowel sounds, soft, nontender, nondistended  Musculoskeletal: No bilateral ankle edema  Psychiatric: Irritable, cooperative  Neurologic: Cranial Nerves grossly intact to confrontation, speech clear  Skin: No rashes    Pertinent  and/or Most Recent Results       Results from last 7 days  Lab Units 10/19/18  0601 10/18/18  0614 10/17/18  0729 10/16/18  0535 10/15/18  2332 10/15/18  1817 10/15/18  1150   SODIUM mmol/L 132 127* 128*  130* 131* 126* 125*   POTASSIUM mmol/L 4.4 4.5 4.1 3.7 4.5 3.8 4.0   CHLORIDE mmol/L 99 95* 96* 97* 97* 93* 94*   CO2 mmol/L 25.0 26.0 27.0 28.0 27.0 26.0 24.0   BUN mg/dL 18 18 14 15 17 16 14   CREATININE mg/dL 0.44* 0.42* 0.44* 0.38* 0.46* 0.46* 0.32*   GLUCOSE mg/dL 74 77 101* 82 99 110* 110*   CALCIUM mg/dL 9.1 9.1 9.0 8.6* 8.6* 8.4* 8.6*       Results from last 7 days  Lab Units 10/15/18  0851   BILIRUBIN mg/dL 0.4   ALK PHOS U/L 102*   ALT (SGPT) U/L 31   AST (SGOT) U/L 25       Results from last 7 days  Lab Units 10/15/18  0851   CHOLESTEROL mg/dL 109   TRIGLYCERIDES mg/dL 34         Brief Urine Lab Results  (Last result in the past 365 days)      Color   Clarity   Blood   Leuk Est   Nitrite   Protein   CREAT   Urine HCG        10/02/18 1100 Yellow Clear Small (1+)(A) Negative Negative Trace(A)               Microbiology Results Abnormal     Procedure Component Value - Date/Time    Blood Culture - Blood, [[id number]]  (Normal) Collected:  10/01/18 1745    Lab Status:  Final result Specimen:  Blood from Wrist, Right Updated:  10/06/18 1830     Blood Culture No growth at 5 days    Blood Culture - Blood, [[id number]]  (Normal) Collected:  10/01/18 1715    Lab Status:  Final result Specimen:  Blood from Arm, Right Updated:  10/06/18 1830     Blood Culture No growth at 5 days          Imaging Results (all)     Procedure Component Value Units Date/Time    FL Video Swallow With Speech [[id number]] Collected:  10/12/18 1427     Updated:  10/12/18 1522    Narrative:       EXAMINATION: FL VIDEO SWALLOW W SPEECH-     INDICATION: dysphagia; J96.01-Acute respiratory failure with hypoxia;  Z74.09-Other reduced mobility; R13.12-Dysphagia, oropharyngeal phase;  Z74.09-Other reduced mobility     TECHNIQUE: 2 minutes and 12 seconds of fluoroscopic time was used for  this exam. 1 associated image was saved. The patient was evaluated in  the seated lateral position while taking a variety of consistencies of  barium by mouth under  the direction of speech pathology.     COMPARISON: NONE     FINDINGS: There was no penetration and no aspiration with any of the  media ingested.          Impression:       Fluoroscopy provided for a modified barium swallow. Please  see speech therapy report for full details and recommendations.         This report was finalized on 10/12/2018 3:20 PM by Dr. Damian Marks.       Fiberoptic Endo (fees) [[id number]] Resulted:  10/08/18 1436     Updated:  10/08/18 1436    Narrative:       This procedure was auto-finalized with no dictation required.    XR Abdomen KUB [[id number]] Collected:  10/08/18 0148     Updated:  10/08/18 0259    Narrative:       EXAM:  XR Abdomen, 1 View    CLINICAL HISTORY:  73 years old, female; Acute respiratory failure with hypoxia; Dysphagia,   oropharyngeal phase; Other reduced mobility; Other reduced mobility; Device   placement; Gi device; Nasogastric tube; Additional info: Ng tube placement    TECHNIQUE:  Frontal supine view of the abdomen/pelvis.    COMPARISON:  CR XR ABDOMEN KUB 10/7/2018 12:18 AM    FINDINGS:  Intraperitoneal space:  No free air.  Gastrointestinal tract:  Gaseous distention of multiple loops of bowel, similar   to comparison study.  Organs:  Normal as visualized.  Bones/joints:  Normal.  Tubes, lines and devices:  Enteric tube tip is in the distal stomach.      Impression:         Enteric tube tip within the distal stomach.        THIS DOCUMENT HAS BEEN ELECTRONICALLY SIGNED BY EYAD DUMONT MD    XR Abdomen KUB [[id number]] Collected:  10/07/18 0012     Updated:  10/07/18 0135    Narrative:       EXAM:    XR Abdomen, 1 View    EXAM DATE/TIME:    10/7/2018 12:12 AM    CLINICAL HISTORY:    73 years old, female; Device placement; Nasogastric tube.    TECHNIQUE:    Frontal supine view of the abdomen/pelvis.    COMPARISON:    CR XR ABDOMEN KUB 10/5/2018 6:48 AM    FINDINGS:    Nasogastric tube terminates in the mid abdomen.      Diffuse gaseous distention of several loops of  small and large bowel in the   central abdomen.  Prominent stool in the rectum.  No visualized free   intraperitoneal air.      Levoconvex curvature of the lumbar spine without acute fracture.  Aortic   atherosclerosis.      Impression:         Nasogastric tube in good position with persistent gaseous distention small   and large bowel loops.    THIS DOCUMENT HAS BEEN ELECTRONICALLY SIGNED BY DAVEY SOUTH MD    XR Chest 1 View [[id number]] Collected:  10/06/18 1201     Updated:  10/06/18 1356    Narrative:       EXAMINATION: XR CHEST 1 VW - 10/06/2018     INDICATION:  J96.01-Acute respiratory failure with hypoxia; Z74.09-Other  reduced mobility; R13.12-Dysphagia, oropharyngeal phase.     TECHNIQUE:  Single view frontal chest.     COMPARISONS: 10/4/2018.     FINDINGS:  Background emphysematous change. Likely trace effusions. No  pneumothorax or segmental air space disease. Unchanged cardiomediastinal  silhouette. Atherosclerosis aortic knob. Right IJ central line in good  position. Nasogastric tube has been added, its terminus below the  diaphragm and out of view.       Impression:       Essentially stable exam.     This report was finalized on 10/6/2018 1:54 PM by Dimas Krishna.       XR Abdomen KUB [[id number]] Collected:  10/05/18 0839     Updated:  10/05/18 1111    Narrative:       EXAMINATION: XR ABDOMEN KUB-      INDICATION: NG placement for feeding; J96.01-Acute respiratory failure  with hypoxia; Z74.09-Other reduced mobility; R13.12-Dysphagia,  oropharyngeal phase.      COMPARISON: 10/04/2018.     FINDINGS: Nasogastric tube terminates within the right lower midabdomen  presumably within the distal portion of a distended gastric lumen.  Gas-filled bowel loops overlie the lower midabdomen.           Impression:       Nasogastric tube terminates lower right mid abdomen likely  within the distal portion of a distended gastric lumen.     D:  10/05/2018  E:  10/05/2018     This report was finalized on 10/5/2018 11:09 AM  by Dr. Damian Marks.       XR Abdomen KUB [[id number]] Collected:  10/04/18 2055     Updated:  10/04/18 2215    Narrative:       EXAM:    XR Abdomen, 1 View    CLINICAL HISTORY:    73 years old, female; Acute respiratory failure with hypoxia; Dysphagia,   oropharyngeal phase; Other reduced mobility; Device placement; Gi device;   Nasogastric tube; Additional info: Ng tube placement for feeding    TECHNIQUE:    Frontal supine view of the abdomen/pelvis.    COMPARISON:    CR XR ABDOMEN KUB 10/4/2018 6:12 PM    FINDINGS:    Tip of NASOGASTRIC/ENTERIC tube distal to the gastroesophageal junction in   the gastric body.      Impression:         Tip of NASOGASTRIC/ENTERIC tube distal to the gastroesophageal junction in   the gastric body.    THIS DOCUMENT HAS BEEN ELECTRONICALLY SIGNED BY ABDON BLAKE MD    XR Abdomen KUB [[id number]] Collected:  10/04/18 1805     Updated:  10/04/18 1936    Narrative:       EXAM:    XR Abdomen, 1 View    CLINICAL HISTORY:    73 years old, female; Acute respiratory failure with hypoxia; Dysphagia,   oropharyngeal phase; Other reduced mobility; Device placement; Gi device;   Other: Feeding tube placement; Additional info: Feeding tube placement; Has   been in place for one hour    TECHNIQUE:    Frontal supine view of the abdomen/pelvis.    COMPARISON:    CR XR ABDOMEN KUB 10/2/2018 9:02 AM    FINDINGS:    Tip of NASOGASTRIC/ENTERIC tube distal to the gastroesophageal junction in   the gastric body.      Impression:         Tip of NASOGASTRIC/ENTERIC tube distal to the gastroesophageal junction in   the gastric body.    THIS DOCUMENT HAS BEEN ELECTRONICALLY SIGNED BY ABDON BLAKE MD    Fiberoptic Endo (fees) [[id number]] Resulted:  10/04/18 1544     Updated:  10/04/18 1544    Narrative:       This procedure was auto-finalized with no dictation required.    XR Chest 1 View [[id number]] Collected:  10/04/18 0854     Updated:  10/04/18 0931    Narrative:       EXAMINATION: XR CHEST 1 VW-       INDICATION: Status post extubation 10/02/18; J96.01-Acute respiratory  failure with hypoxia.     TECHNIQUE:  Single view frontal chest.     COMPARISONS: 10/03/2018.     FINDINGS:  Endotracheal and NG tubes have been removed. Other support  apparatus remains unchanged. Emphysema. Trace effusions. No  pneumothorax. Lucency below the left hemidiaphragm is probably within  the stomach.       Impression:       No acute cardiopulmonary abnormality.     D:  10/04/2018  E:  10/04/2018     This report was finalized on 10/4/2018 9:29 AM by Dimas Krishna.       XR Chest 1 View [[id number]] Collected:  10/03/18 0744     Updated:  10/03/18 1021    Narrative:       EXAMINATION: XR CHEST 1 VW- 10/03/2018     INDICATION: Intubated Patient; J96.01-Acute respiratory failure with  hypoxia      COMPARISON: 10/01/2018     FINDINGS: Support hardware demonstrates interval placement of  esophagogastric tube which courses below the diaphragm and out of the  field-of-view otherwise unchanged. Cardiac size within normal limits. No  focal opacification or consolidation with background chronic lung  changes similar to prior. Mild asymmetric elevation of the right  hemidiaphragm also similar to prior.   No pneumothorax or pleural  effusion.        Impression:       Esophagogastric tube has been placed in the interval  coursing below the diaphragm and out of the field-of-view. No focal  opacification or consolidation within the lung parenchyma.     D:  10/03/2018  E:  10/03/2018     This report was finalized on 10/3/2018 10:19 AM by Dr. Damian Marks.       XR Abdomen KUB [[id number]] Collected:  10/02/18 1023     Updated:  10/02/18 2341    Narrative:       EXAMINATION: XR ABDOMEN, KUB-10/02/2018:      INDICATION: Confirm OG placement; J96.01-Acute respiratory failure with  hypoxia.      COMPARISON: NONE.     FINDINGS: NG tube is seen in the distal stomach or proximal duodenum.  There is mildly and diffusely increased bowel gas, most of which  appears  to be colon. There is moderate stool shadow present. No bowel wall edema  or pneumatosis is seen.           Impression:       NG tube in the distal stomach or duodenal bulb.     D:  10/02/2018  E:  10/02/2018     This report was finalized on 10/2/2018 11:39 PM by DR. Partha Godfrey MD.       CT Head Without Contrast Stroke Protocol [[id number]] Collected:  10/01/18 1643     Updated:  10/02/18 1029    Narrative:       EXAMINATION: CT HEAD WO CONTRAST STROKE PROTOCOL-      INDICATION: Unresponsive.      TECHNIQUE: Axial CT of the head without intravenous contrast  administration.     The radiation dose reduction device was turned on for each scan per the  ALARA (As Low as Reasonably Achievable) protocol.     COMPARISON: None.     FINDINGS: Structures are symmetric without evidence of mass, mass effect  or midline shift. Ventricles and sulci within normal limits. No  intraaxial hemorrhage or extraaxial fluid collection. Severe attenuation  changes in the periventricular and deep white matter indicating chronic  small vessel ischemic disease with likely prior insult left  frontotemporal region as there is mild encephalomalacia present. Globes  and orbits unremarkable. Visualized paranasal sinuses and mastoid air  cells are grossly clear and well pneumatized.       Impression:       No acute intracranial abnormality.     NOTE: Scan performed on 10/01/2018 at 1607 hours. Scan report given to  ER physician and team in person at scanner by Dr. Marks on 10/01/2018 at  1615 hours.     D:  10/01/2018  E:  10/02/2018     This report was finalized on 10/2/2018 10:27 AM by Dr. Damian Marks.       XR Chest 1 View [[id number]] Collected:  10/02/18 0912     Updated:  10/02/18 0957    Narrative:       EXAMINATION: XR CHEST 1 VW- 10/01/2018     INDICATION: Acute Stroke Protocol (onset < 12 hrs)      COMPARISON: NONE     FINDINGS: Portable chest reveals endotracheal tube in satisfactory  position above the daily. Lung fields are  clear. No focal parenchymal  opacification present.  No pleural effusion or pneumothorax.  Emphysematous changes seen throughout the lung fields.           Impression:       Chronic and emphysematous changes seen throughout the lung  fields. No acute parenchymal disease.     D:  10/01/2018  E:  10/02/2018     This report was finalized on 10/2/2018 9:55 AM by Dr. Merari Garcia MD.       CT Angiogram Head With & Without Contrast [[id number]] Collected:  10/02/18 0908     Updated:  10/02/18 0957    Narrative:       EXAMINATION: CT ANGIOGRAM HEAD W WO CONTRAST-, CT ANGIOGRAM NECK W WO  CONTRAST- 10/01/2018     INDICATION: Unresponsive; prior history of stroke     TECHNIQUE: Multiple axial CT imaging was obtained of the head and neck  following the administration of intravenous contrast according to the CT  angio protocol. Three-D reformatted images were submitted to further  facilitate diagnostic accuracy and treatment planning.     Stenosis measurement was performed by the NASCET or similar method.     The radiation dose reduction device was turned on for each scan per the  ALARA (As Low as Reasonably Achievable) protocol.     COMPARISON: None.     FINDINGS:  Severe emphysematous changes identified at the lung apices  with endotracheal tube above the level of the daily. The thyroid is  homogeneous. There is distention identified of the esophagus filled with  air. Thoracic aortic arch reveals atherosclerotic disease. There is  atherosclerotic disease seen at the origin of the left subclavian  artery. Both common carotid arteries are patent bilaterally with  extensive atherosclerotic disease seen in both carotid bifurcations.  There is no significant stenosis seen of the right internal carotid  artery. The left internal carotid artery reveals moderate stenosis of  the proximal portion. There is symmetry seen of the vertebral arteries  bilaterally. No significant stenosis or tortuosity present of the  vertebral  arteries. Degenerative changes seen within the spine.     The internal carotid arteries are revealing extensive atherosclerotic  disease seen in the cavernous portions and supraclinoid portions. There  is no significant stenosis seen of the middle cerebral arteries or the  anterior cerebral arteries. No aneurysmal dilatation is identified. The  posterior circulation reveals small caliber basilar artery. There is  fetal origin identified of the right posterior cerebral artery. Left  posterior cerebral artery is unremarkable in appearance. Posterior  circulation is intact.       Impression:       Atherosclerotic disease of both carotid bifurcations with  no significant stenosis seen of the common carotid arteries and only  moderate stenosis of the left proximal internal carotid artery. No  significant stenosis of the right internal carotid artery. The CTA of  the head is unremarkable with atherosclerotic disease of the  intracranial portions of the internal carotid arteries.     D:  10/01/2018  E:  10/02/2018     This report was finalized on 10/2/2018 9:54 AM by Dr. Merari Garcia MD.       CT Angiogram Neck With & Without Contrast [[id number]] Collected:  10/02/18 0908     Updated:  10/02/18 0957    Narrative:       EXAMINATION: CT ANGIOGRAM HEAD W WO CONTRAST-, CT ANGIOGRAM NECK W WO  CONTRAST- 10/01/2018     INDICATION: Unresponsive; prior history of stroke     TECHNIQUE: Multiple axial CT imaging was obtained of the head and neck  following the administration of intravenous contrast according to the CT  angio protocol. Three-D reformatted images were submitted to further  facilitate diagnostic accuracy and treatment planning.     Stenosis measurement was performed by the NASCET or similar method.     The radiation dose reduction device was turned on for each scan per the  ALARA (As Low as Reasonably Achievable) protocol.     COMPARISON: None.     FINDINGS:  Severe emphysematous changes identified at the lung  apices  with endotracheal tube above the level of the daily. The thyroid is  homogeneous. There is distention identified of the esophagus filled with  air. Thoracic aortic arch reveals atherosclerotic disease. There is  atherosclerotic disease seen at the origin of the left subclavian  artery. Both common carotid arteries are patent bilaterally with  extensive atherosclerotic disease seen in both carotid bifurcations.  There is no significant stenosis seen of the right internal carotid  artery. The left internal carotid artery reveals moderate stenosis of  the proximal portion. There is symmetry seen of the vertebral arteries  bilaterally. No significant stenosis or tortuosity present of the  vertebral arteries. Degenerative changes seen within the spine.     The internal carotid arteries are revealing extensive atherosclerotic  disease seen in the cavernous portions and supraclinoid portions. There  is no significant stenosis seen of the middle cerebral arteries or the  anterior cerebral arteries. No aneurysmal dilatation is identified. The  posterior circulation reveals small caliber basilar artery. There is  fetal origin identified of the right posterior cerebral artery. Left  posterior cerebral artery is unremarkable in appearance. Posterior  circulation is intact.       Impression:       Atherosclerotic disease of both carotid bifurcations with  no significant stenosis seen of the common carotid arteries and only  moderate stenosis of the left proximal internal carotid artery. No  significant stenosis of the right internal carotid artery. The CTA of  the head is unremarkable with atherosclerotic disease of the  intracranial portions of the internal carotid arteries.     D:  10/01/2018  E:  10/02/2018     This report was finalized on 10/2/2018 9:54 AM by Dr. Merari Garcia MD.       CT Cerebral Perfusion With & Without Contrast [[id number]] Collected:  10/02/18 0902     Updated:  10/02/18 0957    Narrative:        EXAMINATION: CT CEREBRAL PERFUSION W WO CONTRAST- 10/01/2018     INDICATION: Unresponsive; prior history of stroke     TECHNIQUE: Cerebral perfusion analysis was performed using computed  tomography with contrast administration, including post processing of  parametric maps with determination of cerebral blood flow, cerebral  blood volume, and mean transit time.     The radiation dose reduction device was turned on for each scan per the  ALARA (As Low as Reasonably Achievable) protocol.     COMPARISON: NONE     FINDINGS: There is extensive low-density seen throughout the  periventricular and subcortical white matter. There is no evidence of  perfusion abnormality to suggest evidence of reversible ischemia. There  is no evidence of increased mean transit time to suggest evidence of an  occlusion or stenosis.       Impression:       No evidence of reversible ischemia.     D:  10/01/2018  E:  10/02/2018           This report was finalized on 10/2/2018 9:54 AM by Dr. Merari Garcia MD.       XR Chest 1 View [[id number]] Collected:  10/01/18 2152     Updated:  10/01/18 2227    Narrative:       EXAM:    XR Chest, 1 View    CLINICAL HISTORY:    73 years old, female; Device placement; Other: Cvc placement    TECHNIQUE:    Frontal view of the chest.    COMPARISON:    CR XR CHEST 1 VW 10/1/2018 5:22 PM    FINDINGS:    Lungs:  COPD.  Mild residual right basilar atelectasis/infiltrate, some of   which is superimposed upon dome of diaphragm.  Mild biapical scarring.  Mild   accentuated interstitial markings at right base.  No acute infiltrates of left   lung.    Pleural space:  No significant pleural effusion visualized.  No obvious   pneumothorax.    Heart:  Normal heart size.    Mediastinum:  No significant pneumomediastinum visualized, but please refer   to recent CT exam.    Bones/joints:  Healing fracture of lateral right ninth rib.  Patient has   known acute right lower rib fractures, but these are better  visualized on the   recent CT scan.    Vasculature:  Atherosclerotic placques scattered along some vessels.    Tubes, lines and devices:  Right jugular central venous catheter tip at SVC   level.  ETT tip is approximately 3.4 cm above daily.    Upper abdomen:  Some gaseous distention of stomach.      Impression:       1.  Right jugular central venous catheter tip at SVC level.  2.  COPD.  3.  ETT tip is approximately 3.4 cm above daily.  4.  Mild residual right basilar atelectasis/infiltrate.    THIS DOCUMENT HAS BEEN ELECTRONICALLY SIGNED BY JOSIE PEREZ MD    CT Abdomen Pelvis Without Contrast [[id number]] Collected:  10/01/18 1827     Updated:  10/01/18 2219    Narrative:       EXAM:    CT Abdomen and Pelvis Without Intravenous Contrast    CLINICAL HISTORY:    73 years old, female; Signs and symptoms; Abdominal tenderness; Additional   info: Abdominal pain, unspecified    TECHNIQUE:    Axial computed tomography images of the abdomen and pelvis without   intravenous contrast.  All CT scans at this facility use At least one of these   dose optimization techniques: automated exposure control; mA and/or kV   adjustment per patient size (includes targeted exams where dose is matched to   clinical indication); or iterative reconstruction.    Coronal and sagittal reformatted images were created and reviewed.    COMPARISON:    No relevant prior studies available.    FINDINGS:    Artifacts:  Artifacts from patient's arm(s) held down diminish resolution on   some scan slices.    Lung bases:  Old granulomatous disease of lung(s).  COPD, scattered   subsegmental atelectasis, and pulmonary scarring.  Mild RLL   infiltrate/atelectasis.    Pleural space:  Anterior left basilar subpleural blebs versus trace   pneumothorax.  Erect inspiratory/expiratory CXR could discern this, if desired.    The finding has been previously reported to physician on CT Chest scan.    Possible minimal right pleural effusion.    Heart:  Normal  heart size.  Cannot exclude CAD or mitral annulus   calcifications.     ABDOMEN:    Liver:  Unremarkable as visualized.  Limited.    Gallbladder and bile ducts:  Distended gallbladder.  No calcified stones.  No   ductal dilation.    Pancreas:  Unremarkable as visualized, but limited resolution    Spleen:  Unremarkable as visualized.  Limited.    Adrenals:  Unremarkable as visualized.  Limited.    Kidneys and ureters:  Scattered subcentimeter or bilateral renal nodules,   which US can characterize.  Limited parenchymal resolution and evaluation at   posterior cortex left kidney related to beam hardening artifacts from adjacent   left arm held down by patient's side.  Bilateral renal function.  No   hydronephrosis.    Stomach and bowel:  Gaseously distended stomach.  Nonspecific bowel gas   pattern.  The contracted segments of bowel restrict wall evaluation to rule out   any abnormal thickening.  No gaseously dilated small bowel.     PELVIS:    Appendix:  Appendix obscured, incompletely evaluated.    Bladder:  The urinary bladder relatively is decompressed with a Matthews   catheter in place.      Reproductive:  Unremarkable as visualized.     ABDOMEN and PELVIS:    Intraperitoneal space:  Possible mild ascites, which US can clarify.  No   definite pneumoperitoneum.    Bones/joints:  Acute mildly displaced fractures of right posterior ninth,   10th, and 11th ribs.  Healing or healed fracture of lateral right ninth rib.    No dislocation.    Soft tissues:  Calcified injection granulomas are noted in the subcutaneous   tissues of right buttocks. Skin wrinkles or skin irregularity at right buttock.    Clinically correlate.     Vasculature:  Mild unruptured proximal AAA 3.2 cm.  Pelvic phleboliths.    Atherosclerotic placques scattered along some vessels.  No abdominal aortic   aneurysm.    Lymph nodes:  Unremarkable.  No enlarged lymph nodes.    Other findings:  Cachexia.      Impression:       1.  Small bilateral renal  nodules.  2.  Mild unruptured proximal AAA 3.2 cm.  3.  Gaseously distended stomach.  4.  Nonspecific bowel gas pattern.  5.  Possible mild ascites, which US can clarify.  6.  Acute mildly displaced fractures of right posterior ninth, 10th, and 11th   ribs.  7.  Abnormal chest findings, as above.    Note: Findings were discussed with Dr. Delvalle, associate of Dhruv Cortez at 10/1/2018 10:05 PM EDT, in conjunction with reportedly CT Chest   report. The report was understood and acknowledged by the healthcare giver, who   stated that gastric gaseous distention may be secondary to recent emergency   assisted ventilation.    THIS DOCUMENT HAS BEEN ELECTRONICALLY SIGNED BY JOSIE PEREZ MD    CT Chest Without Contrast [[id number]] Collected:  10/01/18 1825     Updated:  10/01/18 2211    Addenda:        Note: Critical Findings were discussed with Dr. Delvalle , associate of Dhruv Cortez at 10/1/2018 10:05 PM EDT. The report was understood and   acknowledged by the healthcare giver, who stated patient had hypoxic   episode   and was ventilated prior to arrival.    THIS DOCUMENT HAS BEEN ELECTRONICALLY SIGNED BY JOSIE PEREZ MD  Signed:  10/01/18 2210 by Josie Perez MD    Narrative:       EXAM:    CT Chest Without Intravenous Contrast    CLINICAL HISTORY:    73 years old, female; Abnormal findings; Abnormal radiologic exam of lung or   chest; Additional info: Abn findings on diagnostic imaging of lung. Cxr report   not dictated at this time    TECHNIQUE:    Axial computed tomography images of the chest without intravenous contrast.    All CT scans at this facility use at least one of these dose optimization   techniques: automated exposure control; mA and/or kV adjustment per patient   size (includes targeted exams where dose is matched to clinical indication); or   iterative reconstruction.    Coronal and sagittal reformatted images were created and reviewed.    COMPARISON:    CR XR  CHEST 1 VW 10/1/2018 5:22 PM    FINDINGS:    Artifacts:  Artifacts from patient's arm(s) held down diminish resolution on   some scan slices.    Lungs:  COPD.  Mild scattered subsegmental atelectasis.  Mild RLL   atelectasis/infiltrate.  Old granulomatous disease of lung(s).  Mild pulmonary   scarring, predominantly at lung apices.  Some retained secretions in central   right bronchial tree.  Suspect small streak of scarring or subsegmental   atelectasis laterally at MIRANDA mid lung field bordering the major fissure, rather   than a true tiny nodule.    Pleural space:  Trace anterior left basilar pneumothorax versus subpleural   blebs.  Followup erect inspiratory/expiratory CXR could discern this.  Possible   trace right pleural effusion.  No right pneumothorax.    Heart:  Normal heart size.  Some coronary artery calcification cannot be   excluded.  No significant pericardial effusion.    Mediastinum:  Mild pneumomediastinum bordering left tracheobronchial tree,   perhaps,  related to barotrauma.    Bones/joints:  Acute mildly displaced fractures of posterior right ninth,   10th, 11th ribs.  Mild thoracolumbar scoliosis.  Focal sclerosis in left side   of the L1 vertebral body.  Degenerative changes of the spine.  Healing or   healed fracture of lateral right ninth rib.  No dislocation.    Soft tissues: Cachexia.    Vasculature:  Atherosclerotic placques scattered along some vessels.  No   aortic aneurysm.    Lymph nodes:  No obvious lymphadenopathy, but limited resolution related to   cachexia and noncontrast exam.    Kidneys and ureters:  A few subcentimeter hypodense nodules of left kidney.    US can characterize, if desired.    Stomach and bowel:  Gaseous distention of stomach.    Intraperitoneal space: Limited resolution, cannot exclude mild ascites,   restricted resolution.  US could clarify, if desired.    Tubes, lines and devices:  ETT tip in satisfactory position, 2 cm above   daily.      Impression:       1.   Mild pneumomediastinum bordering left tracheobronchial tree.  2.  Trace anterior left basilar pneumothorax versus subpleural blebs.  Followup   erect inspiratory/expiratory CXR could discern this.  3.  Acute mildly displaced fractures of posterior right ninth, 10th, 11th ribs.  4.  Possible trace right pleural effusion.  5.  Satisfactory ETT position.  6.  COPD.  7.  Mild scattered subsegmental atelectasis and pulmonary scarring.  8.  Mild RLL atelectasis/infiltrate.  9.  Small left renal nodules.  10.  Additional findings, as above.    THIS DOCUMENT HAS BEEN ELECTRONICALLY SIGNED BY JOSIE PEREZ MD                    Results for orders placed during the hospital encounter of 10/01/18   Adult Transthoracic Echo Complete W/ Cont if Necessary Per Protocol    Narrative · Left ventricular systolic function is normal.  · Estimated EF appears to be in the range of 61 - 65%  · All left ventricular wall segments contract normally.  · Normal left atrial pressure.  · Right ventricular cavity is mildly dilated  · Right ventricular wall thickness is consistent with mild hypertrophy.  · Right atrial cavity size is mildly dilated. The inferior vena cava is   dilated. IVC inspiratory collapse is absent. A prominent Eustachian valve   is present.  · Moderate tricuspid valve regurgitation is present. Estimated right   ventricular systolic pressure from tricuspid regurgitation is mildly   elevated (35-45 mmHg).            Discharge Details        Discharge Medications      New Medications      Instructions Start Date   bisacodyl 10 MG suppository  Commonly known as:  DULCOLAX   10 mg, Rectal, Once As Needed      demeclocycline 150 MG tablet  Commonly known as:  DECLOMYCIN   150 mg, Oral, Every 12 Hours Scheduled      famotidine 20 MG tablet  Commonly known as:  PEPCID   20 mg, Oral, 2 Times Daily      multivitamin tablet tablet   1 tablet, Oral, Daily      polyethylene glycol pack packet  Commonly known as:  MIRALAX   17 g, Oral,  Daily      sennosides-docusate sodium 8.6-50 MG tablet  Commonly known as:  SENOKOT-S   2 tablets, Oral, Nightly      sodium chloride 1 g tablet   1 g, Oral, 2 Times Daily With Meals         Continue These Medications      Instructions Start Date   clonazePAM 0.5 MG tablet  Commonly known as:  KlonoPIN   0.5 mg, Oral, 3 Times Daily PRN      clopidogrel 75 MG tablet  Commonly known as:  PLAVIX   75 mg, Oral, Daily         Stop These Medications    ACULAR 0.5 % ophthalmic solution  Generic drug:  ketorolac     ALDACTAZIDE 25-25 MG tablet  Generic drug:  spironolactone-hydrochlorothiazide     doxycycline 100 MG capsule  Commonly known as:  VIBRAMYCIN     esomeprazole 40 MG capsule  Commonly known as:  nexIUM     losartan 100 MG tablet  Commonly known as:  COZAAR     pramipexole 1 MG tablet  Commonly known as:  MIRAPEX              Discharge Disposition:  Rehab Facility or Unit (DC - External)    Discharge Diet:  Diet Instructions     MBS/VFSS/FEES    Reason for Referral  Patient was referred for a FEES to assess the efficiency of his/her swallow function, rule out aspiration and make recommendations regarding safe dietary consistencies, effective compensatory strategies, and safe eating environment.        Referring Physician: MD Emmett          Recommendations/Treatment  Oral Prep Phase: impaired oral phase of swallowing  Oral Transit Phase: impaired  Oral Residue: WFL              SLP Swallowing Diagnosis: mild, oral dysfunction, functional pharyngeal phase  Swallow Criteria for Skilled Therapeutic Interventions Met: no problems identified which require skilled intervention    Therapy Frequency (Swallow): evaluation only  SLP Diet Recommendation: soft textures, chopped, thin liquids, other (see comments) (nursing can advance/adjust as needed r/t cog status)  Recommended Precautions and Strategies: upright posture during/after eating  SLP Rec. for Method of Medication Administration: meds whole, with pudding or  applesauce (advance to via thins as desires as cog status improves)      Oral Preparation/ Oral Phase  Oral Prep Phase: impaired oral phase of swallowing  Prolonged Manipulation: pudding/puree, regular textures, secondary to impaired cognitive status         Pharyngeal Phase  Pharyngeal Phase: functional pharyngeal phase of swallowing             Cervical Esophageal Phase   N/A        Prognosis                           Discharge Activity:  As tolerated with assistance      Code Status/Level of Support:  Code Status and Medical Interventions:   Ordered at: 10/01/18 5889     Code Status:    CPR     Medical Interventions (Level of Support Prior to Arrest):    Full       No future appointments.    Additional Instructions for the Follow-ups that You Need to Schedule     Discharge Follow-up with PCP    As directed      Currently Documented PCP:  Evelyn Francisco MD  PCP Phone Number:  [phone]    Follow Up Details:  1 week               Time Spent on Discharge:  33 minutes    Electronically signed by Angie Montes MD, 10/19/18, 10:45 AM.      Electronically signed by Angie Montes MD at 10/19/2018 10:54 AM

## 2018-10-19 NOTE — PROGRESS NOTES
Case Management Discharge Note    Final Note: Patient medically ready for discharge today to [facility]. EMR is scheduled for 2PM. Report can be called to [phone]. CM will fax discharge summary to [phone]. Son has been notified and will meet the patient at the facility. - Mirtha [phone]     Destination - Selection Complete     Service Request Status Selected Specialties Address Phone Number Fax Number    [facility] Selected Assisted Living Facility [address] [place] [id number] [phone]      Durable Medical Equipment     No service has been selected for the patient.      Dialysis/Infusion     No service has been selected for the patient.      Home Medical Care     No service has been selected for the patient.      Social Care     No service has been selected for the patient.             Final Discharge Disposition Code: 03 - skilled nursing facility (SNF)

## 2018-10-19 NOTE — PROGRESS NOTES
"   LOS: 18 days    Patient Care Team:  Evelyn Francisco MD as PCP - General (Family Medicine)    Reason For Visit:  F/U HYPONATREMIA.  Subjective   Awake alert no obvious distress no new events.        Review of Systems:    Patient denies dysuria, hematuria, nausea, vomiting.        Objective       clopidogrel 75 mg Oral Daily   demeclocycline 150 mg Oral Q12H   docusate sodium 100 mg Nasogastric BID   enoxaparin 30 mg Subcutaneous Nightly   famotidine 20 mg Oral BID   magnesium sulfate 4 g Intravenous Once   multivitamin 1 tablet Oral Daily   polyethylene glycol 17 g Oral Daily   sennosides-docusate sodium 2 tablet Oral Nightly   sodium chloride 1 g Oral BID With Meals            Vital Signs:  Blood pressure 100/49, pulse 68, temperature 99.2 °F (37.3 °C), temperature source Oral, resp. rate 18, height 157.5 cm (62.01\"), weight 38.3 kg (84 lb 6.4 oz), SpO2 97 %.    Flowsheet Rows      First Filed Value   Admission Height  157.5 cm (62\") Documented at 10/01/2018 1700   Admission Weight  36.3 kg (80 lb) Documented at 10/01/2018 1700          10/18 0701 - 10/19 0700  In: 480 [P.O.:480]  Out: -     Physical Exam:  General Appearance: Alert,  no obvious distress.  Eyes: PER, EOMI.  Neck: Supple no JVD.  Lungs: Clear auscultation, no rales rhonchi's, equal chest movement, nonlabored.  Heart: No gallop, murmur, rub, RRR.  Abdomen: Soft, nontender, positive bowel sounds, no organomegaly.  Extremities: No edema, no cyanosis.  Neuro: No focal deficit, moving all extremities, alert oriented   Skin dry  Labs:        Results from last 7 days  Lab Units 10/19/18  0601 10/18/18  0614 10/17/18  0729 10/16/18  0535  10/15/18  0851   SODIUM mmol/L 132 127* 128* 130*  < > 124*   POTASSIUM mmol/L 4.4 4.5 4.1 3.7  < > 4.0   CHLORIDE mmol/L 99 95* 96* 97*  < > 95*   CO2 mmol/L 25.0 26.0 27.0 28.0  < > 25.0   BUN mg/dL 18 18 14 15  < > 12   CREATININE mg/dL 0.44* 0.42* 0.44* 0.38*  < > 0.32*   CALCIUM mg/dL 9.1 9.1 9.0 8.6*  < > 8.5* "   PHOSPHORUS mg/dL  --   --  3.1  --   --  3.4   MAGNESIUM mg/dL  --  1.8 1.6 1.9  --  1.5   ALBUMIN g/dL  --   --  3.74  --   --  3.39   < > = values in this interval not displayed.    Results from last 7 days  Lab Units 10/19/18  0601   GLUCOSE mg/dL 74         Results from last 7 days  Lab Units 10/15/18  0851   ALK PHOS U/L 102*   BILIRUBIN mg/dL 0.4   ALT (SGPT) U/L 31   AST (SGOT) U/L 25     Estimated Creatinine Clearance: 39 mL/min (A) (by C-G formula based on SCr of 0.44 mg/dL (L)).      Assessment       Hyponatremia, suspect SIADH    Generalized abdominal pain    Severe malnutrition (CMS/HCC)    Multiple right rib fractures (ninth, 10th, 11th)    Anemia    COPD and active cigarette abuse (CMS/HCC)     Impression:  HYPONATREMIA  SIADH. DEMECLOCYCLINE AND FLUID RESTRICTION 1200 ml/day. salt tablets 1 g qd      Recommendations:    Check labs weekly X3   ready for discharge today  Jose J Felix MD  10/19/18  11:10 AM

## 2018-10-19 NOTE — PLAN OF CARE
Problem: Infection, Risk/Actual (Adult)  Goal: Infection Prevention/Resolution  Outcome: Ongoing (interventions implemented as appropriate)   10/19/18 0454   Infection, Risk/Actual (Adult)   Infection Prevention/Resolution making progress toward outcome       Problem: Fall Risk (Adult)  Goal: Absence of Fall  Outcome: Ongoing (interventions implemented as appropriate)   10/19/18 0454   Fall Risk (Adult)   Absence of Fall making progress toward outcome       Problem: Patient Care Overview  Goal: Plan of Care Review  Outcome: Ongoing (interventions implemented as appropriate)   10/19/18 0454   Coping/Psychosocial   Plan of Care Reviewed With patient   Plan of Care Review   Progress no change   OTHER   Outcome Summary PT frequently seeks out staff, but cooperative. Possible discharge to rehab facility today if normalized sodium levels. Ativan given once PT reported anxiety and inability to sleep. VSS.

## 2018-10-19 NOTE — PLAN OF CARE
Problem: Skin Injury Risk (Adult)  Goal: Identify Related Risk Factors and Signs and Symptoms  Outcome: Outcome(s) achieved Date Met: 10/19/18    Goal: Skin Health and Integrity  Outcome: Outcome(s) achieved Date Met: 10/19/18      Problem: Patient Care Overview  Goal: Plan of Care Review  Outcome: Outcome(s) achieved Date Met: 10/19/18    Goal: Interprofessional Rounds/Family Conf  Outcome: Outcome(s) achieved Date Met: 10/19/18      Problem: Patient Care Overview  Goal: Plan of Care Review  Outcome: Outcome(s) achieved Date Met: 10/19/18

## 2018-10-19 NOTE — DISCHARGE SUMMARY
[facility]  DISCHARGE SUMMARY    Patient Name: Devora Huddleston  : 1945  MRN: [id number]    Date of Admission: 10/1/2018  Date of Discharge:  10/19/2018  Primary Care Physician: Evelyn Francisco MD    Consults     Date and Time Order Name Status Description    10/10/2018 1400 Inpatient Nephrology Consult Completed         Hospital Course     Presenting Problem:   Acute respiratory failure with hypoxemia (CMS/HCC) [J96.01]    Active Hospital Problems    Diagnosis Date Noted   • **Hyponatremia, suspect SIADH [E87.1] 10/01/2018   • COPD and active cigarette abuse (CMS/HCC) [J44.9] 10/03/2018   • Multiple right rib fractures (ninth, 10th, 11th) [S22.49XA] 10/02/2018   • Anemia [D64.9] 10/02/2018   • Generalized abdominal pain [R10.84] 10/01/2018   • Severe malnutrition (CMS/HCC) [E43] 10/01/2018      Resolved Hospital Problems    Diagnosis Date Noted Date Resolved   • Constipation [K59.00] 10/06/2018 10/18/2018   • Metabolic encephalopathy [G93.41] 10/01/2018 10/18/2018   • Acute hypoxemic respiratory failure requiring intubation and ventilatory support (CMS/HCC) [J96.01] 10/01/2018 10/18/2018   • Hypoglycemia, resolved [E16.2] 10/01/2018 10/18/2018   • Hypotension, resolved [I95.9] 10/01/2018 10/18/2018          Hospital Course:  Devora Huddleston is a 73 y.o. female with history of COPD, HTN, and CVA presents after being found down and unresponsive by her son on 10/1/18. Was intubated in the field. Glucose dropped to 39 en route (initially 105). Patient had reportedly complained of increasing weakness and weight loss for several weeks, and had recent evaluation for hyponatremia and seizures. Admitted to [facility] ICU. CT head, CT perfusion, CTA head and neck all negative. Further imaging revealed 3 right sided rib fractures, presumably from a fall at home. Possible RLL atelectasis vs. infiltrate. She was also hypotensive and requiring pressors. Treated empirically with  Vanc and Zosyn however all cultures remain no growth. She was started on enteral feeds due to dysphagia. Patient extubated 10/3. Transferred to floor, hospitalists assumed care on 10/6.     She was found to have hyponatremia presumed due to SIADH.  Her home HCTZ was discontinued.  She was treated with tolvaptan with good response initially, however continuation after discharge was cost prohibitive so she was transitioned to demeclocycline plus salt tablets and 1200cc daily fluid restriction with normalization of her sodium.  She will need close monitoring of her sodium with BMP in 10 days and may need adjustment in salt tablets or fluid restriction based on results.       Discharge Follow Up Recommendations for labs/diagnostics:  Follow up with PCP within 1 week of rehab discharge.  Avoid medications known to cause or exacerbate SIADH.  BMP in 10 days and regular monitoring of sodium thereafter.  1200cc daily fluid restriction.    Day of Discharge     HPI:  Complains of restless legs and anxiety.  Wants her boost.    Review of Systems  Gen- No fevers, chills  CV- No chest pain, palpitations  Resp- No cough, dyspnea  GI- No N/V/D, abd pain    Otherwise ROS is negative except as mentioned in the HPI.    Vital Signs:   Temp:  [97.6 °F (36.4 °C)-99.2 °F (37.3 °C)] 99.2 °F (37.3 °C)  Heart Rate:  [68-80] 68  Resp:  [18] 18  BP: ()/(46-59) 100/49     Physical Exam:  Constitutional: No acute distress, awake, alert, sitting up in chair, cachectic  HENT: NCAT, mucous membranes moist  Respiratory: Clear to auscultation bilaterally, respiratory effort normal   Cardiovascular: RRR, no murmurs, rubs, or gallops  Gastrointestinal: Positive bowel sounds, soft, nontender, nondistended  Musculoskeletal: No bilateral ankle edema  Psychiatric: Irritable, cooperative  Neurologic: Cranial Nerves grossly intact to confrontation, speech clear  Skin: No rashes    Pertinent  and/or Most Recent Results       Results from last 7  days  Lab Units 10/19/18  0601 10/18/18  0614 10/17/18  0729 10/16/18  0535 10/15/18  2332 10/15/18  1817 10/15/18  1150   SODIUM mmol/L 132 127* 128* 130* 131* 126* 125*   POTASSIUM mmol/L 4.4 4.5 4.1 3.7 4.5 3.8 4.0   CHLORIDE mmol/L 99 95* 96* 97* 97* 93* 94*   CO2 mmol/L 25.0 26.0 27.0 28.0 27.0 26.0 24.0   BUN mg/dL 18 18 14 15 17 16 14   CREATININE mg/dL 0.44* 0.42* 0.44* 0.38* 0.46* 0.46* 0.32*   GLUCOSE mg/dL 74 77 101* 82 99 110* 110*   CALCIUM mg/dL 9.1 9.1 9.0 8.6* 8.6* 8.4* 8.6*       Results from last 7 days  Lab Units 10/15/18  0851   BILIRUBIN mg/dL 0.4   ALK PHOS U/L 102*   ALT (SGPT) U/L 31   AST (SGOT) U/L 25       Results from last 7 days  Lab Units 10/15/18  0851   CHOLESTEROL mg/dL 109   TRIGLYCERIDES mg/dL 34         Brief Urine Lab Results  (Last result in the past 365 days)      Color   Clarity   Blood   Leuk Est   Nitrite   Protein   CREAT   Urine HCG        10/02/18 1100 Yellow Clear Small (1+)(A) Negative Negative Trace(A)               Microbiology Results Abnormal     Procedure Component Value - Date/Time    Blood Culture - Blood, [[id number]]  (Normal) Collected:  10/01/18 1745    Lab Status:  Final result Specimen:  Blood from Wrist, Right Updated:  10/06/18 1830     Blood Culture No growth at 5 days    Blood Culture - Blood, [[id number]]  (Normal) Collected:  10/01/18 1715    Lab Status:  Final result Specimen:  Blood from Arm, Right Updated:  10/06/18 1830     Blood Culture No growth at 5 days          Imaging Results (all)     Procedure Component Value Units Date/Time    FL Video Swallow With Speech [[id number]] Collected:  10/12/18 1427     Updated:  10/12/18 1522    Narrative:       EXAMINATION: FL VIDEO SWALLOW W SPEECH-     INDICATION: dysphagia; J96.01-Acute respiratory failure with hypoxia;  Z74.09-Other reduced mobility; R13.12-Dysphagia, oropharyngeal phase;  Z74.09-Other reduced mobility     TECHNIQUE: 2 minutes and 12 seconds of fluoroscopic time was used for  this exam. 1  associated image was saved. The patient was evaluated in  the seated lateral position while taking a variety of consistencies of  barium by mouth under the direction of speech pathology.     COMPARISON: NONE     FINDINGS: There was no penetration and no aspiration with any of the  media ingested.          Impression:       Fluoroscopy provided for a modified barium swallow. Please  see speech therapy report for full details and recommendations.         This report was finalized on 10/12/2018 3:20 PM by Dr. Damian Marks.       Fiberoptic Endo (fees) [[id number]] Resulted:  10/08/18 1436     Updated:  10/08/18 1436    Narrative:       This procedure was auto-finalized with no dictation required.    XR Abdomen KUB [[id number]] Collected:  10/08/18 0148     Updated:  10/08/18 0259    Narrative:       EXAM:  XR Abdomen, 1 View    CLINICAL HISTORY:  73 years old, female; Acute respiratory failure with hypoxia; Dysphagia,   oropharyngeal phase; Other reduced mobility; Other reduced mobility; Device   placement; Gi device; Nasogastric tube; Additional info: Ng tube placement    TECHNIQUE:  Frontal supine view of the abdomen/pelvis.    COMPARISON:  CR XR ABDOMEN KUB 10/7/2018 12:18 AM    FINDINGS:  Intraperitoneal space:  No free air.  Gastrointestinal tract:  Gaseous distention of multiple loops of bowel, similar   to comparison study.  Organs:  Normal as visualized.  Bones/joints:  Normal.  Tubes, lines and devices:  Enteric tube tip is in the distal stomach.      Impression:         Enteric tube tip within the distal stomach.        THIS DOCUMENT HAS BEEN ELECTRONICALLY SIGNED BY EYAD DUMONT MD    XR Abdomen KUB [[id number]] Collected:  10/07/18 0012     Updated:  10/07/18 0135    Narrative:       EXAM:    XR Abdomen, 1 View    EXAM DATE/TIME:    10/7/2018 12:12 AM    CLINICAL HISTORY:    73 years old, female; Device placement; Nasogastric tube.    TECHNIQUE:    Frontal supine view of the abdomen/pelvis.    COMPARISON:     CR XR ABDOMEN KUB 10/5/2018 6:48 AM    FINDINGS:    Nasogastric tube terminates in the mid abdomen.      Diffuse gaseous distention of several loops of small and large bowel in the   central abdomen.  Prominent stool in the rectum.  No visualized free   intraperitoneal air.      Levoconvex curvature of the lumbar spine without acute fracture.  Aortic   atherosclerosis.      Impression:         Nasogastric tube in good position with persistent gaseous distention small   and large bowel loops.    THIS DOCUMENT HAS BEEN ELECTRONICALLY SIGNED BY DAVEY SOUTH MD    XR Chest 1 View [[id number]] Collected:  10/06/18 1201     Updated:  10/06/18 1356    Narrative:       EXAMINATION: XR CHEST 1 VW - 10/06/2018     INDICATION:  J96.01-Acute respiratory failure with hypoxia; Z74.09-Other  reduced mobility; R13.12-Dysphagia, oropharyngeal phase.     TECHNIQUE:  Single view frontal chest.     COMPARISONS: 10/4/2018.     FINDINGS:  Background emphysematous change. Likely trace effusions. No  pneumothorax or segmental air space disease. Unchanged cardiomediastinal  silhouette. Atherosclerosis aortic knob. Right IJ central line in good  position. Nasogastric tube has been added, its terminus below the  diaphragm and out of view.       Impression:       Essentially stable exam.     This report was finalized on 10/6/2018 1:54 PM by Dimas Krishna.       XR Abdomen KUB [[id number]] Collected:  10/05/18 0839     Updated:  10/05/18 1111    Narrative:       EXAMINATION: XR ABDOMEN KUB-      INDICATION: NG placement for feeding; J96.01-Acute respiratory failure  with hypoxia; Z74.09-Other reduced mobility; R13.12-Dysphagia,  oropharyngeal phase.      COMPARISON: 10/04/2018.     FINDINGS: Nasogastric tube terminates within the right lower midabdomen  presumably within the distal portion of a distended gastric lumen.  Gas-filled bowel loops overlie the lower midabdomen.           Impression:       Nasogastric tube terminates lower right mid  abdomen likely  within the distal portion of a distended gastric lumen.     D:  10/05/2018  E:  10/05/2018     This report was finalized on 10/5/2018 11:09 AM by Dr. Damian Marks.       XR Abdomen KUB [[id number]] Collected:  10/04/18 2055     Updated:  10/04/18 2215    Narrative:       EXAM:    XR Abdomen, 1 View    CLINICAL HISTORY:    73 years old, female; Acute respiratory failure with hypoxia; Dysphagia,   oropharyngeal phase; Other reduced mobility; Device placement; Gi device;   Nasogastric tube; Additional info: Ng tube placement for feeding    TECHNIQUE:    Frontal supine view of the abdomen/pelvis.    COMPARISON:    CR XR ABDOMEN KUB 10/4/2018 6:12 PM    FINDINGS:    Tip of NASOGASTRIC/ENTERIC tube distal to the gastroesophageal junction in   the gastric body.      Impression:         Tip of NASOGASTRIC/ENTERIC tube distal to the gastroesophageal junction in   the gastric body.    THIS DOCUMENT HAS BEEN ELECTRONICALLY SIGNED BY ABDON BLAKE MD    XR Abdomen KUB [[id number]] Collected:  10/04/18 1805     Updated:  10/04/18 1936    Narrative:       EXAM:    XR Abdomen, 1 View    CLINICAL HISTORY:    73 years old, female; Acute respiratory failure with hypoxia; Dysphagia,   oropharyngeal phase; Other reduced mobility; Device placement; Gi device;   Other: Feeding tube placement; Additional info: Feeding tube placement; Has   been in place for one hour    TECHNIQUE:    Frontal supine view of the abdomen/pelvis.    COMPARISON:    CR XR ABDOMEN KUB 10/2/2018 9:02 AM    FINDINGS:    Tip of NASOGASTRIC/ENTERIC tube distal to the gastroesophageal junction in   the gastric body.      Impression:         Tip of NASOGASTRIC/ENTERIC tube distal to the gastroesophageal junction in   the gastric body.    THIS DOCUMENT HAS BEEN ELECTRONICALLY SIGNED BY ABDON BLAKE MD    Fiberoptic Endo (fees) [[id number]] Resulted:  10/04/18 1544     Updated:  10/04/18 1544    Narrative:       This procedure was auto-finalized with no  dictation required.    XR Chest 1 View [[id number]] Collected:  10/04/18 0854     Updated:  10/04/18 0931    Narrative:       EXAMINATION: XR CHEST 1 VW-      INDICATION: Status post extubation 10/02/18; J96.01-Acute respiratory  failure with hypoxia.     TECHNIQUE:  Single view frontal chest.     COMPARISONS: 10/03/2018.     FINDINGS:  Endotracheal and NG tubes have been removed. Other support  apparatus remains unchanged. Emphysema. Trace effusions. No  pneumothorax. Lucency below the left hemidiaphragm is probably within  the stomach.       Impression:       No acute cardiopulmonary abnormality.     D:  10/04/2018  E:  10/04/2018     This report was finalized on 10/4/2018 9:29 AM by Dimas Krishna.       XR Chest 1 View [[id number]] Collected:  10/03/18 0744     Updated:  10/03/18 1021    Narrative:       EXAMINATION: XR CHEST 1 VW- 10/03/2018     INDICATION: Intubated Patient; J96.01-Acute respiratory failure with  hypoxia      COMPARISON: 10/01/2018     FINDINGS: Support hardware demonstrates interval placement of  esophagogastric tube which courses below the diaphragm and out of the  field-of-view otherwise unchanged. Cardiac size within normal limits. No  focal opacification or consolidation with background chronic lung  changes similar to prior. Mild asymmetric elevation of the right  hemidiaphragm also similar to prior.   No pneumothorax or pleural  effusion.        Impression:       Esophagogastric tube has been placed in the interval  coursing below the diaphragm and out of the field-of-view. No focal  opacification or consolidation within the lung parenchyma.     D:  10/03/2018  E:  10/03/2018     This report was finalized on 10/3/2018 10:19 AM by Dr. Damian Marks.       XR Abdomen KUB [[id number]] Collected:  10/02/18 1023     Updated:  10/02/18 2341    Narrative:       EXAMINATION: XR ABDOMEN, KUB-10/02/2018:      INDICATION: Confirm OG placement; J96.01-Acute respiratory failure with  hypoxia.       COMPARISON: NONE.     FINDINGS: NG tube is seen in the distal stomach or proximal duodenum.  There is mildly and diffusely increased bowel gas, most of which appears  to be colon. There is moderate stool shadow present. No bowel wall edema  or pneumatosis is seen.           Impression:       NG tube in the distal stomach or duodenal bulb.     D:  10/02/2018  E:  10/02/2018     This report was finalized on 10/2/2018 11:39 PM by DR. Partha Godfrey MD.       CT Head Without Contrast Stroke Protocol [[id number]] Collected:  10/01/18 1643     Updated:  10/02/18 1029    Narrative:       EXAMINATION: CT HEAD WO CONTRAST STROKE PROTOCOL-      INDICATION: Unresponsive.      TECHNIQUE: Axial CT of the head without intravenous contrast  administration.     The radiation dose reduction device was turned on for each scan per the  ALARA (As Low as Reasonably Achievable) protocol.     COMPARISON: None.     FINDINGS: Structures are symmetric without evidence of mass, mass effect  or midline shift. Ventricles and sulci within normal limits. No  intraaxial hemorrhage or extraaxial fluid collection. Severe attenuation  changes in the periventricular and deep white matter indicating chronic  small vessel ischemic disease with likely prior insult left  frontotemporal region as there is mild encephalomalacia present. Globes  and orbits unremarkable. Visualized paranasal sinuses and mastoid air  cells are grossly clear and well pneumatized.       Impression:       No acute intracranial abnormality.     NOTE: Scan performed on 10/01/2018 at 1607 hours. Scan report given to  ER physician and team in person at scanner by Dr. Marks on 10/01/2018 at  1615 hours.     D:  10/01/2018  E:  10/02/2018     This report was finalized on 10/2/2018 10:27 AM by Dr. Damian Marks.       XR Chest 1 View [[id number]] Collected:  10/02/18 0912     Updated:  10/02/18 0957    Narrative:       EXAMINATION: XR CHEST 1 VW- 10/01/2018     INDICATION: Acute Stroke  Protocol (onset < 12 hrs)      COMPARISON: NONE     FINDINGS: Portable chest reveals endotracheal tube in satisfactory  position above the daily. Lung fields are clear. No focal parenchymal  opacification present.  No pleural effusion or pneumothorax.  Emphysematous changes seen throughout the lung fields.           Impression:       Chronic and emphysematous changes seen throughout the lung  fields. No acute parenchymal disease.     D:  10/01/2018  E:  10/02/2018     This report was finalized on 10/2/2018 9:55 AM by Dr. Merari Garcia MD.       CT Angiogram Head With & Without Contrast [[id number]] Collected:  10/02/18 0908     Updated:  10/02/18 0957    Narrative:       EXAMINATION: CT ANGIOGRAM HEAD W WO CONTRAST-, CT ANGIOGRAM NECK W WO  CONTRAST- 10/01/2018     INDICATION: Unresponsive; prior history of stroke     TECHNIQUE: Multiple axial CT imaging was obtained of the head and neck  following the administration of intravenous contrast according to the CT  angio protocol. Three-D reformatted images were submitted to further  facilitate diagnostic accuracy and treatment planning.     Stenosis measurement was performed by the NASCET or similar method.     The radiation dose reduction device was turned on for each scan per the  ALARA (As Low as Reasonably Achievable) protocol.     COMPARISON: None.     FINDINGS:  Severe emphysematous changes identified at the lung apices  with endotracheal tube above the level of the daily. The thyroid is  homogeneous. There is distention identified of the esophagus filled with  air. Thoracic aortic arch reveals atherosclerotic disease. There is  atherosclerotic disease seen at the origin of the left subclavian  artery. Both common carotid arteries are patent bilaterally with  extensive atherosclerotic disease seen in both carotid bifurcations.  There is no significant stenosis seen of the right internal carotid  artery. The left internal carotid artery reveals moderate  stenosis of  the proximal portion. There is symmetry seen of the vertebral arteries  bilaterally. No significant stenosis or tortuosity present of the  vertebral arteries. Degenerative changes seen within the spine.     The internal carotid arteries are revealing extensive atherosclerotic  disease seen in the cavernous portions and supraclinoid portions. There  is no significant stenosis seen of the middle cerebral arteries or the  anterior cerebral arteries. No aneurysmal dilatation is identified. The  posterior circulation reveals small caliber basilar artery. There is  fetal origin identified of the right posterior cerebral artery. Left  posterior cerebral artery is unremarkable in appearance. Posterior  circulation is intact.       Impression:       Atherosclerotic disease of both carotid bifurcations with  no significant stenosis seen of the common carotid arteries and only  moderate stenosis of the left proximal internal carotid artery. No  significant stenosis of the right internal carotid artery. The CTA of  the head is unremarkable with atherosclerotic disease of the  intracranial portions of the internal carotid arteries.     D:  10/01/2018  E:  10/02/2018     This report was finalized on 10/2/2018 9:54 AM by Dr. Merari Garcia MD.       CT Angiogram Neck With & Without Contrast [[id number]] Collected:  10/02/18 0908     Updated:  10/02/18 0957    Narrative:       EXAMINATION: CT ANGIOGRAM HEAD W WO CONTRAST-, CT ANGIOGRAM NECK W WO  CONTRAST- 10/01/2018     INDICATION: Unresponsive; prior history of stroke     TECHNIQUE: Multiple axial CT imaging was obtained of the head and neck  following the administration of intravenous contrast according to the CT  angio protocol. Three-D reformatted images were submitted to further  facilitate diagnostic accuracy and treatment planning.     Stenosis measurement was performed by the NASCET or similar method.     The radiation dose reduction device was turned on  for each scan per the  ALARA (As Low as Reasonably Achievable) protocol.     COMPARISON: None.     FINDINGS:  Severe emphysematous changes identified at the lung apices  with endotracheal tube above the level of the daily. The thyroid is  homogeneous. There is distention identified of the esophagus filled with  air. Thoracic aortic arch reveals atherosclerotic disease. There is  atherosclerotic disease seen at the origin of the left subclavian  artery. Both common carotid arteries are patent bilaterally with  extensive atherosclerotic disease seen in both carotid bifurcations.  There is no significant stenosis seen of the right internal carotid  artery. The left internal carotid artery reveals moderate stenosis of  the proximal portion. There is symmetry seen of the vertebral arteries  bilaterally. No significant stenosis or tortuosity present of the  vertebral arteries. Degenerative changes seen within the spine.     The internal carotid arteries are revealing extensive atherosclerotic  disease seen in the cavernous portions and supraclinoid portions. There  is no significant stenosis seen of the middle cerebral arteries or the  anterior cerebral arteries. No aneurysmal dilatation is identified. The  posterior circulation reveals small caliber basilar artery. There is  fetal origin identified of the right posterior cerebral artery. Left  posterior cerebral artery is unremarkable in appearance. Posterior  circulation is intact.       Impression:       Atherosclerotic disease of both carotid bifurcations with  no significant stenosis seen of the common carotid arteries and only  moderate stenosis of the left proximal internal carotid artery. No  significant stenosis of the right internal carotid artery. The CTA of  the head is unremarkable with atherosclerotic disease of the  intracranial portions of the internal carotid arteries.     D:  10/01/2018  E:  10/02/2018     This report was finalized on 10/2/2018 9:54 AM  by Dr. Merari Garcia MD.       CT Cerebral Perfusion With & Without Contrast [[id number]] Collected:  10/02/18 0902     Updated:  10/02/18 0957    Narrative:       EXAMINATION: CT CEREBRAL PERFUSION W WO CONTRAST- 10/01/2018     INDICATION: Unresponsive; prior history of stroke     TECHNIQUE: Cerebral perfusion analysis was performed using computed  tomography with contrast administration, including post processing of  parametric maps with determination of cerebral blood flow, cerebral  blood volume, and mean transit time.     The radiation dose reduction device was turned on for each scan per the  ALARA (As Low as Reasonably Achievable) protocol.     COMPARISON: NONE     FINDINGS: There is extensive low-density seen throughout the  periventricular and subcortical white matter. There is no evidence of  perfusion abnormality to suggest evidence of reversible ischemia. There  is no evidence of increased mean transit time to suggest evidence of an  occlusion or stenosis.       Impression:       No evidence of reversible ischemia.     D:  10/01/2018  E:  10/02/2018           This report was finalized on 10/2/2018 9:54 AM by Dr. Merari Garcia MD.       XR Chest 1 View [[id number]] Collected:  10/01/18 2152     Updated:  10/01/18 2227    Narrative:       EXAM:    XR Chest, 1 View    CLINICAL HISTORY:    73 years old, female; Device placement; Other: Cvc placement    TECHNIQUE:    Frontal view of the chest.    COMPARISON:    CR XR CHEST 1 VW 10/1/2018 5:22 PM    FINDINGS:    Lungs:  COPD.  Mild residual right basilar atelectasis/infiltrate, some of   which is superimposed upon dome of diaphragm.  Mild biapical scarring.  Mild   accentuated interstitial markings at right base.  No acute infiltrates of left   lung.    Pleural space:  No significant pleural effusion visualized.  No obvious   pneumothorax.    Heart:  Normal heart size.    Mediastinum:  No significant pneumomediastinum visualized, but please refer    to recent CT exam.    Bones/joints:  Healing fracture of lateral right ninth rib.  Patient has   known acute right lower rib fractures, but these are better visualized on the   recent CT scan.    Vasculature:  Atherosclerotic placques scattered along some vessels.    Tubes, lines and devices:  Right jugular central venous catheter tip at SVC   level.  ETT tip is approximately 3.4 cm above daily.    Upper abdomen:  Some gaseous distention of stomach.      Impression:       1.  Right jugular central venous catheter tip at SVC level.  2.  COPD.  3.  ETT tip is approximately 3.4 cm above daily.  4.  Mild residual right basilar atelectasis/infiltrate.    THIS DOCUMENT HAS BEEN ELECTRONICALLY SIGNED BY JOSIE PEREZ MD    CT Abdomen Pelvis Without Contrast [[id number]] Collected:  10/01/18 1827     Updated:  10/01/18 2219    Narrative:       EXAM:    CT Abdomen and Pelvis Without Intravenous Contrast    CLINICAL HISTORY:    73 years old, female; Signs and symptoms; Abdominal tenderness; Additional   info: Abdominal pain, unspecified    TECHNIQUE:    Axial computed tomography images of the abdomen and pelvis without   intravenous contrast.  All CT scans at this facility use At least one of these   dose optimization techniques: automated exposure control; mA and/or kV   adjustment per patient size (includes targeted exams where dose is matched to   clinical indication); or iterative reconstruction.    Coronal and sagittal reformatted images were created and reviewed.    COMPARISON:    No relevant prior studies available.    FINDINGS:    Artifacts:  Artifacts from patient's arm(s) held down diminish resolution on   some scan slices.    Lung bases:  Old granulomatous disease of lung(s).  COPD, scattered   subsegmental atelectasis, and pulmonary scarring.  Mild RLL   infiltrate/atelectasis.    Pleural space:  Anterior left basilar subpleural blebs versus trace   pneumothorax.  Erect inspiratory/expiratory CXR could  discern this, if desired.    The finding has been previously reported to physician on CT Chest scan.    Possible minimal right pleural effusion.    Heart:  Normal heart size.  Cannot exclude CAD or mitral annulus   calcifications.     ABDOMEN:    Liver:  Unremarkable as visualized.  Limited.    Gallbladder and bile ducts:  Distended gallbladder.  No calcified stones.  No   ductal dilation.    Pancreas:  Unremarkable as visualized, but limited resolution    Spleen:  Unremarkable as visualized.  Limited.    Adrenals:  Unremarkable as visualized.  Limited.    Kidneys and ureters:  Scattered subcentimeter or bilateral renal nodules,   which US can characterize.  Limited parenchymal resolution and evaluation at   posterior cortex left kidney related to beam hardening artifacts from adjacent   left arm held down by patient's side.  Bilateral renal function.  No   hydronephrosis.    Stomach and bowel:  Gaseously distended stomach.  Nonspecific bowel gas   pattern.  The contracted segments of bowel restrict wall evaluation to rule out   any abnormal thickening.  No gaseously dilated small bowel.     PELVIS:    Appendix:  Appendix obscured, incompletely evaluated.    Bladder:  The urinary bladder relatively is decompressed with a Matthews   catheter in place.      Reproductive:  Unremarkable as visualized.     ABDOMEN and PELVIS:    Intraperitoneal space:  Possible mild ascites, which US can clarify.  No   definite pneumoperitoneum.    Bones/joints:  Acute mildly displaced fractures of right posterior ninth,   10th, and 11th ribs.  Healing or healed fracture of lateral right ninth rib.    No dislocation.    Soft tissues:  Calcified injection granulomas are noted in the subcutaneous   tissues of right buttocks. Skin wrinkles or skin irregularity at right buttock.    Clinically correlate.     Vasculature:  Mild unruptured proximal AAA 3.2 cm.  Pelvic phleboliths.    Atherosclerotic placques scattered along some vessels.  No  abdominal aortic   aneurysm.    Lymph nodes:  Unremarkable.  No enlarged lymph nodes.    Other findings:  Cachexia.      Impression:       1.  Small bilateral renal nodules.  2.  Mild unruptured proximal AAA 3.2 cm.  3.  Gaseously distended stomach.  4.  Nonspecific bowel gas pattern.  5.  Possible mild ascites, which US can clarify.  6.  Acute mildly displaced fractures of right posterior ninth, 10th, and 11th   ribs.  7.  Abnormal chest findings, as above.    Note: Findings were discussed with Dr. Delvalle, associate of Dhruv Cortez at 10/1/2018 10:05 PM EDT, in conjunction with reportedly CT Chest   report. The report was understood and acknowledged by the healthcare giver, who   stated that gastric gaseous distention may be secondary to recent emergency   assisted ventilation.    THIS DOCUMENT HAS BEEN ELECTRONICALLY SIGNED BY JOSIE PEREZ MD    CT Chest Without Contrast [[id number]] Collected:  10/01/18 1825     Updated:  10/01/18 2211    Addenda:        Note: Critical Findings were discussed with Dr. Delvalle , associate of Dhruv Cortez at 10/1/2018 10:05 PM EDT. The report was understood and   acknowledged by the healthcare giver, who stated patient had hypoxic   episode   and was ventilated prior to arrival.    THIS DOCUMENT HAS BEEN ELECTRONICALLY SIGNED BY JOSIE PEREZ MD  Signed:  10/01/18 2210 by Josie Perez MD    Narrative:       EXAM:    CT Chest Without Intravenous Contrast    CLINICAL HISTORY:    73 years old, female; Abnormal findings; Abnormal radiologic exam of lung or   chest; Additional info: Abn findings on diagnostic imaging of lung. Cxr report   not dictated at this time    TECHNIQUE:    Axial computed tomography images of the chest without intravenous contrast.    All CT scans at this facility use at least one of these dose optimization   techniques: automated exposure control; mA and/or kV adjustment per patient   size (includes targeted exams where dose  is matched to clinical indication); or   iterative reconstruction.    Coronal and sagittal reformatted images were created and reviewed.    COMPARISON:    CR XR CHEST 1 VW 10/1/2018 5:22 PM    FINDINGS:    Artifacts:  Artifacts from patient's arm(s) held down diminish resolution on   some scan slices.    Lungs:  COPD.  Mild scattered subsegmental atelectasis.  Mild RLL   atelectasis/infiltrate.  Old granulomatous disease of lung(s).  Mild pulmonary   scarring, predominantly at lung apices.  Some retained secretions in central   right bronchial tree.  Suspect small streak of scarring or subsegmental   atelectasis laterally at MIRANDA mid lung field bordering the major fissure, rather   than a true tiny nodule.    Pleural space:  Trace anterior left basilar pneumothorax versus subpleural   blebs.  Followup erect inspiratory/expiratory CXR could discern this.  Possible   trace right pleural effusion.  No right pneumothorax.    Heart:  Normal heart size.  Some coronary artery calcification cannot be   excluded.  No significant pericardial effusion.    Mediastinum:  Mild pneumomediastinum bordering left tracheobronchial tree,   perhaps,  related to barotrauma.    Bones/joints:  Acute mildly displaced fractures of posterior right ninth,   10th, 11th ribs.  Mild thoracolumbar scoliosis.  Focal sclerosis in left side   of the L1 vertebral body.  Degenerative changes of the spine.  Healing or   healed fracture of lateral right ninth rib.  No dislocation.    Soft tissues: Cachexia.    Vasculature:  Atherosclerotic placques scattered along some vessels.  No   aortic aneurysm.    Lymph nodes:  No obvious lymphadenopathy, but limited resolution related to   cachexia and noncontrast exam.    Kidneys and ureters:  A few subcentimeter hypodense nodules of left kidney.    US can characterize, if desired.    Stomach and bowel:  Gaseous distention of stomach.    Intraperitoneal space: Limited resolution, cannot exclude mild ascites,    restricted resolution.  US could clarify, if desired.    Tubes, lines and devices:  ETT tip in satisfactory position, 2 cm above   daily.      Impression:       1.  Mild pneumomediastinum bordering left tracheobronchial tree.  2.  Trace anterior left basilar pneumothorax versus subpleural blebs.  Followup   erect inspiratory/expiratory CXR could discern this.  3.  Acute mildly displaced fractures of posterior right ninth, 10th, 11th ribs.  4.  Possible trace right pleural effusion.  5.  Satisfactory ETT position.  6.  COPD.  7.  Mild scattered subsegmental atelectasis and pulmonary scarring.  8.  Mild RLL atelectasis/infiltrate.  9.  Small left renal nodules.  10.  Additional findings, as above.    THIS DOCUMENT HAS BEEN ELECTRONICALLY SIGNED BY JOSIE PEREZ MD                    Results for orders placed during the hospital encounter of 10/01/18   Adult Transthoracic Echo Complete W/ Cont if Necessary Per Protocol    Narrative · Left ventricular systolic function is normal.  · Estimated EF appears to be in the range of 61 - 65%  · All left ventricular wall segments contract normally.  · Normal left atrial pressure.  · Right ventricular cavity is mildly dilated  · Right ventricular wall thickness is consistent with mild hypertrophy.  · Right atrial cavity size is mildly dilated. The inferior vena cava is   dilated. IVC inspiratory collapse is absent. A prominent Eustachian valve   is present.  · Moderate tricuspid valve regurgitation is present. Estimated right   ventricular systolic pressure from tricuspid regurgitation is mildly   elevated (35-45 mmHg).            Discharge Details        Discharge Medications      New Medications      Instructions Start Date   bisacodyl 10 MG suppository  Commonly known as:  DULCOLAX   10 mg, Rectal, Once As Needed      demeclocycline 150 MG tablet  Commonly known as:  DECLOMYCIN   150 mg, Oral, Every 12 Hours Scheduled      famotidine 20 MG tablet  Commonly known as:   PEPCID   20 mg, Oral, 2 Times Daily      multivitamin tablet tablet   1 tablet, Oral, Daily      polyethylene glycol pack packet  Commonly known as:  MIRALAX   17 g, Oral, Daily      sennosides-docusate sodium 8.6-50 MG tablet  Commonly known as:  SENOKOT-S   2 tablets, Oral, Nightly      sodium chloride 1 g tablet   1 g, Oral, 2 Times Daily With Meals         Continue These Medications      Instructions Start Date   clonazePAM 0.5 MG tablet  Commonly known as:  KlonoPIN   0.5 mg, Oral, 3 Times Daily PRN      clopidogrel 75 MG tablet  Commonly known as:  PLAVIX   75 mg, Oral, Daily      pramipexole 1 MG tablet  Commonly known as:  MIRAPEX   1 mg, Oral, 3 Times Daily         Stop These Medications    ACULAR 0.5 % ophthalmic solution  Generic drug:  ketorolac     ALDACTAZIDE 25-25 MG tablet  Generic drug:  spironolactone-hydrochlorothiazide     doxycycline 100 MG capsule  Commonly known as:  VIBRAMYCIN     esomeprazole 40 MG capsule  Commonly known as:  nexIUM     losartan 100 MG tablet  Commonly known as:  COZAAR              Discharge Disposition:  Rehab Facility or Unit (DC - External)    Discharge Diet:  Diet Instructions     MBS/VFSS/FEES    Reason for Referral  Patient was referred for a FEES to assess the efficiency of his/her swallow function, rule out aspiration and make recommendations regarding safe dietary consistencies, effective compensatory strategies, and safe eating environment.        Referring Physician: MD Emmett          Recommendations/Treatment  Oral Prep Phase: impaired oral phase of swallowing  Oral Transit Phase: impaired  Oral Residue: WFL              SLP Swallowing Diagnosis: mild, oral dysfunction, functional pharyngeal phase  Swallow Criteria for Skilled Therapeutic Interventions Met: no problems identified which require skilled intervention    Therapy Frequency (Swallow): evaluation only  SLP Diet Recommendation: soft textures, chopped, thin liquids, other (see comments) (nursing can  advance/adjust as needed r/t cog status)  Recommended Precautions and Strategies: upright posture during/after eating  SLP Rec. for Method of Medication Administration: meds whole, with pudding or applesauce (advance to via thins as desires as cog status improves)      Oral Preparation/ Oral Phase  Oral Prep Phase: impaired oral phase of swallowing  Prolonged Manipulation: pudding/puree, regular textures, secondary to impaired cognitive status         Pharyngeal Phase  Pharyngeal Phase: functional pharyngeal phase of swallowing             Cervical Esophageal Phase   N/A        Prognosis                           Discharge Activity:  As tolerated with assistance      Code Status/Level of Support:  Code Status and Medical Interventions:   Ordered at: 10/01/18 7248     Code Status:    CPR     Medical Interventions (Level of Support Prior to Arrest):    Full       No future appointments.    Additional Instructions for the Follow-ups that You Need to Schedule     Discharge Follow-up with PCP    As directed      Currently Documented PCP:  Evelyn Francisco MD  PCP Phone Number:  [phone]    Follow Up Details:  1 week               Time Spent on Discharge:  33 minutes    Electronically signed by Angie Montes MD, 10/19/18, 10:45 AM.

## 2019-08-29 NOTE — PROCEDURES
From: Maria Antonia Faustin  To: Lucas Jiang MD  Sent: 8/29/2019 10:28 AM CDT  Subject: After-Visit Question    Could Dr Jiang's nurse please call me? I'm having some pain that started yesterday, possibly Tuesday. It feels like the same pain after the catheter was removed only milder. I'm having pain when I start to urinate and I also had cramping like the bladder spasm that woke me up at 2:30 this morning. I haven't had that pain again. My urine looks clear, not dark. I did not have any of this for the past week. [phone], thanks.   Insert Central Line At Bedside  Date/Time: 10/1/2018 9:52 PM  Performed by: EDIL SCRUGGS  Authorized by: EDIL SCRUGGS   Consent: The procedure was performed in an emergent situation.  Required items: required blood products, implants, devices, and special equipment available  Patient identity confirmed: arm band, provided demographic data and hospital-assigned identification number  Indications: vascular access and central pressure monitoring  Anesthesia: local infiltration    Anesthesia:  Local Anesthetic: lidocaine 2% without epinephrine  Anesthetic total: 5 mL    Sedation:  Patient sedated: no  Preparation: skin prepped with 2% chlorhexidine  Skin prep agent dried: skin prep agent completely dried prior to procedure  Sterile barriers: all five maximum sterile barriers used - cap, mask, sterile gown, sterile gloves, and large sterile sheet  Hand hygiene: hand hygiene performed prior to central venous catheter insertion  Location details: right internal jugular  Patient position: Trendelenburg  Catheter type: triple lumen  Pre-procedure: landmarks identified  Ultrasound guidance: yes  Sterile ultrasound techniques: sterile gel and sterile probe covers were used  Number of attempts: 2  Successful placement: yes  Post-procedure: line sutured and dressing applied  Assessment: blood return through all ports,  free fluid flow,  placement verified by x-ray and no pneumothorax on x-ray  Patient tolerance: Patient tolerated the procedure well with no immediate complications

## 2022-08-09 NOTE — PROGRESS NOTES
"   LOS: 17 days    Patient Care Team:  Evelyn Francisco MD as PCP - General (Family Medicine)    Reason For Visit:  F/U HYPONATREMIA.  Subjective   Awake alert no obvious distress no new events.        Review of Systems:    Patient denies shortness of breath, chest pain, dysuria, hematuria, nausea, vomiting.        Objective       clopidogrel 75 mg Oral Daily   demeclocycline 150 mg Oral Q12H   docusate sodium 100 mg Nasogastric BID   enoxaparin 30 mg Subcutaneous Nightly   famotidine 20 mg Oral BID   magnesium sulfate 4 g Intravenous Once   multivitamin 1 tablet Oral Daily   polyethylene glycol 17 g Oral Daily   sennosides-docusate sodium 2 tablet Oral Nightly            Vital Signs:  Blood pressure 117/56, pulse 67, temperature 98.6 °F (37 °C), temperature source Oral, resp. rate 16, height 157.5 cm (62.01\"), weight 44.6 kg (98 lb 4.8 oz), SpO2 99 %.    Flowsheet Rows      First Filed Value   Admission Height  157.5 cm (62\") Documented at 10/01/2018 1700   Admission Weight  36.3 kg (80 lb) Documented at 10/01/2018 1700          No intake/output data recorded.    Physical Exam:  General Appearance: Alert, oriented, no obvious distress.  Eyes: PER, EOMI.  Neck: Supple no JVD.  Lungs: Clear auscultation, no rales rhonchi's, equal chest movement, nonlabored.  Heart: No gallop, murmur, rub, RRR.  Abdomen: Soft, nontender, positive bowel sounds, no organomegaly.  Extremities: No edema, no cyanosis.  Neuro: No focal deficit, moving all extremities, alert oriented   Skin dry  Labs:    Results from last 7 days  Lab Units 10/12/18  1004   WBC 10*3/mm3 8.21   HEMOGLOBIN g/dL 9.1*   HEMATOCRIT % 28.0*   PLATELETS 10*3/mm3 378       Results from last 7 days  Lab Units 10/18/18  0614 10/17/18  0729 10/16/18  0535 10/15/18  2332  10/15/18  0851   SODIUM mmol/L 127* 128* 130* 131*  < > 124*   POTASSIUM mmol/L 4.5 4.1 3.7 4.5  < > 4.0   CHLORIDE mmol/L 95* 96* 97* 97*  < > 95*   CO2 mmol/L 26.0 27.0 28.0 27.0  < > 25.0   BUN " Fasting labs ordered, mychart msg sent.      mg/dL 18 14 15 17  < > 12   CREATININE mg/dL 0.42* 0.44* 0.38* 0.46*  < > 0.32*   CALCIUM mg/dL 9.1 9.0 8.6* 8.6*  < > 8.5*   PHOSPHORUS mg/dL  --  3.1  --   --   --  3.4   MAGNESIUM mg/dL 1.8 1.6 1.9  --   --  1.5   ALBUMIN g/dL  --  3.74  --   --   --  3.39   < > = values in this interval not displayed.    Results from last 7 days  Lab Units 10/18/18  0614   GLUCOSE mg/dL 77         Results from last 7 days  Lab Units 10/15/18  0851   ALK PHOS U/L 102*   BILIRUBIN mg/dL 0.4   ALT (SGPT) U/L 31   AST (SGOT) U/L 25     Estimated Creatinine Clearance: 45.4 mL/min (A) (by C-G formula based on SCr of 0.42 mg/dL (L)).      Assessment       Hyponatremia, suspect SIADH    Generalized abdominal pain    Severe malnutrition (CMS/HCC)    Multiple right rib fractures (ninth, 10th, 11th)    Anemia    COPD and active cigarette abuse (CMS/HCC)     Impression:  HYPONATREMIA A LITTLE WORSE. SIADH.  According to CASE MANAGEMENT AND DR BATRES. SHE CANNOT GET TOLVAPTAN AT THE NURSING HOME. WILL TRY DEMECLOCYCLINE AND FLUID RESTRICTION.  Will add salt tablets      Recommendations: Add salt tablets  Check labs regularly.  Not ready for discharge today  Jose J Felix MD  10/18/18  11:34 AM